# Patient Record
Sex: FEMALE | Race: WHITE | Employment: OTHER | URBAN - METROPOLITAN AREA
[De-identification: names, ages, dates, MRNs, and addresses within clinical notes are randomized per-mention and may not be internally consistent; named-entity substitution may affect disease eponyms.]

---

## 2017-01-04 ENCOUNTER — HOSPITAL ENCOUNTER (OUTPATIENT)
Dept: PHYSICAL THERAPY | Age: 72
Discharge: HOME | End: 2017-01-04
Payer: MEDICARE

## 2017-01-04 PROCEDURE — G8979 MOBILITY GOAL STATUS: HCPCS

## 2017-01-04 PROCEDURE — 97162 PT EVAL MOD COMPLEX 30 MIN: CPT

## 2017-01-04 PROCEDURE — G8978 MOBILITY CURRENT STATUS: HCPCS

## 2017-01-04 PROCEDURE — 97110 THERAPEUTIC EXERCISES: CPT

## 2017-01-04 NOTE — PROGRESS NOTES
Ambulatory/Rehab Services H2 Model Falls Risk Assessment    Risk Factor Pts. ·   Confusion/Disorientation/Impulsivity  []    4 ·   Symptomatic Depression  []   2 ·   Altered Elimination  []   1 ·   Dizziness/Vertigo  []   1 ·   Gender (Male)  []   1 ·   Any administered antiepileptics (anticonvulsants):  []   2 ·   Any administered benzodiazepines:  []   1 ·   Visual Impairment (specify):  []   1 ·   Portable Oxygen Use  []   1 ·   Orthostatic ? BP  []   1 ·   History of Recent Falls (within 3 mos.)  []   5     Ability to Rise from Chair (choose one) Pts. ·   Ability to rise in a single movement  []   0 ·   Pushes up, successful in one attempt  [x]   1 ·   Multiple attempts, but successful  []   3 ·   Unable to rise without assistance  []   4   Total: (5 or greater = High Risk) 1     Falls Prevention Plan:   []                Physical Limitations to Exercise (specify):   []                Mobility Assistance Device (type):   []                Exercise/Equipment Adaptation (specify):    ©2010 [facility] [address] Patent #[id number].  Federal Law prohibits the replication, distribution or use without written permission from [facility] [employer]

## 2017-01-04 NOTE — PROGRESS NOTES
Leonela Thompson  : 1945 [facility] [place] [address], [address] [place] [address]  [phone]   [phone]             OUTPATIENT PHYSICAL THERAPY:Initial Assessment 2017    ICD-10: Treatment Diagnosis: pain in left knee M25.562, Pain in right knee M25.561, Abnormality of Gait R26.89  Precautions/Allergies:   Review of patient's allergies indicates no known allergies. Fall Risk Score: 1 (? 5 = High Risk)  MD Orders: Eval and treat MEDICAL/REFERRING DIAGNOSIS:  Unilateral primary osteoarthritis, right knee [M17.11]  Unilateral primary osteoarthritis, left knee [M17.12]   DATE OF ONSET: chronic  REFERRING PHYSICIAN: José Zuleta., *  RETURN PHYSICIAN APPOINTMENT: TBD     INITIAL ASSESSMENT:  Ms. Sean Brewer presents with severe gait deviations, poor tolerance for gait, decreased glute and quad strength, impaired TKE and end-range extension ROM, decreased tibiofemoral and PF joint mobility and impaired balance and proprioception that limits ability to prolonged ambulate and perform functional activities. Pt would benefit from skilled therapy to address these deficits for return to previous level of function. PROBLEM LIST (Impacting functional limitations):  1. Decreased Strength  2. Decreased ADL/Functional Activities  3. Decreased Transfer Abilities  4. Decreased Ambulation Ability/Technique  5. Decreased Balance  6. Increased Pain  7. Decreased Activity Tolerance  8. Decreased Flexibility/Joint Mobility  9. Decreased Knowledge of Precautions INTERVENTIONS PLANNED:  1. Cold  2. Gait Training  3. Home Exercise Program (HEP)  4. Manual Therapy  5. Range of Motion (ROM)  6. Therapeutic Exercise/Strengthening   TREATMENT PLAN:  Effective Dates: 17 TO 17  Frequency/Duration: 2 times a week for 12 weeks  GOALS: (Goals have been discussed and agreed upon with patient.)  Short-Term Functional Goals: Time Frame: 4 weeks  1.  Pt will be I with HEP to allow for continued progress with symptom management. 2. Pt will ambulate with moderate gait deviations to improve tolerance for gait. Discharge Goals: Time Frame: 8 weeks  1. Pt will improve LEFS score to >47 to reflect an improvement in function. 2. Pt will improve c/o pain to 5/10 to allow for improved tolerance for ambulation. 3. Pt will improve quad and glute strength to 4+/5 for improved strength for functional activities. 4. Pt will demonstrate trace gait deviations with use of single point cane for all distance for improved tolerance for ambulation. Rehabilitation Potential For Stated Goals: Good  Regarding [facility]'s therapy, I certify that the treatment plan above will be carried out by a therapist or under their direction. Thank you for this referral,  Delmy Nicholson, PT     Referring Physician Signature: Noelle Reyez., *              Date                    HISTORY:   History of Present Injury/Illness (Reason for Referral):  Pt is a 73 y.o. Female presenting to PT with bilateral knee pain with TKA scheduled in the next 6 months. Has had chemotherapy last year and feels that her knees worsened during this time period. States that her left knee is worse than her right. Reports that she has trouble with any walking and can only do stairs with a hand rail non-reciprocally. Limited to walking for about 30 minutes before having to take a break. Has a  so she is not having to do heavy house cleaning and states that her knees do not limit her from doing the light work that she does around the house. Difficulty rising from a chair using bilateral UE support. 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 Myron Lyman  has a past medical history of Anxiety; Arthritis; Breast cancer ([facility] [address] (1/18/2016); Breast cancer ([facility] [address] Cancer ([facility] [address] Former smoker; GERD (gastroesophageal reflux disease); Hard of hearing;  Headache; Hypertension; and Insomnia. She also has no past medical history of Adverse effect of anesthesia; Aneurysm ([facility] [address] Arrhythmia; Asthma; Autoimmune disease ([facility] [address] CAD (coronary artery disease); Chronic kidney disease; Chronic obstructive pulmonary disease ([facility] [address] Chronic pain; Coagulation disorder ([facility] [address] Diabetes ([facility] [address] Difficult intubation; Heart failure ([facility] [address] Ill-defined condition; Liver disease; Malignant hyperthermia due to anesthesia; Morbid obesity ([facility] [address] Pseudocholinesterase deficiency; Psychiatric disorder; PUD (peptic ulcer disease); Seizures ([facility] [address] Sleep apnea; Stroke [facility]); Thromboembolus ([facility] [address] Thyroid disease; or Unspecified adverse effect of anesthesia. Ms. Sherif Álvarez  has a past surgical history that includes colonoscopy; wisdom teeth extraction; tonsillectomy (1957); vascular access (Right); breast surgery procedure unlisted (Left, 7/2015); breast biopsy (Left, 1/18/2016); mastectomy, partial (1/18/2016); and breast lumpectomy (Left, 01/2016). Social History/Living Environment:     lives alone  Prior Level of Function/Work/Activity:  Restricted by multi-joint arthritis and effects of chemotherapy and radiation  Previous Treatment Approaches:          History of BRCA  Current Medications:    Current Outpatient Prescriptions:     TURMERIC, BULK,, by Does Not Apply route., Disp: , Rfl:     hydrochlorothiazide (HYDRODIURIL) 25 mg tablet, Take 1 Tab by mouth daily. Indications: HYPERTENSION, Disp: 30 Tab, Rfl: 5    levothyroxine (SYNTHROID) 25 mcg tablet, Take 1 Tab by mouth Daily (before breakfast). , Disp: 30 Tab, Rfl: 5    temazepam (RESTORIL) 15 mg capsule, Take 1 Cap by mouth nightly. Max Daily Amount: 15 mg., Disp: 30 Cap, Rfl: 5    calcium carbonate (TITRALAC) 168 mg (420 mg) chew, Take 1 Tab by mouth., Disp: , Rfl:     multivitamin (ONE A DAY) tablet, Take 1 Tab by mouth daily. , Disp: , Rfl:     esomeprazole (NEXIUM) 20 mg capsule, Take 20 mg by mouth every morning.  Take / use AM day of surgery  per anesthesia protocols. Indications: GASTROESOPHAGEAL REFLUX, Disp: , Rfl:      Date Last Reviewed:  1/4/2017     Number of Personal Factors/Comorbidities that affect the Plan of Care: 1-2: MODERATE COMPLEXITY   EXAMINATION:   Observation/Orthostatic Postural Assessment:     Joint effusion L>R  Knee posture-  Genu valgus L>R  Palpation:          Prominent bony build up in lateral joint line L>R  ROM:    LEFT        Knee flexion- 124 degrees  Knee extension-  4 degrees    RIGHT  Knee flexion- 130 degrees  Knee extension-  5 degrees    Strength:    LEFT  Knee flexion- 4-/5   Knee extension-  4-/5   Hip Ext- 4-/5   Hip Flexion-  4-/5   Hip ER- 4-/5   Hip IR- 4-/5   Hip ABD- 4-/5     RIGHT  Knee flexion- 4/5   Knee extension-  4/5   Hip Ext- 4/5   Hip Flexion-  4/5   Hip ER- 4/5   Hip IR- 4/5   Hip ABD- 4/5       Functional Mobility:         Gait/Ambulation: Stiff knee with hip hike bilaterally, L trendelenberg, slow and antalgic        Transfers:  Use of B UE for sit-to-stand        Bed Mobility:  WFL        Stairs:  Non-reciprocally relying on B UE for support with hand-rail  Balance:          SLS:  L- 1 sec, R 3 sec  Skin Integrity:          normal   Body Structures Involved:  1. Bones  2. Joints  3. Muscles  4. Ligaments Body Functions Affected:  1. Sensory/Pain  2. Neuromusculoskeletal  3. Movement Related Activities and Participation Affected:  1. Learning and Applying Knowledge  2. General Tasks and Demands  3. Mobility  4. Self Care   Number of elements that affect the Plan of Care: 3: MODERATE COMPLEXITY   CLINICAL PRESENTATION:   Presentation: Evolving clinical presentation with changing clinical characteristics: MODERATE COMPLEXITY   CLINICAL DECISION MAKING:   Outcome Measure:    Tool Used: Lower Extremity Functional Scale (LEFS)  Score:  Initial: 35/80 Most Recent: X/80 (Date: -- )   Interpretation of Score: 20 questions each scored on a 5 point scale with 0 representing \"extreme difficulty or unable to perform\" and 4 representing \"no difficulty\". The lower the score, the greater the functional disability. 80/80 represents no disability. Minimal detectable change is 9 points. Score 80 79-63 62-48 47-32 31-16 15-1 0   Modifier CH CI CJ CK CL CM CN     ? Mobility - Walking and Moving Around:     - CURRENT STATUS: CK - 40%-59% impaired, limited or restricted    - GOAL STATUS: CJ - 20%-39% impaired, limited or restricted    - D/C STATUS:  ---------------To be determined---------------    Medical Necessity:   · Patient is expected to demonstrate progress in strength, range of motion and balance to improve ambulation. Reason for Services/Other Comments:  · Patient continues to require present interventions due to patient's inability to prolonged ambulate. Use of outcome tool(s) and clinical judgement create a POC that gives a: Questionable prediction of patient's progress: MODERATE COMPLEXITY   TREATMENT:   (In addition to Assessment/Re-Assessment sessions the following treatments were rendered)  THERAPEUTIC EXERCISE: (14 minutes):  Exercises per grid below to improve mobility, strength and balance. Required moderate visual, verbal and manual cues to promote proper body alignment, promote proper body posture and promote proper body mechanics. Progressed resistance, range, repetitions and complexity of movement as indicated. Date:  01-04-17 Date:   Date:     Activity/Exercise Parameters Parameters Parameters   SLR 10 reps     Standing hip ABD 20 reps     SLS 3 min     March with cane 5 min                         MANUAL THERAPY: (15 minutes): Joint mobilization and Soft tissue mobilization was utilized and necessary because of the patient's restricted joint motion, painful spasm, loss of articular motion and restricted motion of soft tissue.         Treatment/Session Assessment:  Pt would benefit from skilled therapy to address the aforementioned deficits that limit functional ability to community ambulate. · Pre-treatment Symptoms:  Bilateral knee pain and difficulty walking  · Pain: Initial:  Pain Intensity 1:  (4-9/10)  Post Session:  4/10 ·   Compliance with Program/Exercises: Will assess as treatment progresses. · Recommendations/Intent for next treatment session: \"Next visit will focus on advancements to more challenging activities\".   Total Treatment Duration:  PT Patient Time In/Time Out  Time In: 0959  Time Out: [address] [facility]

## 2017-01-09 ENCOUNTER — HOSPITAL ENCOUNTER (OUTPATIENT)
Dept: PHYSICAL THERAPY | Age: 72
Discharge: HOME | End: 2017-01-09
Payer: MEDICARE

## 2017-01-11 ENCOUNTER — HOSPITAL ENCOUNTER (OUTPATIENT)
Dept: PHYSICAL THERAPY | Age: 72
Discharge: HOME | End: 2017-01-11
Payer: MEDICARE

## 2017-01-11 PROCEDURE — 97140 MANUAL THERAPY 1/> REGIONS: CPT

## 2017-01-11 PROCEDURE — 97110 THERAPEUTIC EXERCISES: CPT

## 2017-01-11 NOTE — PROGRESS NOTES
eDz Swanson  : 1945 [facility] [address] [place] [address], [place] [address].  [phone]   [phone]             OUTPATIENT PHYSICAL THERAPY:Daily Note 2017    ICD-10: Treatment Diagnosis: pain in left knee M25.562, Pain in right knee M25.561, Abnormality of Gait R26.89  Precautions/Allergies:   Review of patient's allergies indicates no known allergies. Fall Risk Score: 1 (? 5 = High Risk)  MD Orders: Eval and treat MEDICAL/REFERRING DIAGNOSIS:  Unilateral primary osteoarthritis, right knee [M17.11]  Unilateral primary osteoarthritis, left knee [M17.12]   DATE OF ONSET: chronic  REFERRING PHYSICIAN: Thang Pradhan, *  RETURN PHYSICIAN APPOINTMENT: TBD     INITIAL ASSESSMENT:  Ms. Sherif Álvarez presents with severe gait deviations, poor tolerance for gait, decreased glute and quad strength, impaired TKE and end-range extension ROM, decreased tibiofemoral and PF joint mobility and impaired balance and proprioception that limits ability to prolonged ambulate and perform functional activities. Pt would benefit from skilled therapy to address these deficits for return to previous level of function. PROBLEM LIST (Impacting functional limitations):  1. Decreased Strength  2. Decreased ADL/Functional Activities  3. Decreased Transfer Abilities  4. Decreased Ambulation Ability/Technique  5. Decreased Balance  6. Increased Pain  7. Decreased Activity Tolerance  8. Decreased Flexibility/Joint Mobility  9. Decreased Knowledge of Precautions INTERVENTIONS PLANNED:  1. Cold  2. Gait Training  3. Home Exercise Program (HEP)  4. Manual Therapy  5. Range of Motion (ROM)  6. Therapeutic Exercise/Strengthening   TREATMENT PLAN:  Effective Dates: 17 TO 17  Frequency/Duration: 2 times a week for 12 weeks  GOALS: (Goals have been discussed and agreed upon with patient.)  Short-Term Functional Goals: Time Frame: 4 weeks  1.  Pt will be I with HEP to allow for continued progress with symptom management. 2. Pt will ambulate with moderate gait deviations to improve tolerance for gait. Discharge Goals: Time Frame: 8 weeks  1. Pt will improve LEFS score to >47 to reflect an improvement in function. 2. Pt will improve c/o pain to 5/10 to allow for improved tolerance for ambulation. 3. Pt will improve quad and glute strength to 4+/5 for improved strength for functional activities. 4. Pt will demonstrate trace gait deviations with use of single point cane for all distance for improved tolerance for ambulation. Rehabilitation Potential For Stated Goals: Good  Regarding [facility]'s therapy, I certify that the treatment plan above will be carried out by a therapist or under their direction. Thank you for this referral,  Berhane Ortiz, PT     Referring Physician Signature: Tonnie Nageotte., *              Date                    HISTORY:   1/11/2017: Pt rates her current knee pain 5/10 L>R. History of Present Injury/Illness (Reason for Referral):  Pt is a 73 y.o. Female presenting to PT with bilateral knee pain with TKA scheduled in the next 6 months. Has had chemotherapy last year and feels that her knees worsened during this time period. States that her left knee is worse than her right. Reports that she has trouble with any walking and can only do stairs with a hand rail non-reciprocally. Limited to walking for about 30 minutes before having to take a break. Has a  so she is not having to do heavy house cleaning and states that her knees do not limit her from doing the light work that she does around the house. Difficulty rising from a chair using bilateral UE support. 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 Petr Chatman  has a past medical history of Anxiety; Arthritis; Breast cancer ([facility] [address] (1/18/2016); Breast cancer ([facility] [address] Cancer ([facility] [address]  Former smoker; GERD (gastroesophageal reflux disease); Hard of hearing; Headache; Hypertension; and Insomnia. She also has no past medical history of Adverse effect of anesthesia; Aneurysm ([facility] [address] Arrhythmia; Asthma; Autoimmune disease ([facility] [address] CAD (coronary artery disease); Chronic kidney disease; Chronic obstructive pulmonary disease ([facility] [address] Chronic pain; Coagulation disorder ([facility] [address] Diabetes ([facility] [address] Difficult intubation; Heart failure ([facility] [address] Ill-defined condition; Liver disease; Malignant hyperthermia due to anesthesia; Morbid obesity ([facility] [address] Pseudocholinesterase deficiency; Psychiatric disorder; PUD (peptic ulcer disease); Seizures ([facility] [address] Sleep apnea; Stroke [facility]); Thromboembolus ([facility] [address] Thyroid disease; or Unspecified adverse effect of anesthesia. Ms. Stefan Arevalo  has a past surgical history that includes colonoscopy; wisdom teeth extraction; tonsillectomy (1957); vascular access (Right); breast surgery procedure unlisted (Left, 7/2015); breast biopsy (Left, 1/18/2016); mastectomy, partial (1/18/2016); and breast lumpectomy (Left, 01/2016). Social History/Living Environment:     lives alone  Prior Level of Function/Work/Activity:  Restricted by multi-joint arthritis and effects of chemotherapy and radiation  Previous Treatment Approaches:          History of BRCA  Current Medications:    Current Outpatient Prescriptions:     celecoxib (CELEBREX) 200 mg capsule, TAKE ONE CAPSULE BY MOUTH ONE TIME DAILY, Disp: , Rfl: 3    TURMERIC, BULK,, by Does Not Apply route., Disp: , Rfl:     hydrochlorothiazide (HYDRODIURIL) 25 mg tablet, Take 1 Tab by mouth daily. Indications: HYPERTENSION, Disp: 30 Tab, Rfl: 5    levothyroxine (SYNTHROID) 25 mcg tablet, Take 1 Tab by mouth Daily (before breakfast). , Disp: 30 Tab, Rfl: 5    temazepam (RESTORIL) 15 mg capsule, Take 1 Cap by mouth nightly.  Max Daily Amount: 15 mg., Disp: 30 Cap, Rfl: 5    calcium carbonate (TITRALAC) 168 mg (420 mg) chew, Take 1 Tab by mouth., Disp: , Rfl:     multivitamin (ONE A DAY) tablet, Take 1 Tab by mouth daily. , Disp: , Rfl:     esomeprazole (NEXIUM) 20 mg capsule, Take 20 mg by mouth every morning. Take / use AM day of surgery  per anesthesia protocols. Indications: GASTROESOPHAGEAL REFLUX, Disp: , Rfl:      Date Last Reviewed:  1/11/2017     Number of Personal Factors/Comorbidities that affect the Plan of Care: 1-2: MODERATE COMPLEXITY   EXAMINATION:   Observation/Orthostatic Postural Assessment:     Joint effusion L>R  Knee posture-  Genu valgus L>R  Palpation:          Prominent bony build up in lateral joint line L>R  ROM:    LEFT        Knee flexion- 124 degrees  Knee extension-  4 degrees    RIGHT  Knee flexion- 130 degrees  Knee extension-  5 degrees    Strength:    LEFT  Knee flexion- 4-/5   Knee extension-  4-/5   Hip Ext- 4-/5   Hip Flexion-  4-/5   Hip ER- 4-/5   Hip IR- 4-/5   Hip ABD- 4-/5     RIGHT  Knee flexion- 4/5   Knee extension-  4/5   Hip Ext- 4/5   Hip Flexion-  4/5   Hip ER- 4/5   Hip IR- 4/5   Hip ABD- 4/5       Functional Mobility:         Gait/Ambulation: Stiff knee with hip hike bilaterally, L trendelenberg, slow and antalgic        Transfers:  Use of B UE for sit-to-stand        Bed Mobility:  WFL        Stairs:  Non-reciprocally relying on B UE for support with hand-rail  Balance:          SLS:  L- 1 sec, R 3 sec  Skin Integrity:          normal   Body Structures Involved:  1. Bones  2. Joints  3. Muscles  4. Ligaments Body Functions Affected:  1. Sensory/Pain  2. Neuromusculoskeletal  3. Movement Related Activities and Participation Affected:  1. Learning and Applying Knowledge  2. General Tasks and Demands  3. Mobility  4. Self Care   Number of elements that affect the Plan of Care: 3: MODERATE COMPLEXITY   CLINICAL PRESENTATION:   Presentation: Evolving clinical presentation with changing clinical characteristics: MODERATE COMPLEXITY   CLINICAL DECISION MAKING:   Outcome Measure:    Tool Used: Lower Extremity Functional Scale (LEFS)  Score:  Initial: 35/80 Most Recent: X/80 (Date: -- )   Interpretation of Score: 20 questions each scored on a 5 point scale with 0 representing \"extreme difficulty or unable to perform\" and 4 representing \"no difficulty\". The lower the score, the greater the functional disability. 80/80 represents no disability. Minimal detectable change is 9 points. Score 80 79-63 62-48 47-32 31-16 15-1 0   Modifier CH CI CJ CK CL CM CN     ? Mobility - Walking and Moving Around:     - CURRENT STATUS: CK - 40%-59% impaired, limited or restricted    - GOAL STATUS: CJ - 20%-39% impaired, limited or restricted    - D/C STATUS:  ---------------To be determined---------------    Medical Necessity:   · Patient is expected to demonstrate progress in strength, range of motion and balance to improve ambulation. Reason for Services/Other Comments:  · Patient continues to require present interventions due to patient's inability to prolonged ambulate. Use of outcome tool(s) and clinical judgement create a POC that gives a: Questionable prediction of patient's progress: MODERATE COMPLEXITY   TREATMENT:   (In addition to Assessment/Re-Assessment sessions the following treatments were rendered)  THERAPEUTIC EXERCISE: (35 minutes):  Exercises per grid below to improve mobility, strength and balance. Required moderate visual, verbal and manual cues to promote proper body alignment, promote proper body posture and promote proper body mechanics. Progressed resistance, range, repetitions and complexity of movement as indicated.    Date:  01-04-17 Date:  01-11-17 Date:     Activity/Exercise Parameters Parameters Parameters   SLR 10 reps 15 reps  B LE's    Standing hip ABD 20 reps     SLS 3 min     March with cane 5 min     NuStep  Level 3  8 minutes    Standing Heel/Toe Raises  20 reps each    Step-Ups  6 inch step  10 reps each LE    Gastroc Slantboard  3 reps  20 sec holds    LAQ's  15 reps  B LE's    SAQ's  15 reps  B LE's    Gait Training with Cane  5 minutes MANUAL THERAPY: (10 minutes): Joint mobilization and Soft tissue mobilization was utilized and necessary because of the patient's restricted joint motion, painful spasm, loss of articular motion and restricted motion of soft tissue. L knee       Treatment/Session Assessment:  Pt denied pain at end of session. · Pre-treatment Symptoms:  Bilateral knee pain and difficulty walking  · Pain: Initial:   5/10  Post Session:  0/10 ·   Compliance with Program/Exercises: Will assess as treatment progresses. · Recommendations/Intent for next treatment session: \"Next visit will focus on advancements to more challenging activities\".   Total Treatment Duration:  PT Patient Time In/Time Out  Time In: 0940  Time Out: [address], PT

## 2017-01-13 ENCOUNTER — HOSPITAL ENCOUNTER (OUTPATIENT)
Dept: PHYSICAL THERAPY | Age: 72
Discharge: HOME | End: 2017-01-13
Payer: MEDICARE

## 2017-01-16 ENCOUNTER — HOSPITAL ENCOUNTER (OUTPATIENT)
Dept: PHYSICAL THERAPY | Age: 72
Discharge: HOME | End: 2017-01-16
Payer: MEDICARE

## 2017-01-16 PROCEDURE — 97110 THERAPEUTIC EXERCISES: CPT

## 2017-01-16 PROCEDURE — 97140 MANUAL THERAPY 1/> REGIONS: CPT

## 2017-01-16 NOTE — PROGRESS NOTES
Cresencio Pereira  : 1945 [facility] [place] [address],[address] [facility], [facility].  [phone]   [phone]             OUTPATIENT PHYSICAL THERAPY:Daily Note 2017    ICD-10: Treatment Diagnosis: pain in left knee M25.562, Pain in right knee M25.561, Abnormality of Gait R26.89  Precautions/Allergies:   Review of patient's allergies indicates no known allergies. Fall Risk Score: 1 (? 5 = High Risk)  MD Orders: Eval and treat MEDICAL/REFERRING DIAGNOSIS:  Unilateral primary osteoarthritis, right knee [M17.11]  Unilateral primary osteoarthritis, left knee [M17.12]   DATE OF ONSET: chronic  REFERRING PHYSICIAN: Dorene Gutierrez., *  RETURN PHYSICIAN APPOINTMENT: TBD     INITIAL ASSESSMENT:  Ms. Protia Bean presents with severe gait deviations, poor tolerance for gait, decreased glute and quad strength, impaired TKE and end-range extension ROM, decreased tibiofemoral and PF joint mobility and impaired balance and proprioception that limits ability to prolonged ambulate and perform functional activities. Pt would benefit from skilled therapy to address these deficits for return to previous level of function. PROBLEM LIST (Impacting functional limitations):  1. Decreased Strength  2. Decreased ADL/Functional Activities  3. Decreased Transfer Abilities  4. Decreased Ambulation Ability/Technique  5. Decreased Balance  6. Increased Pain  7. Decreased Activity Tolerance  8. Decreased Flexibility/Joint Mobility  9. Decreased Knowledge of Precautions INTERVENTIONS PLANNED:  1. Cold  2. Gait Training  3. Home Exercise Program (HEP)  4. Manual Therapy  5. Range of Motion (ROM)  6. Therapeutic Exercise/Strengthening   TREATMENT PLAN:  Effective Dates: 17 TO 17  Frequency/Duration: 2 times a week for 12 weeks  GOALS: (Goals have been discussed and agreed upon with patient.)  Short-Term Functional Goals: Time Frame: 4 weeks  1.  Pt will be I with HEP to allow for continued progress with symptom management. 2. Pt will ambulate with moderate gait deviations to improve tolerance for gait. Discharge Goals: Time Frame: 8 weeks  1. Pt will improve LEFS score to >47 to reflect an improvement in function. 2. Pt will improve c/o pain to 5/10 to allow for improved tolerance for ambulation. 3. Pt will improve quad and glute strength to 4+/5 for improved strength for functional activities. 4. Pt will demonstrate trace gait deviations with use of single point cane for all distance for improved tolerance for ambulation. Rehabilitation Potential For Stated Goals: Good  Regarding Norberto Rust's therapy, I certify that the treatment plan above will be carried out by a therapist or under their direction. Thank you for this referral,  Genesis Nixon, PT     Referring Physician Signature: Hermilo Montes De Oca., *              Date                    HISTORY:   1/16/2017: Pt states she walked around [place] about 3 miles on Saturday and rates her current knee pain 9/10. She states she is also having pain in her low back today. History of Present Injury/Illness (Reason for Referral):  Pt is a 73 y.o. Female presenting to PT with bilateral knee pain with TKA scheduled in the next 6 months. Has had chemotherapy last year and feels that her knees worsened during this time period. States that her left knee is worse than her right. Reports that she has trouble with any walking and can only do stairs with a hand rail non-reciprocally. Limited to walking for about 30 minutes before having to take a break. Has a  so she is not having to do heavy house cleaning and states that her knees do not limit her from doing the light work that she does around the house. Difficulty rising from a chair using bilateral UE support. Enjoys doing yoga. Retired hairdresser.   Past Medical History/Comorbidities:   Ms. Juani Henriquez  has a past medical history of Anxiety; Arthritis; Breast cancer ([facility] [address] (1/18/2016); Breast cancer ([facility] [address] Cancer ([facility] [address] Former smoker; GERD (gastroesophageal reflux disease); Hard of hearing; Headache; Hypertension; and Insomnia. She also has no past medical history of Adverse effect of anesthesia; Aneurysm ([facility] [address] [facility] Arrhythmia; Asthma; Autoimmune disease ([facility] [address] [facility] CAD (coronary artery disease); Chronic kidney disease; Chronic obstructive pulmonary disease ([facility] [address] [facility] Chronic pain; Coagulation disorder ([facility] [address] [facility] Diabetes ([facility] [address] [facility] Difficult intubation; Heart failure ([facility] [address] [facility] Ill-defined condition; Liver disease; Malignant hyperthermia due to anesthesia; Morbid obesity ([facility] [facility] Pseudocholinesterase deficiency; Psychiatric disorder; PUD (peptic ulcer disease); Seizures ([facility] [address] [facility] Sleep apnea; Stroke [facility]); Thromboembolus ([facility] [address] [facility] Thyroid disease; or Unspecified adverse effect of anesthesia. Ms. Luis Ballard  has a past surgical history that includes colonoscopy; wisdom teeth extraction; tonsillectomy (1957); vascular access (Right); breast surgery procedure unlisted (Left, 7/2015); breast biopsy (Left, 1/18/2016); mastectomy, partial (1/18/2016); and breast lumpectomy (Left, 01/2016). Social History/Living Environment:     lives alone  Prior Level of Function/Work/Activity:  Restricted by multi-joint arthritis and effects of chemotherapy and radiation  Previous Treatment Approaches:          History of BRCA  Current Medications:    Current Outpatient Prescriptions:     celecoxib (CELEBREX) 200 mg capsule, TAKE ONE CAPSULE BY MOUTH ONE TIME DAILY, Disp: , Rfl: 3    TURMERIC, BULK,, by Does Not Apply route., Disp: , Rfl:     hydrochlorothiazide (HYDRODIURIL) 25 mg tablet, Take 1 Tab by mouth daily. Indications: HYPERTENSION, Disp: 30 Tab, Rfl: 5    levothyroxine (SYNTHROID) 25 mcg tablet, Take 1 Tab by mouth Daily (before breakfast). , Disp: 30 Tab, Rfl: 5    temazepam (RESTORIL) 15 mg capsule, Take 1 Cap by mouth nightly.  Max Daily Amount: 15 mg., Disp: 30 Cap, Rfl: 5    calcium carbonate (TITRALAC) 168 mg (420 mg) chew, Take 1 Tab by mouth., Disp: , Rfl:     multivitamin (ONE A DAY) tablet, Take 1 Tab by mouth daily. , Disp: , Rfl:     esomeprazole (NEXIUM) 20 mg capsule, Take 20 mg by mouth every morning. Take / use AM day of surgery  per anesthesia protocols. Indications: GASTROESOPHAGEAL REFLUX, Disp: , Rfl:      Date Last Reviewed:  1/16/2017     Number of Personal Factors/Comorbidities that affect the Plan of Care: 1-2: MODERATE COMPLEXITY   EXAMINATION:   Observation/Orthostatic Postural Assessment:     Joint effusion L>R  Knee posture-  Genu valgus L>R  Palpation:          Prominent bony build up in lateral joint line L>R  ROM:    LEFT        Knee flexion- 124 degrees  Knee extension-  4 degrees    RIGHT  Knee flexion- 130 degrees  Knee extension-  5 degrees    Strength:    LEFT  Knee flexion- 4-/5   Knee extension-  4-/5   Hip Ext- 4-/5   Hip Flexion-  4-/5   Hip ER- 4-/5   Hip IR- 4-/5   Hip ABD- 4-/5     RIGHT  Knee flexion- 4/5   Knee extension-  4/5   Hip Ext- 4/5   Hip Flexion-  4/5   Hip ER- 4/5   Hip IR- 4/5   Hip ABD- 4/5       Functional Mobility:         Gait/Ambulation: Stiff knee with hip hike bilaterally, L trendelenberg, slow and antalgic        Transfers:  Use of B UE for sit-to-stand        Bed Mobility:  WFL        Stairs:  Non-reciprocally relying on B UE for support with hand-rail  Balance:          SLS:  L- 1 sec, R 3 sec  Skin Integrity:          normal   Body Structures Involved:  1. Bones  2. Joints  3. Muscles  4. Ligaments Body Functions Affected:  1. Sensory/Pain  2. Neuromusculoskeletal  3. Movement Related Activities and Participation Affected:  1. Learning and Applying Knowledge  2. General Tasks and Demands  3. Mobility  4.  Self Care   Number of elements that affect the Plan of Care: 3: MODERATE COMPLEXITY   CLINICAL PRESENTATION:   Presentation: Evolving clinical presentation with changing clinical characteristics: MODERATE COMPLEXITY   CLINICAL DECISION MAKING:   Outcome Measure: Tool Used: Lower Extremity Functional Scale (LEFS)  Score:  Initial: 35/80 Most Recent: X/80 (Date: -- )   Interpretation of Score: 20 questions each scored on a 5 point scale with 0 representing \"extreme difficulty or unable to perform\" and 4 representing \"no difficulty\". The lower the score, the greater the functional disability. 80/80 represents no disability. Minimal detectable change is 9 points. Score 80 79-63 62-48 47-32 31-16 15-1 0   Modifier CH CI CJ CK CL CM CN     ? Mobility - Walking and Moving Around:     - CURRENT STATUS: CK - 40%-59% impaired, limited or restricted    - GOAL STATUS: CJ - 20%-39% impaired, limited or restricted    - D/C STATUS:  ---------------To be determined---------------    Medical Necessity:   · Patient is expected to demonstrate progress in strength, range of motion and balance to improve ambulation. Reason for Services/Other Comments:  · Patient continues to require present interventions due to patient's inability to prolonged ambulate. Use of outcome tool(s) and clinical judgement create a POC that gives a: Questionable prediction of patient's progress: MODERATE COMPLEXITY   TREATMENT:   (In addition to Assessment/Re-Assessment sessions the following treatments were rendered)  THERAPEUTIC EXERCISE: (30 minutes):  Exercises per grid below to improve mobility, strength and balance. Required moderate visual, verbal and manual cues to promote proper body alignment, promote proper body posture and promote proper body mechanics. Progressed resistance, range, repetitions and complexity of movement as indicated.    Date:  01-04-17 Date:  01-11-17 Date:  01-16-17   Activity/Exercise Parameters Parameters Parameters   SLR 10 reps 15 reps  B LE's 20 reps  B LE's   Standing hip ABD 20 reps     SLS 3 min     March with cane 5 min     NuStep  Level 3  8 minutes Level 3  8 minutes   Standing Heel/Toe Raises  20 reps each 20 reps each   Step-Ups  6 inch step  10 reps each LE 6 inch step  15 reps each LE   Gastroc Slantboard  3 reps  20 sec holds 3 reps  20 sec holds   LAQ's  15 reps  B LE's 20 reps  B LE's   SAQ's  15 reps  B LE's 20 reps  B LE's   Gait Training with Cane  5 minutes 5 minutes     MANUAL THERAPY: (10 minutes): Joint mobilization and Soft tissue mobilization was utilized and necessary because of the patient's restricted joint motion, painful spasm, loss of articular motion and restricted motion of soft tissue. L knee     Modalities: Cold pack to bilateral knees x10 minutes to decrease pain. Skin clear afterwards. Treatment/Session Assessment:  Pt tolerated treatments fairly today. Did not progress exercises due to increased pain from excessive walking this weekend. She stated her knees felt much better after session today. · Pre-treatment Symptoms:  Bilateral knee pain and difficulty walking  · Pain: Initial:   9/10  Post Session:  2/10 ·   Compliance with Program/Exercises: Will assess as treatment progresses. · Recommendations/Intent for next treatment session: \"Next visit will focus on advancements to more challenging activities\".   Total Treatment Duration:  50 minutes  PT Patient Time In/Time Out  Time In: 1030  Time Out: Miguel [address] Rvias, PT

## 2017-01-18 ENCOUNTER — HOSPITAL ENCOUNTER (OUTPATIENT)
Dept: PHYSICAL THERAPY | Age: 72
Discharge: HOME | End: 2017-01-18
Payer: MEDICARE

## 2017-01-18 PROCEDURE — 97110 THERAPEUTIC EXERCISES: CPT

## 2017-01-18 PROCEDURE — 97140 MANUAL THERAPY 1/> REGIONS: CPT

## 2017-01-18 NOTE — PROGRESS NOTES
Cielo Ng  : 1945 [facility] [address] [place] [address],[address], [facility] [address]  [phone]   [phone]             OUTPATIENT PHYSICAL THERAPY:Daily Note 2017    ICD-10: Treatment Diagnosis: pain in left knee M25.562, Pain in right knee M25.561, Abnormality of Gait R26.89  Precautions/Allergies:   Review of patient's allergies indicates no known allergies. Fall Risk Score: 1 (? 5 = High Risk)  MD Orders: Eval and treat MEDICAL/REFERRING DIAGNOSIS:  Unilateral primary osteoarthritis, right knee [M17.11]  Unilateral primary osteoarthritis, left knee [M17.12]   DATE OF ONSET: chronic  REFERRING PHYSICIAN: Bob Winters., *  RETURN PHYSICIAN APPOINTMENT: TBD     INITIAL ASSESSMENT:  Ms. Angel King presents with severe gait deviations, poor tolerance for gait, decreased glute and quad strength, impaired TKE and end-range extension ROM, decreased tibiofemoral and PF joint mobility and impaired balance and proprioception that limits ability to prolonged ambulate and perform functional activities. Pt would benefit from skilled therapy to address these deficits for return to previous level of function. PROBLEM LIST (Impacting functional limitations):  1. Decreased Strength  2. Decreased ADL/Functional Activities  3. Decreased Transfer Abilities  4. Decreased Ambulation Ability/Technique  5. Decreased Balance  6. Increased Pain  7. Decreased Activity Tolerance  8. Decreased Flexibility/Joint Mobility  9. Decreased Knowledge of Precautions INTERVENTIONS PLANNED:  1. Cold  2. Gait Training  3. Home Exercise Program (HEP)  4. Manual Therapy  5. Range of Motion (ROM)  6. Therapeutic Exercise/Strengthening   TREATMENT PLAN:  Effective Dates: 17 TO 17  Frequency/Duration: 2 times a week for 12 weeks  GOALS: (Goals have been discussed and agreed upon with patient.)  Short-Term Functional Goals: Time Frame: 4 weeks  1.  Pt will be I with HEP to allow for continued progress with symptom management. 2. Pt will ambulate with moderate gait deviations to improve tolerance for gait. Discharge Goals: Time Frame: 8 weeks  1. Pt will improve LEFS score to >47 to reflect an improvement in function. 2. Pt will improve c/o pain to 5/10 to allow for improved tolerance for ambulation. 3. Pt will improve quad and glute strength to 4+/5 for improved strength for functional activities. 4. Pt will demonstrate trace gait deviations with use of single point cane for all distance for improved tolerance for ambulation. Rehabilitation Potential For Stated Goals: Good  Regarding [facility]'s therapy, I certify that the treatment plan above will be carried out by a therapist or under their direction. Thank you for this referral,  Delmy Nicholson, PT     Referring Physician Signature: Noelle Reyez., *              Date                    HISTORY:   1/18/2017: Pt reports that her knees are much better today because she has not walked as much and because it is not raining. History of Present Injury/Illness (Reason for Referral):  Pt is a 73 y.o. Female presenting to PT with bilateral knee pain with TKA scheduled in the next 6 months. Has had chemotherapy last year and feels that her knees worsened during this time period. States that her left knee is worse than her right. Reports that she has trouble with any walking and can only do stairs with a hand rail non-reciprocally. Limited to walking for about 30 minutes before having to take a break. Has a  so she is not having to do heavy house cleaning and states that her knees do not limit her from doing the light work that she does around the house. Difficulty rising from a chair using bilateral UE support. 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 Myron Lyman  has a past medical history of Anxiety;  Arthritis; Breast cancer ([facility] [address] (1/18/2016); Breast cancer ([facility] [address] Cancer ([facility] [address] Former smoker; GERD (gastroesophageal reflux disease); Hard of hearing; Headache; Hypertension; and Insomnia. She also has no past medical history of Adverse effect of anesthesia; Aneurysm ([facility] [address] Arrhythmia; Asthma; Autoimmune disease ([facility] [address] CAD (coronary artery disease); Chronic kidney disease; Chronic obstructive pulmonary disease ([facility] [address] Chronic pain; Coagulation disorder ([facility] [address] Diabetes ([facility] [address] Difficult intubation; Heart failure ([facility] [address] Ill-defined condition; Liver disease; Malignant hyperthermia due to anesthesia; Morbid obesity ([facility] [address] Pseudocholinesterase deficiency; Psychiatric disorder; PUD (peptic ulcer disease); Seizures ([facility] [address] Sleep apnea; Stroke [facility]); Thromboembolus ([facility] [address] Thyroid disease; or Unspecified adverse effect of anesthesia. Ms. Siomara Cabrera  has a past surgical history that includes colonoscopy; wisdom teeth extraction; tonsillectomy (1957); vascular access (Right); breast surgery procedure unlisted (Left, 7/2015); breast biopsy (Left, 1/18/2016); mastectomy, partial (1/18/2016); and breast lumpectomy (Left, 01/2016). Social History/Living Environment:     lives alone  Prior Level of Function/Work/Activity:  Restricted by multi-joint arthritis and effects of chemotherapy and radiation  Previous Treatment Approaches:          History of BRCA  Current Medications:    Current Outpatient Prescriptions:     temazepam (RESTORIL) 15 mg capsule, Take 1 Cap by mouth nightly. Max Daily Amount: 15 mg., Disp: 30 Cap, Rfl: 5    celecoxib (CELEBREX) 200 mg capsule, TAKE ONE CAPSULE BY MOUTH ONE TIME DAILY, Disp: , Rfl: 3    TURMERIC, BULK,, by Does Not Apply route., Disp: , Rfl:     hydrochlorothiazide (HYDRODIURIL) 25 mg tablet, Take 1 Tab by mouth daily. Indications: HYPERTENSION, Disp: 30 Tab, Rfl: 5    levothyroxine (SYNTHROID) 25 mcg tablet, Take 1 Tab by mouth Daily (before breakfast). , Disp: 30 Tab, Rfl: 5    calcium carbonate (TITRALAC) 168 mg (420 mg) chew, Take 1 Tab by mouth., Disp: , Rfl:     multivitamin (ONE A DAY) tablet, Take 1 Tab by mouth daily. , Disp: , Rfl:     esomeprazole (NEXIUM) 20 mg capsule, Take 20 mg by mouth every morning. Take / use AM day of surgery  per anesthesia protocols. Indications: GASTROESOPHAGEAL REFLUX, Disp: , Rfl:      Date Last Reviewed:  1/18/2017     Number of Personal Factors/Comorbidities that affect the Plan of Care: 1-2: MODERATE COMPLEXITY   EXAMINATION:   Observation/Orthostatic Postural Assessment:     Joint effusion L>R  Knee posture-  Genu valgus L>R  Palpation:          Prominent bony build up in lateral joint line L>R  ROM:    LEFT        Knee flexion- 124 degrees  Knee extension-  4 degrees    RIGHT  Knee flexion- 130 degrees  Knee extension-  5 degrees    Strength:    LEFT  Knee flexion- 4-/5   Knee extension-  4-/5   Hip Ext- 4-/5   Hip Flexion-  4-/5   Hip ER- 4-/5   Hip IR- 4-/5   Hip ABD- 4-/5     RIGHT  Knee flexion- 4/5   Knee extension-  4/5   Hip Ext- 4/5   Hip Flexion-  4/5   Hip ER- 4/5   Hip IR- 4/5   Hip ABD- 4/5       Functional Mobility:         Gait/Ambulation: Stiff knee with hip hike bilaterally, L trendelenberg, slow and antalgic        Transfers:  Use of B UE for sit-to-stand        Bed Mobility:  WFL        Stairs:  Non-reciprocally relying on B UE for support with hand-rail  Balance:          SLS:  L- 1 sec, R 3 sec  Skin Integrity:          normal   Body Structures Involved:  1. Bones  2. Joints  3. Muscles  4. Ligaments Body Functions Affected:  1. Sensory/Pain  2. Neuromusculoskeletal  3. Movement Related Activities and Participation Affected:  1. Learning and Applying Knowledge  2. General Tasks and Demands  3. Mobility  4. Self Care   Number of elements that affect the Plan of Care: 3: MODERATE COMPLEXITY   CLINICAL PRESENTATION:   Presentation: Evolving clinical presentation with changing clinical characteristics: MODERATE COMPLEXITY   CLINICAL DECISION MAKING:   Outcome Measure:    Tool Used: Lower Extremity Functional Scale (LEFS)  Score:  Initial: 35/80 Most Recent: X/80 (Date: -- )   Interpretation of Score: 20 questions each scored on a 5 point scale with 0 representing \"extreme difficulty or unable to perform\" and 4 representing \"no difficulty\". The lower the score, the greater the functional disability. 80/80 represents no disability. Minimal detectable change is 9 points. Score 80 79-63 62-48 47-32 31-16 15-1 0   Modifier CH CI CJ CK CL CM CN     ? Mobility - Walking and Moving Around:     - CURRENT STATUS: CK - 40%-59% impaired, limited or restricted    - GOAL STATUS: CJ - 20%-39% impaired, limited or restricted    - D/C STATUS:  ---------------To be determined---------------    Medical Necessity:   · Patient is expected to demonstrate progress in strength, range of motion and balance to improve ambulation. Reason for Services/Other Comments:  · Patient continues to require present interventions due to patient's inability to prolonged ambulate. Use of outcome tool(s) and clinical judgement create a POC that gives a: Questionable prediction of patient's progress: MODERATE COMPLEXITY   TREATMENT:   (In addition to Assessment/Re-Assessment sessions the following treatments were rendered)  THERAPEUTIC EXERCISE: (25 minutes):  Exercises per grid below to improve mobility, strength and balance. Required moderate visual, verbal and manual cues to promote proper body alignment, promote proper body posture and promote proper body mechanics. Progressed resistance, range, repetitions and complexity of movement as indicated.    Date:  01-04-17 Date:  01-11-17 Date:  01-16-17 Date:  01-18-17   Activity/Exercise Parameters Parameters Parameters    SLR 10 reps 15 reps  B LE's 20 reps  B LE's 20 reps B LE's   Standing hip ABD 20 reps   20 reps   SLS 3 min      March with cane 5 min      NuStep  Level 3  8 minutes Level 3  8 minutes 8 min level 3.0   Standing Heel/Toe Raises  20 reps each 20 reps each 20 reps   Step-Ups  6 inch step  10 reps each LE 6 inch step  15 reps each LE 6 inch step x 15 reps   Gastroc Slantboard  3 reps  20 sec holds 3 reps  20 sec holds 3x20 sec hold   LAQ's  15 reps  B LE's 20 reps  B LE's 20 reps B LE   SAQ's  15 reps  B LE's 20 reps  B LE's    Gait Training with Cane  5 minutes 5 minutes 5 min   Heel raises    3x20 reps                                 MANUAL THERAPY: (13 minutes): Joint mobilization and Soft tissue mobilization was utilized and necessary because of the patient's restricted joint motion, painful spasm, loss of articular motion and restricted motion of soft tissue. L knee     Modalities: Cold pack to bilateral knees x10 minutes to decrease pain. Skin clear afterwards. Treatment/Session Assessment:  Pt unable to ambulate for prolonged distance and resorted back to stiff knee gait by end of session. · Pre-treatment Symptoms:  Bilateral knee pain and difficulty walking  · Pain: Initial:  Pain Intensity 1: 5 Post Session:  2/10 ·   Compliance with Program/Exercises: Will assess as treatment progresses. · Recommendations/Intent for next treatment session: \"Next visit will focus on advancements to more challenging activities\".   Total Treatment Duration:  50 minutes  PT Patient Time In/Time Out  Time In: Kathya  Time Out: Adrián

## 2017-01-20 ENCOUNTER — HOSPITAL ENCOUNTER (OUTPATIENT)
Dept: PHYSICAL THERAPY | Age: 72
Discharge: HOME | End: 2017-01-20
Payer: MEDICARE

## 2017-01-20 ENCOUNTER — HOSPITAL ENCOUNTER (OUTPATIENT)
Dept: LAB | Age: 72
Discharge: HOME | End: 2017-01-20
Payer: MEDICARE

## 2017-01-20 DIAGNOSIS — C50.912 MALIGNANT NEOPLASM OF LEFT FEMALE BREAST, UNSPECIFIED SITE OF BREAST: Chronic | ICD-10-CM

## 2017-01-20 LAB
ALBUMIN SERPL BCP-MCNC: 4 G/DL (ref 3.2–4.6)
ALBUMIN/GLOB SERPL: 1.1 {RATIO} (ref 1.2–3.5)
ALP SERPL-CCNC: 95 U/L (ref 50–136)
ALT SERPL-CCNC: 23 U/L (ref 12–65)
ANION GAP BLD CALC-SCNC: 6 MMOL/L (ref 7–16)
AST SERPL W P-5'-P-CCNC: 18 U/L (ref 15–37)
BASOPHILS # BLD AUTO: 0 K/UL (ref 0–0.2)
BASOPHILS # BLD: 1 % (ref 0–2)
BILIRUB SERPL-MCNC: 0.6 MG/DL (ref 0.2–1.1)
BUN SERPL-MCNC: 23 MG/DL (ref 8–23)
CALCIUM SERPL-MCNC: 9.8 MG/DL (ref 8.3–10.4)
CANCER AG15-3 SERPL-ACNC: 15 U/ML (ref 1–35)
CHLORIDE SERPL-SCNC: 103 MMOL/L (ref 98–107)
CO2 SERPL-SCNC: 29 MMOL/L (ref 23–32)
CREAT SERPL-MCNC: 0.9 MG/DL (ref 0.6–1)
DIFFERENTIAL METHOD BLD: NORMAL
EOSINOPHIL # BLD: 0.1 K/UL (ref 0–0.8)
EOSINOPHIL NFR BLD: 2 % (ref 0.5–7.8)
ERYTHROCYTE [DISTWIDTH] IN BLOOD BY AUTOMATED COUNT: 13.8 % (ref 11.9–14.6)
GLOBULIN SER CALC-MCNC: 3.7 G/DL (ref 2.3–3.5)
GLUCOSE SERPL-MCNC: 92 MG/DL (ref 65–100)
HCT VFR BLD AUTO: 41.3 % (ref 35.8–46.3)
HGB BLD-MCNC: 13.4 G/DL (ref 11.7–15.4)
LYMPHOCYTES # BLD AUTO: 17 % (ref 13–44)
LYMPHOCYTES # BLD: 1.1 K/UL (ref 0.5–4.6)
MCH RBC QN AUTO: 27.9 PG (ref 26.1–32.9)
MCHC RBC AUTO-ENTMCNC: 32.4 G/DL (ref 31.4–35)
MCV RBC AUTO: 86 FL (ref 79.6–97.8)
MONOCYTES # BLD: 0.6 K/UL (ref 0.1–1.3)
MONOCYTES NFR BLD AUTO: 10 % (ref 4–12)
NEUTS SEG # BLD: 4.5 K/UL (ref 1.7–8.2)
NEUTS SEG NFR BLD AUTO: 71 % (ref 43–78)
NRBC # BLD: 0 K/UL (ref 0–0.2)
PLATELET # BLD AUTO: 192 K/UL (ref 150–450)
PMV BLD AUTO: 11.5 FL (ref 10.8–14.1)
POTASSIUM SERPL-SCNC: 3.8 MMOL/L (ref 3.5–5.1)
PROT SERPL-MCNC: 7.7 G/DL (ref 6.3–8.2)
RBC # BLD AUTO: 4.8 M/UL (ref 4.05–5.25)
SODIUM SERPL-SCNC: 138 MMOL/L (ref 136–145)
WBC # BLD AUTO: 6.4 K/UL (ref 4.3–11.1)

## 2017-01-20 PROCEDURE — 85025 COMPLETE CBC W/AUTO DIFF WBC: CPT | Performed by: INTERNAL MEDICINE

## 2017-01-20 PROCEDURE — 86300 IMMUNOASSAY TUMOR CA 15-3: CPT | Performed by: INTERNAL MEDICINE

## 2017-01-20 PROCEDURE — 80053 COMPREHEN METABOLIC PANEL: CPT | Performed by: INTERNAL MEDICINE

## 2017-01-20 PROCEDURE — 36415 COLL VENOUS BLD VENIPUNCTURE: CPT | Performed by: INTERNAL MEDICINE

## 2017-01-23 ENCOUNTER — HOSPITAL ENCOUNTER (OUTPATIENT)
Dept: PHYSICAL THERAPY | Age: 72
Discharge: HOME | End: 2017-01-23
Payer: MEDICARE

## 2017-01-23 PROCEDURE — 97110 THERAPEUTIC EXERCISES: CPT

## 2017-01-23 NOTE — PROGRESS NOTES
Roger Rivas  : 1945 [facility]  [facility] [address] [address], [place] [address].  [phone]   [phone]             OUTPATIENT PHYSICAL THERAPY:Daily Note 2017    ICD-10: Treatment Diagnosis: pain in left knee M25.562, Pain in right knee M25.561, Abnormality of Gait R26.89  Precautions/Allergies:   Review of patient's allergies indicates no known allergies. Fall Risk Score: 1 (? 5 = High Risk)  MD Orders: Eval and treat MEDICAL/REFERRING DIAGNOSIS:  Unilateral primary osteoarthritis, right knee [M17.11]  Unilateral primary osteoarthritis, left knee [M17.12]   DATE OF ONSET: chronic  REFERRING PHYSICIAN: Jeremías Hamilton., *  RETURN PHYSICIAN APPOINTMENT: TBD     INITIAL ASSESSMENT:  Ms. Elizabeth Clancy presents with severe gait deviations, poor tolerance for gait, decreased glute and quad strength, impaired TKE and end-range extension ROM, decreased tibiofemoral and PF joint mobility and impaired balance and proprioception that limits ability to prolonged ambulate and perform functional activities. Pt would benefit from skilled therapy to address these deficits for return to previous level of function. PROBLEM LIST (Impacting functional limitations):  1. Decreased Strength  2. Decreased ADL/Functional Activities  3. Decreased Transfer Abilities  4. Decreased Ambulation Ability/Technique  5. Decreased Balance  6. Increased Pain  7. Decreased Activity Tolerance  8. Decreased Flexibility/Joint Mobility  9. Decreased Knowledge of Precautions INTERVENTIONS PLANNED:  1. Cold  2. Gait Training  3. Home Exercise Program (HEP)  4. Manual Therapy  5. Range of Motion (ROM)  6. Therapeutic Exercise/Strengthening   TREATMENT PLAN:  Effective Dates: 17 TO 17  Frequency/Duration: 2 times a week for 12 weeks  GOALS: (Goals have been discussed and agreed upon with patient.)  Short-Term Functional Goals: Time Frame: 4 weeks  1.  Pt will be I with HEP to allow for continued progress with symptom management. 2. Pt will ambulate with moderate gait deviations to improve tolerance for gait. Discharge Goals: Time Frame: 8 weeks  1. Pt will improve LEFS score to >47 to reflect an improvement in function. 2. Pt will improve c/o pain to 5/10 to allow for improved tolerance for ambulation. 3. Pt will improve quad and glute strength to 4+/5 for improved strength for functional activities. 4. Pt will demonstrate trace gait deviations with use of single point cane for all distance for improved tolerance for ambulation. Rehabilitation Potential For Stated Goals: Good  Regarding Siva Rust's therapy, I certify that the treatment plan above will be carried out by a therapist or under their direction. Thank you for this referral,  Janet Zavala, PT     Referring Physician Signature: Erick Gavin., *              Date                    HISTORY:   1/23/2017: Pt states her knees are feeling \"ok\" this afternoon. History of Present Injury/Illness (Reason for Referral):  Pt is a 73 y.o. Female presenting to PT with bilateral knee pain with TKA scheduled in the next 6 months. Has had chemotherapy last year and feels that her knees worsened during this time period. States that her left knee is worse than her right. Reports that she has trouble with any walking and can only do stairs with a hand rail non-reciprocally. Limited to walking for about 30 minutes before having to take a break. Has a  so she is not having to do heavy house cleaning and states that her knees do not limit her from doing the light work that she does around the house. Difficulty rising from a chair using bilateral UE support. 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 Jordan Carrington  has a past medical history of Anxiety; Arthritis; Breast cancer ([address] (1/18/2016); Breast cancer ([address] Cancer ([address]  Former smoker; GERD (gastroesophageal reflux disease); Hard of hearing; Headache; Hypertension; and Insomnia. She also has no past medical history of Adverse effect of anesthesia; Aneurysm ([facility] Arrhythmia; Asthma; Autoimmune disease ([facility] CAD (coronary artery disease); Chronic kidney disease; Chronic obstructive pulmonary disease ([address] Chronic pain; Coagulation disorder ([facility] Diabetes ([facility] Difficult intubation; Heart failure ([facility] Ill-defined condition; Liver disease; Malignant hyperthermia due to anesthesia; Morbid obesity ([facility] Pseudocholinesterase deficiency; Psychiatric disorder; PUD (peptic ulcer disease); Seizures ([facility] Sleep apnea; Stroke [facility]); Thromboembolus ([facility] Thyroid disease; or Unspecified adverse effect of anesthesia. Ms. Laine Augustine  has a past surgical history that includes colonoscopy; wisdom teeth extraction; tonsillectomy (1957); vascular access (Right); breast surgery procedure unlisted (Left, 7/2015); breast biopsy (Left, 1/18/2016); mastectomy, partial (1/18/2016); and breast lumpectomy (Left, 01/2016). Social History/Living Environment:     lives alone  Prior Level of Function/Work/Activity:  Restricted by multi-joint arthritis and effects of chemotherapy and radiation  Previous Treatment Approaches:          History of BRCA  Current Medications:    Current Outpatient Prescriptions:     temazepam (RESTORIL) 15 mg capsule, Take 1 Cap by mouth nightly. Max Daily Amount: 15 mg., Disp: 30 Cap, Rfl: 5    celecoxib (CELEBREX) 200 mg capsule, TAKE ONE CAPSULE BY MOUTH ONE TIME DAILY, Disp: , Rfl: 3    TURMERIC, BULK,, by Does Not Apply route., Disp: , Rfl:     hydrochlorothiazide (HYDRODIURIL) 25 mg tablet, Take 1 Tab by mouth daily. Indications: HYPERTENSION, Disp: 30 Tab, Rfl: 5    levothyroxine (SYNTHROID) 25 mcg tablet, Take 1 Tab by mouth Daily (before breakfast). , Disp: 30 Tab, Rfl: 5    calcium carbonate (TITRALAC) 168 mg (420 mg) chew, Take 1 Tab by mouth., Disp: , Rfl:     multivitamin (ONE A DAY) tablet, Take 1 Tab by mouth daily. , Disp: , Rfl:     esomeprazole (NEXIUM) 20 mg capsule, Take 20 mg by mouth every morning. Take / use AM day of surgery  per anesthesia protocols. Indications: GASTROESOPHAGEAL REFLUX, Disp: , Rfl:      Date Last Reviewed:  1/23/2017     Number of Personal Factors/Comorbidities that affect the Plan of Care: 1-2: MODERATE COMPLEXITY   EXAMINATION:   Observation/Orthostatic Postural Assessment:     Joint effusion L>R  Knee posture-  Genu valgus L>R  Palpation:          Prominent bony build up in lateral joint line L>R  ROM:    LEFT        Knee flexion- 124 degrees  Knee extension-  4 degrees    RIGHT  Knee flexion- 130 degrees  Knee extension-  5 degrees    Strength:    LEFT  Knee flexion- 4-/5   Knee extension-  4-/5   Hip Ext- 4-/5   Hip Flexion-  4-/5   Hip ER- 4-/5   Hip IR- 4-/5   Hip ABD- 4-/5     RIGHT  Knee flexion- 4/5   Knee extension-  4/5   Hip Ext- 4/5   Hip Flexion-  4/5   Hip ER- 4/5   Hip IR- 4/5   Hip ABD- 4/5       Functional Mobility:         Gait/Ambulation: Stiff knee with hip hike bilaterally, L trendelenberg, slow and antalgic        Transfers:  Use of B UE for sit-to-stand        Bed Mobility:  WFL        Stairs:  Non-reciprocally relying on B UE for support with hand-rail  Balance:          SLS:  L- 1 sec, R 3 sec  Skin Integrity:          normal   Body Structures Involved:  1. Bones  2. Joints  3. Muscles  4. Ligaments Body Functions Affected:  1. Sensory/Pain  2. Neuromusculoskeletal  3. Movement Related Activities and Participation Affected:  1. Learning and Applying Knowledge  2. General Tasks and Demands  3. Mobility  4. Self Care   Number of elements that affect the Plan of Care: 3: MODERATE COMPLEXITY   CLINICAL PRESENTATION:   Presentation: Evolving clinical presentation with changing clinical characteristics: MODERATE COMPLEXITY   CLINICAL DECISION MAKING:   Outcome Measure:    Tool Used: Lower Extremity Functional Scale (LEFS)  Score: Initial: 35/80 Most Recent: X/80 (Date: -- )   Interpretation of Score: 20 questions each scored on a 5 point scale with 0 representing \"extreme difficulty or unable to perform\" and 4 representing \"no difficulty\". The lower the score, the greater the functional disability. 80/80 represents no disability. Minimal detectable change is 9 points. Score 80 79-63 62-48 47-32 31-16 15-1 0   Modifier CH CI CJ CK CL CM CN     ? Mobility - Walking and Moving Around:     - CURRENT STATUS: CK - 40%-59% impaired, limited or restricted    - GOAL STATUS: CJ - 20%-39% impaired, limited or restricted    - D/C STATUS:  ---------------To be determined---------------    Medical Necessity:   · Patient is expected to demonstrate progress in strength, range of motion and balance to improve ambulation. Reason for Services/Other Comments:  · Patient continues to require present interventions due to patient's inability to prolonged ambulate. Use of outcome tool(s) and clinical judgement create a POC that gives a: Questionable prediction of patient's progress: MODERATE COMPLEXITY   TREATMENT:   (In addition to Assessment/Re-Assessment sessions the following treatments were rendered)  THERAPEUTIC EXERCISE: (40 minutes):  Exercises per grid below to improve mobility, strength and balance. Required moderate visual, verbal and manual cues to promote proper body alignment, promote proper body posture and promote proper body mechanics. Progressed resistance, range, repetitions and complexity of movement as indicated.    Date:  01-04-17 Date:  01-11-17 Date:  01-16-17 Date:  01-18-17 Date  01-23-17   Activity/Exercise Parameters Parameters Parameters     SLR 10 reps 15 reps  B LE's 20 reps  B LE's 20 reps B LE's 20 reps  B LE's   Standing hip ABD 20 reps   20 reps 20 reps   SLS 3 min       March with cane 5 min       NuStep  Level 3  8 minutes Level 3  8 minutes 8 min level 3.0 Level 4  8 minutes   Standing Heel/Toe Raises  20 reps each 20 reps each 20 reps 20 reps   Step-Ups  6 inch step  10 reps each LE 6 inch step  15 reps each LE 6 inch step x 15 reps 6 inch step  15 reps each LE   Gastroc Slantboard  3 reps  20 sec holds 3 reps  20 sec holds 3x20 sec hold 3 reps  20 sec holds   LAQ's  15 reps  B LE's 20 reps  B LE's 20 reps B LE 20 reps  B LE's   SAQ's  15 reps  B LE's 20 reps  B LE's     Gait Training with Cane  5 minutes 5 minutes 5 min With no assistive device-working on heel strike at initial contact   Heel raises    3x20 reps    High Knee March 4 laps                             MANUAL THERAPY: (0 minutes): Joint mobilization and Soft tissue mobilization was utilized and necessary because of the patient's restricted joint motion, painful spasm, loss of articular motion and restricted motion of soft tissue. L knee     Modalities: Cold pack to bilateral knees x10 minutes to decrease pain. Skin clear afterwards. Treatment/Session Assessment:  Gait improves with treatments, but pt noted to resort back to stiff knee gait when leaving clinic. · Pre-treatment Symptoms:  Bilateral knee pain and difficulty walking  · Pain: Initial:   4/10 Post Session:  2/10 ·   Compliance with Program/Exercises: Will assess as treatment progresses. · Recommendations/Intent for next treatment session: \"Next visit will focus on advancements to more challenging activities\".   Total Treatment Duration:  50 minutes  PT Patient Time In/Time Out  Time In: 3408  Time Out: [address] [facility], PT

## 2017-01-25 ENCOUNTER — HOSPITAL ENCOUNTER (OUTPATIENT)
Dept: PHYSICAL THERAPY | Age: 72
Discharge: HOME | End: 2017-01-25
Payer: MEDICARE

## 2017-01-25 PROCEDURE — 97110 THERAPEUTIC EXERCISES: CPT

## 2017-01-25 PROCEDURE — G8978 MOBILITY CURRENT STATUS: HCPCS

## 2017-01-25 PROCEDURE — G8979 MOBILITY GOAL STATUS: HCPCS

## 2017-01-25 NOTE — PROGRESS NOTES
Kayden Gutierrez  : 1945 Therapy Center at [address], [address] [place] [address],[address], [place] [address].  [phone]   [phone]             OUTPATIENT PHYSICAL THERAPY:Daily Note and Re-evaluation 2017    ICD-10: Treatment Diagnosis: pain in left knee M25.562, Pain in right knee M25.561, Abnormality of Gait R26.89  Precautions/Allergies:   Review of patient's allergies indicates no known allergies. Fall Risk Score: 1 (? 5 = High Risk)  MD Orders: Eval and treat MEDICAL/REFERRING DIAGNOSIS:  Unilateral primary osteoarthritis, right knee [M17.11]  Unilateral primary osteoarthritis, left knee [M17.12]   DATE OF ONSET: chronic  REFERRING PHYSICIAN: Terrance Shaw., *  RETURN PHYSICIAN APPOINTMENT: TBD     INITIAL ASSESSMENT:  Ms. Chioma Figueroa has attended 6 visits over the past 3 weeks with good improvement in gait and progress with strength, ROM and balance. Continues to present with moderate gait deviations, poor tolerance for gait, decreased glute and quad strength, impaired TKE and end-range extension ROM, decreased tibiofemoral and PF joint mobility and impaired balance and proprioception that limits ability to prolonged ambulate and perform functional activities. Pt would benefit from skilled therapy to address these deficits for return to previous level of function. PROBLEM LIST (Impacting functional limitations):  1. Decreased Strength  2. Decreased ADL/Functional Activities  3. Decreased Transfer Abilities  4. Decreased Ambulation Ability/Technique  5. Decreased Balance  6. Increased Pain  7. Decreased Activity Tolerance  8. Decreased Flexibility/Joint Mobility  9. Decreased Knowledge of Precautions INTERVENTIONS PLANNED:  1. Cold  2. Gait Training  3. Home Exercise Program (HEP)  4. Manual Therapy  5. Range of Motion (ROM)  6.  Therapeutic Exercise/Strengthening   TREATMENT PLAN:  Effective Dates: 17 TO 17  Frequency/Duration: 2 times a week for 12 weeks  GOALS: (Goals have been discussed and agreed upon with patient.)  Short-Term Functional Goals: Time Frame: 4 weeks  1. Pt will be I with HEP to allow for continued progress with symptom management. MET 01-25-17  2. Pt will ambulate with moderate gait deviations to improve tolerance for gait. MET 01-25-17  Discharge Goals: Time Frame: 8 weeks  1. Pt will improve LEFS score to >47 to reflect an improvement in function. Ongoing  2. Pt will improve c/o pain to 5/10 to allow for improved tolerance for ambulation. Ongoing  3. Pt will improve quad and glute strength to 4+/5 for improved strength for functional activities. Ongoing  4. Pt will demonstrate trace gait deviations with use of single point cane for all distance for improved tolerance for ambulation. Ongoing  Rehabilitation Potential For Stated Goals: Good  Regarding Carline Rust's therapy, I certify that the treatment plan above will be carried out by a therapist or under their direction. Thank you for this referral,  Lydia Goyal, PT     Referring Physician Signature: Rizwana Martin., *              Date                    HISTORY:   1/25/2017: Pt states that she had injections and her knees are sore. History of Present Injury/Illness (Reason for Referral):  Pt is a 73 y.o. Female presenting to PT with bilateral knee pain with TKA scheduled in the next 6 months. Has had chemotherapy last year and feels that her knees worsened during this time period. States that her left knee is worse than her right. Reports that she has trouble with any walking and can only do stairs with a hand rail non-reciprocally. Limited to walking for about 30 minutes before having to take a break. Has a  so she is not having to do heavy house cleaning and states that her knees do not limit her from doing the light work that she does around the house. Difficulty rising from a chair using bilateral UE support. 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 Chris Alarcon  has a past medical history of Anxiety; Arthritis; Breast cancer ([address] (1/18/2016); Breast cancer ([address] Cancer ([address] Former smoker; GERD (gastroesophageal reflux disease); Hard of hearing; Headache; Hypertension; and Insomnia. She also has no past medical history of Adverse effect of anesthesia; Aneurysm ([address] Arrhythmia; Asthma; Autoimmune disease ([address] CAD (coronary artery disease); Chronic kidney disease; Chronic obstructive pulmonary disease ([address] Chronic pain; Coagulation disorder ([address] Diabetes ([address] Difficult intubation; Heart failure ([address] Ill-defined condition; Liver disease; Malignant hyperthermia due to anesthesia; Morbid obesity ([address] Pseudocholinesterase deficiency; Psychiatric disorder; PUD (peptic ulcer disease); Seizures ([address] Sleep apnea; Stroke [facility]); Thromboembolus ([address] Thyroid disease; or Unspecified adverse effect of anesthesia. Ms. Chris Alarcon  has a past surgical history that includes colonoscopy; wisdom teeth extraction; tonsillectomy (1957); vascular access (Right); breast surgery procedure unlisted (Left, 7/2015); breast biopsy (Left, 1/18/2016); mastectomy, partial (1/18/2016); and breast lumpectomy (Left, 01/2016). Social History/Living Environment:     lives alone  Prior Level of Function/Work/Activity:  Restricted by multi-joint arthritis and effects of chemotherapy and radiation  Previous Treatment Approaches:          History of BRCA  Current Medications:    Current Outpatient Prescriptions:     temazepam (RESTORIL) 15 mg capsule, Take 1 Cap by mouth nightly. Max Daily Amount: 15 mg., Disp: 30 Cap, Rfl: 5    celecoxib (CELEBREX) 200 mg capsule, TAKE ONE CAPSULE BY MOUTH ONE TIME DAILY, Disp: , Rfl: 3    TURMERIC, BULK,, by Does Not Apply route., Disp: , Rfl:     hydrochlorothiazide (HYDRODIURIL) 25 mg tablet, Take 1 Tab by mouth daily.  Indications: HYPERTENSION, Disp: 30 Tab, Rfl: 5   levothyroxine (SYNTHROID) 25 mcg tablet, Take 1 Tab by mouth Daily (before breakfast). , Disp: 30 Tab, Rfl: 5    calcium carbonate (TITRALAC) 168 mg (420 mg) chew, Take 1 Tab by mouth., Disp: , Rfl:     multivitamin (ONE A DAY) tablet, Take 1 Tab by mouth daily. , Disp: , Rfl:     esomeprazole (NEXIUM) 20 mg capsule, Take 20 mg by mouth every morning. Take / use AM day of surgery  per anesthesia protocols. Indications: GASTROESOPHAGEAL REFLUX, Disp: , Rfl:      Date Last Reviewed:  1/25/2017     Number of Personal Factors/Comorbidities that affect the Plan of Care: 1-2: MODERATE COMPLEXITY   EXAMINATION:   Observation/Orthostatic Postural Assessment:     Joint effusion L>R  Knee posture-  Genu valgus L>R  Palpation:          Prominent bony build up in lateral joint line L>R  ROM:    LEFT        Knee flexion- 124 degrees  Knee extension-  4 degrees    RIGHT  Knee flexion- 130 degrees  Knee extension-  5 degrees    Strength:    LEFT  Knee flexion- 4/5   Knee extension-  4/5   Hip Ext- 4-/5   Hip Flexion-  4/5   Hip ER- 4-/5   Hip IR- 4/5   Hip ABD- 4-/5     RIGHT  Knee flexion- 4+/5   Knee extension-  4/5   Hip Ext- 4/5   Hip Flexion-  4/5   Hip ER- 4/5   Hip IR- 4/5   Hip ABD- 4/5       Functional Mobility:         Gait/Ambulation: Moderately Stiff knee with hip hike bilaterally, mild L trendelenberg, slow and antalgic        Transfers: Moderate use of B UE for sit-to-stand        Bed Mobility:  WFL        Stairs:  Non-reciprocally relying on B UE for support with hand-rail  Balance:          SLS:  L- 3 sec, R 3 sec  Skin Integrity:          normal   Body Structures Involved:  1. Bones  2. Joints  3. Muscles  4. Ligaments Body Functions Affected:  1. Sensory/Pain  2. Neuromusculoskeletal  3. Movement Related Activities and Participation Affected:  1. Learning and Applying Knowledge  2. General Tasks and Demands  3. Mobility  4.  Self Care   Number of elements that affect the Plan of Care: 3: MODERATE COMPLEXITY CLINICAL PRESENTATION:   Presentation: Evolving clinical presentation with changing clinical characteristics: MODERATE COMPLEXITY   CLINICAL DECISION MAKING:   Outcome Measure: Tool Used: Lower Extremity Functional Scale (LEFS)  Score:  Initial: 35/80 Most Recent: 33/80 (Date: 01-25-17 )   Interpretation of Score: 20 questions each scored on a 5 point scale with 0 representing \"extreme difficulty or unable to perform\" and 4 representing \"no difficulty\". The lower the score, the greater the functional disability. 80/80 represents no disability. Minimal detectable change is 9 points. Score 80 79-63 62-48 47-32 31-16 15-1 0   Modifier CH CI CJ CK CL CM CN     ? Mobility - Walking and Moving Around:     - CURRENT STATUS: CK - 40%-59% impaired, limited or restricted    - GOAL STATUS: CJ - 20%-39% impaired, limited or restricted    - D/C STATUS:  ---------------To be determined---------------    Medical Necessity:   · Patient is expected to demonstrate progress in strength, range of motion and balance to improve ambulation. Reason for Services/Other Comments:  · Patient continues to require present interventions due to patient's inability to prolonged ambulate. Use of outcome tool(s) and clinical judgement create a POC that gives a: Questionable prediction of patient's progress: MODERATE COMPLEXITY   TREATMENT:   (In addition to Assessment/Re-Assessment sessions the following treatments were rendered)  THERAPEUTIC EXERCISE: (40 minutes):  Exercises per grid below to improve mobility, strength and balance. Required moderate visual, verbal and manual cues to promote proper body alignment, promote proper body posture and promote proper body mechanics. Progressed resistance, range, repetitions and complexity of movement as indicated.    Date:  01-16-17 Date:  01-18-17 Date  01-23-17 Date:  01-25-17   Activity/Exercise Parameters      SLR 20 reps  B LE's 20 reps B LE's 20 reps  B LE's 2x15 reps B LE Standing hip ABD  20 reps 20 reps 2x15 reps each   SLS    3 min          NuStep Level 3  8 minutes 8 min level 3.0 Level 4  8 minutes Level 4.0  8 min   Standing Heel/Toe Raises 20 reps each 20 reps 20 reps 20 reps   Step-Ups 6 inch step  15 reps each LE 6 inch step x 15 reps 6 inch step  15 reps each LE    Gastroc Slantboard 3 reps  20 sec holds 3x20 sec hold 3 reps  20 sec holds 3x20 sec hold   LAQ's 20 reps  B LE's 20 reps B LE 20 reps  B LE's    SAQ's 20 reps  B LE's      Gait Training with Cane 5 minutes 5 min With no assistive device-working on heel strike at initial contact    Heel raises  3x20 reps     High Knee March   4 laps 4x30 ft   Tandem gait    4x30 ft   Side Stepping    2x30 ft   tiltboard    Fwd/bwd 2 min     MANUAL THERAPY: (0 minutes): Joint mobilization and Soft tissue mobilization was utilized and necessary because of the patient's restricted joint motion, painful spasm, loss of articular motion and restricted motion of soft tissue. L knee     Modalities: Cold pack to bilateral knees x10 minutes to decrease pain. Skin clear afterwards. Treatment/Session Assessment:  Pt tolerated treatment with no increased c/o pain but required several rest breaks. · Pre-treatment Symptoms:  Bilateral knee pain and difficulty walking  · Pain: Initial:  Pain Intensity 1: 5 Post Session:  2/10 ·   Compliance with Program/Exercises: Will assess as treatment progresses. · Recommendations/Intent for next treatment session: \"Next visit will focus on advancements to more challenging activities\".   Total Treatment Duration:  50 minutes  PT Patient Time In/Time Out  Time In: Kathya  Time Out: Adrián

## 2017-01-30 ENCOUNTER — APPOINTMENT (OUTPATIENT)
Dept: PHYSICAL THERAPY | Age: 72
End: 2017-01-30
Payer: MEDICARE

## 2017-02-21 ENCOUNTER — HOSPITAL ENCOUNTER (OUTPATIENT)
Dept: PHYSICAL THERAPY | Age: 72
Discharge: HOME | End: 2017-02-21
Payer: MEDICARE

## 2017-02-21 PROCEDURE — 97110 THERAPEUTIC EXERCISES: CPT

## 2017-02-21 NOTE — PROGRESS NOTES
Roberta Morales  : 1945 [facility] [place] [address], [place] [address].  [phone]   [phone]             OUTPATIENT PHYSICAL THERAPY:Daily Note 2017    ICD-10: Treatment Diagnosis: pain in left knee M25.562, Pain in right knee M25.561, Abnormality of Gait R26.89  Precautions/Allergies:   Review of patient's allergies indicates no known allergies. Fall Risk Score: 1 (? 5 = High Risk)  MD Orders: Eval and treat MEDICAL/REFERRING DIAGNOSIS:  Unilateral primary osteoarthritis, right knee [M17.11]  Unilateral primary osteoarthritis, left knee [M17.12]   DATE OF ONSET: chronic  REFERRING PHYSICIAN: Zhen Cross, *  RETURN PHYSICIAN APPOINTMENT: TBD     INITIAL ASSESSMENT:  Ms. Latonya Mccall has attended 6 visits over the past 3 weeks with good improvement in gait and progress with strength, ROM and balance. Continues to present with moderate gait deviations, poor tolerance for gait, decreased glute and quad strength, impaired TKE and end-range extension ROM, decreased tibiofemoral and PF joint mobility and impaired balance and proprioception that limits ability to prolonged ambulate and perform functional activities. Pt would benefit from skilled therapy to address these deficits for return to previous level of function. PROBLEM LIST (Impacting functional limitations):  1. Decreased Strength  2. Decreased ADL/Functional Activities  3. Decreased Transfer Abilities  4. Decreased Ambulation Ability/Technique  5. Decreased Balance  6. Increased Pain  7. Decreased Activity Tolerance  8. Decreased Flexibility/Joint Mobility  9. Decreased Knowledge of Precautions INTERVENTIONS PLANNED:  1. Cold  2. Gait Training  3. Home Exercise Program (HEP)  4. Manual Therapy  5. Range of Motion (ROM)  6.  Therapeutic Exercise/Strengthening   TREATMENT PLAN:  Effective Dates: 17 TO 17  Frequency/Duration: 2 times a week for 12 weeks  GOALS: (Goals have been discussed and agreed upon with patient.)  Short-Term Functional Goals: Time Frame: 4 weeks  1. Pt will be I with HEP to allow for continued progress with symptom management. MET 01-25-17  2. Pt will ambulate with moderate gait deviations to improve tolerance for gait. MET 01-25-17  Discharge Goals: Time Frame: 8 weeks  1. Pt will improve LEFS score to >47 to reflect an improvement in function. Ongoing  2. Pt will improve c/o pain to 5/10 to allow for improved tolerance for ambulation. Ongoing  3. Pt will improve quad and glute strength to 4+/5 for improved strength for functional activities. Ongoing  4. Pt will demonstrate trace gait deviations with use of single point cane for all distance for improved tolerance for ambulation. Ongoing  Rehabilitation Potential For Stated Goals: Good  Regarding Jenniffer Rust's therapy, I certify that the treatment plan above will be carried out by a therapist or under their direction. Thank you for this referral,  Kezia Henderson, PT     Referring Physician Signature: Bob Winters., *              Date                    HISTORY:   2/21/2017: Pt states that her knees are feeling pretty good today. History of Present Injury/Illness (Reason for Referral):  Pt is a 73 y.o. Female presenting to PT with bilateral knee pain with TKA scheduled in the next 6 months. Has had chemotherapy last year and feels that her knees worsened during this time period. States that her left knee is worse than her right. Reports that she has trouble with any walking and can only do stairs with a hand rail non-reciprocally. Limited to walking for about 30 minutes before having to take a break. Has a  so she is not having to do heavy house cleaning and states that her knees do not limit her from doing the light work that she does around the house. Difficulty rising from a chair using bilateral UE support. Enjoys doing yoga.   Retired natasha. Past Medical History/Comorbidities:   Ms. Joe Tony  has a past medical history of Anxiety; Arthritis; Breast cancer ([facility] [address] (1/18/2016); Breast cancer ([address] Cancer ([address] Former smoker; GERD (gastroesophageal reflux disease); Hard of hearing; Headache; Hypertension; and Insomnia. She also has no past medical history of Adverse effect of anesthesia; Aneurysm ([address] Arrhythmia; Asthma; Autoimmune disease ([address] CAD (coronary artery disease); Chronic kidney disease; Chronic obstructive pulmonary disease ([address] Chronic pain; Coagulation disorder ([address] Diabetes ([address] Difficult intubation; Heart failure ([address] Ill-defined condition; Liver disease; Malignant hyperthermia due to anesthesia; Morbid obesity ([address] Pseudocholinesterase deficiency; Psychiatric disorder; PUD (peptic ulcer disease); Seizures ([address] Sleep apnea; Stroke [facility]); Thromboembolus ([address] Thyroid disease; or Unspecified adverse effect of anesthesia. Ms. Joe Tony  has a past surgical history that includes colonoscopy; wisdom teeth extraction; tonsillectomy (1957); vascular access (Right); breast surgery procedure unlisted (Left, 7/2015); breast biopsy (Left, 1/18/2016); mastectomy, partial (1/18/2016); and breast lumpectomy (Left, 01/2016). Social History/Living Environment:     lives alone  Prior Level of Function/Work/Activity:  Restricted by multi-joint arthritis and effects of chemotherapy and radiation  Previous Treatment Approaches:          History of BRCA  Current Medications:    Current Outpatient Prescriptions:     temazepam (RESTORIL) 15 mg capsule, Take 1 Cap by mouth nightly. Max Daily Amount: 15 mg., Disp: 30 Cap, Rfl: 5    celecoxib (CELEBREX) 200 mg capsule, TAKE ONE CAPSULE BY MOUTH ONE TIME DAILY, Disp: , Rfl: 3    TURMERIC, BULK,, by Does Not Apply route., Disp: , Rfl:     hydrochlorothiazide (HYDRODIURIL) 25 mg tablet, Take 1 Tab by mouth daily.  Indications: HYPERTENSION, Disp: 30 Tab, Rfl: 5    levothyroxine (SYNTHROID) 25 mcg tablet, Take 1 Tab by mouth Daily (before breakfast). , Disp: 30 Tab, Rfl: 5    calcium carbonate (TITRALAC) 168 mg (420 mg) chew, Take 1 Tab by mouth., Disp: , Rfl:     multivitamin (ONE A DAY) tablet, Take 1 Tab by mouth daily. , Disp: , Rfl:     esomeprazole (NEXIUM) 20 mg capsule, Take 20 mg by mouth every morning. Take / use AM day of surgery  per anesthesia protocols. Indications: GASTROESOPHAGEAL REFLUX, Disp: , Rfl:      Date Last Reviewed:  2/21/2017     Number of Personal Factors/Comorbidities that affect the Plan of Care: 1-2: MODERATE COMPLEXITY   EXAMINATION:   Observation/Orthostatic Postural Assessment:     Joint effusion L>R  Knee posture-  Genu valgus L>R  Palpation:          Prominent bony build up in lateral joint line L>R  ROM:    LEFT        Knee flexion- 124 degrees  Knee extension-  4 degrees    RIGHT  Knee flexion- 130 degrees  Knee extension-  5 degrees    Strength:    LEFT  Knee flexion- 4/5   Knee extension-  4/5   Hip Ext- 4-/5   Hip Flexion-  4/5   Hip ER- 4-/5   Hip IR- 4/5   Hip ABD- 4-/5     RIGHT  Knee flexion- 4+/5   Knee extension-  4/5   Hip Ext- 4/5   Hip Flexion-  4/5   Hip ER- 4/5   Hip IR- 4/5   Hip ABD- 4/5       Functional Mobility:         Gait/Ambulation: Moderately Stiff knee with hip hike bilaterally, mild L trendelenberg, slow and antalgic        Transfers: Moderate use of B UE for sit-to-stand        Bed Mobility:  WFL        Stairs:  Non-reciprocally relying on B UE for support with hand-rail  Balance:          SLS:  L- 3 sec, R 3 sec  Skin Integrity:          normal   Body Structures Involved:  1. Bones  2. Joints  3. Muscles  4. Ligaments Body Functions Affected:  1. Sensory/Pain  2. Neuromusculoskeletal  3. Movement Related Activities and Participation Affected:  1. Learning and Applying Knowledge  2. General Tasks and Demands  3. Mobility  4.  Self Care   Number of elements that affect the Plan of Care: 3: MODERATE COMPLEXITY   CLINICAL PRESENTATION:   Presentation: Evolving clinical presentation with changing clinical characteristics: MODERATE COMPLEXITY   CLINICAL DECISION MAKING:   Outcome Measure: Tool Used: Lower Extremity Functional Scale (LEFS)  Score:  Initial: 35/80 Most Recent: 33/80 (Date: 01-25-17 )   Interpretation of Score: 20 questions each scored on a 5 point scale with 0 representing \"extreme difficulty or unable to perform\" and 4 representing \"no difficulty\". The lower the score, the greater the functional disability. 80/80 represents no disability. Minimal detectable change is 9 points. Score 80 79-63 62-48 47-32 31-16 15-1 0   Modifier CH CI CJ CK CL CM CN     ? Mobility - Walking and Moving Around:     - CURRENT STATUS: CK - 40%-59% impaired, limited or restricted    - GOAL STATUS: CJ - 20%-39% impaired, limited or restricted    - D/C STATUS:  ---------------To be determined---------------    Medical Necessity:   · Patient is expected to demonstrate progress in strength, range of motion and balance to improve ambulation. Reason for Services/Other Comments:  · Patient continues to require present interventions due to patient's inability to prolonged ambulate. Use of outcome tool(s) and clinical judgement create a POC that gives a: Questionable prediction of patient's progress: MODERATE COMPLEXITY   TREATMENT:   (In addition to Assessment/Re-Assessment sessions the following treatments were rendered)  THERAPEUTIC EXERCISE: (40 minutes):  Exercises per grid below to improve mobility, strength and balance. Required moderate visual, verbal and manual cues to promote proper body alignment, promote proper body posture and promote proper body mechanics. Progressed resistance, range, repetitions and complexity of movement as indicated.    Date:  01-16-17 Date:  01-18-17 Date  01-23-17 Date:  01-25-17 Date  02-21-17   Activity/Exercise Parameters       SLR 20 reps  B LE's 20 reps B LE's 20 reps  B LE's 2x15 reps B LE 2 sets  10 reps  B LE's   Standing hip ABD  20 reps 20 reps 2x15 reps each 2 sets  15 reps each   SLS    3 min 5 reps to Fatigue each LE           NuStep Level 3  8 minutes 8 min level 3.0 Level 4  8 minutes Level 4.0  8 min Level 4  8 minutes   Standing Heel/Toe Raises 20 reps each 20 reps 20 reps 20 reps 20 reps   Step-Ups 6 inch step  15 reps each LE 6 inch step x 15 reps 6 inch step  15 reps each LE     Gastroc Slantboard 3 reps  20 sec holds 3x20 sec hold 3 reps  20 sec holds 3x20 sec hold 3 reps  20 sec holds   LAQ's 20 reps  B LE's 20 reps B LE 20 reps  B LE's     SAQ's 20 reps  B LE's       Gait Training with Cane 5 minutes 5 min With no assistive device-working on heel strike at initial contact     Heel raises  3x20 reps      High Knee March   4 laps 4x30 ft 4x30 ft   Tandem gait    4x30 ft 4x30 ft   Side Stepping    2x30 ft 4x30 ft   tiltboard    Fwd/bwd 2 min FW/BW and Sideways  20 reps each     MANUAL THERAPY: (0 minutes): Joint mobilization and Soft tissue mobilization was utilized and necessary because of the patient's restricted joint motion, painful spasm, loss of articular motion and restricted motion of soft tissue. L knee     Modalities: Cold pack to bilateral knees x10 minutes to decrease pain. Skin clear afterwards. Treatment/Session Assessment:  Pt tolerated treatment with no increased c/o pain. She did require a few rest breaks due to fatigue. She reported feeling good at end of session today. · Pre-treatment Symptoms:  Bilateral knee pain and difficulty walking  · Pain: Initial:    Post Session:  2/10 ·   Compliance with Program/Exercises: Will assess as treatment progresses. · Recommendations/Intent for next treatment session: \"Next visit will focus on advancements to more challenging activities\".   Total Treatment Duration:  50 minutes  PT Patient Time In/Time Out  Time In: 1115  Time Out: [address], PT

## 2017-02-28 ENCOUNTER — HOSPITAL ENCOUNTER (OUTPATIENT)
Dept: PHYSICAL THERAPY | Age: 72
Discharge: HOME | End: 2017-02-28
Payer: MEDICARE

## 2017-02-28 PROCEDURE — 97110 THERAPEUTIC EXERCISES: CPT

## 2017-02-28 NOTE — PROGRESS NOTES
Ashish Mcneal  : 1945 [facility] [place] [address] [place] [address], [place] [address].  [phone]   [phone]             OUTPATIENT PHYSICAL THERAPY:Daily Note and Progress Report 2017    ICD-10: Treatment Diagnosis: pain in left knee M25.562, Pain in right knee M25.561, Abnormality of Gait R26.89  Precautions/Allergies:   Review of patient's allergies indicates no known allergies. Fall Risk Score: 1 (? 5 = High Risk)  MD Orders: Eval and treat MEDICAL/REFERRING DIAGNOSIS:  Unilateral primary osteoarthritis, right knee [M17.11]  Unilateral primary osteoarthritis, left knee [M17.12]   DATE OF ONSET: chronic  REFERRING PHYSICIAN: Ray Barnett., *  RETURN PHYSICIAN APPOINTMENT: TBD     INITIAL ASSESSMENT:  Ms. Silvia Rai has attended 6 visits over the past 3 weeks with good improvement in gait and progress with strength, ROM and balance. Continues to present with moderate gait deviations, poor tolerance for gait, decreased glute and quad strength, impaired TKE and end-range extension ROM, decreased tibiofemoral and PF joint mobility and impaired balance and proprioception that limits ability to prolonged ambulate and perform functional activities. Pt would benefit from skilled therapy to address these deficits for return to previous level of function. PROBLEM LIST (Impacting functional limitations):  1. Decreased Strength  2. Decreased ADL/Functional Activities  3. Decreased Transfer Abilities  4. Decreased Ambulation Ability/Technique  5. Decreased Balance  6. Increased Pain  7. Decreased Activity Tolerance  8. Decreased Flexibility/Joint Mobility  9. Decreased Knowledge of Precautions INTERVENTIONS PLANNED:  1. Cold  2. Gait Training  3. Home Exercise Program (HEP)  4. Manual Therapy  5. Range of Motion (ROM)  6.  Therapeutic Exercise/Strengthening   TREATMENT PLAN:  Effective Dates: 17 TO 17  Frequency/Duration: 2 times a week for 12 weeks  GOALS: (Goals have been discussed and agreed upon with patient.)  Short-Term Functional Goals: Time Frame: 4 weeks  1. Pt will be I with HEP to allow for continued progress with symptom management. MET 01-25-17  2. Pt will ambulate with moderate gait deviations to improve tolerance for gait. MET 01-25-17  Discharge Goals: Time Frame: 8 weeks  1. Pt will improve LEFS score to >47 to reflect an improvement in function. Ongoing  2. Pt will improve c/o pain to 5/10 to allow for improved tolerance for ambulation. Ongoing  3. Pt will improve quad and glute strength to 4+/5 for improved strength for functional activities. Ongoing  4. Pt will demonstrate trace gait deviations with use of single point cane for all distance for improved tolerance for ambulation. Ongoing  Rehabilitation Potential For Stated Goals: Good  Regarding Yady Rust's therapy, I certify that the treatment plan above will be carried out by a therapist or under their direction. Thank you for this referral,  Veronique Alejandre, PT     Referring Physician Signature: Jodi Nix, *              Date                    HISTORY:   2/28/2017: Pt states she is having a bad day today due to the rainy weather coming in. She rates her current pain 9/10. History of Present Injury/Illness (Reason for Referral):  Pt is a 73 y.o. Female presenting to PT with bilateral knee pain with TKA scheduled in the next 6 months. Has had chemotherapy last year and feels that her knees worsened during this time period. States that her left knee is worse than her right. Reports that she has trouble with any walking and can only do stairs with a hand rail non-reciprocally. Limited to walking for about 30 minutes before having to take a break. Has a  so she is not having to do heavy house cleaning and states that her knees do not limit her from doing the light work that she does around the house.   Difficulty rising from a chair using bilateral UE support. 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 Chris Alarcon  has a past medical history of Anxiety; Arthritis; Breast cancer ([facility] [address] (1/18/2016); Breast cancer ([address] Cancer ([address] Former smoker; GERD (gastroesophageal reflux disease); Hard of hearing; Headache; Hypertension; and Insomnia. She also has no past medical history of Adverse effect of anesthesia; Aneurysm ([address] Arrhythmia; Asthma; Autoimmune disease ([address] CAD (coronary artery disease); Chronic kidney disease; Chronic obstructive pulmonary disease ([address] Chronic pain; Coagulation disorder ([address] Diabetes ([address] Difficult intubation; Heart failure ([address] Ill-defined condition; Liver disease; Malignant hyperthermia due to anesthesia; Morbid obesity ([address] Pseudocholinesterase deficiency; Psychiatric disorder; PUD (peptic ulcer disease); Seizures ([address] Sleep apnea; Stroke [facility]); Thromboembolus ([address] Thyroid disease; or Unspecified adverse effect of anesthesia. Ms. Chris Alarcon  has a past surgical history that includes colonoscopy; wisdom teeth extraction; tonsillectomy (1957); vascular access (Right); breast surgery procedure unlisted (Left, 7/2015); breast biopsy (Left, 1/18/2016); mastectomy, partial (1/18/2016); and breast lumpectomy (Left, 01/2016). Social History/Living Environment:     lives alone  Prior Level of Function/Work/Activity:  Restricted by multi-joint arthritis and effects of chemotherapy and radiation  Previous Treatment Approaches:          History of BRCA  Current Medications:    Current Outpatient Prescriptions:     temazepam (RESTORIL) 15 mg capsule, Take 1 Cap by mouth nightly. Max Daily Amount: 15 mg., Disp: 30 Cap, Rfl: 5    celecoxib (CELEBREX) 200 mg capsule, TAKE ONE CAPSULE BY MOUTH ONE TIME DAILY, Disp: , Rfl: 3    TURMERIC, BULK,, by Does Not Apply route., Disp: , Rfl:     hydrochlorothiazide (HYDRODIURIL) 25 mg tablet, Take 1 Tab by mouth daily.  Indications: HYPERTENSION, Disp: 30 Tab, Rfl: 5    levothyroxine (SYNTHROID) 25 mcg tablet, Take 1 Tab by mouth Daily (before breakfast). , Disp: 30 Tab, Rfl: 5    calcium carbonate (TITRALAC) 168 mg (420 mg) chew, Take 1 Tab by mouth., Disp: , Rfl:     multivitamin (ONE A DAY) tablet, Take 1 Tab by mouth daily. , Disp: , Rfl:     esomeprazole (NEXIUM) 20 mg capsule, Take 20 mg by mouth every morning. Take / use AM day of surgery  per anesthesia protocols. Indications: GASTROESOPHAGEAL REFLUX, Disp: , Rfl:      Date Last Reviewed:  2/28/2017     Number of Personal Factors/Comorbidities that affect the Plan of Care: 1-2: MODERATE COMPLEXITY   EXAMINATION:   Observation/Orthostatic Postural Assessment:     Joint effusion L>R  Knee posture-  Genu valgus L>R  Palpation:          Prominent bony build up in lateral joint line L>R  ROM:    LEFT        Knee flexion- 124 degrees  Knee extension-  4 degrees    RIGHT  Knee flexion- 130 degrees  Knee extension-  5 degrees    Strength:    LEFT  Knee flexion- 4/5   Knee extension-  4/5   Hip Ext- 4-/5   Hip Flexion-  4/5   Hip ER- 4-/5   Hip IR- 4/5   Hip ABD- 4-/5     RIGHT  Knee flexion- 4+/5   Knee extension-  4/5   Hip Ext- 4/5   Hip Flexion-  4/5   Hip ER- 4/5   Hip IR- 4/5   Hip ABD- 4/5       Functional Mobility:         Gait/Ambulation: Moderately Stiff knee with hip hike bilaterally, mild L trendelenberg, slow and antalgic        Transfers: Moderate use of B UE for sit-to-stand        Bed Mobility:  WFL        Stairs:  Non-reciprocally relying on B UE for support with hand-rail  Balance:          SLS:  L- 3 sec, R 3 sec  Skin Integrity:          normal   Body Structures Involved:  1. Bones  2. Joints  3. Muscles  4. Ligaments Body Functions Affected:  1. Sensory/Pain  2. Neuromusculoskeletal  3. Movement Related Activities and Participation Affected:  1. Learning and Applying Knowledge  2. General Tasks and Demands  3. Mobility  4.  Self Care   Number of elements that affect the Plan of Care: 3: MODERATE COMPLEXITY   CLINICAL PRESENTATION:   Presentation: Evolving clinical presentation with changing clinical characteristics: MODERATE COMPLEXITY   CLINICAL DECISION MAKING:   Outcome Measure: Tool Used: Lower Extremity Functional Scale (LEFS)  Score:  Initial: 35/80 Most Recent: 33/80 (Date: 01-25-17 )   Interpretation of Score: 20 questions each scored on a 5 point scale with 0 representing \"extreme difficulty or unable to perform\" and 4 representing \"no difficulty\". The lower the score, the greater the functional disability. 80/80 represents no disability. Minimal detectable change is 9 points. Score 80 79-63 62-48 47-32 31-16 15-1 0   Modifier CH CI CJ CK CL CM CN     ? Mobility - Walking and Moving Around:     - CURRENT STATUS: CK - 40%-59% impaired, limited or restricted    - GOAL STATUS: CJ - 20%-39% impaired, limited or restricted    - D/C STATUS:  ---------------To be determined---------------    Medical Necessity:   · Patient is expected to demonstrate progress in strength, range of motion and balance to improve ambulation. Reason for Services/Other Comments:  · Patient continues to require present interventions due to patient's inability to prolonged ambulate. Use of outcome tool(s) and clinical judgement create a POC that gives a: Questionable prediction of patient's progress: MODERATE COMPLEXITY   TREATMENT:   (In addition to Assessment/Re-Assessment sessions the following treatments were rendered)  THERAPEUTIC EXERCISE: (40 minutes):  Exercises per grid below to improve mobility, strength and balance. Required moderate visual, verbal and manual cues to promote proper body alignment, promote proper body posture and promote proper body mechanics. Progressed resistance, range, repetitions and complexity of movement as indicated.    Date  02-28-17   Activity/Exercise    SLR 2 sets  10 reps  B LE's   Standing hip ABD 2 sets  15 reps each   SLS    NuStep Level 4  8 minutes   Standing Heel/Toe Raises 20 reps   Step-Ups    Gastroc Slantboard 3 reps  20 reps each   LAQ's    SAQ's 20 reps  B LE's   Gait Training with Cane    Heel raises    High Knee March 4x30 ft   Tandem gait 4x30 ft   Side Stepping 4x30 ft   tiltboard FW/BW and Sideways  20 reps each     MANUAL THERAPY: (0 minutes): Joint mobilization and Soft tissue mobilization was utilized and necessary because of the patient's restricted joint motion, painful spasm, loss of articular motion and restricted motion of soft tissue. L knee     Modalities: Cold pack to bilateral knees x10 minutes to decrease pain. Skin clear afterwards. Treatment/Session Assessment:  Pt tolerated treatments fairly today. She had increased pain with exercise today-she feels due to her inflamed arthritis from the weather. · Pre-treatment Symptoms:  Bilateral knee pain and difficulty walking  · Pain: Initial:    Post Session:  2/10 ·   Compliance with Program/Exercises: Will assess as treatment progresses. · Recommendations/Intent for next treatment session: \"Next visit will focus on advancements to more challenging activities\".   Total Treatment Duration:  50 minutes  PT Patient Time In/Time Out  Time In: 1110  Time Out: [address] [place] Persons

## 2017-03-07 ENCOUNTER — APPOINTMENT (OUTPATIENT)
Dept: PHYSICAL THERAPY | Age: 72
End: 2017-03-07

## 2017-03-13 ENCOUNTER — HOSPITAL ENCOUNTER (OUTPATIENT)
Dept: PHYSICAL THERAPY | Age: 72
Discharge: HOME | End: 2017-03-13

## 2017-04-18 ENCOUNTER — HOSPITAL ENCOUNTER (OUTPATIENT)
Dept: PHYSICAL THERAPY | Age: 72
Discharge: HOME | End: 2017-04-18

## 2017-04-18 ENCOUNTER — ANESTHESIA EVENT (OUTPATIENT)
Dept: SURGERY | Age: 72
DRG: 470 | End: 2017-04-18
Payer: MEDICARE

## 2017-04-18 ENCOUNTER — HOSPITAL ENCOUNTER (OUTPATIENT)
Dept: SURGERY | Age: 72
Discharge: HOME | End: 2017-04-18
Payer: MEDICARE

## 2017-04-18 VITALS
SYSTOLIC BLOOD PRESSURE: 140 MMHG | WEIGHT: 172.2 LBS | BODY MASS INDEX: 29.4 KG/M2 | HEIGHT: 64 IN | DIASTOLIC BLOOD PRESSURE: 70 MMHG | OXYGEN SATURATION: 97 % | HEART RATE: 74 BPM | TEMPERATURE: 96.2 F

## 2017-04-18 LAB
ANION GAP BLD CALC-SCNC: 10 MMOL/L (ref 7–16)
APPEARANCE UR: ABNORMAL
APTT PPP: 23.9 SEC (ref 23.5–31.7)
ATRIAL RATE: 68 BPM
BACTERIA SPEC CULT: ABNORMAL
BACTERIA URNS QL MICRO: ABNORMAL /HPF
BASOPHILS # BLD AUTO: 0 K/UL (ref 0–0.2)
BASOPHILS # BLD: 0 % (ref 0–2)
BILIRUB UR QL: NEGATIVE
BUN SERPL-MCNC: 27 MG/DL (ref 8–23)
CALCIUM SERPL-MCNC: 9.1 MG/DL (ref 8.3–10.4)
CALCULATED P AXIS, ECG09: 54 DEGREES
CALCULATED R AXIS, ECG10: 44 DEGREES
CALCULATED T AXIS, ECG11: 29 DEGREES
CASTS URNS QL MICRO: ABNORMAL /LPF
CHLORIDE SERPL-SCNC: 103 MMOL/L (ref 98–107)
CO2 SERPL-SCNC: 27 MMOL/L (ref 21–32)
COLOR UR: YELLOW
CREAT SERPL-MCNC: 0.92 MG/DL (ref 0.6–1)
DIAGNOSIS, 93000: NORMAL
DIFFERENTIAL METHOD BLD: ABNORMAL
EOSINOPHIL # BLD: 0.3 K/UL (ref 0–0.8)
EOSINOPHIL NFR BLD: 5 % (ref 0.5–7.8)
EPI CELLS #/AREA URNS HPF: ABNORMAL /HPF
ERYTHROCYTE [DISTWIDTH] IN BLOOD BY AUTOMATED COUNT: 14.3 % (ref 11.9–14.6)
GLUCOSE SERPL-MCNC: 84 MG/DL (ref 65–100)
GLUCOSE UR STRIP.AUTO-MCNC: NEGATIVE MG/DL
HCT VFR BLD AUTO: 42.2 % (ref 35.8–46.3)
HGB BLD-MCNC: 13.8 G/DL (ref 11.7–15.4)
HGB UR QL STRIP: ABNORMAL
IMM GRANULOCYTES # BLD: 0 K/UL (ref 0–0.5)
IMM GRANULOCYTES NFR BLD AUTO: 0.2 % (ref 0–5)
INR PPP: 1 (ref 0.9–1.2)
KETONES UR QL STRIP.AUTO: NEGATIVE MG/DL
LEUKOCYTE ESTERASE UR QL STRIP.AUTO: ABNORMAL
LYMPHOCYTES # BLD AUTO: 22 % (ref 13–44)
LYMPHOCYTES # BLD: 1.3 K/UL (ref 0.5–4.6)
MCH RBC QN AUTO: 28.9 PG (ref 26.1–32.9)
MCHC RBC AUTO-ENTMCNC: 32.7 G/DL (ref 31.4–35)
MCV RBC AUTO: 88.5 FL (ref 79.6–97.8)
MONOCYTES # BLD: 0.8 K/UL (ref 0.1–1.3)
MONOCYTES NFR BLD AUTO: 13 % (ref 4–12)
NEUTS SEG # BLD: 3.5 K/UL (ref 1.7–8.2)
NEUTS SEG NFR BLD AUTO: 60 % (ref 43–78)
NITRITE UR QL STRIP.AUTO: NEGATIVE
P-R INTERVAL, ECG05: 128 MS
PH UR STRIP: 5.5 [PH] (ref 5–9)
PLATELET # BLD AUTO: 214 K/UL (ref 150–450)
PMV BLD AUTO: 12.2 FL (ref 10.8–14.1)
POTASSIUM SERPL-SCNC: 3.6 MMOL/L (ref 3.5–5.1)
PROT UR STRIP-MCNC: NEGATIVE MG/DL
PROTHROMBIN TIME: 10.3 SEC (ref 9.6–12)
Q-T INTERVAL, ECG07: 418 MS
QRS DURATION, ECG06: 84 MS
QTC CALCULATION (BEZET), ECG08: 444 MS
RBC # BLD AUTO: 4.77 M/UL (ref 4.05–5.25)
RBC #/AREA URNS HPF: ABNORMAL /HPF
SERVICE CMNT-IMP: ABNORMAL
SODIUM SERPL-SCNC: 140 MMOL/L (ref 136–145)
SP GR UR REFRACTOMETRY: 1.02 (ref 1–1.02)
UROBILINOGEN UR QL STRIP.AUTO: 0.2 EU/DL (ref 0.2–1)
VENTRICULAR RATE, ECG03: 68 BPM
WBC # BLD AUTO: 5.9 K/UL (ref 4.3–11.1)
WBC URNS QL MICRO: ABNORMAL /HPF

## 2017-04-18 PROCEDURE — 80048 BASIC METABOLIC PNL TOTAL CA: CPT | Performed by: PHYSICIAN ASSISTANT

## 2017-04-18 PROCEDURE — 87641 MR-STAPH DNA AMP PROBE: CPT | Performed by: PHYSICIAN ASSISTANT

## 2017-04-18 PROCEDURE — 93005 ELECTROCARDIOGRAM TRACING: CPT | Performed by: ANESTHESIOLOGY

## 2017-04-18 PROCEDURE — 85025 COMPLETE CBC W/AUTO DIFF WBC: CPT | Performed by: PHYSICIAN ASSISTANT

## 2017-04-18 PROCEDURE — 81001 URINALYSIS AUTO W/SCOPE: CPT | Performed by: PHYSICIAN ASSISTANT

## 2017-04-18 PROCEDURE — 36415 COLL VENOUS BLD VENIPUNCTURE: CPT | Performed by: PHYSICIAN ASSISTANT

## 2017-04-18 PROCEDURE — 77030027138 HC INCENT SPIROMETER -A

## 2017-04-18 PROCEDURE — 85610 PROTHROMBIN TIME: CPT | Performed by: PHYSICIAN ASSISTANT

## 2017-04-18 PROCEDURE — 85730 THROMBOPLASTIN TIME PARTIAL: CPT | Performed by: PHYSICIAN ASSISTANT

## 2017-04-18 RX ORDER — GLUCOSAMINE/CHONDR SU A SOD 750-600 MG
2 TABLET ORAL DAILY
COMMUNITY
End: 2018-08-24

## 2017-04-18 NOTE — PROGRESS NOTES
17 1030   Oxygen Therapy   O2 Sat (%) 98 %   Pulse via Oximetry 89 beats per minute   O2 Device Room air   Pre-Treatment   Breath Sounds Bilateral Clear   Pre FEV1 (liters) 1.7 liters   % Predicted 78   Incentive Spirometry Treatment   Actual Volume (ml) 1500 ml     Initial respiratory Assessment completed with pt. Pt was interviewed and evaluated in [facility] prior to surgery. Patient ID:  Fe Weaver  [id number]  80 y.o.  1945  Surgeon: Dr. Ivan Nichole  Date of Surgery: The linked surgery was not found. Please check manually. Procedure: Total Left Knee Arthroplasty  Primary Care Physician: Froylan Meyer [phone]  Specialists:                                  Pt instructed in the use of Incentive Spirometry. Pt instructed to bring Incentive Spirometer back on date of surgery & to start using Is upon return to pt room.     Pt taught proper cough technique      History of smokin CIG /WEEK FOR MANY YEARS     Quit date:10 YEARS AGO    Secondhand smoke:MOTHER      Past procedures with Oxygen desaturation:NONE    Past Medical History:   Diagnosis Date    Anxiety     Arthritis     back and knee's     Breast cancer ([facility] 2016    Left Lumpectomy    Breast cancer ([facility]     Cancer ([facility]     melanoma left face,  left breast ca     Chronic pain     back    Former smoker     was a passive smoker for 40 yrs; stopped in     GERD (gastroesophageal reflux disease)     Hard of hearing     bilateral hearing aids    Headache     hx monthly migraines; rare now    Hypertension     controlled with med    Hypothyroidism     Insomnia                                     ENT:SINUS                                                                                                                      Respiratory history:HX OF PNA- HOSPITALIZED IN , HX OF DYSPNEAHX ,GROUND GLASS APPEARANCE ON X-RAY                                 SOB  ON EXERTION Respiratory meds:                                                         FAMILY PRESENT:             FRIEND                                        PAST SLEEP STUDY:           NO  HX OF MAYA:                           NO                                     MAYA assessment:                                               SLEEPS ON SIDE                                                    PHYSICAL EXAM   Body mass index is 29.56 kg/(m^2).    Visit Vitals    /70 (BP 1 Location: Right arm, BP Patient Position: At rest;Sitting)    Pulse 74    Temp 96.2 °F (35.7 °C)    Ht 5' 4\" (1.626 m)    Wt 78.1 kg (172 lb 3.2 oz)    SpO2 97%    BMI 29.56 kg/m2     Neck circumference:  35.5    cm    Loud snoring:MINIMAL    Witnessed apnea or wakening gasping or choking:,DENIES,    Awakens with headaches:DENIES    Morning or daytime tiredness/ sleepiness: DENIES      Dry mouth or sore throat in morning:SOMETIMES    Freidman stage:3    SACS score:6    STOP/BANG:3                              CPAP:NA           AGGRESSIVE IS  Referrals:    Pt. Phone Number:

## 2017-04-18 NOTE — PERIOP NOTES
Patient verified name, , and surgery as listed in [facility]. Type 3 surgery, walk in assessment complete. Labs per surgeon: CBC, BMP, U/A, PT/PTT; results within [facility] protocols. MRSA nasal swab pending. Labs per anesthesia protocol: included in surgeons orders. EKG: done today; abnormal; MDA to review. EKG dated 14 printed from EMR for comparison. Hibiclens and instructions given per hospital policy. Nasal Swab collected per MD order and instructions for Mupirocin nasal ointment if required. Patient provided with handouts including Guide to Surgery, Pain Management, Hand Hygiene, Blood Transfusion Education, and [facility] Brochure. Patient answered medical/surgical history questions at their best of ability. All prior to admission medications documented in [facility]. Original medication prescription bottles not visualized during patient appointment. Patient instructed to hold all vitamins 7 days prior to surgery and NSAIDS 5 days prior to surgery. Medications to be held prior to surgery: vitamins. Patient instructed to continue previous medications as prescribed prior to surgery and to take the following medications the day of surgery according to anesthesia guidelines with a small sip of water: nexium, levothyroxine. Patient taught back and verbalized understanding.

## 2017-04-18 NOTE — PROGRESS NOTES
Physical Therapy at [address] [place] [address], [place], [address]  [phone]  [facility] Prehab Interview       ICD-10: Treatment Diagnosis:   · Pain in Left Knee (M25.562)  · Stiffness of Left Knee, Not elsewhere classified (M25.662)    SUBJECTIVE:  Subjective: \"I want to walk a 3 mile in 3 months\"      OBJECTIVE:  Patient attends [facility]. Patient has attended a conservative trial of Physical Therapy at [facility] clinic with Netta Ferrer and Marylin Park. Patient has a PT HEP that they are currently performing. We reviewed the Prehab Questionnaire:  Home Situation:    · Home Environment: Private residence  · # Steps to Enter: 0  · One/Two Story Residence: One story  · Living Alone: No  · Support Systems: Spouse/Significant Other/Partner  · Patient Expects to be Discharged to[de-identified] Private residence  · Current DME Used/Available at Home: Cane, straight  · Tub or Shower Type: Shower     Patient self reported Lower Extremity Functional Scale (LEFS) score for today as 42/80. Patient attends the Prehab Education class and all questions are answered. ASSESSMENT:  COMMENTS: She is here with a friend. She plans on going home at [place] with her spouse and a sitter's help    PLAN OF CARE:  left TKA is scheduled with Dr. Jason Pastor on 05/12/17.     Thank you for this referral,  Rose Rashid, PT

## 2017-04-18 NOTE — PERIOP NOTES
Labs dated 4/18/17 routed via [address] [facility] to patients PCP, Neha Connors MD, via fax at [phone] per Dr. Leonel Quigley request. Please contact surgeon with any questions or concerns ([phone]). Recent Results (from the past 12 hour(s))   CBC WITH AUTOMATED DIFF    Collection Time: 04/18/17 10:58 AM   Result Value Ref Range    WBC 5.9 4.3 - 11.1 K/uL    RBC 4.77 4.05 - 5.25 M/uL    HGB 13.8 11.7 - 15.4 g/dL    HCT 42.2 35.8 - 46.3 %    MCV 88.5 79.6 - 97.8 FL    MCH 28.9 26.1 - 32.9 PG    MCHC 32.7 31.4 - 35.0 g/dL    RDW 14.3 11.9 - 14.6 %    PLATELET 957 906 - 125 K/uL    MPV 12.2 10.8 - 14.1 FL    DF AUTOMATED      NEUTROPHILS 60 43 - 78 %    LYMPHOCYTES 22 13 - 44 %    MONOCYTES 13 (H) 4.0 - 12.0 %    EOSINOPHILS 5 0.5 - 7.8 %    BASOPHILS 0 0.0 - 2.0 %    IMMATURE GRANULOCYTES 0.2 0.0 - 5.0 %    ABS. NEUTROPHILS 3.5 1.7 - 8.2 K/UL    ABS. LYMPHOCYTES 1.3 0.5 - 4.6 K/UL    ABS. MONOCYTES 0.8 0.1 - 1.3 K/UL    ABS. EOSINOPHILS 0.3 0.0 - 0.8 K/UL    ABS. BASOPHILS 0.0 0.0 - 0.2 K/UL    ABS. IMM.  GRANS. 0.0 0.0 - 0.5 K/UL   PROTHROMBIN TIME + INR    Collection Time: 04/18/17 10:58 AM   Result Value Ref Range    Prothrombin time 10.3 9.6 - 12.0 sec    INR 1.0 0.9 - 1.2     PTT    Collection Time: 04/18/17 10:58 AM   Result Value Ref Range    aPTT 23.9 23.5 - 30.8 SEC   METABOLIC PANEL, BASIC    Collection Time: 04/18/17 10:58 AM   Result Value Ref Range    Sodium 140 136 - 145 mmol/L    Potassium 3.6 3.5 - 5.1 mmol/L    Chloride 103 98 - 107 mmol/L    CO2 27 21 - 32 mmol/L    Anion gap 10 7 - 16 mmol/L    Glucose 84 65 - 100 mg/dL    BUN 27 (H) 8 - 23 MG/DL    Creatinine 0.92 0.6 - 1.0 MG/DL    GFR est AA >60 >60 ml/min/1.73m2    GFR est non-AA >60 >60 ml/min/1.73m2    Calcium 9.1 8.3 - 10.4 MG/DL   URINALYSIS W/ RFLX MICROSCOPIC    Collection Time: 04/18/17 10:58 AM   Result Value Ref Range    Color YELLOW      Appearance CLOUDY      Specific gravity 1.024 (H) 1.001 - 1.023      pH (UA) 5.5 5.0 - 9.0 Protein NEGATIVE  NEG mg/dL    Glucose NEGATIVE  mg/dL    Ketone NEGATIVE  NEG mg/dL    Bilirubin NEGATIVE  NEG      Blood SMALL (A) NEG      Urobilinogen 0.2 0.2 - 1.0 EU/dL    Nitrites NEGATIVE  NEG      Leukocyte Esterase MODERATE (A) NEG      WBC 20-50 0 /hpf    RBC 5-10 0 /hpf    Epithelial cells 5-10 0 /hpf    Bacteria 1+ (H) 0 /hpf    Casts 0-3 0 /lpf   EKG, 12 LEAD, INITIAL    Collection Time: 04/18/17 12:52 PM   Result Value Ref Range    Ventricular Rate 68 BPM    Atrial Rate 68 BPM    P-R Interval 128 ms    QRS Duration 84 ms    Q-T Interval 418 ms    QTC Calculation (Bezet) 444 ms    Calculated P Axis 54 degrees    Calculated R Axis 44 degrees    Calculated T Axis 29 degrees    Diagnosis       Normal sinus rhythm  Cannot rule out Anterior infarct , age undetermined  Abnormal ECG  When compared with ECG of 11-MAY-2014 09:51,  No significant change was found

## 2017-04-19 NOTE — PERIOP NOTES
Nasal swab reviewed. 4/18/2017  5:33 PM - Bryan, Lab In [facility]       Component Results      Component Value Flag Ref Range Units Status     Special Requests: NO SPECIAL REQUESTS     Final     Culture result:  (A)    Final     MRSA target DNA not detected, SA target DNA detected.   A MRSA negative, SA positive test result does not preclude MRSA nasal colonization. Pt called and notified of result. Mupirocin ointment 2% 22 gram tube called into pt's pharmacy ([facility] #[id number]) and instructions left for pt to apply intra-nasally to both nostrils BID x 5 days for a total of 10 doses prior to surgery per protocol.

## 2017-04-27 NOTE — H&P
[facility]  Pre Operative History and Physical Exam    Patient ID:  Hanh Caruso  [id number]  65 y.o.  1945    Today: April 27, 2017       Assessment:   1. Arthritis of the left knee  2. UTI: will place on bactrim; recheck ua on DOS after gilliam placed    Plan:    1. Proceed with scheduled Procedure(s) (LRB):  LEFT KNEE ARTHROPLASTY TOTAL/ ROMEL (Left)            CC: Left knee pain    HPI:   The patient has end stage arthritis of the left knee. The patient was evaluated and examined during a consultation prior to this office visit. There have been no changes to the patient's orthopedic condition since the initial consultation. The patient has failed previous conservative treatment for this condition including antiinflammatories , and lifestyle modifications. The necessity for joint replacement is present.  The patient will be admitted the day of surgery for Procedure(s) (LRB):  LEFT KNEE ARTHROPLASTY TOTAL/ ROMEL (Left)      Past Medical/Surgical History:  Past Medical History:   Diagnosis Date    Anxiety     Arthritis     back and knee's     Breast cancer ([facility] 1/18/2016    Left Lumpectomy    Breast cancer ([facility]     Cancer ([facility]     melanoma left face,  left breast ca     Chronic pain     back    Former smoker     was a passive smoker for 40 yrs; stopped in 2014    GERD (gastroesophageal reflux disease)     Hard of hearing     bilateral hearing aids    Headache     hx monthly migraines; rare now    Hypertension     controlled with med    Hypothyroidism     Insomnia      Past Surgical History:   Procedure Laterality Date    BREAST SURGERY PROCEDURE UNLISTED Left 7/2015    biopsy    HX BREAST BIOPSY Left 1/18/2016    BREAST NEEDLE LOCALIZED PARTIAL MASTECTOMY/ LEFT //   performed by Concepcion Blakely MD at [address] [place] [address] HX BREAST LUMPECTOMY Left 01/2016    HX COLONOSCOPY      HX TONSILLECTOMY  1957    HX VASCULAR ACCESS Right     port right upper chest- removed 2016    HX WISDOM TEETH EXTRACTION      RI MASTECTOMY, PARTIAL Left 01/18/2016        Allergies: No Known Allergies     Objective:                    HEENT: NC/AT                   Lungs:  Unlabored respirations, clear breath sounds                    Heart:   RRR                    Abdomen: soft                   Extremities:  Pain with ROM of the left knee    Meds:   No current facility-administered medications for this encounter. Current Outpatient Prescriptions   Medication Sig    mupirocin calcium (BACTROBAN) 2 % nasal ointment by Both Nostrils route two (2) times a day.  NAPROXEN SODIUM (ALEVE PO) Take 2 Tabs by mouth daily as needed for Pain.  OTHER,NON-FORMULARY, Take 4 Tabs by mouth daily. Relief Factor Tablets    Biotin 2,500 mcg cap Take 2 Tabs by mouth daily.  OTHER,NON-FORMULARY, Take 1 Tab by mouth daily. Air Borne Chewable tab    polycarbophil calcium (EQUALACTIN) 500 mg chew Take 1,500 mg by mouth daily.  levothyroxine (SYNTHROID) 25 mcg tablet TAKE ONE TABLET BY MOUTH ONE TIME DAILY BEFORE BREAKFAST    hydroCHLOROthiazide (HYDRODIURIL) 25 mg tablet TAKE ONE TABLET BY MOUTH ONE TIME DAILY    temazepam (RESTORIL) 15 mg capsule Take 1 Cap by mouth nightly. Max Daily Amount: 15 mg.    multivitamin (ONE A DAY) tablet Take 1 Tab by mouth daily.  esomeprazole (NEXIUM) 20 mg capsule Take 20 mg by mouth every morning. Take / use AM day of surgery  per anesthesia protocols.   Indications: GASTROESOPHAGEAL REFLUX         Labs:  Hospital Outpatient Visit on 04/18/2017   Component Date Value Ref Range Status    WBC 04/18/2017 5.9  4.3 - 11.1 K/uL Final    RBC 04/18/2017 4.77  4.05 - 5.25 M/uL Final    HGB 04/18/2017 13.8  11.7 - 15.4 g/dL Final    HCT 04/18/2017 42.2  35.8 - 46.3 % Final    MCV 04/18/2017 88.5  79.6 - 97.8 FL Final    MCH 04/18/2017 28.9  26.1 - 32.9 PG Final    MCHC 04/18/2017 32.7  31.4 - 35.0 g/dL Final    RDW 04/18/2017 14.3  11.9 - 14.6 % Final    PLATELET 69/94/7721 190  150 - 450 K/uL Final    MPV 04/18/2017 12.2  10.8 - 14.1 FL Final    DF 04/18/2017 AUTOMATED    Final    NEUTROPHILS 04/18/2017 60  43 - 78 % Final    LYMPHOCYTES 04/18/2017 22  13 - 44 % Final    MONOCYTES 04/18/2017 13* 4.0 - 12.0 % Final    EOSINOPHILS 04/18/2017 5  0.5 - 7.8 % Final    BASOPHILS 04/18/2017 0  0.0 - 2.0 % Final    IMMATURE GRANULOCYTES 04/18/2017 0.2  0.0 - 5.0 % Final    ABS. NEUTROPHILS 04/18/2017 3.5  1.7 - 8.2 K/UL Final    ABS. LYMPHOCYTES 04/18/2017 1.3  0.5 - 4.6 K/UL Final    ABS. MONOCYTES 04/18/2017 0.8  0.1 - 1.3 K/UL Final    ABS. EOSINOPHILS 04/18/2017 0.3  0.0 - 0.8 K/UL Final    ABS. BASOPHILS 04/18/2017 0.0  0.0 - 0.2 K/UL Final    ABS. IMM. GRANS. 04/18/2017 0.0  0.0 - 0.5 K/UL Final    Prothrombin time 04/18/2017 10.3  9.6 - 12.0 sec Final    INR 04/18/2017 1.0  0.9 - 1.2   Final    Comment: Suggested therapeutic INR range:  Venous thrombosis and embolus  2.0-3.0  Prosthetic heart valve         2.5-3.5      aPTT 04/18/2017 23.9  23.5 - 31.7 SEC Final    Heparin Therapeutic Range = 56.6-81.7 secs    Sodium 04/18/2017 140  136 - 145 mmol/L Final    Potassium 04/18/2017 3.6  3.5 - 5.1 mmol/L Final    Chloride 04/18/2017 103  98 - 107 mmol/L Final    CO2 04/18/2017 27  21 - 32 mmol/L Final    Anion gap 04/18/2017 10  7 - 16 mmol/L Final    Glucose 04/18/2017 84  65 - 100 mg/dL Final    Comment: 47 - 60 mg/dl Consistent with, but not fully diagnostic of hypoglycemia.   101 - 125 mg/dl Impaired fasting glucose/consistent with pre-diabetes mellitus  > 126 mg/dl Fasting glucose consistent with overt diabetes mellitus      BUN 04/18/2017 27* 8 - 23 MG/DL Final    Creatinine 04/18/2017 0.92  0.6 - 1.0 MG/DL Final    GFR est AA 04/18/2017 >60  >60 ml/min/1.73m2 Final    GFR est non-AA 04/18/2017 >60  >60 ml/min/1.73m2 Final    Comment: (NOTE)  Estimated GFR is calculated using the Modification of Diet in Renal   Disease (MDRD) Study equation, reported for both  Americans   (GFRAA) and non- Americans (GFRNA), and normalized to 1.73m2   body surface area. The physician must decide which value applies to   the patient. The MDRD study equation should only be used in   individuals age 25 or older. It has not been validated for the   following: pregnant women, patients with serious comorbid conditions,   or on certain medications, or persons with extremes of body size,   muscle mass, or nutritional status.  Calcium 04/18/2017 9.1  8.3 - 10.4 MG/DL Final    Color 04/18/2017 YELLOW    Final    Appearance 04/18/2017 CLOUDY    Final    Specific gravity 04/18/2017 1.024* 1.001 - 1.023   Final    pH (UA) 04/18/2017 5.5  5.0 - 9.0   Final    Protein 04/18/2017 NEGATIVE   NEG mg/dL Final    Glucose 04/18/2017 NEGATIVE   mg/dL Final    Ketone 04/18/2017 NEGATIVE   NEG mg/dL Final    Bilirubin 04/18/2017 NEGATIVE   NEG   Final    Blood 04/18/2017 SMALL* NEG   Final    Urobilinogen 04/18/2017 0.2  0.2 - 1.0 EU/dL Final    Nitrites 04/18/2017 NEGATIVE   NEG   Final    Leukocyte Esterase 04/18/2017 MODERATE* NEG   Final    WBC 04/18/2017 20-50  0 /hpf Final    RBC 04/18/2017 5-10  0 /hpf Final    Epithelial cells 04/18/2017 5-10  0 /hpf Final    Bacteria 04/18/2017 1+* 0 /hpf Final    Casts 04/18/2017 0-3  0 /lpf Final    HYALINE    Special Requests: 04/18/2017 NO SPECIAL REQUESTS    Final    Culture result: 04/18/2017 MRSA target DNA not detected, SA target DNA detected. A MRSA negative, SA positive test result does not preclude MRSA nasal colonization. *   Final    Ventricular Rate 04/18/2017 68  BPM Final    Atrial Rate 04/18/2017 68  BPM Final    P-R Interval 04/18/2017 128  ms Final    QRS Duration 04/18/2017 84  ms Final    Q-T Interval 04/18/2017 418  ms Final    QTC Calculation (Bezet) 04/18/2017 444  ms Final    Calculated P Axis 04/18/2017 54  degrees Final    Calculated R Axis 04/18/2017 44  degrees Final  Calculated T Axis 04/18/2017 29  degrees Final    Diagnosis 04/18/2017    Final                    Value:Normal sinus rhythm  When compared with ECG of 11-MAY-2014 09:51,  No significant change was found  Confirmed by [facility]  MD ()ASHLEIGH ([id number]) on 4/18/2017 3:25:37 PM                   Patient Active Problem List   Diagnosis Code    Malignant neoplasm of left breast ([facility] C50.912    Dyspnea R06.00    Anemia D64.9    Pulmonary infiltrates R91.8    GERD (gastroesophageal reflux disease) K21.9    Hypertension I10    Hypomagnesemia E83.42         Signed By: RADHA Harp  April 27, 2017

## 2017-05-12 ENCOUNTER — HOSPITAL ENCOUNTER (INPATIENT)
Age: 72
LOS: 2 days | Discharge: HOME HEALTH | DRG: 470 | End: 2017-05-14
Attending: ORTHOPAEDIC SURGERY | Admitting: ORTHOPAEDIC SURGERY
Payer: MEDICARE

## 2017-05-12 ENCOUNTER — ANESTHESIA (OUTPATIENT)
Dept: SURGERY | Age: 72
DRG: 470 | End: 2017-05-12
Payer: MEDICARE

## 2017-05-12 DIAGNOSIS — Z96.652 STATUS POST TOTAL LEFT KNEE REPLACEMENT: Primary | ICD-10-CM

## 2017-05-12 LAB
ABO + RH BLD: NORMAL
APPEARANCE UR: CLEAR
BACTERIA URNS QL MICRO: 0 /HPF
BILIRUB UR QL: NEGATIVE
BLOOD GROUP ANTIBODIES SERPL: NORMAL
CASTS URNS QL MICRO: ABNORMAL /LPF
COLOR UR: YELLOW
EPI CELLS #/AREA URNS HPF: ABNORMAL /HPF
GLUCOSE BLD STRIP.AUTO-MCNC: 100 MG/DL (ref 65–100)
GLUCOSE UR STRIP.AUTO-MCNC: NEGATIVE MG/DL
HGB BLD-MCNC: 10.9 G/DL (ref 11.7–15.4)
HGB UR QL STRIP: ABNORMAL
KETONES UR QL STRIP.AUTO: NEGATIVE MG/DL
LEUKOCYTE ESTERASE UR QL STRIP.AUTO: NEGATIVE
NITRITE UR QL STRIP.AUTO: NEGATIVE
PH UR STRIP: 6 [PH] (ref 5–9)
PROT UR STRIP-MCNC: NEGATIVE MG/DL
RBC #/AREA URNS HPF: ABNORMAL /HPF
SP GR UR REFRACTOMETRY: 1.01 (ref 1–1.02)
SPECIMEN EXP DATE BLD: NORMAL
UROBILINOGEN UR QL STRIP.AUTO: 0.2 EU/DL (ref 0.2–1)
WBC URNS QL MICRO: 0 /HPF

## 2017-05-12 PROCEDURE — 77030003602 HC NDL NRV BLK BBMI -B: Performed by: NURSE ANESTHETIST, CERTIFIED REGISTERED

## 2017-05-12 PROCEDURE — 74011250636 HC RX REV CODE- 250/636

## 2017-05-12 PROCEDURE — 77030018836 HC SOL IRR NACL ICUM -A: Performed by: ORTHOPAEDIC SURGERY

## 2017-05-12 PROCEDURE — 87086 URINE CULTURE/COLONY COUNT: CPT | Performed by: ORTHOPAEDIC SURGERY

## 2017-05-12 PROCEDURE — 74011250636 HC RX REV CODE- 250/636: Performed by: ORTHOPAEDIC SURGERY

## 2017-05-12 PROCEDURE — 86900 BLOOD TYPING SEROLOGIC ABO: CPT | Performed by: PHYSICIAN ASSISTANT

## 2017-05-12 PROCEDURE — 82962 GLUCOSE BLOOD TEST: CPT

## 2017-05-12 PROCEDURE — 76010010054 HC POST OP PAIN BLOCK: Performed by: ORTHOPAEDIC SURGERY

## 2017-05-12 PROCEDURE — 77030034849: Performed by: ORTHOPAEDIC SURGERY

## 2017-05-12 PROCEDURE — 74011250636 HC RX REV CODE- 250/636: Performed by: ANESTHESIOLOGY

## 2017-05-12 PROCEDURE — 74011250637 HC RX REV CODE- 250/637: Performed by: PHYSICIAN ASSISTANT

## 2017-05-12 PROCEDURE — 77030011640 HC PAD GRND REM COVD -A: Performed by: ORTHOPAEDIC SURGERY

## 2017-05-12 PROCEDURE — 81001 URINALYSIS AUTO W/SCOPE: CPT | Performed by: ORTHOPAEDIC SURGERY

## 2017-05-12 PROCEDURE — 74011000302 HC RX REV CODE- 302: Performed by: ORTHOPAEDIC SURGERY

## 2017-05-12 PROCEDURE — 74011000250 HC RX REV CODE- 250: Performed by: ORTHOPAEDIC SURGERY

## 2017-05-12 PROCEDURE — 97165 OT EVAL LOW COMPLEX 30 MIN: CPT

## 2017-05-12 PROCEDURE — 77030020782 HC GWN BAIR PAWS FLX 3M -B: Performed by: NURSE ANESTHETIST, CERTIFIED REGISTERED

## 2017-05-12 PROCEDURE — 36415 COLL VENOUS BLD VENIPUNCTURE: CPT | Performed by: PHYSICIAN ASSISTANT

## 2017-05-12 PROCEDURE — 77030036688 HC BLNKT CLD THER S2SG -B

## 2017-05-12 PROCEDURE — 77030031139 HC SUT VCRL2 J&J -A: Performed by: ORTHOPAEDIC SURGERY

## 2017-05-12 PROCEDURE — 77010033678 HC OXYGEN DAILY

## 2017-05-12 PROCEDURE — 85018 HEMOGLOBIN: CPT | Performed by: PHYSICIAN ASSISTANT

## 2017-05-12 PROCEDURE — 76210000016 HC OR PH I REC 1 TO 1.5 HR: Performed by: ORTHOPAEDIC SURGERY

## 2017-05-12 PROCEDURE — 0SRD0JZ REPLACEMENT OF LEFT KNEE JOINT WITH SYNTHETIC SUBSTITUTE, OPEN APPROACH: ICD-10-PCS | Performed by: ORTHOPAEDIC SURGERY

## 2017-05-12 PROCEDURE — 74011000258 HC RX REV CODE- 258: Performed by: ORTHOPAEDIC SURGERY

## 2017-05-12 PROCEDURE — 77030007880 HC KT SPN EPDRL BBMI -B: Performed by: NURSE ANESTHETIST, CERTIFIED REGISTERED

## 2017-05-12 PROCEDURE — 77030006789 HC BLD SAW OSC STRY -C: Performed by: ORTHOPAEDIC SURGERY

## 2017-05-12 PROCEDURE — 76060000035 HC ANESTHESIA 2 TO 2.5 HR: Performed by: ORTHOPAEDIC SURGERY

## 2017-05-12 PROCEDURE — 76942 ECHO GUIDE FOR BIOPSY: CPT | Performed by: ORTHOPAEDIC SURGERY

## 2017-05-12 PROCEDURE — 65270000029 HC RM PRIVATE

## 2017-05-12 PROCEDURE — 77030008467 HC STPLR SKN COVD -B: Performed by: ORTHOPAEDIC SURGERY

## 2017-05-12 PROCEDURE — 74011250637 HC RX REV CODE- 250/637: Performed by: INTERNAL MEDICINE

## 2017-05-12 PROCEDURE — 77030011208: Performed by: ORTHOPAEDIC SURGERY

## 2017-05-12 PROCEDURE — 77030002912 HC SUT ETHBND J&J -A: Performed by: ORTHOPAEDIC SURGERY

## 2017-05-12 PROCEDURE — 77030019557 HC ELECTRD VES SEAL MEDT -F: Performed by: ORTHOPAEDIC SURGERY

## 2017-05-12 PROCEDURE — 74011000250 HC RX REV CODE- 250

## 2017-05-12 PROCEDURE — 74011250636 HC RX REV CODE- 250/636: Performed by: PHYSICIAN ASSISTANT

## 2017-05-12 PROCEDURE — 77030006720 HC BLD PAT RMR ZIMM -B: Performed by: ORTHOPAEDIC SURGERY

## 2017-05-12 PROCEDURE — 97161 PT EVAL LOW COMPLEX 20 MIN: CPT

## 2017-05-12 PROCEDURE — 77030035236 HC SUT PDS STRATFX BARB J&J -B: Performed by: ORTHOPAEDIC SURGERY

## 2017-05-12 PROCEDURE — 94760 N-INVAS EAR/PLS OXIMETRY 1: CPT

## 2017-05-12 PROCEDURE — C1776 JOINT DEVICE (IMPLANTABLE): HCPCS | Performed by: ORTHOPAEDIC SURGERY

## 2017-05-12 PROCEDURE — 77030013727 HC IRR FAN PULSVC ZIMM -B: Performed by: ORTHOPAEDIC SURGERY

## 2017-05-12 PROCEDURE — 76010000171 HC OR TIME 2 TO 2.5 HR INTENSV-TIER 1: Performed by: ORTHOPAEDIC SURGERY

## 2017-05-12 PROCEDURE — 77030002966 HC SUT PDS J&J -A: Performed by: ORTHOPAEDIC SURGERY

## 2017-05-12 PROCEDURE — 77030012935 HC DRSG AQUACEL BMS -B: Performed by: ORTHOPAEDIC SURGERY

## 2017-05-12 PROCEDURE — 77030032490 HC SLV COMPR SCD KNE COVD -B

## 2017-05-12 PROCEDURE — 77030012890

## 2017-05-12 PROCEDURE — 86580 TB INTRADERMAL TEST: CPT | Performed by: ORTHOPAEDIC SURGERY

## 2017-05-12 PROCEDURE — 77030003665 HC NDL SPN BBMI -A: Performed by: NURSE ANESTHETIST, CERTIFIED REGISTERED

## 2017-05-12 DEVICE — IMPLANTABLE DEVICE: Type: IMPLANTABLE DEVICE | Site: KNEE | Status: FUNCTIONAL

## 2017-05-12 DEVICE — BASEPLATE TIB SZ 3 AP44MM ML67MM KNEE TRITANIUM 4 CRUCFRM: Type: IMPLANTABLE DEVICE | Site: KNEE | Status: FUNCTIONAL

## 2017-05-12 DEVICE — INSERT TIB SZ 3 11MM KNEE POLY POST STBL CONVENTIONAL: Type: IMPLANTABLE DEVICE | Site: KNEE | Status: FUNCTIONAL

## 2017-05-12 DEVICE — PAT ASYM MTL-BK 9MM SZ A29 -- TRIATHLON: Type: IMPLANTABLE DEVICE | Site: KNEE | Status: FUNCTIONAL

## 2017-05-12 RX ORDER — LIDOCAINE HYDROCHLORIDE 20 MG/ML
INJECTION, SOLUTION EPIDURAL; INFILTRATION; INTRACAUDAL; PERINEURAL AS NEEDED
Status: DISCONTINUED | OUTPATIENT
Start: 2017-05-12 | End: 2017-05-12 | Stop reason: HOSPADM

## 2017-05-12 RX ORDER — ACETAMINOPHEN 10 MG/ML
1000 INJECTION, SOLUTION INTRAVENOUS ONCE
Status: COMPLETED | OUTPATIENT
Start: 2017-05-12 | End: 2017-05-12

## 2017-05-12 RX ORDER — KETOROLAC TROMETHAMINE 30 MG/ML
INJECTION, SOLUTION INTRAMUSCULAR; INTRAVENOUS AS NEEDED
Status: DISCONTINUED | OUTPATIENT
Start: 2017-05-12 | End: 2017-05-12 | Stop reason: HOSPADM

## 2017-05-12 RX ORDER — ONDANSETRON 2 MG/ML
4 INJECTION INTRAMUSCULAR; INTRAVENOUS
Status: DISCONTINUED | OUTPATIENT
Start: 2017-05-12 | End: 2017-05-14 | Stop reason: HOSPADM

## 2017-05-12 RX ORDER — CIPROFLOXACIN 500 MG/1
500 TABLET ORAL EVERY 12 HOURS
Status: COMPLETED | OUTPATIENT
Start: 2017-05-12 | End: 2017-05-13

## 2017-05-12 RX ORDER — TEMAZEPAM 15 MG/1
15 CAPSULE ORAL
Status: DISCONTINUED | OUTPATIENT
Start: 2017-05-12 | End: 2017-05-14 | Stop reason: HOSPADM

## 2017-05-12 RX ORDER — SODIUM CHLORIDE, SODIUM LACTATE, POTASSIUM CHLORIDE, CALCIUM CHLORIDE 600; 310; 30; 20 MG/100ML; MG/100ML; MG/100ML; MG/100ML
INJECTION, SOLUTION INTRAVENOUS
Status: DISCONTINUED | OUTPATIENT
Start: 2017-05-12 | End: 2017-05-12 | Stop reason: HOSPADM

## 2017-05-12 RX ORDER — SODIUM CHLORIDE, SODIUM LACTATE, POTASSIUM CHLORIDE, CALCIUM CHLORIDE 600; 310; 30; 20 MG/100ML; MG/100ML; MG/100ML; MG/100ML
75 INJECTION, SOLUTION INTRAVENOUS CONTINUOUS
Status: CANCELLED | OUTPATIENT
Start: 2017-05-12 | End: 2017-05-13

## 2017-05-12 RX ORDER — ROPIVACAINE HYDROCHLORIDE 2 MG/ML
INJECTION, SOLUTION EPIDURAL; INFILTRATION; PERINEURAL AS NEEDED
Status: DISCONTINUED | OUTPATIENT
Start: 2017-05-12 | End: 2017-05-12 | Stop reason: HOSPADM

## 2017-05-12 RX ORDER — CELECOXIB 200 MG/1
200 CAPSULE ORAL EVERY 12 HOURS
Status: DISCONTINUED | OUTPATIENT
Start: 2017-05-12 | End: 2017-05-14 | Stop reason: HOSPADM

## 2017-05-12 RX ORDER — CEFAZOLIN SODIUM IN 0.9 % NACL 2 G/50 ML
2 INTRAVENOUS SOLUTION, PIGGYBACK (ML) INTRAVENOUS EVERY 8 HOURS
Status: COMPLETED | OUTPATIENT
Start: 2017-05-12 | End: 2017-05-12

## 2017-05-12 RX ORDER — MORPHINE SULFATE 10 MG/ML
INJECTION, SOLUTION INTRAMUSCULAR; INTRAVENOUS AS NEEDED
Status: DISCONTINUED | OUTPATIENT
Start: 2017-05-12 | End: 2017-05-12 | Stop reason: HOSPADM

## 2017-05-12 RX ORDER — FENTANYL CITRATE 50 UG/ML
INJECTION, SOLUTION INTRAMUSCULAR; INTRAVENOUS AS NEEDED
Status: DISCONTINUED | OUTPATIENT
Start: 2017-05-12 | End: 2017-05-12 | Stop reason: HOSPADM

## 2017-05-12 RX ORDER — SODIUM CHLORIDE 0.9 % (FLUSH) 0.9 %
5-10 SYRINGE (ML) INJECTION AS NEEDED
Status: DISCONTINUED | OUTPATIENT
Start: 2017-05-12 | End: 2017-05-14 | Stop reason: HOSPADM

## 2017-05-12 RX ORDER — SODIUM CHLORIDE 9 MG/ML
100 INJECTION, SOLUTION INTRAVENOUS CONTINUOUS
Status: DISPENSED | OUTPATIENT
Start: 2017-05-12 | End: 2017-05-13

## 2017-05-12 RX ORDER — ONDANSETRON 2 MG/ML
INJECTION INTRAMUSCULAR; INTRAVENOUS AS NEEDED
Status: DISCONTINUED | OUTPATIENT
Start: 2017-05-12 | End: 2017-05-12 | Stop reason: HOSPADM

## 2017-05-12 RX ORDER — AMOXICILLIN 250 MG
2 CAPSULE ORAL DAILY
Status: DISCONTINUED | OUTPATIENT
Start: 2017-05-13 | End: 2017-05-14 | Stop reason: HOSPADM

## 2017-05-12 RX ORDER — ASPIRIN 325 MG
325 TABLET, DELAYED RELEASE (ENTERIC COATED) ORAL EVERY 12 HOURS
Qty: 70 TAB | Refills: 0 | Status: SHIPPED | OUTPATIENT
Start: 2017-05-12 | End: 2017-06-16

## 2017-05-12 RX ORDER — MIDAZOLAM HYDROCHLORIDE 1 MG/ML
2 INJECTION, SOLUTION INTRAMUSCULAR; INTRAVENOUS ONCE
Status: COMPLETED | OUTPATIENT
Start: 2017-05-12 | End: 2017-05-12

## 2017-05-12 RX ORDER — HYDRALAZINE HYDROCHLORIDE 20 MG/ML
INJECTION INTRAMUSCULAR; INTRAVENOUS AS NEEDED
Status: DISCONTINUED | OUTPATIENT
Start: 2017-05-12 | End: 2017-05-12 | Stop reason: HOSPADM

## 2017-05-12 RX ORDER — DEXAMETHASONE SODIUM PHOSPHATE 4 MG/ML
INJECTION, SOLUTION INTRA-ARTICULAR; INTRALESIONAL; INTRAMUSCULAR; INTRAVENOUS; SOFT TISSUE AS NEEDED
Status: DISCONTINUED | OUTPATIENT
Start: 2017-05-12 | End: 2017-05-12 | Stop reason: HOSPADM

## 2017-05-12 RX ORDER — HYDROMORPHONE HYDROCHLORIDE 2 MG/1
2 TABLET ORAL
Status: DISCONTINUED | OUTPATIENT
Start: 2017-05-12 | End: 2017-05-14 | Stop reason: HOSPADM

## 2017-05-12 RX ORDER — SODIUM CHLORIDE, SODIUM LACTATE, POTASSIUM CHLORIDE, CALCIUM CHLORIDE 600; 310; 30; 20 MG/100ML; MG/100ML; MG/100ML; MG/100ML
75 INJECTION, SOLUTION INTRAVENOUS CONTINUOUS
Status: DISCONTINUED | OUTPATIENT
Start: 2017-05-12 | End: 2017-05-14 | Stop reason: HOSPADM

## 2017-05-12 RX ORDER — DEXAMETHASONE SODIUM PHOSPHATE 100 MG/10ML
10 INJECTION INTRAMUSCULAR; INTRAVENOUS ONCE
Status: ACTIVE | OUTPATIENT
Start: 2017-05-13 | End: 2017-05-14

## 2017-05-12 RX ORDER — SODIUM CHLORIDE 0.9 % (FLUSH) 0.9 %
5-10 SYRINGE (ML) INJECTION AS NEEDED
Status: DISCONTINUED | OUTPATIENT
Start: 2017-05-12 | End: 2017-05-12 | Stop reason: HOSPADM

## 2017-05-12 RX ORDER — TRANEXAMIC ACID 100 MG/ML
INJECTION, SOLUTION INTRAVENOUS AS NEEDED
Status: DISCONTINUED | OUTPATIENT
Start: 2017-05-12 | End: 2017-05-12 | Stop reason: HOSPADM

## 2017-05-12 RX ORDER — FENTANYL CITRATE 50 UG/ML
100 INJECTION, SOLUTION INTRAMUSCULAR; INTRAVENOUS ONCE
Status: CANCELLED | OUTPATIENT
Start: 2017-05-12 | End: 2017-05-12

## 2017-05-12 RX ORDER — HYDROMORPHONE HYDROCHLORIDE 2 MG/1
2 TABLET ORAL
Qty: 40 TAB | Refills: 0 | Status: SHIPPED | OUTPATIENT
Start: 2017-05-12 | End: 2017-11-21

## 2017-05-12 RX ORDER — ACETAMINOPHEN 500 MG
1000 TABLET ORAL EVERY 6 HOURS
Status: DISCONTINUED | OUTPATIENT
Start: 2017-05-13 | End: 2017-05-14 | Stop reason: HOSPADM

## 2017-05-12 RX ORDER — ASPIRIN 325 MG
325 TABLET, DELAYED RELEASE (ENTERIC COATED) ORAL EVERY 12 HOURS
Status: DISCONTINUED | OUTPATIENT
Start: 2017-05-12 | End: 2017-05-14 | Stop reason: HOSPADM

## 2017-05-12 RX ORDER — HYDROCHLOROTHIAZIDE 25 MG/1
25 TABLET ORAL DAILY
Status: DISCONTINUED | OUTPATIENT
Start: 2017-05-13 | End: 2017-05-14 | Stop reason: HOSPADM

## 2017-05-12 RX ORDER — NALOXONE HYDROCHLORIDE 0.4 MG/ML
.2-.4 INJECTION, SOLUTION INTRAMUSCULAR; INTRAVENOUS; SUBCUTANEOUS
Status: DISCONTINUED | OUTPATIENT
Start: 2017-05-12 | End: 2017-05-14 | Stop reason: HOSPADM

## 2017-05-12 RX ORDER — HYDROMORPHONE HYDROCHLORIDE 1 MG/ML
1 INJECTION, SOLUTION INTRAMUSCULAR; INTRAVENOUS; SUBCUTANEOUS
Status: DISCONTINUED | OUTPATIENT
Start: 2017-05-12 | End: 2017-05-14 | Stop reason: HOSPADM

## 2017-05-12 RX ORDER — CEFAZOLIN SODIUM IN 0.9 % NACL 2 G/50 ML
2 INTRAVENOUS SOLUTION, PIGGYBACK (ML) INTRAVENOUS ONCE
Status: COMPLETED | OUTPATIENT
Start: 2017-05-12 | End: 2017-05-12

## 2017-05-12 RX ORDER — FENTANYL CITRATE 50 UG/ML
100 INJECTION, SOLUTION INTRAMUSCULAR; INTRAVENOUS ONCE
Status: COMPLETED | OUTPATIENT
Start: 2017-05-12 | End: 2017-05-12

## 2017-05-12 RX ORDER — OXYCODONE AND ACETAMINOPHEN 10; 325 MG/1; MG/1
1 TABLET ORAL AS NEEDED
Status: DISCONTINUED | OUTPATIENT
Start: 2017-05-12 | End: 2017-05-12 | Stop reason: HOSPADM

## 2017-05-12 RX ORDER — DIPHENHYDRAMINE HCL 25 MG
25 CAPSULE ORAL
Status: DISCONTINUED | OUTPATIENT
Start: 2017-05-12 | End: 2017-05-14 | Stop reason: HOSPADM

## 2017-05-12 RX ORDER — SODIUM CHLORIDE 0.9 % (FLUSH) 0.9 %
5-10 SYRINGE (ML) INJECTION EVERY 8 HOURS
Status: DISCONTINUED | OUTPATIENT
Start: 2017-05-12 | End: 2017-05-14 | Stop reason: HOSPADM

## 2017-05-12 RX ORDER — NEOMYCIN AND POLYMYXIN B SULFATES 40; 200000 MG/ML; [USP'U]/ML
SOLUTION IRRIGATION AS NEEDED
Status: DISCONTINUED | OUTPATIENT
Start: 2017-05-12 | End: 2017-05-12 | Stop reason: HOSPADM

## 2017-05-12 RX ORDER — PROPOFOL 10 MG/ML
INJECTION, EMULSION INTRAVENOUS AS NEEDED
Status: DISCONTINUED | OUTPATIENT
Start: 2017-05-12 | End: 2017-05-12 | Stop reason: HOSPADM

## 2017-05-12 RX ORDER — LEVOTHYROXINE SODIUM 50 UG/1
25 TABLET ORAL
Status: DISCONTINUED | OUTPATIENT
Start: 2017-05-13 | End: 2017-05-14 | Stop reason: HOSPADM

## 2017-05-12 RX ORDER — HYDROMORPHONE HYDROCHLORIDE 2 MG/ML
0.5 INJECTION, SOLUTION INTRAMUSCULAR; INTRAVENOUS; SUBCUTANEOUS
Status: DISCONTINUED | OUTPATIENT
Start: 2017-05-12 | End: 2017-05-12 | Stop reason: HOSPADM

## 2017-05-12 RX ORDER — MIDAZOLAM HYDROCHLORIDE 1 MG/ML
2 INJECTION, SOLUTION INTRAMUSCULAR; INTRAVENOUS
Status: CANCELLED | OUTPATIENT
Start: 2017-05-12

## 2017-05-12 RX ORDER — PANTOPRAZOLE SODIUM 40 MG/1
40 TABLET, DELAYED RELEASE ORAL
Status: DISCONTINUED | OUTPATIENT
Start: 2017-05-13 | End: 2017-05-14 | Stop reason: HOSPADM

## 2017-05-12 RX ORDER — OXYCODONE HYDROCHLORIDE 5 MG/1
5 TABLET ORAL
Status: DISCONTINUED | OUTPATIENT
Start: 2017-05-12 | End: 2017-05-12 | Stop reason: HOSPADM

## 2017-05-12 RX ADMIN — CEFAZOLIN 2 G: 1 INJECTION, POWDER, FOR SOLUTION INTRAMUSCULAR; INTRAVENOUS; PARENTERAL at 14:19

## 2017-05-12 RX ADMIN — FENTANYL CITRATE 50 MCG: 50 INJECTION, SOLUTION INTRAMUSCULAR; INTRAVENOUS at 09:13

## 2017-05-12 RX ADMIN — TUBERCULIN PURIFIED PROTEIN DERIVATIVE 5 UNITS: 5 INJECTION, SOLUTION INTRADERMAL at 06:10

## 2017-05-12 RX ADMIN — MORPHINE SULFATE 2 MG: 10 INJECTION, SOLUTION INTRAMUSCULAR; INTRAVENOUS at 09:00

## 2017-05-12 RX ADMIN — SODIUM CHLORIDE 100 ML/HR: 900 INJECTION, SOLUTION INTRAVENOUS at 14:26

## 2017-05-12 RX ADMIN — MORPHINE SULFATE 2 MG: 10 INJECTION, SOLUTION INTRAMUSCULAR; INTRAVENOUS at 09:10

## 2017-05-12 RX ADMIN — CIPROFLOXACIN HYDROCHLORIDE 500 MG: 500 TABLET, FILM COATED ORAL at 23:09

## 2017-05-12 RX ADMIN — TRANEXAMIC ACID 1000 MG: 100 INJECTION, SOLUTION INTRAVENOUS at 08:11

## 2017-05-12 RX ADMIN — HYDROMORPHONE HYDROCHLORIDE 0.5 MG: 2 INJECTION, SOLUTION INTRAMUSCULAR; INTRAVENOUS; SUBCUTANEOUS at 10:24

## 2017-05-12 RX ADMIN — SODIUM CHLORIDE, SODIUM LACTATE, POTASSIUM CHLORIDE, AND CALCIUM CHLORIDE 75 ML/HR: 600; 310; 30; 20 INJECTION, SOLUTION INTRAVENOUS at 06:11

## 2017-05-12 RX ADMIN — PROPOFOL 130 MG: 10 INJECTION, EMULSION INTRAVENOUS at 08:22

## 2017-05-12 RX ADMIN — TEMAZEPAM 15 MG: 15 CAPSULE ORAL at 23:04

## 2017-05-12 RX ADMIN — MORPHINE SULFATE 2 MG: 10 INJECTION, SOLUTION INTRAMUSCULAR; INTRAVENOUS at 09:35

## 2017-05-12 RX ADMIN — MORPHINE SULFATE 2 MG: 10 INJECTION, SOLUTION INTRAMUSCULAR; INTRAVENOUS at 09:15

## 2017-05-12 RX ADMIN — SODIUM CHLORIDE 100 ML/HR: 900 INJECTION, SOLUTION INTRAVENOUS at 21:53

## 2017-05-12 RX ADMIN — CEFAZOLIN 2 G: 1 INJECTION, POWDER, FOR SOLUTION INTRAMUSCULAR; INTRAVENOUS; PARENTERAL at 21:49

## 2017-05-12 RX ADMIN — ONDANSETRON 4 MG: 2 INJECTION INTRAMUSCULAR; INTRAVENOUS at 13:20

## 2017-05-12 RX ADMIN — FENTANYL CITRATE 100 MCG: 50 INJECTION, SOLUTION INTRAMUSCULAR; INTRAVENOUS at 07:04

## 2017-05-12 RX ADMIN — MIDAZOLAM HYDROCHLORIDE 1 MG: 1 INJECTION, SOLUTION INTRAMUSCULAR; INTRAVENOUS at 07:04

## 2017-05-12 RX ADMIN — CELECOXIB 200 MG: 200 CAPSULE ORAL at 21:48

## 2017-05-12 RX ADMIN — HYDRALAZINE HYDROCHLORIDE 5 MG: 20 INJECTION INTRAMUSCULAR; INTRAVENOUS at 09:15

## 2017-05-12 RX ADMIN — HYDROMORPHONE HYDROCHLORIDE 1 MG: 1 INJECTION, SOLUTION INTRAMUSCULAR; INTRAVENOUS; SUBCUTANEOUS at 15:05

## 2017-05-12 RX ADMIN — HYDROMORPHONE HYDROCHLORIDE 2 MG: 2 TABLET ORAL at 23:04

## 2017-05-12 RX ADMIN — FENTANYL CITRATE 50 MCG: 50 INJECTION, SOLUTION INTRAMUSCULAR; INTRAVENOUS at 09:08

## 2017-05-12 RX ADMIN — FENTANYL CITRATE 25 MCG: 50 INJECTION, SOLUTION INTRAMUSCULAR; INTRAVENOUS at 08:25

## 2017-05-12 RX ADMIN — LIDOCAINE HYDROCHLORIDE 100 MG: 20 INJECTION, SOLUTION EPIDURAL; INFILTRATION; INTRACAUDAL; PERINEURAL at 08:22

## 2017-05-12 RX ADMIN — HYDROMORPHONE HYDROCHLORIDE 0.5 MG: 2 INJECTION, SOLUTION INTRAMUSCULAR; INTRAVENOUS; SUBCUTANEOUS at 10:40

## 2017-05-12 RX ADMIN — FENTANYL CITRATE 25 MCG: 50 INJECTION, SOLUTION INTRAMUSCULAR; INTRAVENOUS at 08:31

## 2017-05-12 RX ADMIN — MORPHINE SULFATE 2 MG: 10 INJECTION, SOLUTION INTRAMUSCULAR; INTRAVENOUS at 09:05

## 2017-05-12 RX ADMIN — CEFAZOLIN 2 G: 1 INJECTION, POWDER, FOR SOLUTION INTRAMUSCULAR; INTRAVENOUS; PARENTERAL at 08:04

## 2017-05-12 RX ADMIN — CIPROFLOXACIN HYDROCHLORIDE 500 MG: 500 TABLET, FILM COATED ORAL at 14:19

## 2017-05-12 RX ADMIN — DEXAMETHASONE SODIUM PHOSPHATE 10 MG: 4 INJECTION, SOLUTION INTRA-ARTICULAR; INTRALESIONAL; INTRAMUSCULAR; INTRAVENOUS; SOFT TISSUE at 08:08

## 2017-05-12 RX ADMIN — HYDROMORPHONE HYDROCHLORIDE 2 MG: 2 TABLET ORAL at 18:58

## 2017-05-12 RX ADMIN — ACETAMINOPHEN 1000 MG: 500 TABLET, FILM COATED ORAL at 23:09

## 2017-05-12 RX ADMIN — ACETAMINOPHEN 1000 MG: 10 INJECTION, SOLUTION INTRAVENOUS at 17:22

## 2017-05-12 RX ADMIN — HYDRALAZINE HYDROCHLORIDE 5 MG: 20 INJECTION INTRAMUSCULAR; INTRAVENOUS at 09:23

## 2017-05-12 RX ADMIN — ASPIRIN 325 MG: 325 TABLET, DELAYED RELEASE ORAL at 21:48

## 2017-05-12 RX ADMIN — SODIUM CHLORIDE, SODIUM LACTATE, POTASSIUM CHLORIDE, CALCIUM CHLORIDE: 600; 310; 30; 20 INJECTION, SOLUTION INTRAVENOUS at 08:04

## 2017-05-12 RX ADMIN — FENTANYL CITRATE 25 MCG: 50 INJECTION, SOLUTION INTRAMUSCULAR; INTRAVENOUS at 08:56

## 2017-05-12 RX ADMIN — ONDANSETRON 4 MG: 2 INJECTION INTRAMUSCULAR; INTRAVENOUS at 08:08

## 2017-05-12 RX ADMIN — HYDROMORPHONE HYDROCHLORIDE 0.5 MG: 2 INJECTION, SOLUTION INTRAMUSCULAR; INTRAVENOUS; SUBCUTANEOUS at 11:06

## 2017-05-12 RX ADMIN — FENTANYL CITRATE 25 MCG: 50 INJECTION, SOLUTION INTRAMUSCULAR; INTRAVENOUS at 08:55

## 2017-05-12 NOTE — H&P
The patient has end stage arthritis of the left knee. The patient was see and examined and there are no changes to the patient's orthopedic condition. They have tried conservative treatment for this condition; including antiinflammatories and lifestyle modifications and have failed. The necessity for the joint replacement is still present, and the H&P from the office is still current.  The patient will be admitted today for Procedure(s) (LRB):  LEFT KNEE ARTHROPLASTY TOTAL/ ROMEL/FNB (Left)

## 2017-05-12 NOTE — PROGRESS NOTES
Problem: Mobility Impaired (Adult and Pediatric)  Goal: *Acute Goals and Plan of Care (Insert Text)  GOALS (1-4 days):  (1.)Ms. GRISEL MCDUFFIE will move from supine to sit and sit to supine in bed with SUPERVISION. (2.)Ms. GRISEL MCDUFFIE will transfer from bed to chair and chair to bed with STAND BY ASSIST using the least restrictive device. (3.)Ms. GRISEL MCDUFFIE will ambulate with STAND BY ASSIST for 200 feet with the least restrictive device. (4.)Ms. GRISEL MCDUFFIE will ambulate up/down 3 steps with bilateral railing with CONTACT GUARD ASSIST with no device. (5.)Ms. GRISEL MCDUFFIE will increase left knee ROM to 5°-80°.   ________________________________________________________________________________________________      PHYSICAL THERAPY JOINT CAMP TKA: INITIAL ASSESSMENT, TREATMENT DAY: DAY OF ASSESSMENT, PM 5/12/2017  INPATIENT: Hospital Day: 1  Payor: Jia Oliver / Plan: [facility] MEDICARE CHOICE PPO/PFFS / Product Type: [employer] /      NAME/AGE/GENDER: Elena Sanchez is a 81 y.o. female            PRIMARY DIAGNOSIS:  Primary osteoarthritis of left knee [M17.12]              Procedure(s) and Anesthesia Type:     * LEFT KNEE ARTHROPLASTY TOTAL/ ROMEL/FNB - Spinal (Left)  ICD-10: Treatment Diagnosis:        · Pain in Left Knee (M25.562)  · Stiffness of Left Knee, Not elsewhere classified (P74.160)       ASSESSMENT:      Ms. GRISEL MCDUFFIE presents with impaired strength & mobility s/p left TKA. Pt also had decreased stability during out of bed activity. Pt will benefit from follow up therapy to help restore safe function prior to returning home with caregiver. This section established at most recent assessment   PROBLEM LIST (Impairments causing functional limitations):  1. Decreased Strength  2. Decreased ADL/Functional Activities  3. Decreased Transfer Abilities  4. Decreased Ambulation Ability/Technique  5. Decreased Balance  6. Increased Pain  7. Decreased Activity Tolerance  8.  Decreased Flexibility/Joint Mobility  9. Decreased Starr with Home Exercise Program    INTERVENTIONS PLANNED: (Benefits and precautions of physical therapy have been discussed with the patient.)  1. Bed Mobility  2. Gait Training  3. Home Exercise Program (HEP)  4. Therapeutic Exercise/Strengthening  5. Transfer Training  6. Range of Motion: active/assisted/passive  7. Therapeutic Activities  8. Group Therapy      TREATMENT PLAN: Frequency/Duration: Follow patient BID   to address above goals. Rehabilitation Potential For Stated Goals: GOOD      RECOMMENDED REHABILITATION/EQUIPMENT: (at time of discharge pending progress): Continue Skilled Therapy and Home Health: Physical Therapy. HISTORY:   History of Present Injury/Illness (Reason for Referral): The patient has end stage arthritis of the left knee. The patient was evaluated and examined during a consultation prior to this office visit. There have been no changes to the patient's orthopedic condition since the initial consultation. The patient has failed previous conservative treatment for this condition including antiinflammatories , and lifestyle modifications. The necessity for joint replacement is present. The patient will be admitted the day of surgery for Procedure(s) (LRB):  LEFT KNEE ARTHROPLASTY TOTAL/ ROMEL (Left)      Past Medical History/Comorbidities:   Ms. Rebecca Kaur  has a past medical history of Anxiety; Arthritis; Breast cancer ([address] (1/18/2016); Breast cancer ([address] Cancer ([address] Chronic pain; Former smoker; GERD (gastroesophageal reflux disease); Hard of hearing; Headache; Hypertension; Hypothyroidism; Insomnia; and Status post total left knee replacement (5/12/2017). She also has no past medical history of Adverse effect of anesthesia; Aneurysm ([address] Arrhythmia; Asthma; Autoimmune disease ([address] CAD (coronary artery disease); Chronic kidney disease; Chronic obstructive pulmonary disease ([address]  Coagulation disorder ([address] Diabetes ([facility] Difficult intubation; Endocarditis; Heart failure ([facility] Ill-defined condition; Liver disease; Malignant hyperthermia due to anesthesia; Morbid obesity ([facility] Pseudocholinesterase deficiency; Psychiatric disorder; PUD (peptic ulcer disease); Rheumatic fever; Seizures ([facility] Sleep apnea; Stroke [facility]); Thromboembolus ([facility] or Unspecified adverse effect of anesthesia. Ms. Nadiya Beavers  has a past surgical history that includes colonoscopy; wisdom teeth extraction; tonsillectomy (1957); vascular access (Right); breast surgery procedure unlisted (Left, 7/2015); breast biopsy (Left, 1/18/2016); mastectomy, partial (Left, 01/18/2016); and breast lumpectomy (Left, 01/2016). Social History/Living Environment:   Home Environment: Private residence  # Steps to Enter: 0  One/Two Story Residence: One story  Living Alone: No  Support Systems: Spouse/Significant Other/Partner  Patient Expects to be Discharged to[de-identified] Private residence  Current DME Used/Available at Home: None  Tub or Shower Type: Shower  Prior Level of Function/Work/Activity:  Pt was independent without an assistive device prior to this admission   Number of Personal Factors/Comorbidities that affect the Plan of Care: 1-2: MODERATE COMPLEXITY   EXAMINATION:   Most Recent Physical Functioning:   Gross Assessment: Yes  Gross Assessment  AROM: Within functional limits (right LE)  Strength: Within functional limits (right LE)  Coordination: Within functional limits (right LE)            LLE PROM  L Knee Flexion: 50  L Knee Extension: -15           Bed Mobility  Supine to Sit: Contact guard assistance  Sit to Supine: Contact guard assistance  Scooting: Contact guard assistance     Transfers  Sit to Stand: Minimum assistance;Assist x2  Stand to Sit: Minimum assistance;Assist x2  Bed to Chair:  (NT)     Balance  Sitting: Intact; Without support  Standing: Impaired; With support (walker)                Weight Bearing Status  Left Side Weight Bearing: As tolerated  Distance (ft): 4 Feet (ft)  Ambulation - Level of Assistance: Minimal assistance;Assist x2  Assistive Device: Walker, rolling  Speed/Jennifer: Shuffled; Slow  Step Length: Left shortened;Right shortened  Stance: Left decreased  Gait Abnormalities: Antalgic;Decreased step clearance         Braces/Orthotics: none     Left Knee Cold  Type: Cryocuff       Body Structures Involved:  1. Joints  2. Muscles Body Functions Affected:  1. Movement Related Activities and Participation Affected:  1. Mobility   Number of elements that affect the Plan of Care: 4+: HIGH COMPLEXITY   CLINICAL PRESENTATION:   Presentation: Stable and uncomplicated: LOW COMPLEXITY   CLINICAL DECISION MAKIN[address] [place] [address] AM-PAC 6 Clicks   Basic Mobility Inpatient Short Form  How much difficulty does the patient currently have. .. Unable A Lot A Little None   1. Turning over in bed (including adjusting bedclothes, sheets and blankets)? [ ] 1   [ ] 2   [X] 3   [ ] 4   2. Sitting down on and standing up from a chair with arms ( e.g., wheelchair, bedside commode, etc.)   [ ] 1   [ ] 2   [X] 3   [ ] 4   3. Moving from lying on back to sitting on the side of the bed? [ ] 1   [ ] 2   [X] 3   [ ] 4   How much help from another person does the patient currently need. .. Total A Lot A Little None   4. Moving to and from a bed to a chair (including a wheelchair)? [ ] 1   [ ] 2   [X] 3   [ ] 4   5. Need to walk in hospital room? [ ] 1   [ ] 2   [X] 3   [ ] 4   6. Climbing 3-5 steps with a railing? [X] 1   [ ] 2   [ ] 3   [ ] 4   © 2007, Trustees of [address] [place] [address], under license to [employer]. All rights reserved       Score:  Initial: 16 Most Recent: X (Date: -- )     Interpretation of Tool:  Represents activities that are increasingly more difficult (i.e. Bed mobility, Transfers, Gait).        Score 24 23 22-20 19-15 14-10 9-7 6       Modifier CH CI CJ CK CL CM CN         · Mobility - Walking and Moving Around:  - CURRENT STATUS:    CK - 40%-59% impaired, limited or restricted               - GOAL STATUS:           CJ - 20%-39% impaired, limited or restricted               - D/C STATUS:                       ---------------To be determined---------------  Payor: [employer] MEDICARE / Plan: [facility] [employer] MEDICARE CHOICE PPO/PFFS / Product Type: Managed Care Medicare /       Medical Necessity:     · Patient is expected to demonstrate progress in strength, range of motion, balance, coordination and functional technique to decrease assistance required with bed mobility, transfers & gait. Reason for Services/Other Comments:  · Patient continues to require skilled intervention due to pt not independent with functional mobility. Use of outcome tool(s) and clinical judgement create a POC that gives a: Clear prediction of patient's progress: LOW COMPLEXITY                 TREATMENT:   (In addition to Assessment/Re-Assessment sessions the following treatments were rendered)      Pre-treatment Symptoms/Complaints:  none  Pain: Initial: visual scale  Pain Intensity 1: 0  Post Session:  0/10      Assessment/Reassessment only, no treatment provided today        Date:    Date:    Date:      ACTIVITY/EXERCISE AM PM AM PM AM PM   GROUP THERAPY  [ ]  [ ]  [ ]  [ ]  [ ]  [ ]   Ankle Pumps               Quad Sets               Gluteal Sets               Hip ABd/ADduction               Straight Leg Raises               Knee Slides               Short Arc Quads               Long Arc Quads               Chair Slides                               B = bilateral; AA = active assistive; A = active; P = passive       Treatment/Session Assessment:         Response to Treatment:  Tolerated well.      Education:  [ ] Home Exercises  [X] Fall Precautions  [ ] Hip Precautions [ ] Going Home Video  [ ] Knee/Hip Prosthesis Review  [X] Walker Management/Safety [ ] Adaptive Equipment as Needed         Interdisciplinary Collaboration: · Occupational Therapist  · Registered Nurse     After treatment position/precautions:   · Supine in bed  · Bed/Chair-wheels locked  · Bed in low position  · Caregiver at bedside  · Call light within reach  · RN notified  · Family at bedside     Compliance with Program/Exercises: Will assess as treatment progresses. Recommendations/Intent for next treatment session:  Treatment next visit will focus on increasing Ms. Rust's independence with bed mobility, transfers, gait training, strength/ROM exercises, modalities for pain, and patient education.        Total Treatment Duration:  PT Patient Time In/Time Out  Time In: 1255  Time Out: [address], PT

## 2017-05-12 NOTE — ADDENDUM NOTE
Addendum  created 05/12/17 1325 by Liban Boyle CRNA    Anesthesia Intra Meds edited, Orders acknowledged in Narrator

## 2017-05-12 NOTE — ANESTHESIA PREPROCEDURE EVALUATION
Anesthetic History   No history of anesthetic complications            Review of Systems / Medical History  Patient summary reviewed and pertinent labs reviewed    Pulmonary  Within defined limits                 Neuro/Psych   Within defined limits           Cardiovascular    Hypertension: well controlled              Exercise tolerance: >4 METS     GI/Hepatic/Renal     GERD: well controlled           Endo/Other      Hypothyroidism: well controlled  Arthritis     Other Findings              Physical Exam    Airway  Mallampati: II  TM Distance: > 6 cm  Neck ROM: normal range of motion   Mouth opening: Normal     Cardiovascular    Rhythm: regular           Dental  No notable dental hx       Pulmonary                 Abdominal  GI exam deferred       Other Findings            Anesthetic Plan    ASA: 2  Anesthesia type: spinal - femoral single shot      Post-op pain plan if not by surgeon: peripheral nerve block single    Induction: Intravenous  Anesthetic plan and risks discussed with: Patient

## 2017-05-12 NOTE — ANESTHESIA PROCEDURE NOTES
Peripheral Block    Start time: 5/12/2017 7:04 AM  End time: 5/12/2017 7:08 AM  Performed by: Rajeev Peace by: Nitesh Perez: Indications: at surgeon's request and post-op pain management    Preanesthetic Checklist: patient identified, risks and benefits discussed, site marked, timeout performed, anesthesia consent given and patient being monitored    Timeout Time: 07:04          Block Type:   Block Type:   Adductor canal  Laterality:  Left  Monitoring:  Standard ASA monitoring, continuous pulse ox, frequent vital sign checks, heart rate, oxygen and responsive to questions  Injection Technique:  Single shot  Procedures: ultrasound guided    Patient Position: supine  Prep: chlorhexidine    Location:  Mid thigh  Needle Localization:  Anatomical landmarks and ultrasound guidance  Medication Injected:  0.2%  ropivacaine  Volume (mL):  20    Assessment:  Number of attempts:  1  Injection Assessment:  Incremental injection every 5 mL, local visualized surrounding nerve on ultrasound, negative aspiration for blood, no intravascular symptoms, no paresthesia and ultrasound image on chart

## 2017-05-12 NOTE — CONSULTS
HOSPITALIST H&P/CONSULT  NAME:  Kieran Mcguire   Age:  81 y.o.  :   1945   MRN:   [id number]  PCP: Dylan Fernando MD  Consulting MD:  Treatment Team: Attending Provider: Inge Reyes MD; Consulting Provider: Lilliam Mirza MD; Consulting Provider: Shanae Obrien DO; Consulting Provider: Dmitri Bejarano MD  HPI:     Pt is a 80 yo female s/p left TKA per Blaze Johnson. PMH includes HTN, breast cancer, anxiety. Hospitalist have been consulted for medical management. Noted that pt was diagnosed with UTI at prehab and given Bactrim. She completed this and continued to c/o some dysuria and was started on Cipro per PCP .   urine culture without growth. Repeat UA today without any signs of infection. Pt given gilliam for procedure. She denies frequent UTIs. This afternoon she is resting in bed. States compliance with Cipro. Denies urinary symptoms at this time.       Complete ROS done and is as stated in HPI or otherwise negative  Past Medical History:   Diagnosis Date    Anxiety     Arthritis     back and knee's     Breast cancer ([facility] 2016    Left Lumpectomy    Breast cancer ([facility]     Cancer ([facility]     melanoma left face,  left breast ca     Chronic pain     back    Former smoker     was a passive smoker for 40 yrs; stopped in     GERD (gastroesophageal reflux disease)     Hard of hearing     bilateral hearing aids    Headache     hx monthly migraines; rare now    Hypertension     controlled with med    Hypothyroidism     Insomnia     Status post total left knee replacement 2017      Past Surgical History:   Procedure Laterality Date    BREAST SURGERY PROCEDURE UNLISTED Left 2015    biopsy    HX BREAST BIOPSY Left 2016    BREAST NEEDLE LOCALIZED PARTIAL MASTECTOMY/ LEFT //   [facility] [address] [facility] BREAST LUMPECTOMY Left 2016    HX COLONOSCOPY      HX TONSILLECTOMY      HX VASCULAR ACCESS Right port right upper chest- removed 2016    HX WISDOM TEETH EXTRACTION      NV MASTECTOMY, PARTIAL Left 01/18/2016      Prior to Admission Medications   Prescriptions Last Dose Informant Patient Reported? Taking? Biotin 2,500 mcg cap   Yes No   Sig: Take 2 Tabs by mouth daily. NAPROXEN SODIUM (ALEVE PO)   Yes No   Sig: Take 2 Tabs by mouth daily as needed for Pain. OTHER,NON-FORMULARY,   Yes No   Sig: Take 4 Tabs by mouth daily. Relief Factor Tablets   OTHER,NON-FORMULARY,   Yes No   Sig: Take 1 Tab by mouth daily. Air Borne Chewable tab   esomeprazole (NEXIUM) 20 mg capsule 5/12/2017 at Unknown time  Yes Yes   Sig: Take 20 mg by mouth every morning. Take / use AM day of surgery  per anesthesia protocols. Indications: GASTROESOPHAGEAL REFLUX   hydroCHLOROthiazide (HYDRODIURIL) 25 mg tablet   No No   Sig: TAKE ONE TABLET BY MOUTH ONE TIME DAILY   levothyroxine (SYNTHROID) 25 mcg tablet 5/12/2017 at Unknown time  No Yes   Sig: TAKE ONE TABLET BY MOUTH ONE TIME DAILY BEFORE BREAKFAST   multivitamin (ONE A DAY) tablet   Yes No   Sig: Take 1 Tab by mouth daily. mupirocin calcium (BACTROBAN) 2 % nasal ointment   Yes No   Sig: by Both Nostrils route two (2) times a day. Pt completed 5 doses of bactroban prior to arrival for surgery   polycarbophil calcium (EQUALACTIN) 500 mg chew   Yes No   Sig: Take 1,500 mg by mouth daily. temazepam (RESTORIL) 15 mg capsule   No No   Sig: Take 1 Cap by mouth nightly. Max Daily Amount: 15 mg.       Facility-Administered Medications: None     No Known Allergies   Social History   Substance Use Topics    Smoking status: Former Smoker     Years: 40.00     Types: Cigarettes     Quit date: 1/13/2014    Smokeless tobacco: Never Used      Comment: was a passive smoker for 40 yrs    Alcohol use 0.0 oz/week     0 Standard drinks or equivalent per week      Comment: occasionally      Family History   Problem Relation Age of Onset    Cancer Mother      leukemia    Hypertension Mother  Stroke Mother     Heart Disease Father     Hypertension Father     Stroke Sister     Hypertension Sister     Asthma Paternal Grandfather     Breast Cancer Neg Hx       Objective:     Visit Vitals    /48    Pulse 100    Temp 96.7 °F (35.9 °C)    Resp 16    Ht 5' 4\" (1.626 m)    Wt 78.1 kg (172 lb 3.2 oz)    SpO2 96%    BMI 29.56 kg/m2      Temp (24hrs), Av.8 °F (36 °C), Min:96.3 °F (35.7 °C), Max:97.5 °F (36.4 °C)    Oxygen Therapy  O2 Sat (%): 96 % (17 1149)  O2 Device: Nasal cannula (17 1010)  O2 Flow Rate (L/min): 3 l/min (17 1122)  Physical Exam:  General:    Alert, cooperative, no distress, appears stated age. Head:   Normocephalic, without obvious abnormality, atraumatic. Nose:  Nares normal. No drainage or sinus tenderness. Lungs:   Clear to auscultation bilaterally. No Wheezing or Rhonchi. No rales. Heart:   Regular rate and rhythm,  no murmur, rub or gallop. Abdomen:   Soft, non-tender. Not distended. Bowel sounds normal.   Extremities: No cyanosis. No edema. No clubbing  Skin:     Texture, turgor normal. No rashes or lesions.   Not Jaundiced  Neurologic: Alert and oriented x 3, no focal deficits   Data Review:   Recent Results (from the past 24 hour(s))   TYPE & SCREEN    Collection Time: 17  5:45 AM   Result Value Ref Range    Crossmatch Expiration 05/15/2017     ABO/Rh(D) B NEGATIVE     Antibody screen NEG    GLUCOSE, POC    Collection Time: 17  6:21 AM   Result Value Ref Range    Glucose (POC) 100 65 - 100 mg/dL   URINALYSIS W/ RFLX MICROSCOPIC    Collection Time: 17  8:30 AM   Result Value Ref Range    Color YELLOW      Appearance CLEAR      Specific gravity 1.011 1.001 - 1.023      pH (UA) 6.0 5.0 - 9.0      Protein NEGATIVE  NEG mg/dL    Glucose NEGATIVE  mg/dL    Ketone NEGATIVE  NEG mg/dL    Bilirubin NEGATIVE  NEG      Blood MODERATE (A) NEG      Urobilinogen 0.2 0.2 - 1.0 EU/dL    Nitrites NEGATIVE  NEG      Leukocyte Esterase NEGATIVE  NEG      WBC 0 0 /hpf    RBC 5-10 0 /hpf    Epithelial cells 0-3 0 /hpf    Bacteria 0 0 /hpf    Casts 0-3 0 /lpf     Imaging /Procedures /Studies     Assessment and Plan: Active Hospital Problems    Diagnosis Date Noted    Status post total left knee replacement 05/12/2017       A/P:    Osteoarthritis- s/p left TKA. Cont PT/OT, pain management, DVT prophylaxis per ortho    UTI- Started on Cipro 05/09, eot 05/13. Culture without growth but will finish abx course. HTN- Chronic, cont home regimen HCTZ    Hospitalist will sign off. Please re consult if needed, thank you!      Signed By: João Mckeon NP     May 12, 2017

## 2017-05-12 NOTE — PERIOP NOTES
TRANSFER - OUT REPORT:    Verbal report given to Jarod Bell  being transferred to room 315for routine progression of care       Report consisted of patients Situation, Background, Assessment and   Recommendations(SBAR). Information from the following report(s) SBAR, OR Summary, Procedure Summary, Intake/Output and MAR was reviewed with the receiving nurse. Opportunity for questions and clarification was provided.       Patient transported with:   O2 @ 2 liters

## 2017-05-12 NOTE — PROGRESS NOTES
Problem: Self Care Deficits Care Plan (Adult)  Goal: *Acute Goals and Plan of Care (Insert Text)  GOALS:   DISCHARGE GOALS (in preparation for going home/rehab): 3 days  1. Ms. Sinai Ahumada will perform one lower body dressing activity with minimal assistance required to demonstrate improved functional mobility and safety. 2. Ms. Sinai Ahumada will perform one lower body bathing activity with minimal assistance required to demonstrate improved functional mobility and safety. 3. Ms. Sinai Ahumada will perform toileting/toilet transfer with contact guard assistance to demonstrate improved functional mobility and safety. 4. Ms. Sinai Ahumada will perform shower transfer with contact guard assistance to demonstrate improved functional mobility and safety. JOINT CAMP OCCUPATIONAL THERAPY TKA: Initial Assessment 5/12/2017  INPATIENT: Hospital Day: 1  Payor: Chelsea Latif / Plan: [facility] [employer] MEDICARE CHOICE PPO/PFFS / Product Type: [employer] Medicare /      NAME/AGE/GENDER: Izzy Clarke is a 81 y.o. female           PRIMARY DIAGNOSIS:  Primary osteoarthritis of left knee [M17.12]              Procedure(s) and Anesthesia Type:     * LEFT KNEE ARTHROPLASTY TOTAL/ ROMEL/FNB - Spinal (Left)  ICD-10: Treatment Diagnosis:        · Pain in Left Knee (M25.562)  · Stiffness of Left Knee, Not elsewhere classified (T99.715)       ASSESSMENT:      Ms. Sinai Ahumada is s/p left TKA and presents with decreased weight bearing on left LE and decreased independence with functional mobility and activities of daily living. Patient would benefit from skilled Occupational Therapy to maximize independence and safety with self-care task and functional mobility. Pt would also benefit from education on adaptive equipment and safety precautions in preparation for going home or for recommendations for post-hospital rehab program.  Patient plans for further rehab at home with home health services and good family support.   OT reviewed therapy schedule and plan of care with patient. Patient was able to transfer and preform self care skills as charted below. Patient instructed to call for assistance when needing to get up from the bed and all needs in reach. Patient verbalized understanding of call light. This section established at most recent assessment   PROBLEM LIST (Impairments causing functional limitations):  1. Decreased Strength  2. Decreased ADL/Functional Activities  3. Decreased Transfer Abilities  4. Increased Pain  5. Increased Fatigue  6. Decreased Flexibility/Joint Mobility  7. Decreased Knowledge of Precautions    INTERVENTIONS PLANNED: (Benefits and precautions of occupational therapy have been discussed with the patient.)  1. Activities of daily living training  2. Adaptive equipment training  3. Balance training  4. Clothing management  5. Donning&doffing training  6. Theraputic activity      TREATMENT PLAN: Frequency/Duration: Follow patient 1-2 times to address above goals. Rehabilitation Potential For Stated Goals: GOOD      RECOMMENDED REHABILITATION/EQUIPMENT: (at time of discharge pending progress): Continue Skilled Therapy and Home Health: Physical Therapy. OCCUPATIONAL PROFILE AND HISTORY:   History of Present Injury/Illness (Reason for Referral): Pt presents this date s/p (left) TKA. Past Medical History/Comorbidities:   Ms. Carlos Cabrera  has a past medical history of Anxiety; Arthritis; Breast cancer ([address] (1/18/2016); Breast cancer ([address] Cancer ([address] Chronic pain; Former smoker; GERD (gastroesophageal reflux disease); Hard of hearing; Headache; Hypertension; Hypothyroidism; Insomnia; and Status post total left knee replacement (5/12/2017). She also has no past medical history of Adverse effect of anesthesia; Aneurysm ([address] Arrhythmia; Asthma; Autoimmune disease ([address] CAD (coronary artery disease); Chronic kidney disease; Chronic obstructive pulmonary disease ([address]  Coagulation disorder ([address] Diabetes [address] Difficult intubation; Endocarditis; Heart failure ([facility] Ill-defined condition; Liver disease; Malignant hyperthermia due to anesthesia; Morbid obesity ([facility] Pseudocholinesterase deficiency; Psychiatric disorder; PUD (peptic ulcer disease); Rheumatic fever; Seizures ([facility] Sleep apnea; Stroke [facility]); Thromboembolus ([facility] [facility] or Unspecified adverse effect of anesthesia. Ms. Annalee Sheffield  has a past surgical history that includes colonoscopy; wisdom teeth extraction; tonsillectomy (1957); vascular access (Right); breast surgery procedure unlisted (Left, 7/2015); breast biopsy (Left, 1/18/2016); mastectomy, partial (Left, 01/18/2016); and breast lumpectomy (Left, 01/2016). Social History/Living Environment:   Home Environment: Private residence  # Steps to Enter: 0  One/Two Story Residence: One story  Living Alone: No  Support Systems: Spouse/Significant Other/Partner  Patient Expects to be Discharged to[de-identified] Private residence  Current DME Used/Available at Home: None  Tub or Shower Type: Shower  Prior Level of Function/Work/Activity:  Independent       Number of Personal Factors/Comorbidities that affect the Plan of Care: Brief history (0):  LOW COMPLEXITY   ASSESSMENT OF OCCUPATIONAL PERFORMANCE[de-identified]   Most Recent Physical Functioning:   Balance  Sitting: Intact; Without support  Standing: Impaired; With support (walker)        Patient Vitals for the past 6 hrs:       BP BP Patient Position SpO2 O2 Flow Rate (L/min) Pulse   05/12/17 1010 131/58 At rest 96 % 3 l/min (!) 111   05/12/17 1015 120/58 - 98 % 3 l/min (!) 118   05/12/17 1020 118/56 - 99 % 3 l/min (!) 112   05/12/17 1025 114/54 - 99 % 3 l/min (!) 114   05/12/17 1040 112/56 - 99 % 3 l/min (!) 104   05/12/17 1044 136/62 - 99 % 3 l/min (!) 103   05/12/17 1107 136/65 - 98 % 3 l/min (!) 116   05/12/17 1113 139/65 - 99 % 3 l/min (!) 101   05/12/17 1122 134/60 - 99 % 3 l/min (!) 101   05/12/17 1149 126/48 - 96 % - 100   05/12/17 1435 - - 99 % 2 l/min - Gross Assessment: Yes  Gross Assessment  AROM: Within functional limits (right LE)  Strength: Within functional limits (right LE)  Coordination: Within functional limits (right LE)            LLE Assessment  LLE Assessment (WDL): Exception to WDL  LLE PROM  L Knee Flexion: 50  L Knee Extension: -15 Coordination  Fine Motor Skills-Upper: Left Intact; Right Intact  Gross Motor Skills-Upper: Left Intact; Right Intact           Mental Status  Neurologic State: Alert  Orientation Level: Oriented X4  Cognition: Appropriate decision making  Perception: Appears intact  Perseveration: No perseveration noted  Safety/Judgement: Awareness of environment            RLE Assessment  RLE Assessment (WDL): Within defined limits       Basic ADLs (From Assessment) Complex ADLs (From Assessment)   Basic ADL  Feeding: Independent  Oral Facial Hygiene/Grooming: Supervision  Bathing: Moderate assistance  Upper Body Dressing: Supervision  Lower Body Dressing: Moderate assistance  Toileting: Moderate assistance     Grooming/Bathing/Dressing Activities of Daily Living     Cognitive Retraining  Safety/Judgement: Awareness of environment                 Functional Transfers  Toilet Transfer : Moderate assistance  Shower Transfer: Moderate assistance     Bed/Mat Mobility  Supine to Sit: Contact guard assistance  Sit to Supine: Contact guard assistance  Sit to Stand: Minimum assistance;Assist x2  Bed to Chair:  (NT)  Scooting: Contact guard assistance           Physical Skills Involved:  1. Range of Motion  2. Balance  3. Mobility Cognitive Skills Affected (resulting in the inability to perform in a timely and safe manner): 1. none Psychosocial Skills Affected:  1. Environmental Adaptations   Number of elements that affect the Plan of Care: 3-5:  MODERATE COMPLEXITY   CLINICAL DECISION MAKIN [facility] [address] [id number] AM-PAC 6 Clicks   Basic Mobility Inpatient Short Form  How much help from another person does the patient currently need. ..  Total A Lot A Little None   1. Putting on and taking off regular lower body clothing?   [ ] 1   [X] 2   [ ] 3   [ ] 4   2. Bathing (including washing, rinsing, drying)? [ ] 1   [X] 2   [ ] 3   [ ] 4   3. Toileting, which includes using toilet, bedpan or urinal?   [ ] 1   [ ] 2   [X] 3   [ ] 4   4. Putting on and taking off regular upper body clothing?   [ ] 1   [ ] 2   [ ] 3   [X] 4   5. Taking care of personal grooming such as brushing teeth? [ ] 1   [ ] 2   [ ] 3   [X] 4   6. Eating meals? [ ] 1   [ ] 2   [ ] 3   [X] 4   © 2007, Trustees of [address] [place] [address] [address] [id number], under license to [employer]. All rights reserved   Score:  Initial: 19 Most Recent: X (Date: -- )     Interpretation of Tool:  Represents activities that are increasingly more difficult (i.e. Bed mobility, Transfers, Gait). Score 24 23 22-20 19-15 14-10 9-7 6       Modifier CH CI CJ CK CL CM CN         · Self Care:               - CURRENT STATUS:    CK - 40%-59% impaired, limited or restricted               - GOAL STATUS:           CJ - 20%-39% impaired, limited or restricted               - D/C STATUS:                       ---------------To be determined---------------  Payor: [employer] MEDICARE / Plan: [facility] [employer] MEDICARE CHOICE PPO/PFFS / Product Type: Managed Care Medicare /       Medical Necessity:     · Patient is expected to demonstrate progress in range of motion, balance and functional technique to increase independence with self care. Reason for Services/Other Comments:  · Patient would benefit from skilled Occupational Therapy to maximize independence and safety with self-care task and functional mobility. .   Use of outcome tool(s) and clinical judgement create a POC that gives a: LOW COMPLEXITY                 TREATMENT:   (In addition to Assessment/Re-Assessment sessions the following treatments were rendered)      Pre-treatment Symptoms/Complaints:  0  Pain: Initial:   Pain Intensity 1: 0 0 Post Session:  0 Assessment/Reassessment only, no treatment provided today     Treatment/Session Assessment:         Response to Treatment:  Pt moves well and should do fine. Education:  [ ] Home Exercises  [X] Fall Precautions  [ ] Hip Precautions [ ] Going Home Video  [X] Knee/Hip Prosthesis Review  [X] Walker Management/Safety [X] Adaptive Equipment as Needed         Interdisciplinary Collaboration:   · Physical Therapist  · Occupational Therapist  · Registered Nurse     After treatment position/precautions:   · Up in chair  · Bed/Chair-wheels locked  · Caregiver at bedside  · Call light within reach  · RN notified     Compliance with Program/Exercises: compliant all of the time. Recommendations/Intent for next treatment session:  Treatment next visit will focus on increasing Ms. Rust's independence with bed mobility, transfers, self care training and patient education.        Total Treatment Duration:  OT Patient Time In/Time Out  Time In: 1305  Time Out: 430 Andreas Tip Belle OT

## 2017-05-12 NOTE — CONSULTS
MD Ismael   Medical Director  [address] [place] [address], [address] [facility]  Tel: [phone]     Physical Medicine & Rehabilitation Note-consult    Patient: Alpa Hernandez MRN: [id number]  SSN: [id number]    YOB: 1945  Age: 81 y.o. Sex: female      Admit Date: 5/12/2017  Admitting Physician: Shelly De La O MD    Medical Decision Making/Plan/Recommend:  Gait impairment and functional deficits. Therapy tolerated well, without unexpected barriers to progress. She is at minimum assist level with transfers, and ambulation using a RW. Continued rehab at home via [facility] would be reasonable and necessary. Continue PT, OT for active/assisted/passive left TKA ROM, strengthening, mobility, transfers, gait training. Will follow progress. Chief Complaint : Gait dysfunction secondary to below.   Admit Diagnosis: Primary osteoarthritis of left knee [M17.12]  left total knee arthroplasty  5/12/2017  Pain  DVT risk  Post op hemorrhagic anemia  Arthritis  Hypertension  Peripheral neuropathy  Acute Rehab Dx:  Gait impairment  Debility    Mobility and ambulation deficits  Self Care/ADL deficits    Medical Dx:  Past Medical History:   Diagnosis Date    Anxiety     Arthritis     back and knee's     Breast cancer ([facility] [address] 1/18/2016    Left Lumpectomy    Breast cancer ([facility] [address]     Cancer ([facility] [address]     melanoma left face,  left breast ca     Chronic pain     back    Former smoker     was a passive smoker for 40 yrs; stopped in 2014    GERD (gastroesophageal reflux disease)     Hard of hearing     bilateral hearing aids    Headache     hx monthly migraines; rare now    Hypertension     controlled with med    Hypothyroidism     Insomnia     Status post total left knee replacement 5/12/2017     Subjective:     Date of Evaluation:  May 12, 2017    HPI: Alpa Hernandez is a 81 y.o. female patient at [address] [place] [address] who was admitted on 5/12/2017  by Adan Childress Jayna He MD with below mentioned medical history, is being seen for Physical Medicine and Rehabilitation consult. Roberto Velasquez who presented with progressively worsening left knee pain due to end stage DJD underwent a left total knee arthroplasty per Dr. Tahira Cash MD on 5/12/2017. Her post operative course has been medically uncomplicated. Pain, and common post op issues well tolerated. We are consulted to assist with rehab needs and placement. Patient is to be WBAT LLE. Patient is tolerating initial cute PT exercises well, with no major barriers. She is primarily limited due to knee pain, decreased ROM and strength. Roberto Velasquez is seen and examined today. Medical Records reviewed. Pt states she has distal BLE neuropathy, numbness due to past chemo/ radiation. She has been independent with ambulation, prior to admission, limited by left knee pain. Current Level of Function: bed mobility - min A, transfers - min A x 2, decreased balance , ambulation -  4' with min A x2 using a RW .       Prior Level of Function/Work/Activity:   Pt was independent without an assistive device prior to this admission        Family History   Problem Relation Age of Onset    Cancer Mother      leukemia    Hypertension Mother     Stroke Mother     Heart Disease Father     Hypertension Father     Stroke Sister     Hypertension Sister     Asthma Paternal Grandfather     Breast Cancer Neg Hx       Social History   Substance Use Topics    Smoking status: Former Smoker     Years: 40.00     Types: Cigarettes     Quit date: 1/13/2014    Smokeless tobacco: Never Used      Comment: was a passive smoker for 40 yrs    Alcohol use 0.0 oz/week     0 Standard drinks or equivalent per week      Comment: occasionally     Past Surgical History:   Procedure Laterality Date    BREAST SURGERY PROCEDURE UNLISTED Left 7/2015    biopsy    HX BREAST BIOPSY Left 1/18/2016    BREAST NEEDLE LOCALIZED PARTIAL MASTECTOMY/ LEFT //   performed by Lyndsay Tamayo MD at [address] HX BREAST LUMPECTOMY Left 01/2016    HX COLONOSCOPY      HX TONSILLECTOMY  1957    HX VASCULAR ACCESS Right     port right upper chest- removed 2016    HX WISDOM TEETH EXTRACTION      AK MASTECTOMY, PARTIAL Left 01/18/2016      Prior to Admission medications    Medication Sig Start Date End Date Taking? Authorizing Provider   aspirin delayed-release 325 mg tablet Take 1 Tab by mouth every twelve (12) hours every twelve (12) hours for 35 days. 5/12/17 6/16/17 Yes RADHA Graham   HYDROmorphone (DILAUDID) 2 mg tablet Take 1 Tab by mouth every four (4) hours as needed. Max Daily Amount: 12 mg. 5/12/17  Yes RADHA Graham   levothyroxine (SYNTHROID) 25 mcg tablet TAKE ONE TABLET BY MOUTH ONE TIME DAILY BEFORE BREAKFAST 3/11/17  Yes Maggy St MD   esomeprazole (NEXIUM) 20 mg capsule Take 20 mg by mouth every morning. Take / use AM day of surgery  per anesthesia protocols. Indications: GASTROESOPHAGEAL REFLUX   Yes Historical Provider   mupirocin calcium (BACTROBAN) 2 % nasal ointment by Both Nostrils route two (2) times a day. Pt completed 5 doses of bactroban prior to arrival for surgery    Historical Provider   NAPROXEN SODIUM (ALEVE PO) Take 2 Tabs by mouth daily as needed for Pain. Historical Provider   OTHER,NON-FORMULARY, Take 4 Tabs by mouth daily. Relief Factor Tablets    Historical Provider   Biotin 2,500 mcg cap Take 2 Tabs by mouth daily. Historical Provider   OTHER,NON-FORMULARY, Take 1 Tab by mouth daily. Air Borne Chewable tab    Historical Provider   polycarbophil calcium (EQUALACTIN) 500 mg chew Take 1,500 mg by mouth daily. Historical Provider   hydroCHLOROthiazide (HYDRODIURIL) 25 mg tablet TAKE ONE TABLET BY MOUTH ONE TIME DAILY 3/11/17   Maggy St MD   temazepam (RESTORIL) 15 mg capsule Take 1 Cap by mouth nightly.  Max Daily Amount: 15 mg. 1/17/17   Maggy St MD   multivitamin (ONE A DAY) tablet Take 1 Tab by mouth daily. Historical Provider     No Known Allergies     Review of Systems: +left knee pain, +antalgic gait. Denies chest pain, shortness of breath, cough, headache, visual problems, abdominal pain, dysurea, calf pain. Pertinent positives are as noted in the medical records and unremarkable otherwise. Objective:     Vitals:  Blood pressure 124/59, pulse (!) 104, temperature 96.1 °F (35.6 °C), resp. rate 16, height 5' 4\" (1.626 m), weight 172 lb 3.2 oz (78.1 kg), SpO2 96 %. Temp (24hrs), Av.7 °F (35.9 °C), Min:96.1 °F (35.6 °C), Max:97.5 °F (36.4 °C)    BMI (calculated): 29.5 (17 0555)   Intake and Output:   0701 -  1900  In: 2060 [P.O.:360; I.V.:1700]  Out: 4837 [Urine:675]    Physical Exam:   General: Alert and age appropriately oriented. No acute cardio respiratory distress. HEENT: Normocephalic, no conjunctival pallor. Oral mucosa moist without cyanosis. No JVD. Lungs: Clear to auscultation bilaterally. Respiration even and unlabored   Heart: Regular rate and rhythm, S1, S2   No  Murmurs. Abdomen: Soft, non-tender, non-distended. Genitourinary: defered   Neuromuscular:      Grossly no focal motor deficits. Left knee extension strong  Left ankle dorsiflexion 5/5  Left ankle plantarflexion 5/5  No sensory deficits distally BLE to soft touch. Skin/extremity: Non tender calves BLE. No rashes, no marginal erythema.                                                                                          Labs/Studies:  Recent Results (from the past 72 hour(s))   TYPE & SCREEN    Collection Time: 17  5:45 AM   Result Value Ref Range    Crossmatch Expiration 05/15/2017     ABO/Rh(D) B NEGATIVE     Antibody screen NEG    GLUCOSE, POC    Collection Time: 17  6:21 AM   Result Value Ref Range    Glucose (POC) 100 65 - 100 mg/dL   URINALYSIS W/ RFLX MICROSCOPIC    Collection Time: 17  8:30 AM   Result Value Ref Range    Color YELLOW Appearance CLEAR      Specific gravity 1.011 1.001 - 1.023      pH (UA) 6.0 5.0 - 9.0      Protein NEGATIVE  NEG mg/dL    Glucose NEGATIVE  mg/dL    Ketone NEGATIVE  NEG mg/dL    Bilirubin NEGATIVE  NEG      Blood MODERATE (A) NEG      Urobilinogen 0.2 0.2 - 1.0 EU/dL    Nitrites NEGATIVE  NEG      Leukocyte Esterase NEGATIVE  NEG      WBC 0 0 /hpf    RBC 5-10 0 /hpf    Epithelial cells 0-3 0 /hpf    Bacteria 0 0 /hpf    Casts 0-3 0 /lpf       Functional Assessment:  Reviewed participation and progress in therapies  Gross Assessment  AROM: Within functional limits (right LE) (05/12/17 1300)  Strength: Within functional limits (right LE) (05/12/17 1300)  Coordination: Within functional limits (right LE) (05/12/17 1300)   Bed Mobility  Supine to Sit: Contact guard assistance (05/12/17 1300)  Sit to Supine: Contact guard assistance (05/12/17 1300)  Scooting: Contact guard assistance (05/12/17 1300)   Balance  Sitting: Intact; Without support (05/12/17 1300)  Standing: Impaired; With support (walker) (05/12/17 1300)               Bed/Mat Mobility  Supine to Sit: Contact guard assistance (05/12/17 1300)  Sit to Supine: Contact guard assistance (05/12/17 1300)  Sit to Stand: Minimum assistance;Assist x2 (05/12/17 1300)  Bed to Chair:  (NT) (05/12/17 1300)  Scooting: Contact guard assistance (05/12/17 1300)     Ambulation:  Gait  Speed/Jennifer: Shuffled; Slow (05/12/17 1300)  Step Length: Left shortened;Right shortened (05/12/17 1300)  Stance: Left decreased (05/12/17 1300)  Gait Abnormalities: Antalgic;Decreased step clearance (05/12/17 1300)  Ambulation - Level of Assistance: Minimal assistance;Assist x2 (05/12/17 1300)  Distance (ft): 4 Feet (ft) (05/12/17 1300)  Assistive Device: Walker, rolling (05/12/17 1300)    Impression/Plan:     Principal Problem:    Status post total left knee replacement (5/12/2017)        Current Facility-Administered Medications   Medication Dose Route Frequency Provider Last Rate Last Dose    lactated ringers infusion  75 mL/hr IntraVENous CONTINUOUS Nurislevar Mckee MD 75 mL/hr at 05/12/17 0611 75 mL/hr at 05/12/17 0611    [START ON 5/13/2017] pantoprazole (PROTONIX) tablet 40 mg  40 mg Oral ACB [place], [place]        [START ON 5/13/2017] hydroCHLOROthiazide (HYDRODIURIL) tablet 25 mg  25 mg Oral DAILY [place]        [START ON 5/13/2017] levothyroxine (SYNTHROID) tablet 25 mcg  25 mcg Oral ACB [place], [place]        temazepam (RESTORIL) capsule 15 mg  15 mg Oral QHS [place], [place]        0.9% sodium chloride infusion  100 mL/hr IntraVENous CONTINUOUS Max Ross  mL/hr at 05/12/17 1426 100 mL/hr at 05/12/17 1426    sodium chloride (NS) flush 5-10 mL  5-10 mL IntraVENous Q8H [place]        sodium chloride (NS) flush 5-10 mL  5-10 mL IntraVENous PRN [facility] [place]        ceFAZolin in 0.9% NS (ANCEF) IVPB soln 2 g  2 g IntraVENous Q8H [facility] [place]  mL/hr at 05/12/17 1419 2 g at 05/12/17 1419    [START ON 5/13/2017] acetaminophen (TYLENOL) tablet 1,000 mg  1,000 mg Oral Q6H [place]        celecoxib (CELEBREX) capsule 200 mg  200 mg Oral Q12H [place]        HYDROmorphone (DILAUDID) tablet 2 mg  2 mg Oral Q4H PRN RADHA Higginbotham   2 mg at 05/12/17 1858    HYDROmorphone (PF) (DILAUDID) injection 1 mg  1 mg IntraVENous Q3H PRN Max Ross PA   1 mg at 05/12/17 1505    naloxone [facility]) injection 0.2-0.4 mg  0.2-0.4 mg IntraVENous Q10MIN PRN RADHA Higginbotham        [START ON 5/13/2017] dexamethasone (DECADRON) injection 10 mg  10 mg IntraVENous ONCE Max Ross [place]        ondansetron [facility]) injection 4 mg  4 mg IntraVENous Q4H PRN RADHA Higginbotham   4 mg at 05/12/17 1320    diphenhydrAMINE (BENADRYL) capsule 25 mg  25 mg Oral Q4H PRN RADHA Higginbotham        [START ON 5/13/2017] senna-docusate (PERICOLACE) 8.6-50 mg per tablet 2 Tab  2 Tab Oral DAILY RADHA Higginbotham       [employer] aspirin delayed-release tablet 325 mg  325 mg Oral Q12H Amira Gresham        ciprofloxacin HCl (CIPRO) tablet 500 mg  500 mg Oral Q12H Gely Pan DO   500 mg at 05/12/17 1419        Recommendations: Planning for [place] PT  Continue Acute Rehab Program. PT, OT  to focus on  gait training, transfer training, balance activities, ROM and strengthening exercises. Coordination of rehab/medical care. Counseling of Physical Medicine & Rehab care issues management. Monitoring and management of rehab conditions per the plan of care/orders. Rehabilitation Management/ Medical Management: 1. Devices:Walkers, Type: Rolling Walker  2. Consult:Rehab team including PT, OT,  and . 3. Disposition Rehab-discussed with patient. 4. Thigh-high or knee-high RICH's when out of bed. 5. DVT Prophylaxis - aspirin 325mg bid x 35days. 6. Incentive spirometer Q1H while awake  7. Post op hemorrhagic anemia-monitor. 8. Activity: WBAT LLE  9. Planned Labs: CBC,BMP  10. Pain Control: Fair control. Continue scheduled tylenol, Celebrex and  PRN meds. 11. Wound Care: Keep left TKA wound clean and dry and reinforce dressing PRN. May remove Aquacel 1 week post op ad replace with new one. Remove staples 12-14 post surgery, when incision appears appropriately closed and apply benzoin and 1/2\" steristrips. Follow up with Dr Mandie Doe  2 weeks after discharge from rehab. Follow up with ORTHO per instructions. Thank you for the opportunity to participate in the care of this patient.     Signed By: Susy Henderson MD     May 12, 2017

## 2017-05-12 NOTE — ANESTHESIA PROCEDURE NOTES
Spinal Block    Start time: 5/12/2017 8:10 AM  End time: 5/12/2017 8:16 AM  Performed by: Janis Vu  Authorized by: Janis Vu     Pre-procedure: Indications: primary anesthetic  Preanesthetic Checklist: patient identified, risks and benefits discussed, anesthesia consent, site marked, patient being monitored and timeout performed      Spinal Block:   Patient Position:  Seated  Prep Region:  Lumbar  Prep: chlorhexidine and patient draped                Needle:   Needle Type:  Pencan  Needle Gauge:  25 G  Attempts:  3 or more            Assessment:  Insertion:  Complicated  Patient tolerance:  Patient tolerated the procedure well with no immediate complications  Multiple attempts both midline and paramedian at L3-4, L2-3 without success.  Procedure abandoned in favor of general anesthesia

## 2017-05-12 NOTE — OP NOTES
[facility]  Total Knee Arthroplasty  Patient:Le Shaw   : 1945  Medical Record [id number]  Pre-operative Diagnosis:  Primary osteoarthritis of left knee [M17.12]  Post-operative Diagnosis: Status post total left knee replacement  Location: [address] [place] [address]  Surgeon: Jane Kothari MD  Assistant: Chely Carrillo PA-C    Anesthesia: Spinal and FNB    Procedure: Procedure(s) (LRB):  LEFT KNEE ARTHROPLASTY TOTAL/ ROMEL/FNB (Left)    The complexity of the total joint surgery requires the use of a first assistant for positioning, retraction and expertise in closure. Tourniquet Time: 0 minutes  EBL: 250cc  Findings: severe degenerative arthritis, patellar osteophytes, posterior femoral osteophytes   BMI: Body mass index is 29.56 kg/(m^2). Ángel Rowan was brought to the operating room and positioned on the operating table. She was anethestized with anesthesia. A gilliam catheter was placed preoperatively and IV antibiotics was administered. Prior to the incision being made a timeout was called identifying the patient, procedure ,operative side and surgeon. .The operative leg was prepped and draped in the usual sterile manner. An anterior longitudinal incision was accomplished just medial to the tibial tubercle and extending approximal 6 centimeters proximal to the superior pole of the patella. A medial parapatellar capsular incision was performed. The medial capsular flap was elevated around to the insertion of the semimembranous tendon. The patella was everted and the knee flexed and externally rotated. The medial and external menisci were excised. The lateral half of the fat pad excised and the patella femoral ligament was released. The anterior cruciate ligament was resected and the posterior cruciate ligament was substituted. Using extramedullary instrumentation, the tibial cut was accomplished with appropriate posterior slope.   Approxiamately 9mm of bone was removed from the high side of the tibia. The distal femur was next addressed. A drill hole was made above the intracondolar notch. Using appropriate intramedullary instrumentation,a five degree valgus distal cut was accomplished. The femur was sized. The anterior and posterior cuts were then made about the distal femur. The osteophytes were removed from the tibial and femoral surfaces. The flexion and extension gaps were assessed with the appropriate spacer blocks. Additional surgical procedures included: none. The flexion and extension gaps were deemed appropriately balanced. The appropriate cutting blocks were then utilized to perform the anterior chamfer, posterior chamfer and notch cuts, with appropriate lateral tranlation accomplished for the patellofemoral groove. The tibia baseplate was sized. The tibial base plate was pinned into place with the appropriate external rotation and stem site prepared. A preliminary range of motion was accomplished with  trial components. The patient was able to obtain full extension as well as appropriate flexion. The patient's ligaments were stable in flexion and extension to medial and lateral stressing and the alignment was through the appropriate mechanical axis. The patella was then everted. The bone was resected to accomodate the three peg  patella button. A trial reduction revealed appropriate tracking through the patellofemoral groove with no lateral retinacular release being accomplished. All trial components were removed. The knee was irrigated. There were no femoral deficiencies. There were no tibial deficiencies. No augmentation was utilized. The permanent Tibial and Femoral components were impacted into place. The patella component was then pressed in place. Lajoyce Spice knee was placed through range of motion and noted to be stable as mentioned above with the trail components.   The wound was dry, therefore no drain was used. The operative knee was injected with 60cc of Naropin, 10 cc's of morphine and 1 cc of 30mg of Toradol. The knee was then soaked with a diluted betadine solution for approximately 3 min. This was then thoroughly irrigated. The capsular layer was closed using a #1 PDS suture. The knee joint was then injected with transexamic acid. The subcutaneous layers were closed using 2-0 Stratafix suture. Finally the skin was closed using 3-0 Vicryl and skin staples, which were applied in occlusive fashion and sterile bandage applied. An Iceman cryo pad was applied on the operative leg. Sponge count and needle counts were correct. Heraclio Giordano left the operating room     Implants:   Implant Name Type Inv.  Item Serial No.  Lot No. LRB No. Used   BASEPLT TIB PC TRITNM SZ 3 -- TRIATHLON - [id number]  BASEPLT TIB PC TRITNM SZ 3 -- TRIATHLON [id number] [employer] HOW [id number] Left 1   COMPNT FEM PS TRIATHLN 2 L PA --  - [id number]  COMPNT FEM PS TRIATHLN 2 L PA --  [id number] [employer] HOW [id number] Left 1   PAT ASYM MTL-BK 9MM SZ A29 -- TRIATHLON - SATH2  PAT ASYM MTL-BK 9MM SZ A29 -- TRIATHLON ATH2 [employer] HOW ATH2 Left 1   INSERT TIB PS TRIATHLN 3 11MM --  - [id number]   INSERT TIB PS TRIATHLN 3 11MM --  [id number] [employer] HOW [id number] Left 1           Signed By: Kalina Armenta MD  5/12/2017,  10:12 AM

## 2017-05-12 NOTE — PERIOP NOTES
TRANSFER - OUT REPORT:    Verbal report given to elsi(name) on Elena Sanchez  being transferred to [facility] area(unit) for routine progression of care       Report consisted of patients Situation, Background, Assessment and   Recommendations(SBAR). Information from the following report(s) SBAR and MAR was reviewed with the receiving nurse. Lines:   Venous Access Device Right chest port  Upper chest (subclavicular area, right (Active)       Peripheral IV 05/12/17 Left Hand (Active)   Site Assessment Clean, dry, & intact 5/12/2017  6:00 AM   Phlebitis Assessment 0 5/12/2017  6:00 AM   Infiltration Assessment 0 5/12/2017  6:00 AM   Dressing Status Clean, dry, & intact 5/12/2017  6:00 AM   Dressing Type Tape;Transparent 5/12/2017  6:00 AM   Hub Color/Line Status Green; Infusing 5/12/2017  6:00 AM   Action Taken Blood drawn 5/12/2017  6:00 AM        Opportunity for questions and clarification was provided.       Patient transported with:   Tech   HR=[id number]

## 2017-05-12 NOTE — ROUTINE PROCESS
TRANSFER - IN REPORT:    Verbal report received from [facility]) on Paul Frazier  being received from PACU(unit) for routine post - op      Report consisted of patients Situation, Background, Assessment and   Recommendations(SBAR). Information from the following report(s) SBAR, Kardex, Procedure Summary, Intake/Output, MAR and Recent Results was reviewed with the receiving nurse. Opportunity for questions and clarification was provided. Assessment completed upon patients arrival to unit and care assumed.

## 2017-05-12 NOTE — DISCHARGE INSTRUCTIONS
[id number] [facility]   Patient Discharge Instructions    Ángel Rowan / [id number] : 1945    Admitted 2017 Discharged: 2017     IF YOU HAVE ANY PROBLEMS ONCE YOU ARE AT HOME CALL THE FOLLOWING NUMBERS:   Main office number: [phone]      Medications    · The medications you are to continue on are listed on the medication reconciliation sheet. · Narcotic pain medications as well as supplemental iron can cause constipation. If this occurs try stopping the narcotic pain medication and/or the iron. · It is important that you take the medication exactly as they are prescribed. · Medications which increase your risk of blood clots are listed to stop for 5 weeks after surgery as well as medications or supplements which increase your risk of bleeding complications. · Keep your medication in the bottles provided by the pharmacist and keep a list of the medication names, dosages, and times to be taken in your wallet. · Do not take other medications without consulting your doctor. Important Information    Do NOT smoke as this will greatly increase your risk of infection! Resume your prehospital diet. If you have excessive nausea or vomitting call your doctor's office     Leg swelling and warmth is normal for 6 months after surgery. If you experience swelling in your leg elevate you leg while laying down with your toes above your heart. If you have sudden onset severe swelling with leg pain call our office. Use Yaron Hose stockings until we see you in the office for your follow up appointment. The stitches deep inside take approximately 6 months to dissolve. There will be sharp shooting, stinging and burning pain. This is normal and will resolve between 3-6 months after surgery. Difficulty sleeping is normal following total Knee and Hip replacement. You may try melatonin, an over-the-counter sleep aid or benadryl to help with sleep.  Most patients will resume sleeping through the night 8 weeks after surgery. Home Physical Therapy is arranged. Home Health will contact you within 48 hrs of discharge that you have chosen. If you have not received a call within this time frame please contact that provider you chose. You should be given this information before you leave the hospital.     You are at a risk for falls. Use the rolling walker when walking. Patients who have had a joint replacement should not drive if they are still taking narcotic pain mediation during the daytime hours. Most patients wean themselves off of pain medication within 2-5 weeks after surgery. When to Call the office    - If you have a temperature greater then 101  - Uncontrolled vomiting   - Loose control of your bladder or bowel function  - Are unable to bear any wieght   - Need a pain medication refill     Information obtained by :  I understand that if any problems occur once I am at home I am to contact my physician. I understand and acknowledge receipt of the instructions indicated above.                                                                                                                                            Physician's or R.N.'s Signature                                                                  Date/Time                                                                                                                                              Patient or Representative Signature                                                          Date/Time

## 2017-05-12 NOTE — ANESTHESIA POSTPROCEDURE EVALUATION
Post-Anesthesia Evaluation and Assessment    Patient: Niels Richter MRN: [id number]  SSN: [id number]    YOB: 1945  Age: 81 y.o. Sex: female       Cardiovascular Function/Vital Signs  Visit Vitals    /65    Pulse (!) 116    Temp 36.4 °C (97.5 °F)    Resp 16    Ht 5' 4\" (1.626 m)    Wt 78.1 kg (172 lb 3.2 oz)    SpO2 98%    BMI 29.56 kg/m2       Patient is status post spinal anesthesia for Procedure(s):  LEFT KNEE ARTHROPLASTY TOTAL/ ROMEL/FNB. Nausea/Vomiting: None    Postoperative hydration reviewed and adequate. Pain:  Pain Scale 1: Visual (05/12/17 1107)  Pain Intensity 1: 10 (05/12/17 1107)   Managed    Neurological Status:   Neuro (WDL): Within Defined Limits (05/12/17 1044)  Neuro  Neurologic State: Drowsy (05/12/17 1044)  Orientation Level: Oriented to person;Oriented to place (05/12/17 1044)  LUE Motor Response: Purposeful (05/12/17 1044)  LLE Motor Response: Purposeful (05/12/17 1044)  RUE Motor Response: Purposeful (05/12/17 1044)  RLE Motor Response: Purposeful (05/12/17 1044)   At baseline    Mental Status and Level of Consciousness: Arousable    Pulmonary Status:   O2 Device: Nasal cannula (05/12/17 1010)   Adequate oxygenation and airway patent    Complications related to anesthesia: None    Post-anesthesia assessment completed.  No concerns    Signed By: Mary Navarrete MD     May 12, 2017

## 2017-05-12 NOTE — PROGRESS NOTES
05/12/17 1435   Oxygen Therapy   O2 Sat (%) 99 %   Pulse via Oximetry 107 beats per minute   O2 Device Nasal cannula   O2 Flow Rate (L/min) 2 l/min  (weaned to room air)   Patient achieved   1250     Ml/sec on IS. Patient encouraged to do every hour while awake-patient agreed and demonstrated. No shortness of breath or distress noted. BS are clear b/l.

## 2017-05-13 LAB
ANION GAP BLD CALC-SCNC: 9 MMOL/L (ref 7–16)
BUN SERPL-MCNC: 16 MG/DL (ref 8–23)
CALCIUM SERPL-MCNC: 8 MG/DL (ref 8.3–10.4)
CHLORIDE SERPL-SCNC: 107 MMOL/L (ref 98–107)
CO2 SERPL-SCNC: 25 MMOL/L (ref 21–32)
CREAT SERPL-MCNC: 0.93 MG/DL (ref 0.6–1)
GLUCOSE SERPL-MCNC: 123 MG/DL (ref 65–100)
HGB BLD-MCNC: 9.8 G/DL (ref 11.7–15.4)
MM INDURATION POC: 0 MM (ref 0–5)
POTASSIUM SERPL-SCNC: 3.7 MMOL/L (ref 3.5–5.1)
PPD POC: NEGATIVE NEGATIVE
SODIUM SERPL-SCNC: 141 MMOL/L (ref 136–145)

## 2017-05-13 PROCEDURE — 97116 GAIT TRAINING THERAPY: CPT

## 2017-05-13 PROCEDURE — 74011250637 HC RX REV CODE- 250/637: Performed by: INTERNAL MEDICINE

## 2017-05-13 PROCEDURE — 74011250636 HC RX REV CODE- 250/636: Performed by: PHYSICIAN ASSISTANT

## 2017-05-13 PROCEDURE — 74011250637 HC RX REV CODE- 250/637: Performed by: PHYSICIAN ASSISTANT

## 2017-05-13 PROCEDURE — 36415 COLL VENOUS BLD VENIPUNCTURE: CPT | Performed by: PHYSICIAN ASSISTANT

## 2017-05-13 PROCEDURE — 80048 BASIC METABOLIC PNL TOTAL CA: CPT | Performed by: PHYSICIAN ASSISTANT

## 2017-05-13 PROCEDURE — 65270000029 HC RM PRIVATE

## 2017-05-13 PROCEDURE — 97110 THERAPEUTIC EXERCISES: CPT

## 2017-05-13 PROCEDURE — 85018 HEMOGLOBIN: CPT | Performed by: PHYSICIAN ASSISTANT

## 2017-05-13 RX ADMIN — CIPROFLOXACIN HYDROCHLORIDE 500 MG: 500 TABLET, FILM COATED ORAL at 11:16

## 2017-05-13 RX ADMIN — HYDROMORPHONE HYDROCHLORIDE 1 MG: 1 INJECTION, SOLUTION INTRAMUSCULAR; INTRAVENOUS; SUBCUTANEOUS at 21:07

## 2017-05-13 RX ADMIN — HYDROMORPHONE HYDROCHLORIDE 2 MG: 2 TABLET ORAL at 15:15

## 2017-05-13 RX ADMIN — HYDROCHLOROTHIAZIDE 25 MG: 25 TABLET ORAL at 08:04

## 2017-05-13 RX ADMIN — ACETAMINOPHEN 1000 MG: 500 TABLET, FILM COATED ORAL at 07:03

## 2017-05-13 RX ADMIN — Medication 10 ML: at 21:13

## 2017-05-13 RX ADMIN — HYDROMORPHONE HYDROCHLORIDE 2 MG: 2 TABLET ORAL at 11:16

## 2017-05-13 RX ADMIN — ASPIRIN 325 MG: 325 TABLET, DELAYED RELEASE ORAL at 08:04

## 2017-05-13 RX ADMIN — PANTOPRAZOLE SODIUM 40 MG: 40 TABLET, DELAYED RELEASE ORAL at 07:03

## 2017-05-13 RX ADMIN — HYDROMORPHONE HYDROCHLORIDE 2 MG: 2 TABLET ORAL at 19:40

## 2017-05-13 RX ADMIN — ACETAMINOPHEN 1000 MG: 500 TABLET, FILM COATED ORAL at 18:05

## 2017-05-13 RX ADMIN — ASPIRIN 325 MG: 325 TABLET, DELAYED RELEASE ORAL at 21:07

## 2017-05-13 RX ADMIN — CELECOXIB 200 MG: 200 CAPSULE ORAL at 08:04

## 2017-05-13 RX ADMIN — HYDROMORPHONE HYDROCHLORIDE 2 MG: 2 TABLET ORAL at 02:52

## 2017-05-13 RX ADMIN — CELECOXIB 200 MG: 200 CAPSULE ORAL at 21:07

## 2017-05-13 RX ADMIN — Medication 10 ML: at 07:04

## 2017-05-13 RX ADMIN — Medication 10 ML: at 14:00

## 2017-05-13 RX ADMIN — ACETAMINOPHEN 1000 MG: 500 TABLET, FILM COATED ORAL at 11:16

## 2017-05-13 RX ADMIN — HYDROMORPHONE HYDROCHLORIDE 2 MG: 2 TABLET ORAL at 07:11

## 2017-05-13 RX ADMIN — ACETAMINOPHEN 1000 MG: 500 TABLET, FILM COATED ORAL at 23:55

## 2017-05-13 RX ADMIN — TEMAZEPAM 15 MG: 15 CAPSULE ORAL at 23:55

## 2017-05-13 RX ADMIN — CIPROFLOXACIN HYDROCHLORIDE 500 MG: 500 TABLET, FILM COATED ORAL at 23:55

## 2017-05-13 RX ADMIN — SENNOSIDES AND DOCUSATE SODIUM 2 TABLET: 8.6; 5 TABLET ORAL at 08:04

## 2017-05-13 RX ADMIN — HYDROMORPHONE HYDROCHLORIDE 2 MG: 2 TABLET ORAL at 23:55

## 2017-05-13 RX ADMIN — LEVOTHYROXINE SODIUM 25 MCG: 50 TABLET ORAL at 07:03

## 2017-05-13 NOTE — PROGRESS NOTES
Pt sitting up in bed, pleasant and talkative.  at side. Oriented to room and to call light. Notified of plan of care and discussed prn pain meds. No needs at this time.

## 2017-05-13 NOTE — PROGRESS NOTES
Problem: Mobility Impaired (Adult and Pediatric)  Goal: *Acute Goals and Plan of Care (Insert Text)  GOALS (1-4 days):  (1.)Ms. Devi Robertson will move from supine to sit and sit to supine in bed with SUPERVISION. (2.)Ms. Devi Robertson will transfer from bed to chair and chair to bed with STAND BY ASSIST using the least restrictive device. (3.)Ms. Devi Robertson will ambulate with STAND BY ASSIST for 200 feet with the least restrictive device. (4.)Ms. Devi Robertson will ambulate up/down 3 steps with bilateral railing with CONTACT GUARD ASSIST with no device. (5.)Ms. Devi Robertson will increase left knee ROM to 5°-80°.   ________________________________________________________________________________________________      PHYSICAL THERAPY JOINT CAMP TKA: Daily Note, Treatment Day: 1st and PM 5/13/2017  INPATIENT: Hospital Day: 2  Payor: Jules Mention / Plan: [id number] PPO/PFFS / Product Type: [employer] Medicare /      NAME/AGE/GENDER: Francy Velazquez is a 81 y.o. female            PRIMARY DIAGNOSIS:  Primary osteoarthritis of left knee [M17.12]              Procedure(s) and Anesthesia Type:     * LEFT KNEE ARTHROPLASTY TOTAL/ ROMEL/FNB - Spinal (Left)  ICD-10: Treatment Diagnosis:        · Pain in Left Knee (M25.562)  · Stiffness of Left Knee, Not elsewhere classified (Y08.179)       ASSESSMENT:      Ms. Devi Robertson is sitting up in chair on arrival.  She is agreeable to PT. She ambulated in hallway with slow gait. She is planning to go home tomorrow with HHPT. This section established at most recent assessment   PROBLEM LIST (Impairments causing functional limitations):  1. Decreased Strength  2. Decreased ADL/Functional Activities  3. Decreased Transfer Abilities  4. Decreased Ambulation Ability/Technique  5. Decreased Balance  6. Increased Pain  7. Decreased Activity Tolerance  8. Decreased Flexibility/Joint Mobility  9.  Decreased [place] with Home Exercise Program    INTERVENTIONS PLANNED: (Benefits and precautions of physical therapy have been discussed with the patient.)  1. Bed Mobility  2. Gait Training  3. Home Exercise Program (HEP)  4. Therapeutic Exercise/Strengthening  5. Transfer Training  6. Range of Motion: active/assisted/passive  7. Therapeutic Activities  8. Group Therapy      TREATMENT PLAN: Frequency/Duration: Follow patient BID   to address above goals. Rehabilitation Potential For Stated Goals: GOOD      RECOMMENDED REHABILITATION/EQUIPMENT: (at time of discharge pending progress): Continue Skilled Therapy and Home Health: Physical Therapy. HISTORY:   History of Present Injury/Illness (Reason for Referral): The patient has end stage arthritis of the left knee. The patient was evaluated and examined during a consultation prior to this office visit. There have been no changes to the patient's orthopedic condition since the initial consultation. The patient has failed previous conservative treatment for this condition including antiinflammatories , and lifestyle modifications. The necessity for joint replacement is present. The patient will be admitted the day of surgery for Procedure(s) (LRB):  LEFT KNEE ARTHROPLASTY TOTAL/ ROMEL (Left)      Past Medical History/Comorbidities:   Ms. Josef Glynn  has a past medical history of Anxiety; Arthritis; Breast cancer ([address] (1/18/2016); Breast cancer ([address] Cancer ([address] Chronic pain; Former smoker; GERD (gastroesophageal reflux disease); Hard of hearing; Headache; Hypertension; Hypothyroidism; Insomnia; and Status post total left knee replacement (5/12/2017). She also has no past medical history of Adverse effect of anesthesia; Aneurysm ([address] Arrhythmia; Asthma; Autoimmune disease ([address] CAD (coronary artery disease); Chronic kidney disease; Chronic obstructive pulmonary disease ([address] Coagulation disorder ([address] Diabetes ([address] Difficult intubation; Endocarditis; Heart failure ([address]  Ill-defined condition; Liver disease; Malignant hyperthermia due to anesthesia; Morbid obesity ([facility] Pseudocholinesterase deficiency; Psychiatric disorder; PUD (peptic ulcer disease); Rheumatic fever; Seizures ([facility] Sleep apnea; Stroke [facility]); Thromboembolus ([facility] or Unspecified adverse effect of anesthesia. Ms. Annalee Sheffield  has a past surgical history that includes colonoscopy; wisdom teeth extraction; tonsillectomy (1957); vascular access (Right); breast surgery procedure unlisted (Left, 7/2015); breast biopsy (Left, 1/18/2016); mastectomy, partial (Left, 01/18/2016); and breast lumpectomy (Left, 01/2016). Social History/Living Environment:   Home Environment: Private residence  # Steps to Enter: 0  One/Two Story Residence: One story  Living Alone: No  Support Systems: Spouse/Significant Other/Partner  Patient Expects to be Discharged to[de-identified] Private residence  Current DME Used/Available at Home: None  Tub or Shower Type: Shower  Prior Level of Function/Work/Activity:  Pt was independent without an assistive device prior to this admission   Number of Personal Factors/Comorbidities that affect the Plan of Care: 1-2: MODERATE COMPLEXITY   EXAMINATION:   Most Recent Physical Functioning:                               Bed Mobility  Supine to Sit: Supervision  Sit to Supine: Minimum assistance     Transfers  Sit to Stand: Contact guard assistance  Stand to Sit: Contact guard assistance  Bed to Chair: Contact guard assistance                      Weight Bearing Status  Left Side Weight Bearing: As tolerated  Distance (ft): 150 Feet (ft)  Ambulation - Level of Assistance: Contact guard assistance  Assistive Device: Walker, rolling  Speed/Jennifer: Delayed;Pace decreased (<100 feet/min)  Step Length: Left shortened;Right shortened  Stance: Left decreased  Gait Abnormalities: Antalgic;Decreased step clearance         Braces/Orthotics: none     Left Knee Cold  Type: Cryocuff       Body Structures Involved:  1. Joints  2.  Muscles Body Functions Affected:  1. Movement Related Activities and Participation Affected:  1. Mobility   Number of elements that affect the Plan of Care: 4+: HIGH COMPLEXITY   CLINICAL PRESENTATION:   Presentation: Stable and uncomplicated: LOW COMPLEXITY   CLINICAL DECISION MAKIN [facility] [address] [id number] AM-PAC 6 Clicks   Basic Mobility Inpatient Short Form  How much difficulty does the patient currently have. .. Unable A Lot A Little None   1. Turning over in bed (including adjusting bedclothes, sheets and blankets)? [ ] 1   [ ] 2   [X] 3   [ ] 4   2. Sitting down on and standing up from a chair with arms ( e.g., wheelchair, bedside commode, etc.)   [ ] 1   [ ] 2   [X] 3   [ ] 4   3. Moving from lying on back to sitting on the side of the bed? [ ] 1   [ ] 2   [X] 3   [ ] 4   How much help from another person does the patient currently need. .. Total A Lot A Little None   4. Moving to and from a bed to a chair (including a wheelchair)? [ ] 1   [ ] 2   [X] 3   [ ] 4   5. Need to walk in hospital room? [ ] 1   [ ] 2   [X] 3   [ ] 4   6. Climbing 3-5 steps with a railing? [X] 1   [ ] 2   [ ] 3   [ ] 4   © , Trustees of [address] [place] [address] [address] [address], under license to [employer]. All rights reserved       Score:  Initial: 16 Most Recent: X (Date: -- )     Interpretation of Tool:  Represents activities that are increasingly more difficult (i.e. Bed mobility, Transfers, Gait).        Score 24 23 22-20 19-15 14-10 9-7 6       Modifier CH CI CJ CK CL CM CN         · Mobility - Walking and Moving Around:               - CURRENT STATUS:    CK - 40%-59% impaired, limited or restricted               - GOAL STATUS:           CJ - 20%-39% impaired, limited or restricted               - D/C STATUS:                       ---------------To be determined---------------  Payor: [employer] MEDICARE / Plan: [facility] [employer] MEDICARE CHOICE PPO/PFFS / Product Type: Managed Care Medicare /       Medical Necessity:     · Patient is expected to demonstrate progress in strength, range of motion, balance, coordination and functional technique to decrease assistance required with bed mobility, transfers & gait. Reason for Services/Other Comments:  · Patient continues to require skilled intervention due to pt not independent with functional mobility. Use of outcome tool(s) and clinical judgement create a POC that gives a: Clear prediction of patient's progress: LOW COMPLEXITY                 TREATMENT:   (In addition to Assessment/Re-Assessment sessions the following treatments were rendered)      Pre-treatment Symptoms/Complaints:  none  Pain: Initial: 1/10     Post Session:  3/10 visually      Gait Training (15 Minutes):  Gait training to improve and/or restore physical functioning as related to mobility, strength and balance. Ambulated 150 Feet (ft) with Contact guard assistance using a Walker, rolling and minimal   related to their stance phase and stride length to promote proper body alignment, promote proper body posture and promote proper body mechanics. Therapeutic Exercise: (15 Minutes):  Exercises per grid below to improve mobility, strength and balance. Required minimal verbal cues to promote proper body alignment, promote proper body posture and promote proper body mechanics. Progressed repetitions as indicated. Date:   5/13/17 Date:    Date:      ACTIVITY/EXERCISE AM PM AM PM AM PM   GROUP THERAPY  [ ]  [ ]  [ ]  [ ]  [ ]  [ ]   Ankle Pumps  20  20           Quad Sets 10  15           Gluteal Sets 10  15           Hip ABd/ADduction 10  15           Straight Leg Raises 10  15           Knee Slides  10  15           Short Arc Quads               Long Arc Quads               Chair Slides                               B = bilateral; AA = active assistive; A = active; P = passive       Treatment/Session Assessment:         Response to Treatment:  Doing better this afternoon with gait.      Education:  [ ] Home Exercises  [X] Fall Precautions  [ ] Hip Precautions [ ] Going Home Video  [ ] Knee/Hip Prosthesis Review  [X] Walker Management/Safety [ ] Adaptive Equipment as Needed         Interdisciplinary Collaboration:   · Registered Nurse     After treatment position/precaution/s:   · Supine in bed, Bed/Chair-wheels locked, Bed in low position, Caregiver at bedside, Call light within reach, Family at bedside and Side rails x 2     Compliance with Program/Exercises: Will assess as treatment progresses. Recommendations/Intent for next treatment session:  Treatment next visit will focus on increasing Ms. Rust's independence with bed mobility, transfers, gait training, strength/ROM exercises, modalities for pain, and patient education.        Total Treatment Duration:  PT Patient Time In/Time Out  Time In: 1330  Time Out: 0273 [facility]

## 2017-05-13 NOTE — PROGRESS NOTES
Problem: Mobility Impaired (Adult and Pediatric)  Goal: *Acute Goals and Plan of Care (Insert Text)  GOALS (1-4 days):  (1.)Ms. Annalee Sheffield will move from supine to sit and sit to supine in bed with SUPERVISION. (2.)Ms. Annalee Sheffield will transfer from bed to chair and chair to bed with STAND BY ASSIST using the least restrictive device. (3.)Ms. Annalee Sheffield will ambulate with STAND BY ASSIST for 200 feet with the least restrictive device. (4.)Ms. Annalee Sheffield will ambulate up/down 3 steps with bilateral railing with CONTACT GUARD ASSIST with no device. (5.)Ms. Annalee Sheffield will increase left knee ROM to 5°-80°.   ________________________________________________________________________________________________      PHYSICAL THERAPY JOINT CAMP TKA: Daily Note, Treatment Day: 1st and AM 5/13/2017  INPATIENT: Hospital Day: 2  Payor: Dionne Pride / Plan: [facility] HUMANA MEDICARE CHOICE PPO/PFFS / Product Type: [employer] Medicare /      NAME/AGE/GENDER: Patrick Jose is a 81 y.o. female            PRIMARY DIAGNOSIS:  Primary osteoarthritis of left knee [M17.12]              Procedure(s) and Anesthesia Type:     * LEFT KNEE ARTHROPLASTY TOTAL/ ROMEL/FNB - Spinal (Left)  ICD-10: Treatment Diagnosis:        · Pain in Left Knee (M25.562)  · Stiffness of Left Knee, Not elsewhere classified (E41.441)       ASSESSMENT:      Ms. Annalee Sheffield is supine in bed on arrival.  She is dressed with her makeup on. Pt is motivated to participate with PT. She is doing well with mobility. Pt ambulated into hallway and back into room. Pt left sitting up in chair with needs in reach and friend present. This section established at most recent assessment   PROBLEM LIST (Impairments causing functional limitations):  1. Decreased Strength  2. Decreased ADL/Functional Activities  3. Decreased Transfer Abilities  4. Decreased Ambulation Ability/Technique  5. Decreased Balance  6. Increased Pain  7. Decreased Activity Tolerance  8.  Decreased Flexibility/Joint Mobility  9. Decreased [place] with Home Exercise Program    INTERVENTIONS PLANNED: (Benefits and precautions of physical therapy have been discussed with the patient.)  1. Bed Mobility  2. Gait Training  3. Home Exercise Program (HEP)  4. Therapeutic Exercise/Strengthening  5. Transfer Training  6. Range of Motion: active/assisted/passive  7. Therapeutic Activities  8. Group Therapy      TREATMENT PLAN: Frequency/Duration: Follow patient BID   to address above goals. Rehabilitation Potential For Stated Goals: GOOD      RECOMMENDED REHABILITATION/EQUIPMENT: (at time of discharge pending progress): Continue Skilled Therapy and Home Health: Physical Therapy. HISTORY:   History of Present Injury/Illness (Reason for Referral): The patient has end stage arthritis of the left knee. The patient was evaluated and examined during a consultation prior to this office visit. There have been no changes to the patient's orthopedic condition since the initial consultation. The patient has failed previous conservative treatment for this condition including antiinflammatories , and lifestyle modifications. The necessity for joint replacement is present. The patient will be admitted the day of surgery for Procedure(s) (LRB):  LEFT KNEE ARTHROPLASTY TOTAL/ ROMEL (Left)      Past Medical History/Comorbidities:   Ms. Rebecca Kaur  has a past medical history of Anxiety; Arthritis; Breast cancer ([address] (1/18/2016); Breast cancer ([address] Cancer ([address] Chronic pain; Former smoker; GERD (gastroesophageal reflux disease); Hard of hearing; Headache; Hypertension; Hypothyroidism; Insomnia; and Status post total left knee replacement (5/12/2017). She also has no past medical history of Adverse effect of anesthesia; Aneurysm ([address] Arrhythmia; Asthma; Autoimmune disease ([address] CAD (coronary artery disease); Chronic kidney disease; Chronic obstructive pulmonary disease ([address]  Coagulation disorder ([address] Diabetes ([facility] Difficult intubation; Endocarditis; Heart failure ([facility] Ill-defined condition; Liver disease; Malignant hyperthermia due to anesthesia; Morbid obesity ([facility] Pseudocholinesterase deficiency; Psychiatric disorder; PUD (peptic ulcer disease); Rheumatic fever; Seizures ([facility] Sleep apnea; Stroke [facility]); Thromboembolus ([facility] or Unspecified adverse effect of anesthesia. Ms. Abimbola Reich  has a past surgical history that includes colonoscopy; wisdom teeth extraction; tonsillectomy (1957); vascular access (Right); breast surgery procedure unlisted (Left, 7/2015); breast biopsy (Left, 1/18/2016); mastectomy, partial (Left, 01/18/2016); and breast lumpectomy (Left, 01/2016).   Social History/Living Environment:   Home Environment: Private residence  # Steps to Enter: 0  One/Two Story Residence: One story  Living Alone: No  Support Systems: Spouse/Significant Other/Partner  Patient Expects to be Discharged to[de-identified] Private residence  Current DME Used/Available at Home: None  Tub or Shower Type: Shower  Prior Level of Function/Work/Activity:  Pt was independent without an assistive device prior to this admission   Number of Personal Factors/Comorbidities that affect the Plan of Care: 1-2: MODERATE COMPLEXITY   EXAMINATION:   Most Recent Physical Functioning:                               Bed Mobility  Supine to Sit: Supervision  Sit to Supine:  (pt left up in chair)     Transfers  Sit to Stand: Contact guard assistance  Stand to Sit: Contact guard assistance                      Weight Bearing Status  Left Side Weight Bearing: As tolerated  Distance (ft): 45 Feet (ft)  Ambulation - Level of Assistance: Contact guard assistance  Assistive Device: Walker, rolling  Speed/Jennifer: Delayed;Pace decreased (<100 feet/min)  Step Length: Left shortened;Right shortened  Stance: Left decreased  Gait Abnormalities: Antalgic         Braces/Orthotics: none     Left Knee Cold  Type: Cryocuff       Body Structures Involved:  1. Joints  2. Muscles Body Functions Affected:  1. Movement Related Activities and Participation Affected:  1. Mobility   Number of elements that affect the Plan of Care: 4+: HIGH COMPLEXITY   CLINICAL PRESENTATION:   Presentation: Stable and uncomplicated: LOW COMPLEXITY   CLINICAL DECISION MAKIN [facility] [address] [address] AM-PAC 6 Clicks   Basic Mobility Inpatient Short Form  How much difficulty does the patient currently have. .. Unable A Lot A Little None   1. Turning over in bed (including adjusting bedclothes, sheets and blankets)? [ ] 1   [ ] 2   [X] 3   [ ] 4   2. Sitting down on and standing up from a chair with arms ( e.g., wheelchair, bedside commode, etc.)   [ ] 1   [ ] 2   [X] 3   [ ] 4   3. Moving from lying on back to sitting on the side of the bed? [ ] 1   [ ] 2   [X] 3   [ ] 4   How much help from another person does the patient currently need. .. Total A Lot A Little None   4. Moving to and from a bed to a chair (including a wheelchair)? [ ] 1   [ ] 2   [X] 3   [ ] 4   5. Need to walk in hospital room? [ ] 1   [ ] 2   [X] 3   [ ] 4   6. Climbing 3-5 steps with a railing? [X] 1   [ ] 2   [ ] 3   [ ] 4   © , Trustees of [address] [place] [address] [address] [address], under license to [employer]. All rights reserved       Score:  Initial: 16 Most Recent: X (Date: -- )     Interpretation of Tool:  Represents activities that are increasingly more difficult (i.e. Bed mobility, Transfers, Gait).        Score 24 23 22-20 19-15 14-10 9-7 6       Modifier CH CI CJ CK CL CM CN         · Mobility - Walking and Moving Around:               - CURRENT STATUS:    CK - 40%-59% impaired, limited or restricted               - GOAL STATUS:           CJ - 20%-39% impaired, limited or restricted               - D/C STATUS:                       ---------------To be determined---------------  Payor: HUMANA MEDICARE / Plan: [facility] HUMANA MEDICARE CHOICE PPO/PFFS / Product Type: Managed Care Medicare / Medical Necessity:     · Patient is expected to demonstrate progress in strength, range of motion, balance, coordination and functional technique to decrease assistance required with bed mobility, transfers & gait. Reason for Services/Other Comments:  · Patient continues to require skilled intervention due to pt not independent with functional mobility. Use of outcome tool(s) and clinical judgement create a POC that gives a: Clear prediction of patient's progress: LOW COMPLEXITY                 TREATMENT:   (In addition to Assessment/Re-Assessment sessions the following treatments were rendered)      Pre-treatment Symptoms/Complaints:  none  Pain: Initial: 1/10     Post Session:  2/10 visually      Gait Training (15 Minutes):  Gait training to improve and/or restore physical functioning as related to mobility, strength and balance. Ambulated 45 Feet (ft) with Contact guard assistance using a Walker, rolling and minimal   related to their stance phase and stride length to promote proper body alignment, promote proper body posture and promote proper body mechanics. Therapeutic Exercise: (15 Minutes):  Exercises per grid below to improve mobility, strength and balance. Required minimal verbal cues to promote proper body alignment, promote proper body posture and promote proper body mechanics. Progressed repetitions as indicated. Date:   5/13/17 Date:    Date:      ACTIVITY/EXERCISE AM PM AM PM AM PM   GROUP THERAPY  [ ]  [ ]  [ ]  [ ]  [ ]  [ ]   Ankle Pumps  20             Quad Sets 10             Gluteal Sets 10             Hip ABd/ADduction 10             Straight Leg Raises 10             Knee Slides  10             Short Arc Quads               Long Arc Quads               Chair Slides                               B = bilateral; AA = active assistive; A = active; P = passive       Treatment/Session Assessment:         Response to Treatment:  Tolerated well.      Education:  [ ] Home Exercises  [X] Fall Precautions  [ ] Hip Precautions [ ] Going Home Video  [ ] Knee/Hip Prosthesis Review  [X] Walker Management/Safety [ ] Adaptive Equipment as Needed         Interdisciplinary Collaboration:   · Registered Nurse     After treatment position/precaution/s:   · Up in chair, Bed/Chair-wheels locked, Bed in low position, Caregiver at bedside, Call light within reach and Family at bedside     Compliance with Program/Exercises: Will assess as treatment progresses. Recommendations/Intent for next treatment session:  Treatment next visit will focus on increasing Ms. Rust's independence with bed mobility, transfers, gait training, strength/ROM exercises, modalities for pain, and patient education.        Total Treatment Duration:  PT Patient Time In/Time Out  Time In: 0930  Time Out: 1000     Lorrin Lennox, MINA

## 2017-05-13 NOTE — PROGRESS NOTES
Alert and oriented  Wound area is benign with no obvious infection  Hg 9.8  Denies chest pain or dyspnea  No inordinate pain  Pain management is good.  .  Needs continued inpatient physiotherapy

## 2017-05-14 ENCOUNTER — HOME HEALTH ADMISSION (OUTPATIENT)
Dept: HOME HEALTH SERVICES | Facility: HOME HEALTH | Age: 72
End: 2017-05-14
Payer: MEDICARE

## 2017-05-14 VITALS
OXYGEN SATURATION: 97 % | BODY MASS INDEX: 29.4 KG/M2 | WEIGHT: 172.2 LBS | TEMPERATURE: 95.7 F | DIASTOLIC BLOOD PRESSURE: 55 MMHG | SYSTOLIC BLOOD PRESSURE: 105 MMHG | HEART RATE: 90 BPM | HEIGHT: 64 IN | RESPIRATION RATE: 18 BRPM

## 2017-05-14 LAB
BACTERIA SPEC CULT: NORMAL
HGB BLD-MCNC: 8.9 G/DL (ref 11.7–15.4)
SERVICE CMNT-IMP: NORMAL

## 2017-05-14 PROCEDURE — 74011250637 HC RX REV CODE- 250/637: Performed by: PHYSICIAN ASSISTANT

## 2017-05-14 PROCEDURE — 97150 GROUP THERAPEUTIC PROCEDURES: CPT

## 2017-05-14 PROCEDURE — 36415 COLL VENOUS BLD VENIPUNCTURE: CPT | Performed by: PHYSICIAN ASSISTANT

## 2017-05-14 PROCEDURE — 97535 SELF CARE MNGMENT TRAINING: CPT

## 2017-05-14 PROCEDURE — 97116 GAIT TRAINING THERAPY: CPT

## 2017-05-14 PROCEDURE — 85018 HEMOGLOBIN: CPT | Performed by: PHYSICIAN ASSISTANT

## 2017-05-14 RX ADMIN — HYDROCHLOROTHIAZIDE 25 MG: 25 TABLET ORAL at 09:09

## 2017-05-14 RX ADMIN — LEVOTHYROXINE SODIUM 25 MCG: 50 TABLET ORAL at 05:17

## 2017-05-14 RX ADMIN — HYDROMORPHONE HYDROCHLORIDE 2 MG: 2 TABLET ORAL at 11:32

## 2017-05-14 RX ADMIN — ACETAMINOPHEN 1000 MG: 500 TABLET, FILM COATED ORAL at 11:32

## 2017-05-14 RX ADMIN — ACETAMINOPHEN 1000 MG: 500 TABLET, FILM COATED ORAL at 05:16

## 2017-05-14 RX ADMIN — HYDROMORPHONE HYDROCHLORIDE 2 MG: 2 TABLET ORAL at 09:10

## 2017-05-14 RX ADMIN — SENNOSIDES AND DOCUSATE SODIUM 2 TABLET: 8.6; 5 TABLET ORAL at 09:10

## 2017-05-14 RX ADMIN — ASPIRIN 325 MG: 325 TABLET, DELAYED RELEASE ORAL at 09:09

## 2017-05-14 RX ADMIN — PANTOPRAZOLE SODIUM 40 MG: 40 TABLET, DELAYED RELEASE ORAL at 05:16

## 2017-05-14 RX ADMIN — HYDROMORPHONE HYDROCHLORIDE 2 MG: 2 TABLET ORAL at 05:16

## 2017-05-14 RX ADMIN — CELECOXIB 200 MG: 200 CAPSULE ORAL at 09:09

## 2017-05-14 NOTE — PROGRESS NOTES
Problem: Self Care Deficits Care Plan (Adult)  Goal: *Acute Goals and Plan of Care (Insert Text)  GOALS:   DISCHARGE GOALS (in preparation for going home/rehab): 3 days  1. Ms. Linda Pena will perform one lower body dressing activity with minimal assistance required to demonstrate improved functional mobility and safety. GOAL MET 5/14/2017  2. Ms. Linda Pena will perform one lower body bathing activity with minimal assistance required to demonstrate improved functional mobility and safety. GOAL MET 5/14/2017  3. Ms. Linda Pena will perform toileting/toilet transfer with contact guard assistance to demonstrate improved functional mobility and safety. 4. Ms. Linda Pena will perform shower transfer with contact guard assistance to demonstrate improved functional mobility and safety. GOAL MET 5/14/2017      JOINT CAMP OCCUPATIONAL THERAPY TKA: Daily Note, Treatment Day: 1st and AM 5/14/2017  INPATIENT: Hospital Day: 3  Payor: Lizeth Dove / Plan: [facility] HUMANA MEDICARE CHOICE PPO/PFFS / Product Type: [employer] Medicare /      NAME/AGE/GENDER: Roberto Velasquez is a 81 y.o. female           PRIMARY DIAGNOSIS:  Primary osteoarthritis of left knee [M17.12]              Procedure(s) and Anesthesia Type:     * LEFT KNEE ARTHROPLASTY TOTAL/ ROMEL/FNB - Spinal (Left)  ICD-10: Treatment Diagnosis:        · Pain in Left Knee (M25.562)  · Stiffness of Left Knee, Not elsewhere classified (V95.739)       ASSESSMENT:    Ms. Linda Pena is s/p L TKA and presents with decreased weight bearing on L LE and decreased independence with functional mobility and activities of daily living. Patient completed shower and dressing as charter below in ADL grid and is ambulating with rolling walker and supervision. Patient has met 3/4 goals and plans to return home with good family support. Family able to provide patient with appropriate level of assistance at this time.   OT reviewed safety precautions throughout session and therapy schedule for the remainder of today. Patient instructed to call for assistance when needing to get up from recliner and all needs in reach. Patient verbalized understanding of call light. This section established at most recent assessment   PROBLEM LIST (Impairments causing functional limitations):  1. Decreased Strength  2. Decreased ADL/Functional Activities  3. Decreased Transfer Abilities  4. Increased Pain  5. Increased Fatigue  6. Decreased Flexibility/Joint Mobility  7. Decreased Knowledge of Precautions    INTERVENTIONS PLANNED: (Benefits and precautions of occupational therapy have been discussed with the patient.)  1. Activities of daily living training  2. Adaptive equipment training  3. Balance training  4. Clothing management  5. Donning&doffing training  6. Theraputic activity      TREATMENT PLAN: Frequency/Duration: Follow patient 1-2 times to address above goals. Rehabilitation Potential For Stated Goals: GOOD      RECOMMENDED REHABILITATION/EQUIPMENT: (at time of discharge pending progress): Continue Skilled Therapy and Home Health: Physical Therapy. OCCUPATIONAL PROFILE AND HISTORY:   History of Present Injury/Illness (Reason for Referral): Pt presents this date s/p (left) TKA. Past Medical History/Comorbidities:   Ms. Gretta Oconnor  has a past medical history of Anxiety; Arthritis; Breast cancer ([address] (1/18/2016); Breast cancer ([address] Cancer ([address] Chronic pain; Former smoker; GERD (gastroesophageal reflux disease); Hard of hearing; Headache; Hypertension; Hypothyroidism; Insomnia; and Status post total left knee replacement (5/12/2017). She also has no past medical history of Adverse effect of anesthesia; Aneurysm ([address] Arrhythmia; Asthma; Autoimmune disease ([address] CAD (coronary artery disease); Chronic kidney disease; Chronic obstructive pulmonary disease ([address] Coagulation disorder ([address] Diabetes ([address] Difficult intubation; Endocarditis; Heart failure ([address]  Ill-defined condition; Liver disease; Malignant hyperthermia due to anesthesia; Morbid obesity ([facility] Pseudocholinesterase deficiency; Psychiatric disorder; PUD (peptic ulcer disease); Rheumatic fever; Seizures ([facility] Sleep apnea; Stroke [facility]); Thromboembolus ([facility] or Unspecified adverse effect of anesthesia. Ms. Barbra Evans  has a past surgical history that includes colonoscopy; wisdom teeth extraction; tonsillectomy (1957); vascular access (Right); breast surgery procedure unlisted (Left, 7/2015); breast biopsy (Left, 1/18/2016); mastectomy, partial (Left, 01/18/2016); and breast lumpectomy (Left, 01/2016). Social History/Living Environment:   Home Environment: Private residence  # Steps to Enter: 0  One/Two Story Residence: One story  Living Alone: No  Support Systems: Spouse/Significant Other/Partner  Patient Expects to be Discharged to[de-identified] Private residence  Current DME Used/Available at Home: None  Tub or Shower Type: Shower  Prior Level of Function/Work/Activity:  Independent       Number of Personal Factors/Comorbidities that affect the Plan of Care: Brief history (0):  LOW COMPLEXITY   ASSESSMENT OF OCCUPATIONAL PERFORMANCE[de-identified]   Most Recent Physical Functioning:   Balance  Sitting: Intact  Standing: With support        Patient Vitals for the past 6 hrs:   BP BP Patient Position SpO2 Pulse   05/14/17 0516 111/72 At rest 93 % (!) 105   05/14/17 0825 105/55 At rest 97 % 90                                   Mental Status  Neurologic State: Alert  Orientation Level: Oriented X4  Cognition: Appropriate decision making; Appropriate for age attention/concentration  Perception: Appears intact  Perseveration: No perseveration noted  Safety/Judgement: Fall prevention                    Basic ADLs (From Assessment) Complex ADLs (From Assessment)   Basic ADL  Feeding: Independent  Oral Facial Hygiene/Grooming: Supervision  Bathing: Moderate assistance  Upper Body Dressing: Supervision  Lower Body Dressing:  Moderate assistance  Toileting: Moderate assistance     Grooming/Bathing/Dressing Activities of Daily Living   Grooming  Applying Makeup: Independent  Brushing/Combing Hair: Independent Cognitive Retraining  Safety/Judgement: Fall prevention   Upper Body Bathing  Bathing Assistance: Modified independent  Position Performed: Seated in chair  Adaptive Equipment: Shower chair     Lower Body Bathing  Bathing Assistance: Modified independent  Perineal  : Independent  Lower Body : Modified independent  Adaptive Equipment: Shower chair     Upper Body Dressing Assistance  Dressing Assistance: Independent  Bra: [address] [facility] [address]: Independent Functional Transfers  Shower Transfer: Supervision   Lower Body Dressing Assistance  Dressing Assistance: Minimum assistance  Underpants: Minimum assistance  Pants With Button/Zipper: Minimum assistance  Socks: Minimum assistance  Antiembolitic Stockings: Compensatory technique training  Slip on Shoes with Back: Minimum assistance  Cues: ff; [place] Bed/Mat Mobility  Supine to Sit: Supervision  Sit to Stand: Supervision  Bed to Chair: Supervision           Physical Skills Involved:  1. Range of Motion  2. Balance  3. Mobility Cognitive Skills Affected (resulting in the inability to perform in a timely and safe manner): 1. none Psychosocial Skills Affected:  1. Environmental Adaptations   Number of elements that affect the Plan of Care: 3-5:  MODERATE COMPLEXITY   CLINICAL DECISION MAKIN [facility] [address] [address] AM-PAC 6 Clicks   Basic Mobility Inpatient Short Form  How much help from another person does the patient currently need. .. Total A Lot A Little None   1. Putting on and taking off regular lower body clothing?   [ ] 1   [X] 2   [ ] 3   [ ] 4   2. Bathing (including washing, rinsing, drying)? [ ] 1   [X] 2   [ ] 3   [ ] 4   3. Toileting, which includes using toilet, bedpan or urinal?   [ ] 1   [ ] 2   [X] 3   [ ] 4   4. Putting on and taking off regular upper body clothing? [ ] 1   [ ] 2   [ ] 3   [X] 4   5. Taking care of personal grooming such as brushing teeth? [ ] 1   [ ] 2   [ ] 3   [X] 4   6. Eating meals? [ ] 1   [ ] 2   [ ] 3   [X] 4   © 2007, Trustees of [address] [place] [address] [address] [address], under license to [employer]. All rights reserved   Score:  Initial: 19 Most Recent: X (Date: -- )     Interpretation of Tool:  Represents activities that are increasingly more difficult (i.e. Bed mobility, Transfers, Gait). Score 24 23 22-20 19-15 14-10 9-7 6       Modifier CH CI CJ CK CL CM CN         · Self Care:               - CURRENT STATUS:    CK - 40%-59% impaired, limited or restricted               - GOAL STATUS:           CJ - 20%-39% impaired, limited or restricted               - D/C STATUS:                       ---------------To be determined---------------  Payor: HUMANA MEDICARE / Plan: [facility] HUMANA MEDICARE CHOICE PPO/PFFS / Product Type: Managed Care Medicare /       Medical Necessity:     · Patient is expected to demonstrate progress in range of motion, balance and functional technique to increase independence with self care. Reason for Services/Other Comments:  · Patient would benefit from skilled Occupational Therapy to maximize independence and safety with self-care task and functional mobility. .   Use of outcome tool(s) and clinical judgement create a POC that gives a: LOW COMPLEXITY                 TREATMENT:   (In addition to Assessment/Re-Assessment sessions the following treatments were rendered)      Pre-treatment Symptoms/Complaints:  0  Pain: Initial:   Pain Intensity 1: 3 (before and after treatment ) 0 Post Session:  0      Self Care: (24): Procedure(s) (per grid) utilized to improve and/or restore self-care/home management as related to dressing, bathing and grooming. Required min verbal and manual cueing to facilitate activities of daily living skills.      Treatment/Session Assessment:         Response to Treatment:  Pt moves well and should do fine.     Education:  [ ] Home Exercises  [X] Fall Precautions  [ ] Hip Precautions [ ] Going Home Video  [X] Knee/Hip Prosthesis Review  [X] Walker Management/Safety [X] Adaptive Equipment as Needed         Interdisciplinary Collaboration:   · Physical Therapist, Certified Occupational Therapy Assistant and Registered Nurse     After treatment position/precautions:   · Up in chair, Bed/Chair-wheels locked, Call light within reach and RN notified     Compliance with Program/Exercises: compliant all of the time. Recommendations/Intent for next treatment session:  Treatment next visit will focus on increasing Ms. uRst's independence with bed mobility, transfers, self care training and patient education.        Total Treatment Duration:  OT Patient Time In/Time Out  Time In: 7363  Time Out: Becky Ulrich 78 Dilma Ang

## 2017-05-14 NOTE — PROGRESS NOTES
Problem: Mobility Impaired (Adult and Pediatric)  Goal: *Acute Goals and Plan of Care (Insert Text)  GOALS (1-4 days):  (1.)Ms. Sedalia Riedel will move from supine to sit and sit to supine in bed with SUPERVISION. (2.)Ms. Sedalia Riedel will transfer from bed to chair and chair to bed with STAND BY ASSIST using the least restrictive device. (3.)Ms. Sedalia Riedel will ambulate with STAND BY ASSIST for 200 feet with the least restrictive device. 100' with supervision 5/14  (4.)Ms. Sedalia Riedel will ambulate up/down 3 steps with bilateral railing with CONTACT GUARD ASSIST with no device. No steps 5/14  (5.)Ms. Sedalia Riedel will increase left knee ROM to 5°-80°. 10-78-degrees 5/14  ________________________________________________________________________________________________      PHYSICAL THERAPY JOINT CAMP TKA: Daily Note and AM 5/14/2017  INPATIENT: Hospital Day: 3  Payor: Aida Kyle / Plan: [facility] HUMANA MEDICARE CHOICE PPO/PFFS / Product Type: [employer] Care Medicare /      NAME/AGE/GENDER: Brandon Akbar is a 81 y.o. female            PRIMARY DIAGNOSIS:  Primary osteoarthritis of left knee [M17.12]              Procedure(s) and Anesthesia Type:     * LEFT KNEE ARTHROPLASTY TOTAL/ ROMEL/FNB - Spinal (Left)  ICD-10: Treatment Diagnosis:        · Pain in Left Knee (M25.562)  · Stiffness of Left Knee, Not elsewhere classified (U17.579)       ASSESSMENT:      Ms. Sedalia Riedel participated well with group therapy session. Patient noted pain prior to session but felt it was better after exercises and walked further on 2nd attempt. Patient performing exercises without assistance. Planning to discharge home and advised to perform exercises at least one more time today. This section established at most recent assessment   PROBLEM LIST (Impairments causing functional limitations):  1. Decreased Strength  2. Decreased ADL/Functional Activities  3. Decreased Transfer Abilities  4.  Decreased Ambulation Ability/Technique  5. Decreased Balance  6. Increased Pain  7. Decreased Activity Tolerance  8. Decreased Flexibility/Joint Mobility  9. Decreased Barton with Home Exercise Program    INTERVENTIONS PLANNED: (Benefits and precautions of physical therapy have been discussed with the patient.)  1. Bed Mobility  2. Gait Training  3. Home Exercise Program (HEP)  4. Therapeutic Exercise/Strengthening  5. Transfer Training  6. Range of Motion: active/assisted/passive  7. Therapeutic Activities  8. Group Therapy      TREATMENT PLAN: Frequency/Duration: Follow patient BID   to address above goals. Rehabilitation Potential For Stated Goals: GOOD      RECOMMENDED REHABILITATION/EQUIPMENT: (at time of discharge pending progress): Continue Skilled Therapy and Home Health: Physical Therapy. HISTORY:   History of Present Injury/Illness (Reason for Referral): The patient has end stage arthritis of the left knee. The patient was evaluated and examined during a consultation prior to this office visit. There have been no changes to the patient's orthopedic condition since the initial consultation. The patient has failed previous conservative treatment for this condition including antiinflammatories , and lifestyle modifications. The necessity for joint replacement is present. The patient will be admitted the day of surgery for Procedure(s) (LRB):  LEFT KNEE ARTHROPLASTY TOTAL/ ROMEL (Left)      Past Medical History/Comorbidities:   Ms. Linda Pena  has a past medical history of Anxiety; Arthritis; Breast cancer ([address] (1/18/2016); Breast cancer ([address] Cancer ([address] Chronic pain; Former smoker; GERD (gastroesophageal reflux disease); Hard of hearing; Headache; Hypertension; Hypothyroidism; Insomnia; and Status post total left knee replacement (5/12/2017). She also has no past medical history of Adverse effect of anesthesia; Aneurysm ([address] Arrhythmia; Asthma;  Autoimmune disease ([address] CAD (coronary artery disease); Chronic kidney disease; Chronic obstructive pulmonary disease ([facility] Coagulation disorder ([facility] Diabetes ([facility] Difficult intubation; Endocarditis; Heart failure ([facility] Ill-defined condition; Liver disease; Malignant hyperthermia due to anesthesia; Morbid obesity ([facility] Pseudocholinesterase deficiency; Psychiatric disorder; PUD (peptic ulcer disease); Rheumatic fever; Seizures ([facility] Sleep apnea; Stroke [facility]); Thromboembolus ([facility] or Unspecified adverse effect of anesthesia. Ms. Lei Wong  has a past surgical history that includes colonoscopy; wisdom teeth extraction; tonsillectomy (1957); vascular access (Right); breast surgery procedure unlisted (Left, 7/2015); breast biopsy (Left, 1/18/2016); mastectomy, partial (Left, 01/18/2016); and breast lumpectomy (Left, 01/2016). Social History/Living Environment:   Home Environment: Private residence  # Steps to Enter: 0  One/Two Story Residence: One story  Living Alone: No  Support Systems: Spouse/Significant Other/Partner  Patient Expects to be Discharged to[de-identified] Private residence  Current DME Used/Available at Home: None  Tub or Shower Type: Shower  Prior Level of Function/Work/Activity:  Pt was independent without an assistive device prior to this admission   Number of Personal Factors/Comorbidities that affect the Plan of Care: 1-2: MODERATE COMPLEXITY   EXAMINATION:   Most Recent Physical Functioning:      Gross Assessment  AROM: Generally decreased, functional (L TKA)  Strength: Generally decreased, functional (L TKA)  Coordination: Generally decreased, functional           LLE AROM  L Knee Flexion: 78  L Knee Extension: 10      LLE Strength  L Hip Flexion: 3  L Knee Extension: 3  L Ankle Dorsiflexion: 4     Bed Mobility  Supine to Sit: Supervision     Transfers  Sit to Stand: Supervision  Stand to Sit: Supervision  Bed to Chair: Supervision     Balance  Sitting: Intact  Standing: Pull to stand; With support                Weight Bearing Status  Left Side Weight Bearing: As tolerated  Distance (ft): 100 Feet (ft) (x 1; 82' x 1)  Ambulation - Level of Assistance: Supervision  Assistive Device: Walker, rolling  Speed/Jennifer: Pace decreased (<100 feet/min)  Step Length: Left lengthened;Right shortened  Stance: Left decreased  Gait Abnormalities: Antalgic; Step to gait  Interventions: Safety awareness training;Verbal cues; Visual/Demos      Braces/Orthotics: none     Left Knee Cold  Type: Cryocuff       Body Structures Involved:  1. Joints  2. Muscles Body Functions Affected:  1. Movement Related Activities and Participation Affected:  1. Mobility   Number of elements that affect the Plan of Care: 4+: HIGH COMPLEXITY   CLINICAL PRESENTATION:   Presentation: Stable and uncomplicated: LOW COMPLEXITY   CLINICAL DECISION MAKIN[address] [place] [id number] AM-PAC 6 Clicks   Basic Mobility Inpatient Short Form  How much difficulty does the patient currently have. .. Unable A Lot A Little None   1. Turning over in bed (including adjusting bedclothes, sheets and blankets)? [ ] 1   [ ] 2   [X] 3   [ ] 4   2. Sitting down on and standing up from a chair with arms ( e.g., wheelchair, bedside commode, etc.)   [ ] 1   [ ] 2   [X] 3   [ ] 4   3. Moving from lying on back to sitting on the side of the bed? [ ] 1   [ ] 2   [X] 3   [ ] 4   How much help from another person does the patient currently need. .. Total A Lot A Little None   4. Moving to and from a bed to a chair (including a wheelchair)? [ ] 1   [ ] 2   [X] 3   [ ] 4   5. Need to walk in hospital room? [ ] 1   [ ] 2   [X] 3   [ ] 4   6. Climbing 3-5 steps with a railing? [X] 1   [ ] 2   [ ] 3   [ ] 4   © , Trustees of [address] [place] [id number], under license to [employer]. All rights reserved       Score:  Initial: 16 Most Recent: X (Date: -- )     Interpretation of Tool:  Represents activities that are increasingly more difficult (i.e. Bed mobility, Transfers, Gait).        Score 24 23 22-20 19-15 14-10 9-7 6 Modifier CH CI CJ CK CL CM CN         · Mobility - Walking and Moving Around:               - CURRENT STATUS:    CK - 40%-59% impaired, limited or restricted               - GOAL STATUS:           CJ - 20%-39% impaired, limited or restricted               - D/C STATUS:                       ---------------To be determined---------------  Payor: HUMANA MEDICARE / Plan: [facility] HUMANA MEDICARE CHOICE PPO/PFFS / Product Type: Managed Care Medicare /       Medical Necessity:     · Patient is expected to demonstrate progress in strength, range of motion, balance, coordination and functional technique to decrease assistance required with bed mobility, transfers & gait. Reason for Services/Other Comments:  · Patient continues to require skilled intervention due to pt not independent with functional mobility. Use of outcome tool(s) and clinical judgement create a POC that gives a: Clear prediction of patient's progress: LOW COMPLEXITY                 TREATMENT:   (In addition to Assessment/Re-Assessment sessions the following treatments were rendered)      Pre-treatment Symptoms/Complaints:  Patient noted increased pain overnight after therapy yesterday. Pain: Initial: 1/10  Pain Intensity 1: 5  Pain Location 1: Knee  Pain Orientation 1: Left  Post Session:  4/10      Gait Training (10 Minutes):  Gait training to improve and/or restore physical functioning as related to mobility, strength and balance. Ambulated 100 Feet (ft) (x 1; 82' x 1) with Supervision using a Walker, rolling and minimal Safety awareness training;Verbal cues; Visual/Demos related to their stance phase and stride length to promote proper body alignment, promote proper body posture and promote proper body mechanics. Therapeutic Exercise: (20 Minutes (group)):  Exercises per grid below to improve mobility, strength and balance.   Required minimal verbal cues to promote proper body alignment, promote proper body posture and promote proper body mechanics. Progressed repetitions as indicated. Date:   5/13/17 Date:  5/14/17  Date:      ACTIVITY/EXERCISE AM PM AM PM AM PM   GROUP THERAPY  [ ]  [ ]  [X]  [ ]  [ ]  [ ]   Ankle Pumps  20  20 20          Quad Sets 10  15  20         Gluteal Sets 10  15  20         Hip ABd/ADduction 10  15  20         Straight Leg Raises 10  15  20         Knee Slides  10  15  20         Short Arc Quads      20         Long Arc Quads               Chair Slides      20                         B = bilateral; AA = active assistive; A = active; P = passive       Treatment/Session Assessment:         Response to Treatment:  Patient participated well with group session. Better gait distance with 2nd walking attempt. Education:  [X] Home Exercises  [X] Fall Precautions  [ ] Hip Precautions [ ] Going Home Video  [ ] Knee/Hip Prosthesis Review  [X] Walker Management/Safety [ ] Adaptive Equipment as Needed         Interdisciplinary Collaboration:   · Physical Therapist, Registered Nurse and Rehabilitation Attendant     After treatment position/precaution/s:   · Up in chair, Bed/Chair-wheels locked and Call light within reach     Compliance with Program/Exercises: Will assess as treatment progresses. Recommendations/Intent for next treatment session:  Treatment next visit will focus on increasing Ms. Rust's independence with bed mobility, transfers, gait training, strength/ROM exercises, modalities for pain, and patient education.        Total Treatment Duration:  PT Patient Time In/Time Out  Time In: 1000  Time Out: [facility] Aaliyah Quiroz PT

## 2017-05-14 NOTE — PROGRESS NOTES
[address]  Face to Face Encounter    Patients Name: Heraclio Giordano    YOB: 1945    Ordering Physician: Kalina Armenta      Primary Diagnosis: Myrtie Lobe    Date of Face to Face:   5/14/2017                                  Face to Face Encounter findings are related to primary reason for home care:   yes. 1. I certify that the patient needs intermittent care as follows: physical therapy: strengthening    2. I certify that this patient is homebound, that is: 1) patient requires the use of a walker device, special transportation, or assistance of another to leave the home; or 2) patient's condition makes leaving the home medically contraindicated; and 3) patient has a normal inability to leave the home and leaving the home requires considerable and taxing effort. Patient may leave the home for infrequent and short duration for medical reasons, and occasional absences for non-medical reasons. Homebound status is due to the following functional limitations: Patient with strength deficits limiting the performance of all ADL's without caregiver assistance or the use of an assistive device. 3. I certify that this patient is under my care and that I, or a nurse practitioner or  [id number], or clinical nurse specialist, or certified nurse midwife, working with me, had a Face-to-Face Encounter that meets the physician Face-to-Face Encounter requirements. The following are the clinical findings from the [address] [place] [address] encounter that support the need for skilled services and is a summary of the encounter: See hospital chart. See attached progess note      Tanya Pascual LMSW  5/14/2017      THE FOLLOWING TO BE COMPLETED BY THE COMMUNITY PHYSICIAN:    I concur with the findings described above from the F encounter that this patient is homebound and in need of a skilled service.     Certifying Physician: _____________________________________      Printed Certifying Physician Name: _____________________________________    Date: _________________

## 2017-05-14 NOTE — PROGRESS NOTES
Shift assessment complete. Pt resting in bed. Call light within reach, Bed low to ground and wheels locked. Pt able to dosi/plantar flex bilaterally with +2 pedal pulses. IS at bedside. No needs at this time.

## 2017-05-14 NOTE — PROGRESS NOTES
Sitting up in bed- pleasant and talkative. Pt states she is hurting more since PT today. Notified of plan of care and discussed prn pain meds.

## 2017-05-14 NOTE — PROGRESS NOTES
Alert and oriented  No inordinate pain  Pain management improving. She requested more but I think she is over the worst of her pain by far.  Explained my logic to her  OK to go home today

## 2017-05-15 ENCOUNTER — HOME CARE VISIT (OUTPATIENT)
Dept: SCHEDULING | Facility: HOME HEALTH | Age: 72
End: 2017-05-15
Payer: MEDICARE

## 2017-05-15 VITALS — RESPIRATION RATE: 18 BRPM | SYSTOLIC BLOOD PRESSURE: 108 MMHG | DIASTOLIC BLOOD PRESSURE: 60 MMHG | TEMPERATURE: 98.3 F

## 2017-05-15 PROCEDURE — 400013 HH SOC

## 2017-05-15 PROCEDURE — 3331090001 HH PPS REVENUE CREDIT

## 2017-05-15 PROCEDURE — G0151 HHCP-SERV OF PT,EA 15 MIN: HCPCS

## 2017-05-15 PROCEDURE — 3331090002 HH PPS REVENUE DEBIT

## 2017-05-16 PROCEDURE — 3331090001 HH PPS REVENUE CREDIT

## 2017-05-16 PROCEDURE — 3331090002 HH PPS REVENUE DEBIT

## 2017-05-17 ENCOUNTER — HOME CARE VISIT (OUTPATIENT)
Dept: SCHEDULING | Facility: HOME HEALTH | Age: 72
End: 2017-05-17
Payer: MEDICARE

## 2017-05-17 VITALS
SYSTOLIC BLOOD PRESSURE: 102 MMHG | HEART RATE: 84 BPM | TEMPERATURE: 98.6 F | DIASTOLIC BLOOD PRESSURE: 58 MMHG | OXYGEN SATURATION: 95 %

## 2017-05-17 PROCEDURE — 3331090002 HH PPS REVENUE DEBIT

## 2017-05-17 PROCEDURE — G0157 HHC PT ASSISTANT EA 15: HCPCS

## 2017-05-17 PROCEDURE — 3331090001 HH PPS REVENUE CREDIT

## 2017-05-18 ENCOUNTER — HOME CARE VISIT (OUTPATIENT)
Dept: HOME HEALTH SERVICES | Facility: HOME HEALTH | Age: 72
End: 2017-05-18
Payer: MEDICARE

## 2017-05-18 PROCEDURE — 3331090002 HH PPS REVENUE DEBIT

## 2017-05-18 PROCEDURE — 3331090001 HH PPS REVENUE CREDIT

## 2017-05-19 ENCOUNTER — HOME CARE VISIT (OUTPATIENT)
Dept: SCHEDULING | Facility: HOME HEALTH | Age: 72
End: 2017-05-19
Payer: MEDICARE

## 2017-05-19 PROCEDURE — 3331090001 HH PPS REVENUE CREDIT

## 2017-05-19 PROCEDURE — G0157 HHC PT ASSISTANT EA 15: HCPCS

## 2017-05-19 PROCEDURE — 3331090002 HH PPS REVENUE DEBIT

## 2017-05-20 PROCEDURE — 3331090002 HH PPS REVENUE DEBIT

## 2017-05-20 PROCEDURE — 3331090001 HH PPS REVENUE CREDIT

## 2017-05-21 VITALS
TEMPERATURE: 97.1 F | SYSTOLIC BLOOD PRESSURE: 122 MMHG | RESPIRATION RATE: 18 BRPM | HEART RATE: 78 BPM | DIASTOLIC BLOOD PRESSURE: 74 MMHG

## 2017-05-21 PROCEDURE — 3331090001 HH PPS REVENUE CREDIT

## 2017-05-21 PROCEDURE — 3331090002 HH PPS REVENUE DEBIT

## 2017-05-22 ENCOUNTER — HOME CARE VISIT (OUTPATIENT)
Dept: SCHEDULING | Facility: HOME HEALTH | Age: 72
End: 2017-05-22
Payer: MEDICARE

## 2017-05-22 VITALS
HEART RATE: 82 BPM | SYSTOLIC BLOOD PRESSURE: 118 MMHG | TEMPERATURE: 96.6 F | RESPIRATION RATE: 17 BRPM | OXYGEN SATURATION: 98 % | DIASTOLIC BLOOD PRESSURE: 70 MMHG

## 2017-05-22 PROCEDURE — 3331090002 HH PPS REVENUE DEBIT

## 2017-05-22 PROCEDURE — 3331090001 HH PPS REVENUE CREDIT

## 2017-05-22 PROCEDURE — G0157 HHC PT ASSISTANT EA 15: HCPCS

## 2017-05-23 PROCEDURE — 3331090001 HH PPS REVENUE CREDIT

## 2017-05-23 PROCEDURE — 3331090002 HH PPS REVENUE DEBIT

## 2017-05-24 PROCEDURE — 3331090001 HH PPS REVENUE CREDIT

## 2017-05-24 PROCEDURE — 3331090002 HH PPS REVENUE DEBIT

## 2017-05-25 PROCEDURE — 3331090001 HH PPS REVENUE CREDIT

## 2017-05-25 PROCEDURE — 3331090002 HH PPS REVENUE DEBIT

## 2017-05-26 ENCOUNTER — HOME CARE VISIT (OUTPATIENT)
Dept: SCHEDULING | Facility: HOME HEALTH | Age: 72
End: 2017-05-26
Payer: MEDICARE

## 2017-05-26 VITALS
HEART RATE: 76 BPM | OXYGEN SATURATION: 97 % | SYSTOLIC BLOOD PRESSURE: 120 MMHG | TEMPERATURE: 97.6 F | DIASTOLIC BLOOD PRESSURE: 60 MMHG

## 2017-05-26 PROCEDURE — 3331090001 HH PPS REVENUE CREDIT

## 2017-05-26 PROCEDURE — G0157 HHC PT ASSISTANT EA 15: HCPCS

## 2017-05-26 PROCEDURE — 3331090002 HH PPS REVENUE DEBIT

## 2017-05-27 PROCEDURE — 3331090001 HH PPS REVENUE CREDIT

## 2017-05-27 PROCEDURE — 3331090002 HH PPS REVENUE DEBIT

## 2017-05-28 PROCEDURE — 3331090002 HH PPS REVENUE DEBIT

## 2017-05-28 PROCEDURE — 3331090001 HH PPS REVENUE CREDIT

## 2017-05-29 PROCEDURE — 3331090002 HH PPS REVENUE DEBIT

## 2017-05-29 PROCEDURE — 3331090001 HH PPS REVENUE CREDIT

## 2017-05-30 ENCOUNTER — HOME CARE VISIT (OUTPATIENT)
Dept: SCHEDULING | Facility: HOME HEALTH | Age: 72
End: 2017-05-30
Payer: MEDICARE

## 2017-05-30 VITALS
TEMPERATURE: 97.8 F | SYSTOLIC BLOOD PRESSURE: 118 MMHG | OXYGEN SATURATION: 95 % | HEART RATE: 80 BPM | DIASTOLIC BLOOD PRESSURE: 74 MMHG

## 2017-05-30 PROCEDURE — 3331090002 HH PPS REVENUE DEBIT

## 2017-05-30 PROCEDURE — 3331090001 HH PPS REVENUE CREDIT

## 2017-05-30 PROCEDURE — G0157 HHC PT ASSISTANT EA 15: HCPCS

## 2017-05-31 PROCEDURE — 3331090002 HH PPS REVENUE DEBIT

## 2017-05-31 PROCEDURE — 3331090001 HH PPS REVENUE CREDIT

## 2017-06-01 ENCOUNTER — HOME CARE VISIT (OUTPATIENT)
Dept: SCHEDULING | Facility: HOME HEALTH | Age: 72
End: 2017-06-01
Payer: MEDICARE

## 2017-06-01 VITALS — TEMPERATURE: 96.9 F | HEART RATE: 82 BPM | OXYGEN SATURATION: 95 %

## 2017-06-01 PROCEDURE — 3331090001 HH PPS REVENUE CREDIT

## 2017-06-01 PROCEDURE — G0157 HHC PT ASSISTANT EA 15: HCPCS

## 2017-06-01 PROCEDURE — A4649 SURGICAL SUPPLIES: HCPCS

## 2017-06-01 PROCEDURE — 3331090002 HH PPS REVENUE DEBIT

## 2017-06-02 PROCEDURE — 3331090002 HH PPS REVENUE DEBIT

## 2017-06-02 PROCEDURE — 3331090001 HH PPS REVENUE CREDIT

## 2017-06-03 PROCEDURE — 3331090002 HH PPS REVENUE DEBIT

## 2017-06-03 PROCEDURE — 3331090001 HH PPS REVENUE CREDIT

## 2017-06-04 PROCEDURE — 3331090001 HH PPS REVENUE CREDIT

## 2017-06-04 PROCEDURE — 3331090002 HH PPS REVENUE DEBIT

## 2017-06-05 PROCEDURE — 3331090002 HH PPS REVENUE DEBIT

## 2017-06-05 PROCEDURE — 3331090001 HH PPS REVENUE CREDIT

## 2017-06-06 ENCOUNTER — HOME CARE VISIT (OUTPATIENT)
Dept: SCHEDULING | Facility: HOME HEALTH | Age: 72
End: 2017-06-06
Payer: MEDICARE

## 2017-06-06 VITALS
HEART RATE: 84 BPM | TEMPERATURE: 96.5 F | SYSTOLIC BLOOD PRESSURE: 120 MMHG | RESPIRATION RATE: 18 BRPM | DIASTOLIC BLOOD PRESSURE: 70 MMHG

## 2017-06-06 PROCEDURE — 3331090002 HH PPS REVENUE DEBIT

## 2017-06-06 PROCEDURE — G0151 HHCP-SERV OF PT,EA 15 MIN: HCPCS

## 2017-06-06 PROCEDURE — 3331090001 HH PPS REVENUE CREDIT

## 2017-06-07 PROCEDURE — 3331090002 HH PPS REVENUE DEBIT

## 2017-06-07 PROCEDURE — 3331090001 HH PPS REVENUE CREDIT

## 2017-06-08 PROCEDURE — 3331090001 HH PPS REVENUE CREDIT

## 2017-06-08 PROCEDURE — 3331090002 HH PPS REVENUE DEBIT

## 2017-06-09 PROCEDURE — 3331090002 HH PPS REVENUE DEBIT

## 2017-06-09 PROCEDURE — 3331090001 HH PPS REVENUE CREDIT

## 2017-06-14 ENCOUNTER — HOSPITAL ENCOUNTER (OUTPATIENT)
Dept: PHYSICAL THERAPY | Age: 72
Discharge: HOME | End: 2017-06-14
Payer: MEDICARE

## 2017-06-14 PROCEDURE — G8979 MOBILITY GOAL STATUS: HCPCS

## 2017-06-14 PROCEDURE — G8978 MOBILITY CURRENT STATUS: HCPCS

## 2017-06-14 PROCEDURE — 97162 PT EVAL MOD COMPLEX 30 MIN: CPT

## 2017-06-14 PROCEDURE — 97016 VASOPNEUMATIC DEVICE THERAPY: CPT

## 2017-06-14 PROCEDURE — 97110 THERAPEUTIC EXERCISES: CPT

## 2017-06-14 NOTE — PROGRESS NOTES
Ambulatory/Rehab Services H2 Model Falls Risk Assessment    Risk Factor Pts. ·   Confusion/Disorientation/Impulsivity  []    4 ·   Symptomatic Depression  []   2 ·   Altered Elimination  []   1 ·   Dizziness/Vertigo  []   1 ·   Gender (Male)  []   1 ·   Any administered antiepileptics (anticonvulsants):  []   2 ·   Any administered benzodiazepines:  []   1 ·   Visual Impairment (specify):  []   1 ·   Portable Oxygen Use  []   1 ·   Orthostatic ? BP  []   1 ·   History of Recent Falls (within 3 mos.)  []   5     Ability to Rise from Chair (choose one) Pts. ·   Ability to rise in a single movement  []   0 ·   Pushes up, successful in one attempt  [x]   1 ·   Multiple attempts, but successful  []   3 ·   Unable to rise without assistance  []   4   Total: (5 or greater = High Risk) 1     Falls Prevention Plan:   []                Physical Limitations to Exercise (specify):   []                Mobility Assistance Device (type):   []                Exercise/Equipment Adaptation (specify):    ©2010 [facility] of [address] Patent #[id number].  Federal Law prohibits the replication, distribution or use without written permission from [facility] [employer]

## 2017-06-14 NOTE — PROGRESS NOTES
Mitchell Agent  : 1945 [facility] at [address] [place] [address] 2050, [address] [address], [facility] [address]  [phone]   [phone]           OUTPATIENT PHYSICAL THERAPY:Initial Assessment 2017    ICD-10: Treatment Diagnosis: Pain in left knee (M25.562); Stiffness of left knee, not elsewhere classified (M25.662); Difficulty in walking, not elsewhere classified (R26.2)  Precautions/Allergies:   Review of patient's allergies indicates no known allergies. Fall Risk Score: 1 (? 5 = High Risk)  MD Orders: Evaluate and Treat MEDICAL/REFERRING DIAGNOSIS:  Presence of left artificial knee joint [Z96.652]   DATE OF ONSET: Surgery 17  REFERRING PHYSICIAN: Juani Myers, *  RETURN PHYSICIAN APPOINTMENT: 06-15-17     INITIAL ASSESSMENT:  Ms. Colleen Ibarra presents with decreased ROM/strength L knee with increased pain leading to decreased functional status. Pt would benefit from skilled physical therapy services to address the above deficits and help patient return to prior level of function. PROBLEM LIST (Impacting functional limitations):  1. Decreased Strength  2. Decreased ADL/Functional Activities  3. Increased Pain  4. Decreased Flexibility/Joint Mobility  5. Decreased [place] with Home Exercise Program INTERVENTIONS PLANNED:  1. Balance Exercise  2. Cold  3. Cryotherapy  4. Electrical Stimulation  5. Gait Training  6. Home Exercise Program (HEP)  7. Manual Therapy  8. Range of Motion (ROM)  9. Therapeutic Exercise/Strengthening  10. Aquatic Therapy   TREATMENT PLAN:  Effective Dates: 17 TO 17. Frequency/Duration: 2 times a week for 3 months  GOALS: (Goals have been discussed and agreed upon with patient.)  Short-Term Functional Goals: Time Frame: 4 weeks  1. Pt will increase ROM L knee 0-120 degrees to assist with ascending/descending stairs  2.  Pt will increase strength L knee 4+/5 to assist with ascending/descending stairs  Discharge Goals: Time Frame: 12 weeks  1. Pt will increase strength L knee 5/5 to assist with ascending/descending stairs  2. Pt will be independent with HEP  3. Pt will ascend/descend 10 eight inch steps independently with no c/o L knee pain  4. Pt will work perform 20 minutes household cooking and cleaning activities independently with min to no c/o L knee pain  Rehabilitation Potential For Stated Goals: Good  Regarding Jagdeep Rust's therapy, I certify that the treatment plan above will be carried out by a therapist or under their direction. Thank you for this referral,  Shameka Wong, PT     Referring Physician Signature: Karrie Kellogg, *              Date                    The information in this section was collected on 06-14-17 (except where otherwise noted). HISTORY:   History of Present Injury/Illness (Reason for Referral):  Pt reports insidious onset bilateral knee pain of several years duration. She had physical therapy with continued pain so she opted for Left TKA which was performed 05-12-17. Pt spent 2 nights in the hospital followed by home health PT until last Friday. She rates her current L knee pain 3-4/10, increasing to 10/10 at times. She is taking hydromorphone for pain. Pt is walking with a straight cane. She reports difficulties ascending/descending stairs due to her L knee pain. She is planning to have her R knee replaced in November 2017. Pt is currently having difficulties with all household cooking and cleaning activities due to her L knee surgery. Past Medical History/Comorbidities:   Ms. Annalee Sheffield  has a past medical history of Anxiety; Arthritis; Breast cancer ([facility] [address] (1/18/2016); Breast cancer ([facility] [address] Cancer ([facility] [address] Chronic pain; Former smoker; GERD (gastroesophageal reflux disease); Hard of hearing; Headache; Hypertension; Hypothyroidism; Insomnia; and Status post total left knee replacement (5/12/2017).  She also has no past medical history of Adverse effect of anesthesia; Aneurysm ([address] Arrhythmia; Asthma; Autoimmune disease ([address] CAD (coronary artery disease); Chronic kidney disease; Chronic obstructive pulmonary disease ([address] Coagulation disorder ([address] Diabetes ([address] Difficult intubation; Endocarditis; Heart failure ([address] Ill-defined condition; Liver disease; Malignant hyperthermia due to anesthesia; Morbid obesity ([facility] [address] Pseudocholinesterase deficiency; Psychiatric disorder; PUD (peptic ulcer disease); Rheumatic fever; Seizures ([facility] [address] Sleep apnea; Stroke [facility]); Thromboembolus ([facility] [address] or Unspecified adverse effect of anesthesia. Ms. Reshma Ayala  has a past surgical history that includes colonoscopy; wisdom teeth extraction; tonsillectomy (1957); vascular access (Right); breast surgery procedure unlisted (Left, 7/2015); breast biopsy (Left, 1/18/2016); mastectomy, partial (Left, 01/18/2016); and breast lumpectomy (Left, 01/2016). Social History/Living Environment:     Pt lives with her spouse (currently has colon cancer) in a one-story house  Prior Level of Function/Work/Activity:  Independent with all household ADL's  Dominant Side:         RIGHT  Other Clinical Tests:          X-rays  Previous Treatment Approaches:          Left TKA 05-12-17  Personal Factors:          Sex:  female        Age:  81 y.o. Profession:  Pt is retired        Past/Current Experience:  Pt helps care for her  who has cancer  Current Medications:       Current Outpatient Prescriptions:     temazepam (RESTORIL) 30 mg capsule, Take 1 Cap by mouth nightly. Max Daily Amount: 30 mg., Disp: 30 Cap, Rfl: 5    acetaminophen (TYLENOL) 500 mg tablet, Take 1,000 mg by mouth every six (6) hours as needed for Pain., Disp: , Rfl:     aspirin delayed-release 325 mg tablet, Take 1 Tab by mouth every twelve (12) hours every twelve (12) hours for 35 days. , Disp: 70 Tab, Rfl: 0    HYDROmorphone (DILAUDID) 2 mg tablet, Take 1 Tab by mouth every four (4) hours as needed.  Max Daily Amount: 12 mg., Disp: 40 Tab, Rfl: 0    OTHER,NON-FORMULARY,, Take 4 Tabs by mouth daily. Relief Factor Tablets, Disp: , Rfl:     Biotin 2,500 mcg cap, Take 2 Tabs by mouth daily. , Disp: , Rfl:     OTHER,NON-FORMULARY,, Take 1 Tab by mouth daily. Air Borne Chewable tab, Disp: , Rfl:     polycarbophil calcium (EQUALACTIN) 500 mg chew, Take 1,500 mg by mouth daily. , Disp: , Rfl:     levothyroxine (SYNTHROID) 25 mcg tablet, TAKE ONE TABLET BY MOUTH ONE TIME DAILY BEFORE BREAKFAST, Disp: 30 Tab, Rfl: 6    hydroCHLOROthiazide (HYDRODIURIL) 25 mg tablet, TAKE ONE TABLET BY MOUTH ONE TIME DAILY, Disp: 30 Tab, Rfl: 5    multivitamin (ONE A DAY) tablet, Take 1 Tab by mouth daily. , Disp: , Rfl:     esomeprazole (NEXIUM) 20 mg capsule, Take 20 mg by mouth every morning. Take / use AM day of surgery  per anesthesia protocols. Indications: GASTROESOPHAGEAL REFLUX, Disp: , Rfl:    Date Last Reviewed:  06-14-17   Number of Personal Factors/Comorbidities that affect the Plan of Care: 1-2: MODERATE COMPLEXITY   EXAMINATION:   Observation/Orthostatic Postural Assessment:          Pt ambulates with mild stiff knee gait and uses a straight cane  Palpation:          Generalized tenderness throughout L knee  ROM:    R knee extension = -10 degrees  R knee flexion = 128 degrees    L knee extension = -10 degrees  L knee flexion = 115 degrees    Strength:    R knee extension = 5/5  R knee flexion = 5/5    L knee extension = 4/5  L knee flexion = 4/5    Neurological Screen:        Sensation: Intact to light touch L LE  Functional Mobility:         Gait/Ambulation:  Pt ambulates with straight cane        Transfers:  Pt transitions sit to supine and supine to sit independently     Body Structures Involved:  1. Bones  2. Joints  3. Muscles Body Functions Affected:  1. Sensory/Pain  2. Neuromusculoskeletal Activities and Participation Affected:  1. Mobility  2. Self Care  3.  Domestic Life   Number of elements (examined above) that affect the Plan of Care: 3: MODERATE COMPLEXITY   CLINICAL PRESENTATION:   Presentation: Evolving clinical presentation with changing clinical characteristics: MODERATE COMPLEXITY   CLINICAL DECISION MAKING:   Outcome Measure: Tool Used: Lower Extremity Functional Scale (LEFS)  Score:  Initial: 53/80 Most Recent: X/80 (Date: -- )   Interpretation of Score: 20 questions each scored on a 5 point scale with 0 representing \"extreme difficulty or unable to perform\" and 4 representing \"no difficulty\". The lower the score, the greater the functional disability. 80/80 represents no disability. Minimal detectable change is 9 points. Score 80 79-63 62-48 47-32 31-16 15-1 0   Modifier CH CI CJ CK CL CM CN     ? Mobility - Walking and Moving Around:     - CURRENT STATUS: CJ - 20%-39% impaired, limited or restricted    - GOAL STATUS: CI - 1%-19% impaired, limited or restricted    - D/C STATUS:  ---------------To be determined---------------    Medical Necessity:   · Patient is expected to demonstrate progress in strength, range of motion and functional technique to increase independence with household ADL's. · Patient demonstrates good rehab potential due to higher previous functional level. Reason for Services/Other Comments:  · Patient continues to require skilled intervention due to decreased ROM/strength L knee with increased pain leading to decreased functional status. Use of outcome tool(s) and clinical judgement create a POC that gives a: Questionable prediction of patient's progress: MODERATE COMPLEXITY            TREATMENT:   (In addition to Assessment/Re-Assessment sessions the following treatments were rendered)  Pre-treatment Symptoms/Complaints:  L knee pain, decreased ROM and decreased strength  Pain: Initial:     3-4/10 Post Session:  2/10     THERAPEUTIC EXERCISE: (20 minutes):  Exercises per grid below to improve mobility and strength.   Required minimal verbal cues to promote proper body alignment and promote proper body posture. Progressed resistance, range and repetitions as indicated. MODALITIES: (10 minutes):      Game Ready Vasopneumatic Compresion with Ice to L knee x10 minutes to decrease pain. Skin clear afterwards. Date:  06-14-17 Date:   Date:     Activity/Exercise Parameters Parameters Parameters   Quad Sets with Towel Roll Under Heel 15 reps  5 sec holds     SLR Flexion 15 reps  3 sec holds     SAQ's 15 reps  5 sec holds     Heel Slides with Strap for OP 15 reps  5 sec holds     Gastroc Stretch with Strap 3 reps  20 sec holds     NuStep Level 3  6 minutes           Treatment/Session Assessment:    · Response to Treatment:  Pt tolerated evaluation and treatments well with no c/o increased pain. · Compliance with Program/Exercises: Will assess as treatment progresses. · Recommendations/Intent for next treatment session: \"Next visit will focus on advancements to more challenging activities\".   Total Treatment Duration:  30 minutes  PT Patient Time In/Time Out  Time In: 1345  Time Out: [address], PT

## 2017-06-16 ENCOUNTER — HOSPITAL ENCOUNTER (OUTPATIENT)
Dept: MAMMOGRAPHY | Age: 72
Discharge: HOME | End: 2017-06-16
Attending: INTERNAL MEDICINE
Payer: MEDICARE

## 2017-06-16 DIAGNOSIS — Z12.31 ENCOUNTER FOR SCREENING MAMMOGRAM FOR HIGH-RISK PATIENT: ICD-10-CM

## 2017-06-16 PROCEDURE — 77066 DX MAMMO INCL CAD BI: CPT

## 2017-06-19 ENCOUNTER — HOSPITAL ENCOUNTER (OUTPATIENT)
Dept: PHYSICAL THERAPY | Age: 72
Discharge: HOME | End: 2017-06-19
Payer: MEDICARE

## 2017-06-21 ENCOUNTER — HOSPITAL ENCOUNTER (OUTPATIENT)
Dept: PHYSICAL THERAPY | Age: 72
Discharge: HOME | End: 2017-06-21
Payer: MEDICARE

## 2017-06-21 PROCEDURE — 97140 MANUAL THERAPY 1/> REGIONS: CPT

## 2017-06-21 PROCEDURE — 97110 THERAPEUTIC EXERCISES: CPT

## 2017-06-21 NOTE — PROGRESS NOTES
Nga Gudino  : 1945 [facility] at [address] [address], [place] [address].  [phone]   [phone]           OUTPATIENT PHYSICAL THERAPY:Daily Note 2017    ICD-10: Treatment Diagnosis: Pain in left knee (M25.562); Stiffness of left knee, not elsewhere classified (M25.662); Difficulty in walking, not elsewhere classified (R26.2)  Precautions/Allergies:   Review of patient's allergies indicates no known allergies. Fall Risk Score: 1 (? 5 = High Risk)  MD Orders: Evaluate and Treat MEDICAL/REFERRING DIAGNOSIS:  Presence of left artificial knee joint [Z96.652]   DATE OF ONSET: Surgery 17  REFERRING PHYSICIAN: Rikki Canas., *  RETURN PHYSICIAN APPOINTMENT: 06-15-17     INITIAL ASSESSMENT:  Ms. Josef Gylnn presents with decreased ROM/strength L knee with increased pain leading to decreased functional status. Pt would benefit from skilled physical therapy services to address the above deficits and help patient return to prior level of function. PROBLEM LIST (Impacting functional limitations):  1. Decreased Strength  2. Decreased ADL/Functional Activities  3. Increased Pain  4. Decreased Flexibility/Joint Mobility  5. Decreased [place] with Home Exercise Program INTERVENTIONS PLANNED:  1. Balance Exercise  2. Cold  3. Cryotherapy  4. Electrical Stimulation  5. Gait Training  6. Home Exercise Program (HEP)  7. Manual Therapy  8. Range of Motion (ROM)  9. Therapeutic Exercise/Strengthening  10. Aquatic Therapy   TREATMENT PLAN:  Effective Dates: 17 TO 17. Frequency/Duration: 2 times a week for 3 months  GOALS: (Goals have been discussed and agreed upon with patient.)  Short-Term Functional Goals: Time Frame: 4 weeks  1. Pt will increase ROM L knee 0-120 degrees to assist with ascending/descending stairs  2.  Pt will increase strength L knee 4+/5 to assist with ascending/descending stairs  Discharge Goals: Time Frame: 12 weeks  1. Pt will increase strength L knee 5/5 to assist with ascending/descending stairs  2. Pt will be independent with HEP  3. Pt will ascend/descend 10 eight inch steps independently with no c/o L knee pain  4. Pt will work perform 20 minutes household cooking and cleaning activities independently with min to no c/o L knee pain  Rehabilitation Potential For Stated Goals: Good  Regarding Bhavya Rust's therapy, I certify that the treatment plan above will be carried out by a therapist or under their direction. Thank you for this referral,  Kleber Recinos PTA     Referring Physician Signature: Brittaney Sage., *              Date                    The information in this section was collected on 06-14-17 (except where otherwise noted). HISTORY:   History of Present Injury/Illness (Reason for Referral):  Pt reports insidious onset bilateral knee pain of several years duration. She had physical therapy with continued pain so she opted for Left TKA which was performed 05-12-17. Pt spent 2 nights in the hospital followed by home health PT until last Friday. She rates her current L knee pain 3-4/10, increasing to 10/10 at times. She is taking hydromorphone for pain. Pt is walking with a straight cane. She reports difficulties ascending/descending stairs due to her L knee pain. She is planning to have her R knee replaced in November 2017. Pt is currently having difficulties with all household cooking and cleaning activities due to her L knee surgery. Past Medical History/Comorbidities:   Ms. Lei Wong  has a past medical history of Anxiety; Arthritis; Breast cancer ([facility] [address] (1/18/2016); Breast cancer ([facility] [address] Cancer ([facility] [address] Chronic pain; Former smoker; GERD (gastroesophageal reflux disease); Hard of hearing; Headache; Hypertension; Hypothyroidism; Insomnia; and Status post total left knee replacement (5/12/2017). She also has no past medical history of Adverse effect of anesthesia;  Aneurysm ([facility] [address] Arrhythmia; Asthma; Autoimmune disease ([facility] [address] CAD (coronary artery disease); Chronic kidney disease; Chronic obstructive pulmonary disease ([facility] [address] Coagulation disorder ([facility] [address] Diabetes ([facility] [address] Difficult intubation; Endocarditis; Heart failure ([facility] [address] Ill-defined condition; Liver disease; Malignant hyperthermia due to anesthesia; Morbid obesity ([facility] [address] Pseudocholinesterase deficiency; Psychiatric disorder; PUD (peptic ulcer disease); Rheumatic fever; Seizures ([facility] [address] Sleep apnea; Stroke [facility]); Thromboembolus ([facility] [address] or Unspecified adverse effect of anesthesia. Ms. Johanna Pepe  has a past surgical history that includes colonoscopy; wisdom teeth extraction; tonsillectomy (1957); vascular access (Right); breast surgery procedure unlisted (Left, 7/2015); breast biopsy (Left, 1/18/2016); mastectomy, partial (Left, 01/18/2016); and breast lumpectomy (Left, 01/2016). Social History/Living Environment:     Pt lives with her spouse (currently has colon cancer) in a one-story house  Prior Level of Function/Work/Activity:  Independent with all household ADL's  Dominant Side:         RIGHT  Other Clinical Tests:          X-rays  Previous Treatment Approaches:          Left TKA 05-12-17  Personal Factors:          Sex:  female        Age:  81 y.o. Profession:  Pt is retired        Past/Current Experience:  Pt helps care for her  who has cancer  Current Medications:       Current Outpatient Prescriptions:     temazepam (RESTORIL) 30 mg capsule, Take 1 Cap by mouth nightly. Max Daily Amount: 30 mg., Disp: 30 Cap, Rfl: 5    acetaminophen (TYLENOL) 500 mg tablet, Take 1,000 mg by mouth every six (6) hours as needed for Pain., Disp: , Rfl:     HYDROmorphone (DILAUDID) 2 mg tablet, Take 1 Tab by mouth every four (4) hours as needed. Max Daily Amount: 12 mg., Disp: 40 Tab, Rfl: 0    OTHER,NON-FORMULARY,, Take 4 Tabs by mouth daily.  Relief Factor Tablets, Disp: , Rfl:     Biotin 2,500 mcg cap, Take 2 Tabs by mouth daily., Disp: , Rfl:     OTHER,NON-FORMULARY,, Take 1 Tab by mouth daily. Air Borne Chewable tab, Disp: , Rfl:     polycarbophil calcium (EQUALACTIN) 500 mg chew, Take 1,500 mg by mouth daily. , Disp: , Rfl:     levothyroxine (SYNTHROID) 25 mcg tablet, TAKE ONE TABLET BY MOUTH ONE TIME DAILY BEFORE BREAKFAST, Disp: 30 Tab, Rfl: 6    hydroCHLOROthiazide (HYDRODIURIL) 25 mg tablet, TAKE ONE TABLET BY MOUTH ONE TIME DAILY, Disp: 30 Tab, Rfl: 5    multivitamin (ONE A DAY) tablet, Take 1 Tab by mouth daily. , Disp: , Rfl:     esomeprazole (NEXIUM) 20 mg capsule, Take 20 mg by mouth every morning. Take / use AM day of surgery  per anesthesia protocols. Indications: GASTROESOPHAGEAL REFLUX, Disp: , Rfl:    Date Last Reviewed:  06-14-17   Number of Personal Factors/Comorbidities that affect the Plan of Care: 1-2: MODERATE COMPLEXITY   EXAMINATION:   Observation/Orthostatic Postural Assessment:          Pt ambulates with mild stiff knee gait and uses a straight cane  Palpation:          Generalized tenderness throughout L knee  ROM:    R knee extension = -10 degrees  R knee flexion = 128 degrees    L knee extension = -5 degrees  L knee flexion = 115 degrees    Strength:    R knee extension = 5/5  R knee flexion = 5/5    L knee extension = 4/5  L knee flexion = 4/5    Neurological Screen:        Sensation: Intact to light touch L LE  Functional Mobility:         Gait/Ambulation:  Pt ambulates with straight cane        Transfers:  Pt transitions sit to supine and supine to sit independently     Body Structures Involved:  1. Bones  2. Joints  3. Muscles Body Functions Affected:  1. Sensory/Pain  2. Neuromusculoskeletal Activities and Participation Affected:  1. Mobility  2. Self Care  3.  Domestic Life   Number of elements (examined above) that affect the Plan of Care: 3: MODERATE COMPLEXITY   CLINICAL PRESENTATION:   Presentation: Evolving clinical presentation with changing clinical characteristics: MODERATE COMPLEXITY CLINICAL DECISION MAKING:   Outcome Measure: Tool Used: Lower Extremity Functional Scale (LEFS)  Score:  Initial: 53/80 Most Recent: X/80 (Date: -- )   Interpretation of Score: 20 questions each scored on a 5 point scale with 0 representing \"extreme difficulty or unable to perform\" and 4 representing \"no difficulty\". The lower the score, the greater the functional disability. 80/80 represents no disability. Minimal detectable change is 9 points. Score 80 79-63 62-48 47-32 31-16 15-1 0   Modifier CH CI CJ CK CL CM CN     ? Mobility - Walking and Moving Around:     - CURRENT STATUS: CJ - 20%-39% impaired, limited or restricted    - GOAL STATUS: CI - 1%-19% impaired, limited or restricted    - D/C STATUS:  ---------------To be determined---------------    Medical Necessity:   · Patient is expected to demonstrate progress in strength, range of motion and functional technique to increase independence with household ADL's. · Patient demonstrates good rehab potential due to higher previous functional level. Reason for Services/Other Comments:  · Patient continues to require skilled intervention due to decreased ROM/strength L knee with increased pain leading to decreased functional status. Use of outcome tool(s) and clinical judgement create a POC that gives a: Questionable prediction of patient's progress: MODERATE COMPLEXITY            TREATMENT:   (In addition to Assessment/Re-Assessment sessions the following treatments were rendered)  Pre-treatment Symptoms/Complaints:  I am still not feeling the best- I will do what I can. Pain: Initial:     7/10 Post Session:  5/10     THERAPEUTIC EXERCISE: (30 minutes):  Exercises per grid below to improve mobility and strength. Required minimal verbal cues to promote proper body alignment and promote proper body posture. Progressed resistance, range and repetitions as indicated.   MANUAL THERAPY: (10 minutes): Joint mobilization and Soft tissue mobilization was utilized and necessary because of the patient's restricted joint motion. MODALITIES: (10 minutes):      Game Ready Vasopneumatic Compresion with Ice to L knee x10 minutes to decrease pain. Skin clear afterwards. Date:  06-20-17 Date:   Date:     Activity/Exercise Parameters Parameters Parameters   Quad Sets with Towel Roll Under Heel 15 reps  5 sec holds     SLR Flexion 15 reps  3 sec holds     SAQ's  LAQ 15 reps  5 sec holds #1     Heel Slides with Strap for OP 15 reps  5 sec holds     Gastroc Stretch with Strap 3 reps  20 sec holds     NuStep Level 4  8 minutes           · Treatment/Session Assessment:  Measure next visit. Patient did well today, some pain with bending into flexion today. Continue plan of care. · Response to Treatment:    · Compliance with Program/Exercises: Compliant. · Recommendations/Intent for next treatment session: \"Next visit will focus on advancements to more challenging activities\".   Total Treatment Duration: 50 minutes  PT Patient Time In/Time Out  Time In: 1030  Time Out: [address] [place], [place]

## 2017-06-23 ENCOUNTER — HOSPITAL ENCOUNTER (OUTPATIENT)
Dept: PHYSICAL THERAPY | Age: 72
Discharge: HOME | End: 2017-06-23
Payer: MEDICARE

## 2017-06-23 PROCEDURE — 97140 MANUAL THERAPY 1/> REGIONS: CPT

## 2017-06-23 PROCEDURE — 97110 THERAPEUTIC EXERCISES: CPT

## 2017-06-23 NOTE — PROGRESS NOTES
Neal Oliva  : 1945 [facility] [place] [address], [place] [address].  [phone]   [phone]           OUTPATIENT PHYSICAL THERAPY:Daily Note 2017    ICD-10: Treatment Diagnosis: Pain in left knee (M25.562); Stiffness of left knee, not elsewhere classified (M25.662); Difficulty in walking, not elsewhere classified (R26.2)  Precautions/Allergies:   Review of patient's allergies indicates no known allergies. Fall Risk Score: 1 (? 5 = High Risk)  MD Orders: Evaluate and Treat MEDICAL/REFERRING DIAGNOSIS:  Presence of left artificial knee joint [Z96.652]   DATE OF ONSET: Surgery 17  REFERRING PHYSICIAN: Mile Zamarripa., *  RETURN PHYSICIAN APPOINTMENT: 06-15-17     INITIAL ASSESSMENT:  Ms. Maryann Hebert presents with decreased ROM/strength L knee with increased pain leading to decreased functional status. Pt would benefit from skilled physical therapy services to address the above deficits and help patient return to prior level of function. PROBLEM LIST (Impacting functional limitations):  1. Decreased Strength  2. Decreased ADL/Functional Activities  3. Increased Pain  4. Decreased Flexibility/Joint Mobility  5. Decreased [place] with Home Exercise Program INTERVENTIONS PLANNED:  1. Balance Exercise  2. Cold  3. Cryotherapy  4. Electrical Stimulation  5. Gait Training  6. Home Exercise Program (HEP)  7. Manual Therapy  8. Range of Motion (ROM)  9. Therapeutic Exercise/Strengthening  10. Aquatic Therapy   TREATMENT PLAN:  Effective Dates: 17 TO 17. Frequency/Duration: 2 times a week for 3 months  GOALS: (Goals have been discussed and agreed upon with patient.)  Short-Term Functional Goals: Time Frame: 4 weeks  1. Pt will increase ROM L knee 0-120 degrees to assist with ascending/descending stairs  2.  Pt will increase strength L knee 4+/5 to assist with ascending/descending stairs  Discharge Goals: Time Frame: 12 weeks  1. Pt will increase strength L knee 5/5 to assist with ascending/descending stairs  2. Pt will be independent with HEP  3. Pt will ascend/descend 10 eight inch steps independently with no c/o L knee pain  4. Pt will work perform 20 minutes household cooking and cleaning activities independently with min to no c/o L knee pain  Rehabilitation Potential For Stated Goals: Good  Regarding Romel Rust's therapy, I certify that the treatment plan above will be carried out by a therapist or under their direction. Thank you for this referral,  Nancy Cabrera, PT     Referring Physician Signature: Trenton Ibarra., *              Date                    The information in this section was collected on 06-14-17 (except where otherwise noted). HISTORY:   History of Present Injury/Illness (Reason for Referral):  Pt reports insidious onset bilateral knee pain of several years duration. She had physical therapy with continued pain so she opted for Left TKA which was performed 05-12-17. Pt spent 2 nights in the hospital followed by home health PT until last Friday. She rates her current L knee pain 3-4/10, increasing to 10/10 at times. She is taking hydromorphone for pain. Pt is walking with a straight cane. She reports difficulties ascending/descending stairs due to her L knee pain. She is planning to have her R knee replaced in November 2017. Pt is currently having difficulties with all household cooking and cleaning activities due to her L knee surgery. Past Medical History/Comorbidities:   Ms. Ulises Moe  has a past medical history of Anxiety; Arthritis; Breast cancer ([facility] (1/18/2016); Breast cancer ([facility] Cancer ([facility] Chronic pain; Former smoker; GERD (gastroesophageal reflux disease); Hard of hearing; Headache; Hypertension; Hypothyroidism; Insomnia; and Status post total left knee replacement (5/12/2017).  She also has no past medical history of Adverse effect of anesthesia; Aneurysm ([facility] Arrhythmia; Asthma; Autoimmune disease ([facility] CAD (coronary artery disease); Chronic kidney disease; Chronic obstructive pulmonary disease ([address] Coagulation disorder ([facility] [facility] Diabetes ([address] Difficult intubation; Endocarditis; Heart failure ([address] Ill-defined condition; Liver disease; Malignant hyperthermia due to anesthesia; Morbid obesity ([facility] Pseudocholinesterase deficiency; Psychiatric disorder; PUD (peptic ulcer disease); Rheumatic fever; Seizures ([facility] Sleep apnea; Stroke [facility]); Thromboembolus ([facility] [address] [facility] or Unspecified adverse effect of anesthesia. Ms. Hussain Chung  has a past surgical history that includes colonoscopy; wisdom teeth extraction; tonsillectomy (1957); vascular access (Right); breast surgery procedure unlisted (Left, 7/2015); breast biopsy (Left, 1/18/2016); mastectomy, partial (Left, 01/18/2016); and breast lumpectomy (Left, 01/2016). Social History/Living Environment:     Pt lives with her spouse (currently has colon cancer) in a one-story house  Prior Level of Function/Work/Activity:  Independent with all household ADL's  Dominant Side:         RIGHT  Other Clinical Tests:          X-rays  Previous Treatment Approaches:          Left TKA 05-12-17  Personal Factors:          Sex:  female        Age:  81 y.o. Profession:  Pt is retired        Past/Current Experience:  Pt helps care for her  who has cancer  Current Medications:       Current Outpatient Prescriptions:     temazepam (RESTORIL) 30 mg capsule, Take 1 Cap by mouth nightly. Max Daily Amount: 30 mg., Disp: 30 Cap, Rfl: 5    acetaminophen (TYLENOL) 500 mg tablet, Take 1,000 mg by mouth every six (6) hours as needed for Pain., Disp: , Rfl:     HYDROmorphone (DILAUDID) 2 mg tablet, Take 1 Tab by mouth every four (4) hours as needed. Max Daily Amount: 12 mg., Disp: 40 Tab, Rfl: 0    OTHER,NON-FORMULARY,, Take 4 Tabs by mouth daily.  Relief Factor Tablets, Disp: , Rfl:     Biotin 2,500 mcg cap, Take 2 Tabs by mouth daily. , Disp: , Rfl:     OTHER,NON-FORMULARY,, Take 1 Tab by mouth daily. Air Borne Chewable tab, Disp: , Rfl:     polycarbophil calcium (EQUALACTIN) 500 mg chew, Take 1,500 mg by mouth daily. , Disp: , Rfl:     levothyroxine (SYNTHROID) 25 mcg tablet, TAKE ONE TABLET BY MOUTH ONE TIME DAILY BEFORE BREAKFAST, Disp: 30 Tab, Rfl: 6    hydroCHLOROthiazide (HYDRODIURIL) 25 mg tablet, TAKE ONE TABLET BY MOUTH ONE TIME DAILY, Disp: 30 Tab, Rfl: 5    multivitamin (ONE A DAY) tablet, Take 1 Tab by mouth daily. , Disp: , Rfl:     esomeprazole (NEXIUM) 20 mg capsule, Take 20 mg by mouth every morning. Take / use AM day of surgery  per anesthesia protocols. Indications: GASTROESOPHAGEAL REFLUX, Disp: , Rfl:    Date Last Reviewed:  06-14-17   Number of Personal Factors/Comorbidities that affect the Plan of Care: 1-2: MODERATE COMPLEXITY   EXAMINATION:   Observation/Orthostatic Postural Assessment:          Pt ambulates with mild stiff knee gait and uses a straight cane  Palpation:          Generalized tenderness throughout L knee  ROM:    R knee extension = -10 degrees  R knee flexion = 128 degrees    L knee extension = -5 degrees  L knee flexion = 115 degrees    Strength:    R knee extension = 5/5  R knee flexion = 5/5    L knee extension = 4/5  L knee flexion = 4/5    Neurological Screen:        Sensation: Intact to light touch L LE  Functional Mobility:         Gait/Ambulation:  Pt ambulates with straight cane        Transfers:  Pt transitions sit to supine and supine to sit independently     Body Structures Involved:  1. Bones  2. Joints  3. Muscles Body Functions Affected:  1. Sensory/Pain  2. Neuromusculoskeletal Activities and Participation Affected:  1. Mobility  2. Self Care  3.  Domestic Life   Number of elements (examined above) that affect the Plan of Care: 3: MODERATE COMPLEXITY   CLINICAL PRESENTATION:   Presentation: Evolving clinical presentation with changing clinical characteristics: MODERATE COMPLEXITY   CLINICAL DECISION MAKING:   Outcome Measure: Tool Used: Lower Extremity Functional Scale (LEFS)  Score:  Initial: 53/80 Most Recent: X/80 (Date: -- )   Interpretation of Score: 20 questions each scored on a 5 point scale with 0 representing \"extreme difficulty or unable to perform\" and 4 representing \"no difficulty\". The lower the score, the greater the functional disability. 80/80 represents no disability. Minimal detectable change is 9 points. Score 80 79-63 62-48 47-32 31-16 15-1 0   Modifier CH CI CJ CK CL CM CN     ? Mobility - Walking and Moving Around:     - CURRENT STATUS: CJ - 20%-39% impaired, limited or restricted    - GOAL STATUS: CI - 1%-19% impaired, limited or restricted    - D/C STATUS:  ---------------To be determined---------------    Medical Necessity:   · Patient is expected to demonstrate progress in strength, range of motion and functional technique to increase independence with household ADL's. · Patient demonstrates good rehab potential due to higher previous functional level. Reason for Services/Other Comments:  · Patient continues to require skilled intervention due to decreased ROM/strength L knee with increased pain leading to decreased functional status. Use of outcome tool(s) and clinical judgement create a POC that gives a: Questionable prediction of patient's progress: MODERATE COMPLEXITY            TREATMENT:   (In addition to Assessment/Re-Assessment sessions the following treatments were rendered)  Pre-treatment Symptoms/Complaints:  I am feeling better than I was. Still not 100 %. Pains been around a 5/10  Pain: Initial:     7/10 Post Session:  5/10     THERAPEUTIC EXERCISE: (25 minutes):  Exercises per grid below to improve mobility and strength. Required minimal verbal cues to promote proper body alignment and promote proper body posture. Progressed resistance, range and repetitions as indicated.   MANUAL THERAPY: (10 minutes): Joint mobilization and Soft tissue mobilization was utilized and necessary because of the patient's restricted joint motion. MODALITIES: (10 minutes):      Game Ready Vasopneumatic Compresion with Ice to L knee x10 minutes to decrease pain. Skin clear afterwards. Date:  06-23-17 Date:   Date:     Activity/Exercise Parameters Parameters Parameters   Quad Sets with Towel Roll Under Heel 15 reps  5 sec holds     SLR Flexion 15 reps  3 sec holds     SAQ's  LAQ 15 reps  5 sec holds #1     Heel Slides with Strap for OP 15 reps  5 sec holds     Gastroc Stretch with Strap 3 reps  20 sec holds     NuStep Level 4  8 minutes           · Treatment/Session Assessment:   Continue plan of care. good tolerance today with treatment. Still challenged by current program, no increase today. 123 degrees flexion today L knee. · Response to Treatment:    · Compliance with Program/Exercises: Compliant. · Recommendations/Intent for next treatment session: \"Next visit will focus on advancements to more challenging activities\".   Total Treatment Duration: 45 minutes  PT Patient Time In/Time Out  Time In: 1115  Time Out: 1200    Sean Lopez PT

## 2017-06-26 ENCOUNTER — HOSPITAL ENCOUNTER (OUTPATIENT)
Dept: PHYSICAL THERAPY | Age: 72
Discharge: HOME | End: 2017-06-26
Payer: MEDICARE

## 2017-06-26 PROCEDURE — 97110 THERAPEUTIC EXERCISES: CPT

## 2017-06-26 PROCEDURE — 97140 MANUAL THERAPY 1/> REGIONS: CPT

## 2017-06-26 NOTE — PROGRESS NOTES
Harish Wang  : 1945 [facility] [address] [place] [address], [address] [place] [address],[address] [place], [facility] [place].  [phone]   [phone]           OUTPATIENT PHYSICAL THERAPY:Daily Note 2017    ICD-10: Treatment Diagnosis: Pain in left knee (M25.562); Stiffness of left knee, not elsewhere classified (M25.662); Difficulty in walking, not elsewhere classified (R26.2)  Precautions/Allergies:   Review of patient's allergies indicates no known allergies. Fall Risk Score: 1 (? 5 = High Risk)  MD Orders: Evaluate and Treat MEDICAL/REFERRING DIAGNOSIS:  Presence of left artificial knee joint [Z96.652]   DATE OF ONSET: Surgery 17  REFERRING PHYSICIAN: Brandon Rodriguez, *  RETURN PHYSICIAN APPOINTMENT: 06-15-17     INITIAL ASSESSMENT:  Ms. Gretta Oconnor presents with decreased ROM/strength L knee with increased pain leading to decreased functional status. Pt would benefit from skilled physical therapy services to address the above deficits and help patient return to prior level of function. PROBLEM LIST (Impacting functional limitations):  1. Decreased Strength  2. Decreased ADL/Functional Activities  3. Increased Pain  4. Decreased Flexibility/Joint Mobility  5. Decreased [place] with Home Exercise Program INTERVENTIONS PLANNED:  1. Balance Exercise  2. Cold  3. Cryotherapy  4. Electrical Stimulation  5. Gait Training  6. Home Exercise Program (HEP)  7. Manual Therapy  8. Range of Motion (ROM)  9. Therapeutic Exercise/Strengthening  10. Aquatic Therapy   TREATMENT PLAN:  Effective Dates: 17 TO 17. Frequency/Duration: 2 times a week for 3 months  GOALS: (Goals have been discussed and agreed upon with patient.)  Short-Term Functional Goals: Time Frame: 4 weeks  1. Pt will increase ROM L knee 0-120 degrees to assist with ascending/descending stairs  2.  Pt will increase strength L knee 4+/5 to assist with ascending/descending stairs  Discharge Goals: Time Frame: 12 weeks  1. Pt will increase strength L knee 5/5 to assist with ascending/descending stairs  2. Pt will be independent with HEP  3. Pt will ascend/descend 10 eight inch steps independently with no c/o L knee pain  4. Pt will work perform 20 minutes household cooking and cleaning activities independently with min to no c/o L knee pain  Rehabilitation Potential For Stated Goals: Good  Regarding Sara Rust's therapy, I certify that the treatment plan above will be carried out by a therapist or under their direction. Thank you for this referral,  Sim Martinez, PT     Referring Physician Signature: Manjula Edwards., *              Date                    The information in this section was collected on 06-14-17 (except where otherwise noted). HISTORY:   History of Present Injury/Illness (Reason for Referral):  Pt reports insidious onset bilateral knee pain of several years duration. She had physical therapy with continued pain so she opted for Left TKA which was performed 05-12-17. Pt spent 2 nights in the hospital followed by home health PT until last Friday. She rates her current L knee pain 3-4/10, increasing to 10/10 at times. She is taking hydromorphone for pain. Pt is walking with a straight cane. She reports difficulties ascending/descending stairs due to her L knee pain. She is planning to have her R knee replaced in November 2017. Pt is currently having difficulties with all household cooking and cleaning activities due to her L knee surgery. Past Medical History/Comorbidities:   Ms. Sedalia Riedel  has a past medical history of Anxiety; Arthritis; Breast cancer ([facility] (1/18/2016); Breast cancer ([facility] Cancer ([facility] Chronic pain; Former smoker; GERD (gastroesophageal reflux disease); Hard of hearing; Headache; Hypertension; Hypothyroidism; Insomnia; and Status post total left knee replacement (5/12/2017). She also has no past medical history of Adverse effect of anesthesia;  Aneurysm ([facility] Arrhythmia; Asthma; Autoimmune disease ([facility] [address] [facility] CAD (coronary artery disease); Chronic kidney disease; Chronic obstructive pulmonary disease ([facility] [facility] Coagulation disorder ([facility] [address] [facility] Diabetes ([facility] [address] [facility] Difficult intubation; Endocarditis; Heart failure ([facility] [address] [facility] Ill-defined condition; Liver disease; Malignant hyperthermia due to anesthesia; Morbid obesity ([facility] [address] Pseudocholinesterase deficiency; Psychiatric disorder; PUD (peptic ulcer disease); Rheumatic fever; Seizures ([facility] [address] Sleep apnea; Stroke [facility]); Thromboembolus ([facility] [address] or Unspecified adverse effect of anesthesia. Ms. Reshma Ayala  has a past surgical history that includes colonoscopy; wisdom teeth extraction; tonsillectomy (1957); vascular access (Right); breast surgery procedure unlisted (Left, 7/2015); breast biopsy (Left, 1/18/2016); mastectomy, partial (Left, 01/18/2016); and breast lumpectomy (Left, 01/2016). Social History/Living Environment:     Pt lives with her spouse (currently has colon cancer) in a one-story house  Prior Level of Function/Work/Activity:  Independent with all household ADL's  Dominant Side:         RIGHT  Other Clinical Tests:          X-rays  Previous Treatment Approaches:          Left TKA 05-12-17  Personal Factors:          Sex:  female        Age:  81 y.o. Profession:  Pt is retired        Past/Current Experience:  Pt helps care for her  who has cancer  Current Medications:       Current Outpatient Prescriptions:     temazepam (RESTORIL) 30 mg capsule, Take 1 Cap by mouth nightly. Max Daily Amount: 30 mg., Disp: 30 Cap, Rfl: 5    acetaminophen (TYLENOL) 500 mg tablet, Take 1,000 mg by mouth every six (6) hours as needed for Pain., Disp: , Rfl:     HYDROmorphone (DILAUDID) 2 mg tablet, Take 1 Tab by mouth every four (4) hours as needed. Max Daily Amount: 12 mg., Disp: 40 Tab, Rfl: 0    OTHER,NON-FORMULARY,, Take 4 Tabs by mouth daily.  Relief Factor Tablets, Disp: , Rfl:     Biotin 2,500 mcg cap, Take 2 Tabs by mouth daily., Disp: , Rfl:     OTHER,NON-FORMULARY,, Take 1 Tab by mouth daily. Air Borne Chewable tab, Disp: , Rfl:     polycarbophil calcium (EQUALACTIN) 500 mg chew, Take 1,500 mg by mouth daily. , Disp: , Rfl:     levothyroxine (SYNTHROID) 25 mcg tablet, TAKE ONE TABLET BY MOUTH ONE TIME DAILY BEFORE BREAKFAST, Disp: 30 Tab, Rfl: 6    hydroCHLOROthiazide (HYDRODIURIL) 25 mg tablet, TAKE ONE TABLET BY MOUTH ONE TIME DAILY, Disp: 30 Tab, Rfl: 5    multivitamin (ONE A DAY) tablet, Take 1 Tab by mouth daily. , Disp: , Rfl:     esomeprazole (NEXIUM) 20 mg capsule, Take 20 mg by mouth every morning. Take / use AM day of surgery  per anesthesia protocols. Indications: GASTROESOPHAGEAL REFLUX, Disp: , Rfl:    Date Last Reviewed:  06-14-17   Number of Personal Factors/Comorbidities that affect the Plan of Care: 1-2: MODERATE COMPLEXITY   EXAMINATION:   Observation/Orthostatic Postural Assessment:          Pt ambulates with mild stiff knee gait and uses a straight cane  Palpation:          Generalized tenderness throughout L knee  ROM:    R knee extension = -10 degrees  R knee flexion = 128 degrees    L knee extension = -5 degrees  L knee flexion = 120 degrees    Strength:    R knee extension = 5/5  R knee flexion = 5/5    L knee extension = 4/5  L knee flexion = 4/5    Neurological Screen:        Sensation: Intact to light touch L LE  Functional Mobility:         Gait/Ambulation:  Pt ambulates with straight cane        Transfers:  Pt transitions sit to supine and supine to sit independently     Body Structures Involved:  1. Bones  2. Joints  3. Muscles Body Functions Affected:  1. Sensory/Pain  2. Neuromusculoskeletal Activities and Participation Affected:  1. Mobility  2. Self Care  3.  Domestic Life   Number of elements (examined above) that affect the Plan of Care: 3: MODERATE COMPLEXITY   CLINICAL PRESENTATION:   Presentation: Evolving clinical presentation with changing clinical characteristics: MODERATE COMPLEXITY CLINICAL DECISION MAKING:   Outcome Measure: Tool Used: Lower Extremity Functional Scale (LEFS)  Score:  Initial: 53/80 Most Recent: X/80 (Date: -- )   Interpretation of Score: 20 questions each scored on a 5 point scale with 0 representing \"extreme difficulty or unable to perform\" and 4 representing \"no difficulty\". The lower the score, the greater the functional disability. 80/80 represents no disability. Minimal detectable change is 9 points. Score 80 79-63 62-48 47-32 31-16 15-1 0   Modifier CH CI CJ CK CL CM CN     ? Mobility - Walking and Moving Around:     - CURRENT STATUS: CJ - 20%-39% impaired, limited or restricted    - GOAL STATUS: CI - 1%-19% impaired, limited or restricted    - D/C STATUS:  ---------------To be determined---------------    Medical Necessity:   · Patient is expected to demonstrate progress in strength, range of motion and functional technique to increase independence with household ADL's. · Patient demonstrates good rehab potential due to higher previous functional level. Reason for Services/Other Comments:  · Patient continues to require skilled intervention due to decreased ROM/strength L knee with increased pain leading to decreased functional status. Use of outcome tool(s) and clinical judgement create a POC that gives a: Questionable prediction of patient's progress: MODERATE COMPLEXITY            TREATMENT:   (In addition to Assessment/Re-Assessment sessions the following treatments were rendered)  Pre-treatment Symptoms/Complaints:  Pt states her knee is feeling ok. Pain: Initial:     6-7/10 Post Session:  5/10     THERAPEUTIC EXERCISE: (30 minutes):  Exercises per grid below to improve mobility and strength. Required minimal verbal cues to promote proper body alignment and promote proper body posture. Progressed resistance, range and repetitions as indicated.   MANUAL THERAPY: (10 minutes): Joint mobilization and Soft tissue mobilization was utilized and necessary because of the patient's restricted joint motion. MODALITIES: (10 minutes):      Game Ready Vasopneumatic Compresion with Ice to L knee x10 minutes to decrease pain. Skin clear afterwards. Date:  06-23-17 Date:  06-26-17 Date:     Activity/Exercise Parameters Parameters Parameters   Quad Sets with Towel Roll Under Heel 15 reps  5 sec holds 20 reps  5 sec holds    SLR Flexion 15 reps  3 sec holds 20 reps  3 sec holds    SAQ's  LAQ 15 reps  5 sec holds #1 2 Lbs  20 reps each    Heel Slides with Strap for OP 15 reps  5 sec holds 20 reps  5 sec holds    Gastroc Stretch with Strap 3 reps  20 sec holds On Slantboard  3 reps  20 sec holds    NuStep Level 4  8 minutes Level 4  8 minutes    Standing Heel/Toe Raises  15 reps    Step-Ups  4 inch  20 reps          · Treatment/Session Assessment:   Pt tolerated treatments well today. ROM and strength improving L knee. · Response to Treatment:    · Compliance with Program/Exercises: Compliant. · Recommendations/Intent for next treatment session: \"Next visit will focus on advancements to more challenging activities\".   Total Treatment Duration: 50 minutes  PT Patient Time In/Time Out  Time In: 1300  [facility] [address] [facility]

## 2017-06-28 ENCOUNTER — HOSPITAL ENCOUNTER (OUTPATIENT)
Dept: PHYSICAL THERAPY | Age: 72
Discharge: HOME | End: 2017-06-28
Payer: MEDICARE

## 2017-06-28 PROCEDURE — 97140 MANUAL THERAPY 1/> REGIONS: CPT

## 2017-06-28 PROCEDURE — 97110 THERAPEUTIC EXERCISES: CPT

## 2017-06-28 NOTE — PROGRESS NOTES
Candelaria Arroyo  : 1945 [facility] [place] [address], [place] [address].  [phone]   [phone]           OUTPATIENT PHYSICAL THERAPY:Daily Note 2017    ICD-10: Treatment Diagnosis: Pain in left knee (M25.562); Stiffness of left knee, not elsewhere classified (M25.662); Difficulty in walking, not elsewhere classified (R26.2)  Precautions/Allergies:   Review of patient's allergies indicates no known allergies. Fall Risk Score: 1 (? 5 = High Risk)  MD Orders: Evaluate and Treat MEDICAL/REFERRING DIAGNOSIS:  Presence of left artificial knee joint [Z96.652]   DATE OF ONSET: Surgery 17  REFERRING PHYSICIAN: Fanny Plasencia., *  RETURN PHYSICIAN APPOINTMENT: 06-15-17     INITIAL ASSESSMENT:  Ms. Katya aMnuel presents with decreased ROM/strength L knee with increased pain leading to decreased functional status. Pt would benefit from skilled physical therapy services to address the above deficits and help patient return to prior level of function. PROBLEM LIST (Impacting functional limitations):  1. Decreased Strength  2. Decreased ADL/Functional Activities  3. Increased Pain  4. Decreased Flexibility/Joint Mobility  5. Decreased [place] with Home Exercise Program INTERVENTIONS PLANNED:  1. Balance Exercise  2. Cold  3. Cryotherapy  4. Electrical Stimulation  5. Gait Training  6. Home Exercise Program (HEP)  7. Manual Therapy  8. Range of Motion (ROM)  9. Therapeutic Exercise/Strengthening  10. Aquatic Therapy   TREATMENT PLAN:  Effective Dates: 17 TO 17. Frequency/Duration: 2 times a week for 3 months  GOALS: (Goals have been discussed and agreed upon with patient.)  Short-Term Functional Goals: Time Frame: 4 weeks  1. Pt will increase ROM L knee 0-120 degrees to assist with ascending/descending stairs  2.  Pt will increase strength L knee 4+/5 to assist with ascending/descending stairs  Discharge Goals: Time Frame: 12 weeks  1. Pt will increase strength L knee 5/5 to assist with ascending/descending stairs  2. Pt will be independent with HEP  3. Pt will ascend/descend 10 eight inch steps independently with no c/o L knee pain  4. Pt will work perform 20 minutes household cooking and cleaning activities independently with min to no c/o L knee pain  Rehabilitation Potential For Stated Goals: Good  Regarding Valeriano Harris Barrera's therapy, I certify that the treatment plan above will be carried out by a therapist or under their direction. Thank you for this referral,  Lizett Salazar, PT     Referring Physician Signature: Fanny Plasencia., *              Date                    The information in this section was collected on 06-14-17 (except where otherwise noted). HISTORY:   History of Present Injury/Illness (Reason for Referral):  Pt reports insidious onset bilateral knee pain of several years duration. She had physical therapy with continued pain so she opted for Left TKA which was performed 05-12-17. Pt spent 2 nights in the hospital followed by home health PT until last Friday. She rates her current L knee pain 3-4/10, increasing to 10/10 at times. She is taking hydromorphone for pain. Pt is walking with a straight cane. She reports difficulties ascending/descending stairs due to her L knee pain. She is planning to have her R knee replaced in November 2017. Pt is currently having difficulties with all household cooking and cleaning activities due to her L knee surgery. Past Medical History/Comorbidities:   Ms. Katya Manuel  has a past medical history of Anxiety; Arthritis; Breast cancer ([address] (1/18/2016); Breast cancer ([address] Cancer ([address] Chronic pain; Former smoker; GERD (gastroesophageal reflux disease); Hard of hearing; Headache; Hypertension; Hypothyroidism; Insomnia; and Status post total left knee replacement (5/12/2017). She also has no past medical history of Adverse effect of anesthesia;  Aneurysm ([facility] Arrhythmia; Asthma; Autoimmune disease ([facility] CAD (coronary artery disease); Chronic kidney disease; Chronic obstructive pulmonary disease ([address] Coagulation disorder ([facility] [facility] Diabetes ([address] Difficult intubation; Endocarditis; Heart failure ([facility] [address] [facility] Ill-defined condition; Liver disease; Malignant hyperthermia due to anesthesia; Morbid obesity ([facility] [address] [facility] Pseudocholinesterase deficiency; Psychiatric disorder; PUD (peptic ulcer disease); Rheumatic fever; Seizures ([facility] [address] [facility] Sleep apnea; Stroke [facility]); Thromboembolus ([facility] [address] [facility] or Unspecified adverse effect of anesthesia. Ms. Marcela Bowman  has a past surgical history that includes colonoscopy; wisdom teeth extraction; tonsillectomy (1957); vascular access (Right); breast surgery procedure unlisted (Left, 7/2015); breast biopsy (Left, 1/18/2016); mastectomy, partial (Left, 01/18/2016); and breast lumpectomy (Left, 01/2016). Social History/Living Environment:     Pt lives with her spouse (currently has colon cancer) in a one-story house  Prior Level of Function/Work/Activity:  Independent with all household ADL's  Dominant Side:         RIGHT  Other Clinical Tests:          X-rays  Previous Treatment Approaches:          Left TKA 05-12-17  Personal Factors:          Sex:  female        Age:  81 y.o. Profession:  Pt is retired        Past/Current Experience:  Pt helps care for her  who has cancer  Current Medications:       Current Outpatient Prescriptions:     temazepam (RESTORIL) 30 mg capsule, Take 1 Cap by mouth nightly. Max Daily Amount: 30 mg., Disp: 30 Cap, Rfl: 5    acetaminophen (TYLENOL) 500 mg tablet, Take 1,000 mg by mouth every six (6) hours as needed for Pain., Disp: , Rfl:     HYDROmorphone (DILAUDID) 2 mg tablet, Take 1 Tab by mouth every four (4) hours as needed. Max Daily Amount: 12 mg., Disp: 40 Tab, Rfl: 0    OTHER,NON-FORMULARY,, Take 4 Tabs by mouth daily.  Relief Factor Tablets, Disp: , Rfl:     Biotin 2,500 mcg cap, Take 2 Tabs by mouth daily., Disp: , Rfl:     OTHER,NON-FORMULARY,, Take 1 Tab by mouth daily. Air Borne Chewable tab, Disp: , Rfl:     polycarbophil calcium (EQUALACTIN) 500 mg chew, Take 1,500 mg by mouth daily. , Disp: , Rfl:     levothyroxine (SYNTHROID) 25 mcg tablet, TAKE ONE TABLET BY MOUTH ONE TIME DAILY BEFORE BREAKFAST, Disp: 30 Tab, Rfl: 6    hydroCHLOROthiazide (HYDRODIURIL) 25 mg tablet, TAKE ONE TABLET BY MOUTH ONE TIME DAILY, Disp: 30 Tab, Rfl: 5    multivitamin (ONE A DAY) tablet, Take 1 Tab by mouth daily. , Disp: , Rfl:     esomeprazole (NEXIUM) 20 mg capsule, Take 20 mg by mouth every morning. Take / use AM day of surgery  per anesthesia protocols. Indications: GASTROESOPHAGEAL REFLUX, Disp: , Rfl:    Date Last Reviewed:  06-14-17   Number of Personal Factors/Comorbidities that affect the Plan of Care: 1-2: MODERATE COMPLEXITY   EXAMINATION:   Observation/Orthostatic Postural Assessment:          Pt ambulates with mild stiff knee gait and uses a straight cane  Palpation:          Generalized tenderness throughout L knee  ROM:    R knee extension = -10 degrees  R knee flexion = 128 degrees    L knee extension = -5 degrees  L knee flexion = 120 degrees    Strength:    R knee extension = 5/5  R knee flexion = 5/5    L knee extension = 4/5  L knee flexion = 4/5    Neurological Screen:        Sensation: Intact to light touch L LE  Functional Mobility:         Gait/Ambulation:  Pt ambulates with straight cane        Transfers:  Pt transitions sit to supine and supine to sit independently     Body Structures Involved:  1. Bones  2. Joints  3. Muscles Body Functions Affected:  1. Sensory/Pain  2. Neuromusculoskeletal Activities and Participation Affected:  1. Mobility  2. Self Care  3.  Domestic Life   Number of elements (examined above) that affect the Plan of Care: 3: MODERATE COMPLEXITY   CLINICAL PRESENTATION:   Presentation: Evolving clinical presentation with changing clinical characteristics: MODERATE COMPLEXITY CLINICAL DECISION MAKING:   Outcome Measure: Tool Used: Lower Extremity Functional Scale (LEFS)  Score:  Initial: 53/80 Most Recent: X/80 (Date: -- )   Interpretation of Score: 20 questions each scored on a 5 point scale with 0 representing \"extreme difficulty or unable to perform\" and 4 representing \"no difficulty\". The lower the score, the greater the functional disability. 80/80 represents no disability. Minimal detectable change is 9 points. Score 80 79-63 62-48 47-32 31-16 15-1 0   Modifier CH CI CJ CK CL CM CN     ? Mobility - Walking and Moving Around:     - CURRENT STATUS: CJ - 20%-39% impaired, limited or restricted    - GOAL STATUS: CI - 1%-19% impaired, limited or restricted    - D/C STATUS:  ---------------To be determined---------------    Medical Necessity:   · Patient is expected to demonstrate progress in strength, range of motion and functional technique to increase independence with household ADL's. · Patient demonstrates good rehab potential due to higher previous functional level. Reason for Services/Other Comments:  · Patient continues to require skilled intervention due to decreased ROM/strength L knee with increased pain leading to decreased functional status. Use of outcome tool(s) and clinical judgement create a POC that gives a: Questionable prediction of patient's progress: MODERATE COMPLEXITY            TREATMENT:   (In addition to Assessment/Re-Assessment sessions the following treatments were rendered)  Pre-treatment Symptoms/Complaints:  Pt states her knee is feeling pretty good today. Pain: Initial:     6/10 Post Session:  5/10     THERAPEUTIC EXERCISE: (30 minutes):  Exercises per grid below to improve mobility and strength. Required minimal verbal cues to promote proper body alignment and promote proper body posture. Progressed resistance, range and repetitions as indicated.   MANUAL THERAPY: (10 minutes): Joint mobilization and Soft tissue mobilization was utilized and necessary because of the patient's restricted joint motion. MODALITIES: (10 minutes):      Cold pack to L knee x10 minutes to decrease pain. Skin clear afterwards. Date:  06-23-17 Date:  06-26-17 Date:  06-28-17   Activity/Exercise Parameters Parameters Parameters   Quad Sets with Towel Roll Under Heel 15 reps  5 sec holds 20 reps  5 sec holds 20 reps  5 sec holds   SLR Flexion 15 reps  3 sec holds 20 reps  3 sec holds 20 reps  3 sec holds   SAQ's  LAQ 15 reps  5 sec holds #1 2 Lbs  20 reps each 2 Lbs  20 reps each   Heel Slides with Strap for OP 15 reps  5 sec holds 20 reps  5 sec holds    Gastroc Stretch with Strap 3 reps  20 sec holds On Slantboard  3 reps  20 sec holds On Slantboard  3 reps  20 sec holds   NuStep Level 4  8 minutes Level 4  8 minutes Level 4  8 minutes   Standing Heel/Toe Raises  15 reps 20 reps   Step-Ups  4 inch  20 reps 4 inch  20 reps   TKE with T-band   Black  20 reps         · Treatment/Session Assessment:   Pt progressing well. No c/o with treatments today. · Response to Treatment:    · Compliance with Program/Exercises: Compliant. · Recommendations/Intent for next treatment session: \"Next visit will focus on advancements to more challenging activities\".   Total Treatment Duration: 50 minutes  PT Patient Time In/Time Out  Time In: 1345  Time Out: [address] [place] [address], PT

## 2017-07-03 ENCOUNTER — HOSPITAL ENCOUNTER (OUTPATIENT)
Dept: PHYSICAL THERAPY | Age: 72
Discharge: HOME | End: 2017-07-03
Payer: MEDICARE

## 2017-07-05 ENCOUNTER — HOSPITAL ENCOUNTER (OUTPATIENT)
Dept: PHYSICAL THERAPY | Age: 72
Discharge: HOME | End: 2017-07-05
Payer: MEDICARE

## 2017-07-05 PROCEDURE — 97110 THERAPEUTIC EXERCISES: CPT

## 2017-07-05 PROCEDURE — 97140 MANUAL THERAPY 1/> REGIONS: CPT

## 2017-07-05 NOTE — PROGRESS NOTES
Maryann Harvey  : 1945 [facility]  [address], [address] [place] [address], [facility] [address]  [phone]   [phone]           OUTPATIENT PHYSICAL THERAPY:Daily Note 2017    ICD-10: Treatment Diagnosis: Pain in left knee (M25.562); Stiffness of left knee, not elsewhere classified (M25.662); Difficulty in walking, not elsewhere classified (R26.2)  Precautions/Allergies:   Review of patient's allergies indicates no known allergies. Fall Risk Score: 1 (? 5 = High Risk)  MD Orders: Evaluate and Treat MEDICAL/REFERRING DIAGNOSIS:  Presence of left artificial knee joint [Z96.652]   DATE OF ONSET: Surgery 17  REFERRING PHYSICIAN: Nikki Wise, *  RETURN PHYSICIAN APPOINTMENT: 06-15-17     INITIAL ASSESSMENT:  Ms. Alexander Mendoza presents with decreased ROM/strength L knee with increased pain leading to decreased functional status. Pt would benefit from skilled physical therapy services to address the above deficits and help patient return to prior level of function. PROBLEM LIST (Impacting functional limitations):  1. Decreased Strength  2. Decreased ADL/Functional Activities  3. Increased Pain  4. Decreased Flexibility/Joint Mobility  5. Decreased [place] with Home Exercise Program INTERVENTIONS PLANNED:  1. Balance Exercise  2. Cold  3. Cryotherapy  4. Electrical Stimulation  5. Gait Training  6. Home Exercise Program (HEP)  7. Manual Therapy  8. Range of Motion (ROM)  9. Therapeutic Exercise/Strengthening  10. Aquatic Therapy   TREATMENT PLAN:  Effective Dates: 17 TO 17. Frequency/Duration: 2 times a week for 3 months  GOALS: (Goals have been discussed and agreed upon with patient.)  Short-Term Functional Goals: Time Frame: 4 weeks  1. Pt will increase ROM L knee 0-120 degrees to assist with ascending/descending stairs  2.  Pt will increase strength L knee 4+/5 to assist with ascending/descending stairs  Discharge Goals: Time Frame: 12 weeks  1. Pt will increase strength L knee 5/5 to assist with ascending/descending stairs  2. Pt will be independent with HEP  3. Pt will ascend/descend 10 eight inch steps independently with no c/o L knee pain  4. Pt will work perform 20 minutes household cooking and cleaning activities independently with min to no c/o L knee pain  Rehabilitation Potential For Stated Goals: Good  Regarding Sandeep Rust's therapy, I certify that the treatment plan above will be carried out by a therapist or under their direction. Thank you for this referral,  Abdoulaye Wallace, PT     Referring Physician Signature: Nikki Wise, *              Date                    The information in this section was collected on 06-14-17 (except where otherwise noted). HISTORY:   History of Present Injury/Illness (Reason for Referral):  Pt reports insidious onset bilateral knee pain of several years duration. She had physical therapy with continued pain so she opted for Left TKA which was performed 05-12-17. Pt spent 2 nights in the hospital followed by home health PT until last Friday. She rates her current L knee pain 3-4/10, increasing to 10/10 at times. She is taking hydromorphone for pain. Pt is walking with a straight cane. She reports difficulties ascending/descending stairs due to her L knee pain. She is planning to have her R knee replaced in November 2017. Pt is currently having difficulties with all household cooking and cleaning activities due to her L knee surgery. Past Medical History/Comorbidities:   Ms. Alexander Mendoza  has a past medical history of Anxiety; Arthritis; Breast cancer ([address] (1/18/2016); Breast cancer ([address] Cancer ([address] Chronic pain; Former smoker; GERD (gastroesophageal reflux disease); Hard of hearing; Headache; Hypertension; Hypothyroidism; Insomnia; and Status post total left knee replacement (5/12/2017). She also has no past medical history of Adverse effect of anesthesia;  Aneurysm ([address] Arrhythmia; Asthma; Autoimmune disease ([facility] CAD (coronary artery disease); Chronic kidney disease; Chronic obstructive pulmonary disease ([facility] Coagulation disorder ([facility] Diabetes ([facility] Difficult intubation; Endocarditis; Heart failure ([facility] Ill-defined condition; Liver disease; Malignant hyperthermia due to anesthesia; Morbid obesity ([facility] Pseudocholinesterase deficiency; Psychiatric disorder; PUD (peptic ulcer disease); Rheumatic fever; Seizures ([facility] [facility] Sleep apnea; Stroke [facility]); Thromboembolus ([facility] or Unspecified adverse effect of anesthesia. Ms. Cornelio Boateng  has a past surgical history that includes colonoscopy; wisdom teeth extraction; tonsillectomy (1957); vascular access (Right); breast surgery procedure unlisted (Left, 7/2015); breast biopsy (Left, 1/18/2016); mastectomy, partial (Left, 01/18/2016); and breast lumpectomy (Left, 01/2016). Social History/Living Environment:     Pt lives with her spouse (currently has colon cancer) in a one-story house  Prior Level of Function/Work/Activity:  Independent with all household ADL's  Dominant Side:         RIGHT  Other Clinical Tests:          X-rays  Previous Treatment Approaches:          Left TKA 05-12-17  Personal Factors:          Sex:  female        Age:  81 y.o. Profession:  Pt is retired        Past/Current Experience:  Pt helps care for her  who has cancer  Current Medications:       Current Outpatient Prescriptions:     temazepam (RESTORIL) 30 mg capsule, Take 1 Cap by mouth nightly. Max Daily Amount: 30 mg., Disp: 30 Cap, Rfl: 5    acetaminophen (TYLENOL) 500 mg tablet, Take 1,000 mg by mouth every six (6) hours as needed for Pain., Disp: , Rfl:     HYDROmorphone (DILAUDID) 2 mg tablet, Take 1 Tab by mouth every four (4) hours as needed. Max Daily Amount: 12 mg., Disp: 40 Tab, Rfl: 0    OTHER,NON-FORMULARY,, Take 4 Tabs by mouth daily. Relief Factor Tablets, Disp: , Rfl:     Biotin 2,500 mcg cap, Take 2 Tabs by mouth daily. , Disp: , Rfl:     OTHER,NON-FORMULARY,, Take 1 Tab by mouth daily. Air Borne Chewable tab, Disp: , Rfl:     polycarbophil calcium (EQUALACTIN) 500 mg chew, Take 1,500 mg by mouth daily. , Disp: , Rfl:     levothyroxine (SYNTHROID) 25 mcg tablet, TAKE ONE TABLET BY MOUTH ONE TIME DAILY BEFORE BREAKFAST, Disp: 30 Tab, Rfl: 6    hydroCHLOROthiazide (HYDRODIURIL) 25 mg tablet, TAKE ONE TABLET BY MOUTH ONE TIME DAILY, Disp: 30 Tab, Rfl: 5    multivitamin (ONE A DAY) tablet, Take 1 Tab by mouth daily. , Disp: , Rfl:     esomeprazole (NEXIUM) 20 mg capsule, Take 20 mg by mouth every morning. Take / use AM day of surgery  per anesthesia protocols. Indications: GASTROESOPHAGEAL REFLUX, Disp: , Rfl:    Date Last Reviewed:  06-14-17   Number of Personal Factors/Comorbidities that affect the Plan of Care: 1-2: MODERATE COMPLEXITY   EXAMINATION:   Observation/Orthostatic Postural Assessment:          Pt ambulates with mild stiff knee gait and uses a straight cane  Palpation:          Generalized tenderness throughout L knee  ROM:    R knee extension = -10 degrees  R knee flexion = 128 degrees    L knee extension = -5 degrees  L knee flexion = 120 degrees    Strength:    R knee extension = 5/5  R knee flexion = 5/5    L knee extension = 4/5  L knee flexion = 4/5    Neurological Screen:        Sensation: Intact to light touch L LE  Functional Mobility:         Gait/Ambulation:  Pt ambulates with straight cane        Transfers:  Pt transitions sit to supine and supine to sit independently     Body Structures Involved:  1. Bones  2. Joints  3. Muscles Body Functions Affected:  1. Sensory/Pain  2. Neuromusculoskeletal Activities and Participation Affected:  1. Mobility  2. Self Care  3.  Domestic Life   Number of elements (examined above) that affect the Plan of Care: 3: MODERATE COMPLEXITY   CLINICAL PRESENTATION:   Presentation: Evolving clinical presentation with changing clinical characteristics: MODERATE COMPLEXITY   CLINICAL DECISION MAKING:   Outcome Measure: Tool Used: Lower Extremity Functional Scale (LEFS)  Score:  Initial: 53/80 Most Recent: X/80 (Date: -- )   Interpretation of Score: 20 questions each scored on a 5 point scale with 0 representing \"extreme difficulty or unable to perform\" and 4 representing \"no difficulty\". The lower the score, the greater the functional disability. 80/80 represents no disability. Minimal detectable change is 9 points. Score 80 79-63 62-48 47-32 31-16 15-1 0   Modifier CH CI CJ CK CL CM CN     ? Mobility - Walking and Moving Around:     - CURRENT STATUS: CJ - 20%-39% impaired, limited or restricted    - GOAL STATUS: CI - 1%-19% impaired, limited or restricted    - D/C STATUS:  ---------------To be determined---------------    Medical Necessity:   · Patient is expected to demonstrate progress in strength, range of motion and functional technique to increase independence with household ADL's. · Patient demonstrates good rehab potential due to higher previous functional level. Reason for Services/Other Comments:  · Patient continues to require skilled intervention due to decreased ROM/strength L knee with increased pain leading to decreased functional status. Use of outcome tool(s) and clinical judgement create a POC that gives a: Questionable prediction of patient's progress: MODERATE COMPLEXITY            TREATMENT:   (In addition to Assessment/Re-Assessment sessions the following treatments were rendered)  Pre-treatment Symptoms/Complaints:  Pt states her knee is doing pretty good. She states she gets soreness in the knee with prolonged sitting. She states she feels better after her therapy sessions. Pt reports some R knee and LBP also. Pain: Initial:     5/10 Post Session:  5/10     THERAPEUTIC EXERCISE: (30 minutes):  Exercises per grid below to improve mobility and strength.   Required minimal verbal cues to promote proper body alignment and promote proper body posture. Progressed resistance, range and repetitions as indicated. MANUAL THERAPY: (10 minutes): Joint mobilization and Soft tissue mobilization was utilized and necessary because of the patient's restricted joint motion. MODALITIES: (10 minutes):      Cold pack to L knee x10 minutes to decrease pain. Skin clear afterwards. Date:  06-23-17 Date:  06-26-17 Date:  06-28-17 Date  07-05-17   Activity/Exercise Parameters Parameters Parameters    Quad Sets with Towel Roll Under Heel 15 reps  5 sec holds 20 reps  5 sec holds 20 reps  5 sec holds 20 reps  5 sec holds   SLR Flexion 15 reps  3 sec holds 20 reps  3 sec holds 20 reps  3 sec holds 20 reps  3 sec holds   SAQ's  LAQ 15 reps  5 sec holds #1 2 Lbs  20 reps each 2 Lbs  20 reps each 3 Lbs  20 reps each   Heel Slides with Strap for OP 15 reps  5 sec holds 20 reps  5 sec holds     Gastroc Stretch with Strap 3 reps  20 sec holds On Slantboard  3 reps  20 sec holds On Slantboard  3 reps  20 sec holds On Slantboard  3 reps  20 sec holds   NuStep Level 4  8 minutes Level 4  8 minutes Level 4  8 minutes Level 4  8 minutes   Standing Heel/Toe Raises  15 reps 20 reps 20 reps   Step-Ups  4 inch  20 reps 4 inch  20 reps 4 inch  20 reps   TKE with T-band   Black  20 reps Black  20 reps   Nautilus Leg Press    35 Lbs  20 reps         · Treatment/Session Assessment:   Pt tolerated progression of exercises well today. · Response to Treatment:    · Compliance with Program/Exercises: Compliant. · Recommendations/Intent for next treatment session: \"Next visit will focus on advancements to more challenging activities\".   Total Treatment Duration: 50 minutes  PT Patient Time In/Time Out  Time In: 1300  Time Out: [address] [facility], PT

## 2017-07-09 ENCOUNTER — HOSPITAL ENCOUNTER (EMERGENCY)
Age: 72
Discharge: HOME | End: 2017-07-09
Attending: EMERGENCY MEDICINE
Payer: MEDICARE

## 2017-07-09 ENCOUNTER — APPOINTMENT (OUTPATIENT)
Dept: ULTRASOUND IMAGING | Age: 72
End: 2017-07-09
Attending: EMERGENCY MEDICINE
Payer: MEDICARE

## 2017-07-09 VITALS
WEIGHT: 170 LBS | OXYGEN SATURATION: 98 % | HEART RATE: 86 BPM | DIASTOLIC BLOOD PRESSURE: 76 MMHG | TEMPERATURE: 98 F | BODY MASS INDEX: 29.18 KG/M2 | RESPIRATION RATE: 20 BRPM | SYSTOLIC BLOOD PRESSURE: 148 MMHG

## 2017-07-09 DIAGNOSIS — R53.83 FATIGUE, UNSPECIFIED TYPE: Primary | ICD-10-CM

## 2017-07-09 LAB
ALBUMIN SERPL BCP-MCNC: 4 G/DL (ref 3.2–4.6)
ALBUMIN/GLOB SERPL: 1 {RATIO} (ref 1.2–3.5)
ALP SERPL-CCNC: 77 U/L (ref 50–136)
ALT SERPL-CCNC: 20 U/L (ref 12–65)
ANION GAP BLD CALC-SCNC: 11 MMOL/L (ref 7–16)
AST SERPL W P-5'-P-CCNC: 18 U/L (ref 15–37)
BACTERIA URNS QL MICRO: 0 /HPF
BASOPHILS # BLD AUTO: 0 K/UL (ref 0–0.2)
BASOPHILS # BLD: 0 % (ref 0–2)
BILIRUB SERPL-MCNC: 0.3 MG/DL (ref 0.2–1.1)
BUN SERPL-MCNC: 20 MG/DL (ref 8–23)
CALCIUM SERPL-MCNC: 9.7 MG/DL (ref 8.3–10.4)
CASTS URNS QL MICRO: 0 /LPF
CHLORIDE SERPL-SCNC: 102 MMOL/L (ref 98–107)
CO2 SERPL-SCNC: 29 MMOL/L (ref 21–32)
CREAT SERPL-MCNC: 0.97 MG/DL (ref 0.6–1)
DIFFERENTIAL METHOD BLD: ABNORMAL
EOSINOPHIL # BLD: 0.1 K/UL (ref 0–0.8)
EOSINOPHIL NFR BLD: 1 % (ref 0.5–7.8)
EPI CELLS #/AREA URNS HPF: NORMAL /HPF
ERYTHROCYTE [DISTWIDTH] IN BLOOD BY AUTOMATED COUNT: 13.9 % (ref 11.9–14.6)
GLOBULIN SER CALC-MCNC: 4.2 G/DL (ref 2.3–3.5)
GLUCOSE SERPL-MCNC: 86 MG/DL (ref 65–100)
HCT VFR BLD AUTO: 39.7 % (ref 35.8–46.3)
HGB BLD-MCNC: 12.6 G/DL (ref 11.7–15.4)
IMM GRANULOCYTES # BLD: 0 K/UL (ref 0–0.5)
IMM GRANULOCYTES NFR BLD AUTO: 0.1 % (ref 0–5)
LYMPHOCYTES # BLD AUTO: 24 % (ref 13–44)
LYMPHOCYTES # BLD: 1.7 K/UL (ref 0.5–4.6)
MAGNESIUM SERPL-MCNC: 1.8 MG/DL (ref 1.8–2.4)
MCH RBC QN AUTO: 27 PG (ref 26.1–32.9)
MCHC RBC AUTO-ENTMCNC: 31.7 G/DL (ref 31.4–35)
MCV RBC AUTO: 85.2 FL (ref 79.6–97.8)
MONOCYTES # BLD: 0.9 K/UL (ref 0.1–1.3)
MONOCYTES NFR BLD AUTO: 13 % (ref 4–12)
NEUTS SEG # BLD: 4.5 K/UL (ref 1.7–8.2)
NEUTS SEG NFR BLD AUTO: 62 % (ref 43–78)
PLATELET # BLD AUTO: 256 K/UL (ref 150–450)
PMV BLD AUTO: 11.7 FL (ref 10.8–14.1)
POTASSIUM SERPL-SCNC: 3.2 MMOL/L (ref 3.5–5.1)
PROT SERPL-MCNC: 8.2 G/DL (ref 6.3–8.2)
RBC # BLD AUTO: 4.66 M/UL (ref 4.05–5.25)
RBC #/AREA URNS HPF: NORMAL /HPF
SODIUM SERPL-SCNC: 142 MMOL/L (ref 136–145)
WBC # BLD AUTO: 7.2 K/UL (ref 4.3–11.1)
WBC URNS QL MICRO: 0 /HPF

## 2017-07-09 PROCEDURE — 93971 EXTREMITY STUDY: CPT

## 2017-07-09 PROCEDURE — 81015 MICROSCOPIC EXAM OF URINE: CPT | Performed by: EMERGENCY MEDICINE

## 2017-07-09 PROCEDURE — 80053 COMPREHEN METABOLIC PANEL: CPT | Performed by: EMERGENCY MEDICINE

## 2017-07-09 PROCEDURE — 85025 COMPLETE CBC W/AUTO DIFF WBC: CPT | Performed by: EMERGENCY MEDICINE

## 2017-07-09 PROCEDURE — 99283 EMERGENCY DEPT VISIT LOW MDM: CPT | Performed by: EMERGENCY MEDICINE

## 2017-07-09 PROCEDURE — 81003 URINALYSIS AUTO W/O SCOPE: CPT | Performed by: EMERGENCY MEDICINE

## 2017-07-09 PROCEDURE — 83735 ASSAY OF MAGNESIUM: CPT | Performed by: EMERGENCY MEDICINE

## 2017-07-09 RX ORDER — HYDROCODONE BITARTRATE AND ACETAMINOPHEN 7.5; 325 MG/1; MG/1
TABLET ORAL
COMMUNITY
End: 2017-11-21

## 2017-07-09 NOTE — ED PROVIDER NOTES
HPI Comments: Patient presents to the ER complaining of some generalized fatigue. Patient states symptoms have been present for the past week and a half. Denies any shortness of breath. Does report some sinus drainage and some minimal cough. She is status post remote left knee replacement. Was seen at urgent care's directly to the ER for further evaluation. She does report some left lower extremity swelling however she feels this is stable and slowly improving since surgery. Denies any chest pain or pleuritic type symptoms. She denies any fevers or chills. Denies any difficulty ambulating    Patient is a 81 y.o. female presenting with fatigue. The history is provided by the patient. Fatigue   This is a new problem. The current episode started more than 1 week ago. The problem has not changed since onset. Pertinent negatives include no focal weakness, no movement disorder, no agitation, no mental status change, no unresponsiveness and no disorientation. Pertinent negatives include no chest pain, no vomiting, no confusion, no headaches, no choking and no bowel incontinence.         Past Medical History:   Diagnosis Date    Anxiety     Arthritis     back and knee's     Breast cancer ([facility] [address] 1/18/2016    Left Lumpectomy    Breast cancer ([facility] [address]     Cancer ([facility] [address]     melanoma left face,  left breast ca     Chronic pain     back    Former smoker     was a passive smoker for 40 yrs; stopped in 2014    GERD (gastroesophageal reflux disease)     Hard of hearing     bilateral hearing aids    Headache     hx monthly migraines; rare now    Hypertension     controlled with med    Hypothyroidism     Insomnia     Status post total left knee replacement 5/12/2017       Past Surgical History:   Procedure Laterality Date    BREAST SURGERY PROCEDURE UNLISTED Left 7/2015    biopsy    HX BREAST BIOPSY Left 1/18/2016    BREAST NEEDLE LOCALIZED PARTIAL MASTECTOMY/ LEFT //   performed by Erik Teran MD at [place] [address] OR    HX BREAST LUMPECTOMY Left 01/2016    HX COLONOSCOPY      HX TONSILLECTOMY  1957    HX VASCULAR ACCESS Right     port right upper chest- removed 2016    HX WISDOM TEETH EXTRACTION      UT MASTECTOMY, PARTIAL Left 01/18/2016         Family History:   Problem Relation Age of Onset    Cancer Mother      leukemia    Hypertension Mother     Stroke Mother     Heart Disease Father     Hypertension Father     Stroke Sister     Hypertension Sister     Asthma Paternal Grandfather     Breast Cancer Neg Hx        Social History     Social History    Marital status:      Spouse name: N/A    Number of children: N/A    Years of education: N/A     Occupational History    Not on file. Social History Main Topics    Smoking status: Former Smoker     Years: 40.00     Types: Cigarettes     Quit date: 1/13/2014    Smokeless tobacco: Never Used      Comment: was a passive smoker for 40 yrs    Alcohol use 0.0 oz/week     0 Standard drinks or equivalent per week      Comment: occasionally    Drug use: No    Sexual activity: Not on file     Other Topics Concern    Not on file     Social History Narrative         ALLERGIES: Review of patient's allergies indicates no known allergies. Review of Systems   Constitutional: Positive for fatigue. Negative for chills and diaphoresis. HENT: Positive for congestion. Negative for dental problem. Eyes: Negative for photophobia, redness and visual disturbance. Respiratory: Negative for choking, chest tightness, wheezing and stridor. Cardiovascular: Positive for leg swelling. Negative for chest pain and palpitations. Gastrointestinal: Negative for abdominal pain, bowel incontinence and vomiting. Endocrine: Negative for polydipsia, polyphagia and polyuria. Genitourinary: Negative for flank pain, frequency and urgency. Musculoskeletal: Negative for back pain and gait problem. Skin: Negative for pallor and rash.    Allergic/Immunologic: Negative for food allergies and immunocompromised state. Neurological: Negative for focal weakness, seizures, numbness and headaches. Hematological: Negative for adenopathy. Does not bruise/bleed easily. Psychiatric/Behavioral: Negative for agitation and confusion. All other systems reviewed and are negative. Vitals:    07/09/17 1353   BP: 147/74   Pulse: 88   Resp: 18   Temp: 98 °F (36.7 °C)   SpO2: 98%   Weight: 77.1 kg (170 lb)            Physical Exam   Constitutional: She is oriented to person, place, and time. She appears well-developed and well-nourished. HENT:   Head: Normocephalic and atraumatic. Mouth/Throat: Oropharynx is clear and moist.   Eyes: Conjunctivae and EOM are normal. Pupils are equal, round, and reactive to light. No scleral icterus. Neck: Normal range of motion. Neck supple. No thyromegaly present. Cardiovascular: Normal rate, regular rhythm and intact distal pulses. Exam reveals no gallop. No murmur heard. Pulmonary/Chest: Effort normal and breath sounds normal. No respiratory distress. Abdominal: Soft. Bowel sounds are normal. She exhibits no distension. Musculoskeletal: She exhibits edema. Left lower leg: She exhibits swelling. Legs:  Neurological: She is alert and oriented to person, place, and time. She has normal reflexes. No cranial nerve deficit. Nursing note and vitals reviewed. MDM  Number of Diagnoses or Management Options  Fatigue, unspecified type:   Diagnosis management comments: We'll check basic labs, urine as well as magnesium here  Patient has a history of hypomagnesemia as well as frequent UTIs. These may be causing her generalized fatigue  Her vital signs are stable including normal heart rate, blood pressure as well as room air oxygen saturation  Low suspicion for any emergent pathology although I will obtain left lower extremity duplex to rule out DVT    5:24 PM  Lab stable. Normal urinalysis. Ultrasound negative for DVT. Results discussed the patient. Patient states she currently is getting a migraine as well as getting anxious and is ready to go home. She is accompanied by her . Patient left the ER before discharge paperwork could be given to her inside       Amount and/or Complexity of Data Reviewed  Clinical lab tests: ordered and reviewed  Tests in the radiology section of CPT®: ordered and reviewed    Risk of Complications, Morbidity, and/or Mortality  Presenting problems: moderate  Diagnostic procedures: moderate  Management options: moderate    Patient Progress  Patient progress: stable    ED Course       Procedures           Results Include:    Recent Results (from the past 24 hour(s))   URINE MICROSCOPIC    Collection Time: 07/09/17  3:50 PM   Result Value Ref Range    WBC 0 0 /hpf    RBC 5-10 0 /hpf    Epithelial cells 0-3 0 /hpf    Bacteria 0 0 /hpf    Casts 0 0 /lpf   CBC WITH AUTOMATED DIFF    Collection Time: 07/09/17  3:52 PM   Result Value Ref Range    WBC 7.2 4.3 - 11.1 K/uL    RBC 4.66 4.05 - 5.25 M/uL    HGB 12.6 11.7 - 15.4 g/dL    HCT 39.7 35.8 - 46.3 %    MCV 85.2 79.6 - 97.8 FL    MCH 27.0 26.1 - 32.9 PG    MCHC 31.7 31.4 - 35.0 g/dL    RDW 13.9 11.9 - 14.6 %    PLATELET 674 837 - 064 K/uL    MPV 11.7 10.8 - 14.1 FL    DF AUTOMATED      NEUTROPHILS 62 43 - 78 %    LYMPHOCYTES 24 13 - 44 %    MONOCYTES 13 (H) 4.0 - 12.0 %    EOSINOPHILS 1 0.5 - 7.8 %    BASOPHILS 0 0.0 - 2.0 %    IMMATURE GRANULOCYTES 0.1 0.0 - 5.0 %    ABS. NEUTROPHILS 4.5 1.7 - 8.2 K/UL    ABS. LYMPHOCYTES 1.7 0.5 - 4.6 K/UL    ABS. MONOCYTES 0.9 0.1 - 1.3 K/UL    ABS. EOSINOPHILS 0.1 0.0 - 0.8 K/UL    ABS. BASOPHILS 0.0 0.0 - 0.2 K/UL    ABS. IMM.  GRANS. 0.0 0.0 - 0.5 K/UL   METABOLIC PANEL, COMPREHENSIVE    Collection Time: 07/09/17  3:52 PM   Result Value Ref Range    Sodium 142 136 - 145 mmol/L    Potassium 3.2 (L) 3.5 - 5.1 mmol/L    Chloride 102 98 - 107 mmol/L    CO2 29 21 - 32 mmol/L    Anion gap 11 7 - 16 mmol/L    Glucose 86 65 - 100 mg/dL    BUN 20 8 - 23 MG/DL    Creatinine 0.97 0.6 - 1.0 MG/DL    GFR est AA >60 >60 ml/min/1.73m2    GFR est non-AA >60 >60 ml/min/1.73m2    Calcium 9.7 8.3 - 10.4 MG/DL    Bilirubin, total 0.3 0.2 - 1.1 MG/DL    ALT (SGPT) 20 12 - 65 U/L    AST (SGOT) 18 15 - 37 U/L    Alk.  phosphatase 77 50 - 136 U/L    Protein, total 8.2 6.3 - 8.2 g/dL    Albumin 4.0 3.2 - 4.6 g/dL    Globulin 4.2 (H) 2.3 - 3.5 g/dL    A-G Ratio 1.0 (L) 1.2 - 3.5     MAGNESIUM    Collection Time: 07/09/17  3:52 PM   Result Value Ref Range    Magnesium 1.8 1.8 - 2.4 mg/dL

## 2017-07-09 NOTE — DISCHARGE INSTRUCTIONS
Fatigue: Care Instructions  Your Care Instructions  Fatigue is a feeling of tiredness, exhaustion, or lack of energy. You may feel fatigue because of too much or not enough activity. It can also come from stress, lack of sleep, boredom, and poor diet. Many medical problems, such as viral infections, can cause fatigue. Emotional problems, especially depression, are often the cause of fatigue. Fatigue is most often a symptom of another problem. Treatment for fatigue depends on the cause. For example, if you have fatigue because you have a certain health problem, treating this problem also treats your fatigue. If depression or anxiety is the cause, treatment may help. Follow-up care is a key part of your treatment and safety. Be sure to make and go to all appointments, and call your doctor if you are having problems. It's also a good idea to know your test results and keep a list of the medicines you take. How can you care for yourself at home? · Get regular exercise. But don't overdo it. Go back and forth between rest and exercise. · Get plenty of rest.  · Eat a healthy diet. Do not skip meals, especially breakfast.  · Reduce your use of caffeine, tobacco, and alcohol. Caffeine is most often found in coffee, tea, cola drinks, and chocolate. · Limit medicines that can cause fatigue. This includes tranquilizers and cold and allergy medicines. When should you call for help? Watch closely for changes in your health, and be sure to contact your doctor if:  · You have new symptoms such as fever or a rash. · Your fatigue gets worse. · You have been feeling down, depressed, or hopeless. Or you may have lost interest in things that you usually enjoy. · You are not getting better as expected. Where can you learn more? Go to http://teo-avis.info/. Enter F027 in the search box to learn more about \"Fatigue: Care Instructions. \"  Current as of: March 20, 2017  Content Version: 11.3  © 9940-0459 [employer]. Care instructions adapted under license by [employer] (which disclaims liability or warranty for this information). If you have questions about a medical condition or this instruction, always ask your healthcare professional. [place] [address] any warranty or liability for your use of this information.

## 2017-07-09 NOTE — ED TRIAGE NOTES
Pt states at urgent care, told to come for possible blood clot. Pt states fatigue, had left knee surgery 6 weeks ago.

## 2017-07-10 ENCOUNTER — HOSPITAL ENCOUNTER (OUTPATIENT)
Dept: PHYSICAL THERAPY | Age: 72
Discharge: HOME | End: 2017-07-10
Payer: MEDICARE

## 2017-07-12 ENCOUNTER — HOSPITAL ENCOUNTER (OUTPATIENT)
Dept: PHYSICAL THERAPY | Age: 72
Discharge: HOME | End: 2017-07-12
Payer: MEDICARE

## 2017-07-12 PROCEDURE — 97110 THERAPEUTIC EXERCISES: CPT

## 2017-07-12 PROCEDURE — 97140 MANUAL THERAPY 1/> REGIONS: CPT

## 2017-07-14 ENCOUNTER — APPOINTMENT (OUTPATIENT)
Dept: PHYSICAL THERAPY | Age: 72
End: 2017-07-14
Payer: MEDICARE

## 2017-07-18 ENCOUNTER — HOSPITAL ENCOUNTER (OUTPATIENT)
Dept: PHYSICAL THERAPY | Age: 72
Discharge: HOME | End: 2017-07-18
Payer: MEDICARE

## 2017-07-18 PROCEDURE — 97140 MANUAL THERAPY 1/> REGIONS: CPT

## 2017-07-18 PROCEDURE — 97110 THERAPEUTIC EXERCISES: CPT

## 2017-07-18 NOTE — PROGRESS NOTES
Maryann Harvey  : 1945 [facility] [place] [address] [place] [address] [facility]  [phone]   [phone]           OUTPATIENT PHYSICAL THERAPY:Daily Note 2017    ICD-10: Treatment Diagnosis: Pain in left knee (M25.562); Stiffness of left knee, not elsewhere classified (M25.662); Difficulty in walking, not elsewhere classified (R26.2)  Precautions/Allergies:   Review of patient's allergies indicates no known allergies. Fall Risk Score: 1 (? 5 = High Risk)  MD Orders: Evaluate and Treat MEDICAL/REFERRING DIAGNOSIS:  Presence of left artificial knee joint [Z96.652]   DATE OF ONSET: Surgery 17  REFERRING PHYSICIAN: Nikki Wise, *  RETURN PHYSICIAN APPOINTMENT: 06-15-17     INITIAL ASSESSMENT:  Ms. Alexander Mendoza presents with decreased ROM/strength L knee with increased pain leading to decreased functional status. Pt would benefit from skilled physical therapy services to address the above deficits and help patient return to prior level of function. PROBLEM LIST (Impacting functional limitations):  1. Decreased Strength  2. Decreased ADL/Functional Activities  3. Increased Pain  4. Decreased Flexibility/Joint Mobility  5. Decreased [place] with Home Exercise Program INTERVENTIONS PLANNED:  1. Balance Exercise  2. Cold  3. Cryotherapy  4. Electrical Stimulation  5. Gait Training  6. Home Exercise Program (HEP)  7. Manual Therapy  8. Range of Motion (ROM)  9. Therapeutic Exercise/Strengthening  10. Aquatic Therapy   TREATMENT PLAN:  Effective Dates: 17 TO 17. Frequency/Duration: 2 times a week for 3 months  GOALS: (Goals have been discussed and agreed upon with patient.)  Short-Term Functional Goals: Time Frame: 4 weeks  1. Pt will increase ROM L knee 0-120 degrees to assist with ascending/descending stairs  2.  Pt will increase strength L knee 4+/5 to assist with ascending/descending stairs  Discharge Goals: Time Frame: 12 weeks  1. Pt will increase strength L knee 5/5 to assist with ascending/descending stairs  2. Pt will be independent with HEP  3. Pt will ascend/descend 10 eight inch steps independently with no c/o L knee pain  4. Pt will work perform 20 minutes household cooking and cleaning activities independently with min to no c/o L knee pain  Rehabilitation Potential For Stated Goals: Good  Regarding [facility] therapy, I certify that the treatment plan above will be carried out by a therapist or under their direction. Thank you for this referral,  Mariia Stevens, PT     Referring Physician Signature: Laura Briceño, *              Date                    The information in this section was collected on 06-14-17 (except where otherwise noted). HISTORY:   History of Present Injury/Illness (Reason for Referral):  Pt reports insidious onset bilateral knee pain of several years duration. She had physical therapy with continued pain so she opted for Left TKA which was performed 05-12-17. Pt spent 2 nights in the hospital followed by home health PT until last Friday. She rates her current L knee pain 3-4/10, increasing to 10/10 at times. She is taking hydromorphone for pain. Pt is walking with a straight cane. She reports difficulties ascending/descending stairs due to her L knee pain. She is planning to have her R knee replaced in November 2017. Pt is currently having difficulties with all household cooking and cleaning activities due to her L knee surgery. Past Medical History/Comorbidities:   Ms. Gisela Cochran  has a past medical history of Anxiety; Arthritis; Breast cancer ([facility] [address] (1/18/2016); Breast cancer ([facility] [address] Cancer ([facility] [address] Chronic pain; Former smoker; GERD (gastroesophageal reflux disease); Hard of hearing; Headache; Hypertension; Hypothyroidism; Insomnia; and Status post total left knee replacement (5/12/2017).  She also has no past medical history of Adverse effect of anesthesia; Aneurysm ([address] Arrhythmia; Asthma; Autoimmune disease ([address] CAD (coronary artery disease); Chronic kidney disease; Chronic obstructive pulmonary disease ([address] Coagulation disorder ([address] Diabetes ([address] Difficult intubation; Endocarditis; Heart failure ([address] Ill-defined condition; Liver disease; Malignant hyperthermia due to anesthesia; Morbid obesity ([address] Pseudocholinesterase deficiency; Psychiatric disorder; PUD (peptic ulcer disease); Rheumatic fever; Seizures ([address] Sleep apnea; Stroke [facility]); Thromboembolus ([facility] [address] or Unspecified adverse effect of anesthesia. Ms. [facility]  has a past surgical history that includes colonoscopy; wisdom teeth extraction; tonsillectomy (1957); vascular access (Right); breast surgery procedure unlisted (Left, 7/2015); breast biopsy (Left, 1/18/2016); mastectomy, partial (Left, 01/18/2016); and breast lumpectomy (Left, 01/2016). Social History/Living Environment:     Pt lives with her spouse (currently has colon cancer) in a one-story house  Prior Level of Function/Work/Activity:  Independent with all household ADL's  Dominant Side:         RIGHT  Other Clinical Tests:          X-rays  Previous Treatment Approaches:          Left TKA 05-12-17  Personal Factors:          Sex:  female        Age:  81 y.o. Profession:  Pt is retired        Past/Current Experience:  Pt helps care for her  who has cancer  Current Medications:       Current Outpatient Prescriptions:     temazepam (RESTORIL) 15 mg capsule, Take 1 Cap by mouth nightly. Max Daily Amount: 15 mg., Disp: 30 Cap, Rfl: 5    HYDROcodone-acetaminophen (NORCO) 7.5-325 mg per tablet, Take  by mouth., Disp: , Rfl:     acetaminophen (TYLENOL) 500 mg tablet, Take 1,000 mg by mouth every six (6) hours as needed for Pain., Disp: , Rfl:     HYDROmorphone (DILAUDID) 2 mg tablet, Take 1 Tab by mouth every four (4) hours as needed.  Max Daily Amount: 12 mg., Disp: 40 Tab, Rfl: 0    OTHER,NON-FORMULARY,, Take 4 Tabs by mouth daily. Relief Factor Tablets, Disp: , Rfl:     Biotin 2,500 mcg cap, Take 2 Tabs by mouth daily. , Disp: , Rfl:     OTHER,NON-FORMULARY,, Take 1 Tab by mouth daily. Air Borne Chewable tab, Disp: , Rfl:     polycarbophil calcium (EQUALACTIN) 500 mg chew, Take 1,500 mg by mouth daily. , Disp: , Rfl:     levothyroxine (SYNTHROID) 25 mcg tablet, TAKE ONE TABLET BY MOUTH ONE TIME DAILY BEFORE BREAKFAST, Disp: 30 Tab, Rfl: 6    hydroCHLOROthiazide (HYDRODIURIL) 25 mg tablet, TAKE ONE TABLET BY MOUTH ONE TIME DAILY, Disp: 30 Tab, Rfl: 5    multivitamin (ONE A DAY) tablet, Take 1 Tab by mouth daily. , Disp: , Rfl:     esomeprazole (NEXIUM) 20 mg capsule, Take 20 mg by mouth every morning. Take / use AM day of surgery  per anesthesia protocols. Indications: GASTROESOPHAGEAL REFLUX, Disp: , Rfl:    Date Last Reviewed:  06-14-17   Number of Personal Factors/Comorbidities that affect the Plan of Care: 1-2: MODERATE COMPLEXITY   EXAMINATION:   Observation/Orthostatic Postural Assessment:          Pt ambulates with mild stiff knee gait and uses a straight cane  Palpation:          Generalized tenderness throughout L knee  ROM:    R knee extension = -10 degrees  R knee flexion = 128 degrees    L knee extension = -5 degrees  L knee flexion = 123 degrees    Strength:    R knee extension = 5/5  R knee flexion = 5/5    L knee extension = 4/5  L knee flexion = 4/5    Neurological Screen:        Sensation: Intact to light touch L LE  Functional Mobility:         Gait/Ambulation:  Pt ambulates with straight cane        Transfers:  Pt transitions sit to supine and supine to sit independently     Body Structures Involved:  1. Bones  2. Joints  3. Muscles Body Functions Affected:  1. Sensory/Pain  2. Neuromusculoskeletal Activities and Participation Affected:  1. Mobility  2. Self Care  3.  Domestic Life   Number of elements (examined above) that affect the Plan of Care: 3: MODERATE COMPLEXITY   CLINICAL PRESENTATION: Presentation: Evolving clinical presentation with changing clinical characteristics: MODERATE COMPLEXITY   CLINICAL DECISION MAKING:   Outcome Measure: Tool Used: Lower Extremity Functional Scale (LEFS)  Score:  Initial: 53/80 Most Recent: X/80 (Date: -- )   Interpretation of Score: 20 questions each scored on a 5 point scale with 0 representing \"extreme difficulty or unable to perform\" and 4 representing \"no difficulty\". The lower the score, the greater the functional disability. 80/80 represents no disability. Minimal detectable change is 9 points. Score 80 79-63 62-48 47-32 31-16 15-1 0   Modifier CH CI CJ CK CL CM CN     ? Mobility - Walking and Moving Around:     - CURRENT STATUS: CJ - 20%-39% impaired, limited or restricted    - GOAL STATUS: CI - 1%-19% impaired, limited or restricted    - D/C STATUS:  ---------------To be determined---------------    Medical Necessity:   · Patient is expected to demonstrate progress in strength, range of motion and functional technique to increase independence with household ADL's. · Patient demonstrates good rehab potential due to higher previous functional level. Reason for Services/Other Comments:  · Patient continues to require skilled intervention due to decreased ROM/strength L knee with increased pain leading to decreased functional status. Use of outcome tool(s) and clinical judgement create a POC that gives a: Questionable prediction of patient's progress: MODERATE COMPLEXITY            TREATMENT:   (In addition to Assessment/Re-Assessment sessions the following treatments were rendered)  Pre-treatment Symptoms/Complaints:  Pt reports that her knee is swollen and sore today. Pain: Initial:   Pain Intensity 1: 2  Post Session:  2/10     THERAPEUTIC EXERCISE: (30 minutes):  Exercises per grid below to improve mobility and strength. Required minimal verbal cues to promote proper body alignment and promote proper body posture.   Progressed resistance, range and repetitions as indicated. MANUAL THERAPY: (10 minutes): Joint mobilization and Soft tissue mobilization was utilized and necessary because of the patient's restricted joint motion. MODALITIES: (0 minutes):      Cold pack to L knee x10 minutes to decrease pain. Skin clear afterwards. Date  07-12-17 Date:  07-18-17   Activity/Exercise     Quad Sets with Towel Roll Under Heel     SLR Flexion 20 reps 20 reps   SAQ's  LAQ 3 Lbs  20 reps each    Heel Slides with Strap for OP 20 reps    Gastroc Stretch with Strap On Slantboard  3 reps  20 sec holds slantboard 3x20 sec horld   NuStep Level 4  10 minutes 10 min level 4.0   Standing Heel/Toe Raises 20 reps 20 reps   Step-Ups 4 inch   20 reps 6 inches x 20 reps   TKE with T-band Black  20 reps Black x 20 reps   Nautilus Leg Press 35 Lbs  20 reps 35 lbs x 20 reps         · Treatment/Session Assessment:   Pt demonstrated excellent improvement in joint mobility and gait by end of session. · Response to Treatment:    · Compliance with Program/Exercises: Compliant. · Recommendations/Intent for next treatment session: \"Next visit will focus on advancements to more challenging activities\".   Total Treatment Duration: 50 minutes  PT Patient Time In/Time Out  Time In: 1307  Time Out: 1 [facility]

## 2017-07-20 ENCOUNTER — HOSPITAL ENCOUNTER (OUTPATIENT)
Dept: PHYSICAL THERAPY | Age: 72
Discharge: HOME | End: 2017-07-20
Payer: MEDICARE

## 2017-07-20 PROCEDURE — 97016 VASOPNEUMATIC DEVICE THERAPY: CPT

## 2017-07-20 PROCEDURE — 97140 MANUAL THERAPY 1/> REGIONS: CPT

## 2017-07-20 PROCEDURE — 97110 THERAPEUTIC EXERCISES: CPT

## 2017-07-20 NOTE — PROGRESS NOTES
David Kaplan  : 1945 [facility] [place] [address], [place] [address].  [phone]   [phone]           OUTPATIENT PHYSICAL THERAPY:Daily Note 2017    ICD-10: Treatment Diagnosis: Pain in left knee (M25.562); Stiffness of left knee, not elsewhere classified (M25.662); Difficulty in walking, not elsewhere classified (R26.2)  Precautions/Allergies:   Review of patient's allergies indicates no known allergies. Fall Risk Score: 1 (? 5 = High Risk)  MD Orders: Evaluate and Treat MEDICAL/REFERRING DIAGNOSIS:  Presence of left artificial knee joint [Z96.652]   DATE OF ONSET: Surgery 17  REFERRING PHYSICIAN: David Mason., *  RETURN PHYSICIAN APPOINTMENT: 06-15-17     INITIAL ASSESSMENT:  Ms. Odilon Armstrong presents with decreased ROM/strength L knee with increased pain leading to decreased functional status. Pt would benefit from skilled physical therapy services to address the above deficits and help patient return to prior level of function. PROBLEM LIST (Impacting functional limitations):  1. Decreased Strength  2. Decreased ADL/Functional Activities  3. Increased Pain  4. Decreased Flexibility/Joint Mobility  5. Decreased [place] with Home Exercise Program INTERVENTIONS PLANNED:  1. Balance Exercise  2. Cold  3. Cryotherapy  4. Electrical Stimulation  5. Gait Training  6. Home Exercise Program (HEP)  7. Manual Therapy  8. Range of Motion (ROM)  9. Therapeutic Exercise/Strengthening  10. Aquatic Therapy   TREATMENT PLAN:  Effective Dates: 17 TO 17. Frequency/Duration: 2 times a week for 3 months  GOALS: (Goals have been discussed and agreed upon with patient.)  Short-Term Functional Goals: Time Frame: 4 weeks  1. Pt will increase ROM L knee 0-120 degrees to assist with ascending/descending stairs  2.  Pt will increase strength L knee 4+/5 to assist with ascending/descending stairs  Discharge Goals: Time Frame: 12 weeks  1. Pt will increase strength L knee 5/5 to assist with ascending/descending stairs  2. Pt will be independent with HEP  3. Pt will ascend/descend 10 eight inch steps independently with no c/o L knee pain  4. Pt will work perform 20 minutes household cooking and cleaning activities independently with min to no c/o L knee pain  Rehabilitation Potential For Stated Goals: Good  Regarding Amarilys Rust's therapy, I certify that the treatment plan above will be carried out by a therapist or under their direction. Thank you for this referral,  Mira Cunningham, PT     Referring Physician Signature: Shira Gaspar., *              Date                    The information in this section was collected on 06-14-17 (except where otherwise noted). HISTORY:   History of Present Injury/Illness (Reason for Referral):  Pt reports insidious onset bilateral knee pain of several years duration. She had physical therapy with continued pain so she opted for Left TKA which was performed 05-12-17. Pt spent 2 nights in the hospital followed by home health PT until last Friday. She rates her current L knee pain 3-4/10, increasing to 10/10 at times. She is taking hydromorphone for pain. Pt is walking with a straight cane. She reports difficulties ascending/descending stairs due to her L knee pain. She is planning to have her R knee replaced in November 2017. Pt is currently having difficulties with all household cooking and cleaning activities due to her L knee surgery. Past Medical History/Comorbidities:   Ms. Rubio Pinon  has a past medical history of Anxiety; Arthritis; Breast cancer ([facility] [address] (1/18/2016); Breast cancer ([address] Cancer ([address] Chronic pain; Former smoker; GERD (gastroesophageal reflux disease); Hard of hearing; Headache; Hypertension; Hypothyroidism; Insomnia; and Status post total left knee replacement (5/12/2017).  She also has no past medical history of Adverse effect of anesthesia; Aneurysm ([facility] [address] Arrhythmia; Asthma; Autoimmune disease ([facility] [address] CAD (coronary artery disease); Chronic kidney disease; Chronic obstructive pulmonary disease ([address] Coagulation disorder ([facility] [address] Diabetes ([address] Difficult intubation; Endocarditis; Heart failure ([address] Ill-defined condition; Liver disease; Malignant hyperthermia due to anesthesia; Morbid obesity ([facility] [address] Pseudocholinesterase deficiency; Psychiatric disorder; PUD (peptic ulcer disease); Rheumatic fever; Seizures ([facility] [address] Sleep apnea; Stroke [facility]); Thromboembolus ([facility] [address] or Unspecified adverse effect of anesthesia. Ms. Radha Gaines  has a past surgical history that includes colonoscopy; wisdom teeth extraction; tonsillectomy (1957); vascular access (Right); breast surgery procedure unlisted (Left, 7/2015); breast biopsy (Left, 1/18/2016); mastectomy, partial (Left, 01/18/2016); and breast lumpectomy (Left, 01/2016). Social History/Living Environment:     Pt lives with her spouse (currently has colon cancer) in a one-story house  Prior Level of Function/Work/Activity:  Independent with all household ADL's  Dominant Side:         RIGHT  Other Clinical Tests:          X-rays  Previous Treatment Approaches:          Left TKA 05-12-17  Personal Factors:          Sex:  female        Age:  81 y.o. Profession:  Pt is retired        Past/Current Experience:  Pt helps care for her  who has cancer  Current Medications:       Current Outpatient Prescriptions:     temazepam (RESTORIL) 15 mg capsule, Take 1 Cap by mouth nightly. Max Daily Amount: 15 mg., Disp: 30 Cap, Rfl: 5    HYDROcodone-acetaminophen (NORCO) 7.5-325 mg per tablet, Take  by mouth., Disp: , Rfl:     acetaminophen (TYLENOL) 500 mg tablet, Take 1,000 mg by mouth every six (6) hours as needed for Pain., Disp: , Rfl:     HYDROmorphone (DILAUDID) 2 mg tablet, Take 1 Tab by mouth every four (4) hours as needed.  Max Daily Amount: 12 mg., Disp: 40 Tab, Rfl: 0    OTHER,NON-FORMULARY,, Take 4 Tabs by mouth daily. Relief Factor Tablets, Disp: , Rfl:     Biotin 2,500 mcg cap, Take 2 Tabs by mouth daily. , Disp: , Rfl:     OTHER,NON-FORMULARY,, Take 1 Tab by mouth daily. Air Borne Chewable tab, Disp: , Rfl:     polycarbophil calcium (EQUALACTIN) 500 mg chew, Take 1,500 mg by mouth daily. , Disp: , Rfl:     levothyroxine (SYNTHROID) 25 mcg tablet, TAKE ONE TABLET BY MOUTH ONE TIME DAILY BEFORE BREAKFAST, Disp: 30 Tab, Rfl: 6    hydroCHLOROthiazide (HYDRODIURIL) 25 mg tablet, TAKE ONE TABLET BY MOUTH ONE TIME DAILY, Disp: 30 Tab, Rfl: 5    multivitamin (ONE A DAY) tablet, Take 1 Tab by mouth daily. , Disp: , Rfl:     esomeprazole (NEXIUM) 20 mg capsule, Take 20 mg by mouth every morning. Take / use AM day of surgery  per anesthesia protocols. Indications: GASTROESOPHAGEAL REFLUX, Disp: , Rfl:    Date Last Reviewed:  06-14-17   Number of Personal Factors/Comorbidities that affect the Plan of Care: 1-2: MODERATE COMPLEXITY   EXAMINATION:   Observation/Orthostatic Postural Assessment:          Pt ambulates with mild stiff knee gait and uses a straight cane  Palpation:          Generalized tenderness throughout L knee  ROM:    R knee extension = -10 degrees  R knee flexion = 128 degrees    L knee extension = -5 degrees  L knee flexion = 123 degrees    Strength:    R knee extension = 5/5  R knee flexion = 5/5    L knee extension = 4/5  L knee flexion = 4/5    Neurological Screen:        Sensation: Intact to light touch L LE  Functional Mobility:         Gait/Ambulation:  Pt ambulates with straight cane        Transfers:  Pt transitions sit to supine and supine to sit independently     Body Structures Involved:  1. Bones  2. Joints  3. Muscles Body Functions Affected:  1. Sensory/Pain  2. Neuromusculoskeletal Activities and Participation Affected:  1. Mobility  2. Self Care  3.  Domestic Life   Number of elements (examined above) that affect the Plan of Care: 3: MODERATE COMPLEXITY   CLINICAL PRESENTATION: Presentation: Evolving clinical presentation with changing clinical characteristics: MODERATE COMPLEXITY   CLINICAL DECISION MAKING:   Outcome Measure: Tool Used: Lower Extremity Functional Scale (LEFS)  Score:  Initial: 53/80 Most Recent: X/80 (Date: -- )   Interpretation of Score: 20 questions each scored on a 5 point scale with 0 representing \"extreme difficulty or unable to perform\" and 4 representing \"no difficulty\". The lower the score, the greater the functional disability. 80/80 represents no disability. Minimal detectable change is 9 points. Score 80 79-63 62-48 47-32 31-16 15-1 0   Modifier CH CI CJ CK CL CM CN     ? Mobility - Walking and Moving Around:     - CURRENT STATUS: CJ - 20%-39% impaired, limited or restricted    - GOAL STATUS: CI - 1%-19% impaired, limited or restricted    - D/C STATUS:  ---------------To be determined---------------    Medical Necessity:   · Patient is expected to demonstrate progress in strength, range of motion and functional technique to increase independence with household ADL's. · Patient demonstrates good rehab potential due to higher previous functional level. Reason for Services/Other Comments:  · Patient continues to require skilled intervention due to decreased ROM/strength L knee with increased pain leading to decreased functional status. Use of outcome tool(s) and clinical judgement create a POC that gives a: Questionable prediction of patient's progress: MODERATE COMPLEXITY            TREATMENT:   (In addition to Assessment/Re-Assessment sessions the following treatments were rendered)  Pre-treatment Symptoms/Complaints:  Pt reports that she overdid it shopping yesterday and had trouble sleeping last night. Pain: Initial:   Pain Intensity 1: 2  Post Session:  2/10     THERAPEUTIC EXERCISE: (30 minutes):  Exercises per grid below to improve mobility and strength.   Required minimal verbal cues to promote proper body alignment and promote proper body posture. Progressed resistance, range and repetitions as indicated. MANUAL THERAPY: (10 minutes): Joint mobilization and Soft tissue mobilization was utilized and necessary because of the patient's restricted joint motion. MODALITIES: (15 minutes):      Vasopneumatic ice and compression. Date  07-12-17 Date:  07-18-17 Date:  07-20-17   Activity/Exercise      Quad Sets with Towel Roll Under Heel      SLR Flexion 20 reps 20 reps    SAQ's  LAQ 3 Lbs  20 reps each     Heel Slides with Strap for OP 20 reps     Gastroc Stretch with Strap On Slantboard  3 reps  20 sec holds slantboard 3x20 sec hold slantboard 3x20 sec hold   NuStep Level 4  10 minutes 10 min level 4.0 8 min level 4.0   Standing Heel/Toe Raises 20 reps 20 reps    Step-Ups 4 inch   20 reps 6 inches x 20 reps 8 inches x 15 reps   TKE with T-band Black  20 reps Black x 20 reps    Nautilus Leg Press 35 Lbs  20 reps 35 lbs x 20 reps 40 pounds x 20 reps   Tandem gait   2x30 ft   march   2x30 ft               · Treatment/Session Assessment:   Pt is improving in extension ROM but cont. To lack balance and proprioception. · Response to Treatment:    · Compliance with Program/Exercises: Compliant. · Recommendations/Intent for next treatment session: \"Next visit will focus on advancements to more challenging activities\".   Total Treatment Duration: 50 minutes  PT Patient Time In/Time Out  Time In: 1300  Time Out: BOOKER Fritz

## 2017-07-21 ENCOUNTER — APPOINTMENT (OUTPATIENT)
Dept: PHYSICAL THERAPY | Age: 72
End: 2017-07-21
Payer: MEDICARE

## 2017-07-24 ENCOUNTER — HOSPITAL ENCOUNTER (OUTPATIENT)
Dept: LAB | Age: 72
Discharge: HOME | End: 2017-07-24
Payer: MEDICARE

## 2017-07-24 DIAGNOSIS — C50.912 MALIGNANT NEOPLASM OF LEFT FEMALE BREAST, UNSPECIFIED SITE OF BREAST: Chronic | ICD-10-CM

## 2017-07-24 LAB
ALBUMIN SERPL BCP-MCNC: 4.1 G/DL (ref 3.2–4.6)
ALBUMIN/GLOB SERPL: 1.1 {RATIO} (ref 1.2–3.5)
ALP SERPL-CCNC: 82 U/L (ref 50–136)
ALT SERPL-CCNC: 19 U/L (ref 12–65)
ANION GAP BLD CALC-SCNC: 6 MMOL/L (ref 7–16)
AST SERPL W P-5'-P-CCNC: 17 U/L (ref 15–37)
BASOPHILS # BLD AUTO: 0 K/UL (ref 0–0.2)
BASOPHILS # BLD: 0 % (ref 0–2)
BILIRUB SERPL-MCNC: 0.4 MG/DL (ref 0.2–1.1)
BUN SERPL-MCNC: 18 MG/DL (ref 8–23)
CALCIUM SERPL-MCNC: 9.3 MG/DL (ref 8.3–10.4)
CANCER AG15-3 SERPL-ACNC: 14.2 U/ML (ref 1–35)
CHLORIDE SERPL-SCNC: 104 MMOL/L (ref 98–107)
CO2 SERPL-SCNC: 29 MMOL/L (ref 21–32)
CREAT SERPL-MCNC: 0.86 MG/DL (ref 0.6–1)
DIFFERENTIAL METHOD BLD: NORMAL
EOSINOPHIL # BLD: 0.1 K/UL (ref 0–0.8)
EOSINOPHIL NFR BLD: 1 % (ref 0.5–7.8)
ERYTHROCYTE [DISTWIDTH] IN BLOOD BY AUTOMATED COUNT: 14.2 % (ref 11.9–14.6)
GLOBULIN SER CALC-MCNC: 3.6 G/DL (ref 2.3–3.5)
GLUCOSE SERPL-MCNC: 111 MG/DL (ref 65–100)
HCT VFR BLD AUTO: 39 % (ref 35.8–46.3)
HGB BLD-MCNC: 12.8 G/DL (ref 11.7–15.4)
LYMPHOCYTES # BLD AUTO: 22 % (ref 13–44)
LYMPHOCYTES # BLD: 1.5 K/UL (ref 0.5–4.6)
MCH RBC QN AUTO: 27.3 PG (ref 26.1–32.9)
MCHC RBC AUTO-ENTMCNC: 32.8 G/DL (ref 31.4–35)
MCV RBC AUTO: 83.2 FL (ref 79.6–97.8)
MONOCYTES # BLD: 0.7 K/UL (ref 0.1–1.3)
MONOCYTES NFR BLD AUTO: 9 % (ref 4–12)
NEUTS SEG # BLD: 4.8 K/UL (ref 1.7–8.2)
NEUTS SEG NFR BLD AUTO: 68 % (ref 43–78)
NRBC # BLD: 0 K/UL (ref 0–0.2)
PLATELET # BLD AUTO: 214 K/UL (ref 150–450)
PMV BLD AUTO: 11.1 FL (ref 10.8–14.1)
POTASSIUM SERPL-SCNC: 3.1 MMOL/L (ref 3.5–5.1)
PROT SERPL-MCNC: 7.7 G/DL (ref 6.3–8.2)
RBC # BLD AUTO: 4.69 M/UL (ref 4.05–5.25)
SODIUM SERPL-SCNC: 139 MMOL/L (ref 136–145)
WBC # BLD AUTO: 7.1 K/UL (ref 4.3–11.1)

## 2017-07-24 PROCEDURE — 36415 COLL VENOUS BLD VENIPUNCTURE: CPT | Performed by: INTERNAL MEDICINE

## 2017-07-24 PROCEDURE — 86300 IMMUNOASSAY TUMOR CA 15-3: CPT | Performed by: INTERNAL MEDICINE

## 2017-07-24 PROCEDURE — 85025 COMPLETE CBC W/AUTO DIFF WBC: CPT | Performed by: INTERNAL MEDICINE

## 2017-07-24 PROCEDURE — 80053 COMPREHEN METABOLIC PANEL: CPT | Performed by: INTERNAL MEDICINE

## 2017-07-25 ENCOUNTER — HOSPITAL ENCOUNTER (OUTPATIENT)
Dept: PHYSICAL THERAPY | Age: 72
Discharge: HOME | End: 2017-07-25
Payer: MEDICARE

## 2017-07-25 PROCEDURE — 97140 MANUAL THERAPY 1/> REGIONS: CPT

## 2017-07-25 PROCEDURE — 97016 VASOPNEUMATIC DEVICE THERAPY: CPT

## 2017-07-25 PROCEDURE — 97110 THERAPEUTIC EXERCISES: CPT

## 2017-07-25 NOTE — PROGRESS NOTES
Liza Boles  : 1945 [facility]  [address], [address] [place] [address], [address] [facility]  [phone]   [phone]           OUTPATIENT PHYSICAL THERAPY:Daily Note and Progress Report 2017    ICD-10: Treatment Diagnosis: Pain in left knee (M25.562); Stiffness of left knee, not elsewhere classified (M25.662); Difficulty in walking, not elsewhere classified (R26.2)  Precautions/Allergies:   Review of patient's allergies indicates no known allergies. Fall Risk Score: 1 (? 5 = High Risk)  MD Orders: Evaluate and Treat MEDICAL/REFERRING DIAGNOSIS:  Presence of left artificial knee joint [Z96.652]   DATE OF ONSET: Surgery 17  REFERRING PHYSICIAN: Byron Hayes., *  RETURN PHYSICIAN APPOINTMENT: 06-15-17     INITIAL ASSESSMENT:  Ms. Antione Ruff presents with decreased ROM/strength L knee with increased pain leading to decreased functional status. Pt would benefit from skilled physical therapy services to address the above deficits and help patient return to prior level of function. PROBLEM LIST (Impacting functional limitations):  1. Decreased Strength  2. Decreased ADL/Functional Activities  3. Increased Pain  4. Decreased Flexibility/Joint Mobility  5. Decreased [place] with Home Exercise Program INTERVENTIONS PLANNED:  1. Balance Exercise  2. Cold  3. Cryotherapy  4. Electrical Stimulation  5. Gait Training  6. Home Exercise Program (HEP)  7. Manual Therapy  8. Range of Motion (ROM)  9. Therapeutic Exercise/Strengthening  10. Aquatic Therapy   TREATMENT PLAN:  Effective Dates: 17 TO 17. Frequency/Duration: 2 times a week for 3 months  GOALS: (Goals have been discussed and agreed upon with patient.)  Short-Term Functional Goals: Time Frame: 4 weeks  1. Pt will increase ROM L knee 0-120 degrees to assist with ascending/descending stairs (Goal Met)  2.  Pt will increase strength L knee 4+/5 to assist with ascending/descending stairs (Goal Met)  Discharge Goals: Time Frame: 12 weeks  1. Pt will increase strength L knee 5/5 to assist with ascending/descending stairs (Goal Ongoing)  2. Pt will be independent with HEP (Goal Met)  3. Pt will ascend/descend 10 eight inch steps independently with no c/o L knee pain (Goal Ongoing)  4. Pt will work perform 20 minutes household cooking and cleaning activities independently with min to no c/o L knee pain (Goal Ongoing)  Rehabilitation Potential For Stated Goals: Good  Regarding Mattie Rust's therapy, I certify that the treatment plan above will be carried out by a therapist or under their direction. Thank you for this referral,  Gaye Salinas, PT     Referring Physician Signature: Tamara Holbrook, *              Date                    The information in this section was collected on 06-14-17 (except where otherwise noted). HISTORY:   History of Present Injury/Illness (Reason for Referral):  Pt reports insidious onset bilateral knee pain of several years duration. She had physical therapy with continued pain so she opted for Left TKA which was performed 05-12-17. Pt spent 2 nights in the hospital followed by home health PT until last Friday. She rates her current L knee pain 3-4/10, increasing to 10/10 at times. She is taking hydromorphone for pain. Pt is walking with a straight cane. She reports difficulties ascending/descending stairs due to her L knee pain. She is planning to have her R knee replaced in November 2017. Pt is currently having difficulties with all household cooking and cleaning activities due to her L knee surgery. Past Medical History/Comorbidities:   Ms. Tommy Kayser  has a past medical history of Anxiety; Arthritis; Breast cancer ([facility] [address] (1/18/2016); Breast cancer ([facility] [address] Cancer ([facility] [address] Chronic pain; Former smoker; GERD (gastroesophageal reflux disease); Hard of hearing; Headache; Hypertension; Hypothyroidism;  Insomnia; and Status post total left knee replacement (5/12/2017). She also has no past medical history of Adverse effect of anesthesia; Aneurysm ([facility] [address] Arrhythmia; Asthma; Autoimmune disease ([address] CAD (coronary artery disease); Chronic kidney disease; Chronic obstructive pulmonary disease ([address] Coagulation disorder ([address] Diabetes ([facility] [address] Difficult intubation; Endocarditis; Heart failure ([facility] [address] Ill-defined condition; Liver disease; Malignant hyperthermia due to anesthesia; Morbid obesity ([address] Pseudocholinesterase deficiency; Psychiatric disorder; PUD (peptic ulcer disease); Rheumatic fever; Seizures ([facility] [address] Sleep apnea; Stroke [facility]); Thromboembolus ([facility] [address] or Unspecified adverse effect of anesthesia. Ms. Antione Ruff  has a past surgical history that includes colonoscopy; wisdom teeth extraction; tonsillectomy (1957); vascular access (Right); breast surgery procedure unlisted (Left, 7/2015); breast biopsy (Left, 1/18/2016); mastectomy, partial (Left, 01/18/2016); breast lumpectomy (Left, 01/2016); orthopaedic; and knee replacement (Left, 05/07/2017). Social History/Living Environment:     Pt lives with her spouse (currently has colon cancer) in a one-story house  Prior Level of Function/Work/Activity:  Independent with all household ADL's  Dominant Side:         RIGHT  Other Clinical Tests:          X-rays  Previous Treatment Approaches:          Left TKA 05-12-17  Personal Factors:          Sex:  female        Age:  81 y.o. Profession:  Pt is retired        Past/Current Experience:  Pt helps care for her  who has cancer  Current Medications:       Current Outpatient Prescriptions:     potassium chloride (KLOR-CON) 10 mEq tablet, Take 1 Tab by mouth daily. , Disp: 30 Tab, Rfl: 0    temazepam (RESTORIL) 15 mg capsule, Take 1 Cap by mouth nightly.  Max Daily Amount: 15 mg., Disp: 30 Cap, Rfl: 5    HYDROcodone-acetaminophen (NORCO) 7.5-325 mg per tablet, Take  by mouth., Disp: , Rfl:     acetaminophen (TYLENOL) 500 mg tablet, Take 1,000 mg by mouth every six (6) hours as needed for Pain., Disp: , Rfl:     HYDROmorphone (DILAUDID) 2 mg tablet, Take 1 Tab by mouth every four (4) hours as needed. Max Daily Amount: 12 mg., Disp: 40 Tab, Rfl: 0    OTHER,NON-FORMULARY,, Take 4 Tabs by mouth daily. Relief Factor Tablets, Disp: , Rfl:     Biotin 2,500 mcg cap, Take 2 Tabs by mouth daily. , Disp: , Rfl:     OTHER,NON-FORMULARY,, Take 1 Tab by mouth daily. Air Borne Chewable tab, Disp: , Rfl:     polycarbophil calcium (EQUALACTIN) 500 mg chew, Take 1,500 mg by mouth daily. , Disp: , Rfl:     levothyroxine (SYNTHROID) 25 mcg tablet, TAKE ONE TABLET BY MOUTH ONE TIME DAILY BEFORE BREAKFAST, Disp: 30 Tab, Rfl: 6    hydroCHLOROthiazide (HYDRODIURIL) 25 mg tablet, TAKE ONE TABLET BY MOUTH ONE TIME DAILY, Disp: 30 Tab, Rfl: 5    multivitamin (ONE A DAY) tablet, Take 1 Tab by mouth daily. , Disp: , Rfl:     esomeprazole (NEXIUM) 20 mg capsule, Take 20 mg by mouth every morning. Take / use AM day of surgery  per anesthesia protocols. Indications: GASTROESOPHAGEAL REFLUX, Disp: , Rfl:    Date Last Reviewed:  06-14-17   Number of Personal Factors/Comorbidities that affect the Plan of Care: 1-2: MODERATE COMPLEXITY   EXAMINATION:   Observation/Orthostatic Postural Assessment:          Pt ambulates with mild stiff knee gait and uses a straight cane  Palpation:          Generalized tenderness throughout L knee  ROM:    R knee extension = -10 degrees  R knee flexion = 128 degrees    L knee extension = -5 degrees  L knee flexion = 123 degrees    Strength:    R knee extension = 5/5  R knee flexion = 5/5    L knee extension = 4+/5  L knee flexion = 4+/5    Neurological Screen:        Sensation: Intact to light touch L LE  Functional Mobility:         Gait/Ambulation:  Pt ambulates with straight cane        Transfers:  Pt transitions sit to supine and supine to sit independently     Body Structures Involved:  1. Bones  2. Joints  3.  Muscles Body Functions Affected:  1. Sensory/Pain  2. Neuromusculoskeletal Activities and Participation Affected:  1. Mobility  2. Self Care  3. Domestic Life   Number of elements (examined above) that affect the Plan of Care: 3: MODERATE COMPLEXITY   CLINICAL PRESENTATION:   Presentation: Evolving clinical presentation with changing clinical characteristics: MODERATE COMPLEXITY   CLINICAL DECISION MAKING:   Outcome Measure: Tool Used: Lower Extremity Functional Scale (LEFS)  Score:  Initial: 53/80 Most Recent: 58/80 (Date: 07-25-17)   Interpretation of Score: 20 questions each scored on a 5 point scale with 0 representing \"extreme difficulty or unable to perform\" and 4 representing \"no difficulty\". The lower the score, the greater the functional disability. 80/80 represents no disability. Minimal detectable change is 9 points. Score 80 79-63 62-48 47-32 31-16 15-1 0   Modifier CH CI CJ CK CL CM CN     ? Mobility - Walking and Moving Around:     - CURRENT STATUS: CJ - 20%-39% impaired, limited or restricted    - GOAL STATUS: CI - 1%-19% impaired, limited or restricted    - D/C STATUS:  ---------------To be determined---------------    Medical Necessity:   · Patient is expected to demonstrate progress in strength, range of motion and functional technique to increase independence with household ADL's. · Patient demonstrates good rehab potential due to higher previous functional level. Reason for Services/Other Comments:  · Patient continues to require skilled intervention due to decreased ROM/strength L knee with increased pain leading to decreased functional status. Use of outcome tool(s) and clinical judgement create a POC that gives a: Questionable prediction of patient's progress: MODERATE COMPLEXITY            TREATMENT:   (In addition to Assessment/Re-Assessment sessions the following treatments were rendered)  Pre-treatment Symptoms/Complaints:  Pt states she is feeling pretty good today.   She states that she continues to have some balance difficulties. Pain: Initial: 3/10     Post Session:  2/10     THERAPEUTIC EXERCISE: (30 minutes):  Exercises per grid below to improve mobility and strength. Required minimal verbal cues to promote proper body alignment and promote proper body posture. Progressed resistance, range and repetitions as indicated. MANUAL THERAPY: (15 minutes): Joint mobilization and Soft tissue mobilization was utilized and necessary because of the patient's restricted joint motion. MODALITIES: (10 minutes):      Vasopneumatic ice and compression to L knee x10 minutes to decrease pain and swelling. Skin clear afterwards. Date  07-12-17 Date:  07-18-17 Date:  07-20-17 Date  07-25-17   Activity/Exercise       Quad Sets with Towel Roll Under Heel       SLR Flexion 20 reps 20 reps     SAQ's  LAQ 3 Lbs  20 reps each      Heel Slides with Strap for OP 20 reps      Gastroc Stretch with Strap On Slantboard  3 reps  20 sec holds slantboard 3x20 sec hold slantboard 3x20 sec hold On Slantboard  3 reps  20 sec holds   NuStep Level 4  10 minutes 10 min level 4.0 8 min level 4.0 Level 4  8 minutes   Standing Heel/Toe Raises 20 reps 20 reps     Step-Ups 4 inch   20 reps 6 inches x 20 reps 8 inches x 15 reps 8 inches  20 reps   TKE with T-band Black  20 reps Black x 20 reps  Black  20 reps   Nautilus Leg Press 35 Lbs  20 reps 35 lbs x 20 reps 40 pounds x 20 reps 60 Lbs  20 reps   Tandem gait   2x30 ft 2x30 ft   march   2x30 ft    Standing Balance Board  FW/BW and Sideways    20 reps each         · Treatment/Session Assessment:   Pt progressing well with ROM and strength L knee. She continues to lack a few degrees of terminal knee extension and needs some further work on L LE strength and balance. · Response to Treatment:    · Compliance with Program/Exercises: Compliant. · Recommendations/Intent for next treatment session: \"Next visit will focus on advancements to more challenging activities\".   Total Treatment Duration: 55 minutes  PT Patient Time In/Time Out  Time In: 9090  Time Out: [facility] Cayla Cool, PT

## 2017-07-27 ENCOUNTER — APPOINTMENT (OUTPATIENT)
Dept: PHYSICAL THERAPY | Age: 72
End: 2017-07-27
Payer: MEDICARE

## 2017-07-28 ENCOUNTER — HOSPITAL ENCOUNTER (OUTPATIENT)
Dept: PHYSICAL THERAPY | Age: 72
Discharge: HOME | End: 2017-07-28
Payer: MEDICARE

## 2017-07-28 PROCEDURE — G8978 MOBILITY CURRENT STATUS: HCPCS

## 2017-07-28 PROCEDURE — 97016 VASOPNEUMATIC DEVICE THERAPY: CPT

## 2017-07-28 PROCEDURE — 97110 THERAPEUTIC EXERCISES: CPT

## 2017-07-28 PROCEDURE — 97140 MANUAL THERAPY 1/> REGIONS: CPT

## 2017-07-28 PROCEDURE — G8979 MOBILITY GOAL STATUS: HCPCS

## 2017-07-28 NOTE — PROGRESS NOTES
Quinn Oconnor  : 1945 [facility] [place] [id number] [place] [address], [address] [facility]  [phone]   [phone]           OUTPATIENT PHYSICAL THERAPY:Daily Note 2017    ICD-10: Treatment Diagnosis: Pain in left knee (M25.562); Stiffness of left knee, not elsewhere classified (M25.662); Difficulty in walking, not elsewhere classified (R26.2)  Precautions/Allergies:   Review of patient's allergies indicates no known allergies. Fall Risk Score: 1 (? 5 = High Risk)  MD Orders: Evaluate and Treat MEDICAL/REFERRING DIAGNOSIS:  Presence of left artificial knee joint [Z96.652]   DATE OF ONSET: Surgery 17  REFERRING PHYSICIAN: Kenzie Reyes, *  RETURN PHYSICIAN APPOINTMENT: 06-15-17     INITIAL ASSESSMENT:  Ms. Hattie Shaikh presents with decreased ROM/strength L knee with increased pain leading to decreased functional status. Pt would benefit from skilled physical therapy services to address the above deficits and help patient return to prior level of function. PROBLEM LIST (Impacting functional limitations):  1. Decreased Strength  2. Decreased ADL/Functional Activities  3. Increased Pain  4. Decreased Flexibility/Joint Mobility  5. Decreased [place] with Home Exercise Program INTERVENTIONS PLANNED:  1. Balance Exercise  2. Cold  3. Cryotherapy  4. Electrical Stimulation  5. Gait Training  6. Home Exercise Program (HEP)  7. Manual Therapy  8. Range of Motion (ROM)  9. Therapeutic Exercise/Strengthening  10. Aquatic Therapy   TREATMENT PLAN:  Effective Dates: 17 TO 17. Frequency/Duration: 2 times a week for 3 months  GOALS: (Goals have been discussed and agreed upon with patient.)  Short-Term Functional Goals: Time Frame: 4 weeks  1. Pt will increase ROM L knee 0-120 degrees to assist with ascending/descending stairs (Goal Met)  2.  Pt will increase strength L knee 4+/5 to assist with ascending/descending stairs (Goal Met)  Discharge Goals: Time Frame: 12 weeks  1. Pt will increase strength L knee 5/5 to assist with ascending/descending stairs (Goal Ongoing)  2. Pt will be independent with HEP (Goal Met)  3. Pt will ascend/descend 10 eight inch steps independently with no c/o L knee pain (Goal Ongoing)  4. Pt will work perform 20 minutes household cooking and cleaning activities independently with min to no c/o L knee pain (Goal Ongoing)  Rehabilitation Potential For Stated Goals: Good            The information in this section was collected on 06-14-17 (except where otherwise noted). HISTORY:   History of Present Injury/Illness (Reason for Referral):  Pt reports insidious onset bilateral knee pain of several years duration. She had physical therapy with continued pain so she opted for Left TKA which was performed 05-12-17. Pt spent 2 nights in the hospital followed by home health PT until last Friday. She rates her current L knee pain 3-4/10, increasing to 10/10 at times. She is taking hydromorphone for pain. Pt is walking with a straight cane. She reports difficulties ascending/descending stairs due to her L knee pain. She is planning to have her R knee replaced in November 2017. Pt is currently having difficulties with all household cooking and cleaning activities due to her L knee surgery. Past Medical History/Comorbidities:   Ms. Ana Laura Murdock  has a past medical history of Anxiety; Arthritis; Breast cancer ([facility] [address] (1/18/2016); Breast cancer ([address] Cancer ([address] Chronic pain; Former smoker; GERD (gastroesophageal reflux disease); Hard of hearing; Headache; Hypertension; Hypothyroidism; Insomnia; and Status post total left knee replacement (5/12/2017). She also has no past medical history of Adverse effect of anesthesia; Aneurysm ([address] Arrhythmia; Asthma; Autoimmune disease ([address] CAD (coronary artery disease); Chronic kidney disease; Chronic obstructive pulmonary disease ([address] Coagulation disorder ([address]  Diabetes ([facility] [address] Difficult intubation; Endocarditis; Heart failure ([facility] [address] Ill-defined condition; Liver disease; Malignant hyperthermia due to anesthesia; Morbid obesity ([facility] [address] Pseudocholinesterase deficiency; Psychiatric disorder; PUD (peptic ulcer disease); Rheumatic fever; Seizures ([facility] [address] Sleep apnea; Stroke [facility]); Thromboembolus ([facility] [address] or Unspecified adverse effect of anesthesia. Ms. Arlana Meckel  has a past surgical history that includes colonoscopy; wisdom teeth extraction; tonsillectomy (1957); vascular access (Right); breast surgery procedure unlisted (Left, 7/2015); breast biopsy (Left, 1/18/2016); mastectomy, partial (Left, 01/18/2016); breast lumpectomy (Left, 01/2016); orthopaedic; and knee replacement (Left, 05/07/2017). Social History/Living Environment:     Pt lives with her spouse (currently has colon cancer) in a one-story house  Prior Level of Function/Work/Activity:  Independent with all household ADL's  Dominant Side:         RIGHT  Other Clinical Tests:          X-rays  Previous Treatment Approaches:          Left TKA 05-12-17  Personal Factors:          Sex:  female        Age:  81 y.o. Profession:  Pt is retired        Past/Current Experience:  Pt helps care for her  who has cancer  Current Medications:       Current Outpatient Prescriptions:     potassium chloride (KLOR-CON) 10 mEq tablet, Take 1 Tab by mouth daily. , Disp: 30 Tab, Rfl: 0    temazepam (RESTORIL) 15 mg capsule, Take 1 Cap by mouth nightly. Max Daily Amount: 15 mg., Disp: 30 Cap, Rfl: 5    HYDROcodone-acetaminophen (NORCO) 7.5-325 mg per tablet, Take  by mouth., Disp: , Rfl:     acetaminophen (TYLENOL) 500 mg tablet, Take 1,000 mg by mouth every six (6) hours as needed for Pain., Disp: , Rfl:     HYDROmorphone (DILAUDID) 2 mg tablet, Take 1 Tab by mouth every four (4) hours as needed. Max Daily Amount: 12 mg., Disp: 40 Tab, Rfl: 0    OTHER,NON-FORMULARY,, Take 4 Tabs by mouth daily.  Relief Factor Tablets, Disp: , Rfl:   Biotin 2,500 mcg cap, Take 2 Tabs by mouth daily. , Disp: , Rfl:     OTHER,NON-FORMULARY,, Take 1 Tab by mouth daily. Air Borne Chewable tab, Disp: , Rfl:     polycarbophil calcium (EQUALACTIN) 500 mg chew, Take 1,500 mg by mouth daily. , Disp: , Rfl:     levothyroxine (SYNTHROID) 25 mcg tablet, TAKE ONE TABLET BY MOUTH ONE TIME DAILY BEFORE BREAKFAST, Disp: 30 Tab, Rfl: 6    hydroCHLOROthiazide (HYDRODIURIL) 25 mg tablet, TAKE ONE TABLET BY MOUTH ONE TIME DAILY, Disp: 30 Tab, Rfl: 5    multivitamin (ONE A DAY) tablet, Take 1 Tab by mouth daily. , Disp: , Rfl:     esomeprazole (NEXIUM) 20 mg capsule, Take 20 mg by mouth every morning. Take / use AM day of surgery  per anesthesia protocols. Indications: GASTROESOPHAGEAL REFLUX, Disp: , Rfl:    Date Last Reviewed:  06-14-17   Number of Personal Factors/Comorbidities that affect the Plan of Care: 1-2: MODERATE COMPLEXITY   EXAMINATION:   Observation/Orthostatic Postural Assessment:          Pt ambulates with mild stiff knee gait and uses a straight cane  Palpation:          Generalized tenderness throughout L knee  ROM:    R knee extension = -10 degrees  R knee flexion = 128 degrees    L knee extension = -5 degrees  L knee flexion = 123 degrees    Strength:    R knee extension = 5/5  R knee flexion = 5/5    L knee extension = 4+/5  L knee flexion = 4+/5    Neurological Screen:        Sensation: Intact to light touch L LE  Functional Mobility:         Gait/Ambulation:  Pt ambulates with straight cane        Transfers:  Pt transitions sit to supine and supine to sit independently     Body Structures Involved:  1. Bones  2. Joints  3. Muscles Body Functions Affected:  1. Sensory/Pain  2. Neuromusculoskeletal Activities and Participation Affected:  1. Mobility  2. Self Care  3.  Domestic Life   Number of elements (examined above) that affect the Plan of Care: 3: MODERATE COMPLEXITY   CLINICAL PRESENTATION:   Presentation: Evolving clinical presentation with changing clinical characteristics: MODERATE COMPLEXITY   CLINICAL DECISION MAKING:   Outcome Measure: Tool Used: Lower Extremity Functional Scale (LEFS)  Score:  Initial: 53/80 Most Recent: 58/80 (Date: 07-25-17)   Interpretation of Score: 20 questions each scored on a 5 point scale with 0 representing \"extreme difficulty or unable to perform\" and 4 representing \"no difficulty\". The lower the score, the greater the functional disability. 80/80 represents no disability. Minimal detectable change is 9 points. Score 80 79-63 62-48 47-32 31-16 15-1 0   Modifier CH CI CJ CK CL CM CN     ? Mobility - Walking and Moving Around:     - CURRENT STATUS: CJ - 20%-39% impaired, limited or restricted    - GOAL STATUS: CI - 1%-19% impaired, limited or restricted    - D/C STATUS:  ---------------To be determined---------------    Medical Necessity:   · Patient is expected to demonstrate progress in strength, range of motion and functional technique to increase independence with household ADL's. · Patient demonstrates good rehab potential due to higher previous functional level. Reason for Services/Other Comments:  · Patient continues to require skilled intervention due to decreased ROM/strength L knee with increased pain leading to decreased functional status. Use of outcome tool(s) and clinical judgement create a POC that gives a: Questionable prediction of patient's progress: MODERATE COMPLEXITY            TREATMENT:   (In addition to Assessment/Re-Assessment sessions the following treatments were rendered)  Pre-treatment Symptoms/Complaints:  7/28/2017: Patient reports she has a little pain on the outside of her knee. Pain: Initial: 3/10  Pain Intensity 1: 2  Post Session:  2/10     THERAPEUTIC EXERCISE: (30 minutes):  Exercises per grid below to improve mobility and strength. Required minimal verbal cues to promote proper body alignment and promote proper body posture.   Progressed resistance, range and repetitions as indicated. Date:  07-20-17 Date  07-25-17 Date:  7/28/2017   Activity/Exercise   Parameters   Quad Sets with Towel Roll Under Heel      SLR Flexion      SAQ's  LAQ      Heel Slides with Strap for OP      Gastroc Stretch with Strap slantboard 3x20 sec hold On Slantboard  3 reps  20 sec holds On Slantboard  3 reps  20 sec holds   NuStep 8 min level 4.0 Level 4  8 minutes Level 4  8 minutes   Standing Heel/Toe Raises      Step-Ups 8 inches x 15 reps 8 inches  20 reps 8 inches  20 reps   TKE with T-band  Black  20 reps Black  20 reps   Nautilus Leg Press 40 pounds x 20 reps 60 Lbs  20 reps 60 Lbs  20 reps   Tandem gait 2x30 ft 2x30 ft 2 laps  30 feet   march 2x30 ft  2 laps  30 feet   Standing Balance Board  FW/BW and Sideways  20 reps each 20 reps each     MANUAL THERAPY: (15 minutes): Joint mobilization and Soft tissue mobilization was utilized and necessary because of the patient's restricted joint motion. MODALITIES: (10 minutes):      Vasopneumatic ice and compression to L knee x10 minutes to decrease pain and swelling. Skin clear afterwards. Treatment/Session Assessment:     · Response to Treatment:  Pt progressing well with ROM and strength L knee. She continues to lack a few degrees of terminal knee extension and needs some further work on L LE strength and balance. · Compliance with Program/Exercises: Compliant. · Recommendations/Intent for next treatment session: \"Next visit will focus on advancements to more challenging activities\".   Total Treatment Duration:   PT Patient Time In/Time Out  Time In: 1345  Time Out: 88 Steve Moser PT

## 2017-08-01 NOTE — PROGRESS NOTES
Caleb Valdez  : 1945 [facility] [place] [address] [place] [address], [address] [facility]  [phone]   [phone]             OUTPATIENT PHYSICAL THERAPY:Discontinuation Summary 2017    ICD-10: Treatment Diagnosis: pain in left knee M25.562, Pain in right knee M25.561, Abnormality of Gait R26.89  Precautions/Allergies:   Review of patient's allergies indicates no known allergies. Fall Risk Score: 1 (? 5 = High Risk)  MD Orders: Eval and treat MEDICAL/REFERRING DIAGNOSIS:  Unilateral primary osteoarthritis, right knee [M17.11]  Unilateral primary osteoarthritis, left knee [M17.12]   DATE OF ONSET: chronic  REFERRING PHYSICIAN: Lord Jacobsen., *  RETURN PHYSICIAN APPOINTMENT: TBD     INITIAL ASSESSMENT:  Ms. Darcie Wilkerson has attended 6 visits over the past 3 weeks with good improvement in gait and progress with strength, ROM and balance. Continues to present with moderate gait deviations, poor tolerance for gait, decreased glute and quad strength, impaired TKE and end-range extension ROM, decreased tibiofemoral and PF joint mobility and impaired balance and proprioception that limits ability to prolonged ambulate and perform functional activities. Pt would benefit from skilled therapy to address these deficits for return to previous level of function. PROBLEM LIST (Impacting functional limitations):  1. Decreased Strength  2. Decreased ADL/Functional Activities  3. Decreased Transfer Abilities  4. Decreased Ambulation Ability/Technique  5. Decreased Balance  6. Increased Pain  7. Decreased Activity Tolerance  8. Decreased Flexibility/Joint Mobility  9. Decreased Knowledge of Precautions INTERVENTIONS PLANNED:  1. Cold  2. Gait Training  3. Home Exercise Program (HEP)  4. Manual Therapy  5. Range of Motion (ROM)  6. Therapeutic Exercise/Strengthening   Unable to assess secondary to pt self-DC.    TREATMENT PLAN:  Effective Dates: 17 TO 04-03-17  Frequency/Duration: 2 times a week for 12 weeks  GOALS: (Goals have been discussed and agreed upon with patient.)  Short-Term Functional Goals: Time Frame: 4 weeks  1. Pt will be I with HEP to allow for continued progress with symptom management. MET 01-25-17  2. Pt will ambulate with moderate gait deviations to improve tolerance for gait. MET 01-25-17  Discharge Goals: Time Frame: 8 weeks  1. Pt will improve LEFS score to >47 to reflect an improvement in function. Ongoing  2. Pt will improve c/o pain to 5/10 to allow for improved tolerance for ambulation. Ongoing  3. Pt will improve quad and glute strength to 4+/5 for improved strength for functional activities. Ongoing  4. Pt will demonstrate trace gait deviations with use of single point cane for all distance for improved tolerance for ambulation. Ongoing  Rehabilitation Potential For Stated Goals: Good  Regarding Tomy Officer Barrera's therapy, I certify that the treatment plan above will be carried out by a therapist or under their direction. Thank you for this referral,  Tala Kitchen, PT     Referring Physician Signature: Yael Mccord., *              Date                    HISTORY:   Unable to assess secondary to pt self-DC. History of Present Injury/Illness (Reason for Referral):  Pt is a 73 y.o. Female presenting to PT with bilateral knee pain with TKA scheduled in the next 6 months. Has had chemotherapy last year and feels that her knees worsened during this time period. States that her left knee is worse than her right. Reports that she has trouble with any walking and can only do stairs with a hand rail non-reciprocally. Limited to walking for about 30 minutes before having to take a break. Has a  so she is not having to do heavy house cleaning and states that her knees do not limit her from doing the light work that she does around the house. Difficulty rising from a chair using bilateral UE support. 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 Tarah Cruz  has a past medical history of Anxiety; Arthritis; Breast cancer ([address] (1/18/2016); Breast cancer ([address] Cancer ([address] Chronic pain; Former smoker; GERD (gastroesophageal reflux disease); Hard of hearing; Headache; Hypertension; Hypothyroidism; Insomnia; and Status post total left knee replacement (5/12/2017). She also has no past medical history of Adverse effect of anesthesia; Aneurysm ([address] Arrhythmia; Asthma; Autoimmune disease ([address] CAD (coronary artery disease); Chronic kidney disease; Chronic obstructive pulmonary disease ([address] Coagulation disorder ([address] Diabetes ([address] Difficult intubation; Endocarditis; Heart failure ([address] Ill-defined condition; Liver disease; Malignant hyperthermia due to anesthesia; Morbid obesity ([address] Pseudocholinesterase deficiency; Psychiatric disorder; PUD (peptic ulcer disease); Rheumatic fever; Seizures ([address] Sleep apnea; Stroke [facility]); Thromboembolus ([address] or Unspecified adverse effect of anesthesia. Ms. Tarah Cruz  has a past surgical history that includes colonoscopy; wisdom teeth extraction; tonsillectomy (1957); vascular access (Right); breast surgery procedure unlisted (Left, 7/2015); breast biopsy (Left, 1/18/2016); mastectomy, partial (Left, 01/18/2016); breast lumpectomy (Left, 01/2016); orthopaedic; and knee replacement (Left, 05/07/2017). Social History/Living Environment:     lives alone  Prior Level of Function/Work/Activity:  Restricted by multi-joint arthritis and effects of chemotherapy and radiation  Previous Treatment Approaches:          History of BRCA  Current Medications:    Current Outpatient Prescriptions:     potassium chloride (KLOR-CON) 10 mEq tablet, Take 1 Tab by mouth daily. , Disp: 30 Tab, Rfl: 0    temazepam (RESTORIL) 15 mg capsule, Take 1 Cap by mouth nightly.  Max Daily Amount: 15 mg., Disp: 30 Cap, Rfl: 5    HYDROcodone-acetaminophen (Benetta Pock) 7.5-325 mg per tablet, Take  by mouth., Disp: , Rfl:     acetaminophen (TYLENOL) 500 mg tablet, Take 1,000 mg by mouth every six (6) hours as needed for Pain., Disp: , Rfl:     HYDROmorphone (DILAUDID) 2 mg tablet, Take 1 Tab by mouth every four (4) hours as needed. Max Daily Amount: 12 mg., Disp: 40 Tab, Rfl: 0    OTHER,NON-FORMULARY,, Take 4 Tabs by mouth daily. Relief Factor Tablets, Disp: , Rfl:     Biotin 2,500 mcg cap, Take 2 Tabs by mouth daily. , Disp: , Rfl:     OTHER,NON-FORMULARY,, Take 1 Tab by mouth daily. Air Borne Chewable tab, Disp: , Rfl:     polycarbophil calcium (EQUALACTIN) 500 mg chew, Take 1,500 mg by mouth daily. , Disp: , Rfl:     levothyroxine (SYNTHROID) 25 mcg tablet, TAKE ONE TABLET BY MOUTH ONE TIME DAILY BEFORE BREAKFAST, Disp: 30 Tab, Rfl: 6    hydroCHLOROthiazide (HYDRODIURIL) 25 mg tablet, TAKE ONE TABLET BY MOUTH ONE TIME DAILY, Disp: 30 Tab, Rfl: 5    multivitamin (ONE A DAY) tablet, Take 1 Tab by mouth daily. , Disp: , Rfl:     esomeprazole (NEXIUM) 20 mg capsule, Take 20 mg by mouth every morning. Take / use AM day of surgery  per anesthesia protocols. Indications: GASTROESOPHAGEAL REFLUX, Disp: , Rfl:      Date Last Reviewed:  8/1/2017     Number of Personal Factors/Comorbidities that affect the Plan of Care: 1-2: MODERATE COMPLEXITY   EXAMINATION:   Unable to assess secondary to pt self-DC.    Observation/Orthostatic Postural Assessment:     Joint effusion L>R  Knee posture-  Genu valgus L>R  Palpation:          Prominent bony build up in lateral joint line L>R  ROM:    LEFT        Knee flexion- 124 degrees  Knee extension-  4 degrees    RIGHT  Knee flexion- 130 degrees  Knee extension-  5 degrees    Strength:    LEFT  Knee flexion- 4/5   Knee extension-  4/5   Hip Ext- 4-/5   Hip Flexion-  4/5   Hip ER- 4-/5   Hip IR- 4/5   Hip ABD- 4-/5     RIGHT  Knee flexion- 4+/5   Knee extension-  4/5   Hip Ext- 4/5   Hip Flexion-  4/5   Hip ER- 4/5   Hip IR- 4/5   Hip ABD- 4/5 Functional Mobility:         Gait/Ambulation: Moderately Stiff knee with hip hike bilaterally, mild L trendelenberg, slow and antalgic        Transfers: Moderate use of B UE for sit-to-stand        Bed Mobility:  WFL        Stairs:  Non-reciprocally relying on B UE for support with hand-rail  Balance:          SLS:  L- 3 sec, R 3 sec  Skin Integrity:          normal   Body Structures Involved:  1. Bones  2. Joints  3. Muscles  4. Ligaments Body Functions Affected:  1. Sensory/Pain  2. Neuromusculoskeletal  3. Movement Related Activities and Participation Affected:  1. Learning and Applying Knowledge  2. General Tasks and Demands  3. Mobility  4. Self Care   Number of elements that affect the Plan of Care: 3: MODERATE COMPLEXITY   CLINICAL PRESENTATION:   Presentation: Evolving clinical presentation with changing clinical characteristics: MODERATE COMPLEXITY   CLINICAL DECISION MAKING:   Unable to assess secondary to pt self-DC. Outcome Measure: Tool Used: Lower Extremity Functional Scale (LEFS)  Score:  Initial: 35/80 Most Recent: 33/80 (Date: 01-25-17 )   Interpretation of Score: 20 questions each scored on a 5 point scale with 0 representing \"extreme difficulty or unable to perform\" and 4 representing \"no difficulty\". The lower the score, the greater the functional disability. 80/80 represents no disability. Minimal detectable change is 9 points. Score 80 79-63 62-48 47-32 31-16 15-1 0   Modifier CH CI CJ CK CL CM CN     ? Mobility - Walking and Moving Around:     - CURRENT STATUS: CK - 40%-59% impaired, limited or restricted    - GOAL STATUS: CJ - 20%-39% impaired, limited or restricted    - D/C STATUS:  ---------------To be determined---------------    Medical Necessity:   · Patient is expected to demonstrate progress in strength, range of motion and balance to improve ambulation.   Reason for Services/Other Comments:  · Patient continues to require present interventions due to patient's inability to prolonged ambulate.    Use of outcome tool(s) and clinical judgement create a POC that gives a: Questionable prediction of patient's progress: MODERATE COMPLEXITY

## 2017-08-04 ENCOUNTER — HOSPITAL ENCOUNTER (OUTPATIENT)
Dept: PHYSICAL THERAPY | Age: 72
Discharge: HOME | End: 2017-08-04
Payer: MEDICARE

## 2017-08-04 PROCEDURE — 97140 MANUAL THERAPY 1/> REGIONS: CPT

## 2017-08-04 PROCEDURE — 97110 THERAPEUTIC EXERCISES: CPT

## 2017-08-04 NOTE — PROGRESS NOTES
Lorraine Trimble  : 1945 [facility]  [address], [address] [address],[address], [address] [facility]  [phone]   [phone]           OUTPATIENT PHYSICAL THERAPY:Daily Note and Re-evaluation 2017    ICD-10: Treatment Diagnosis: Pain in left knee (M25.562); Stiffness of left knee, not elsewhere classified (M25.662); Difficulty in walking, not elsewhere classified (R26.2)  Precautions/Allergies:   Review of patient's allergies indicates no known allergies. Fall Risk Score: 1 (? 5 = High Risk)  MD Orders: Evaluate and Treat MEDICAL/REFERRING DIAGNOSIS:  Presence of left artificial knee joint [Z96.652]   DATE OF ONSET: Surgery 17  REFERRING PHYSICIAN: Chrystal Robb., *  RETURN PHYSICIAN APPOINTMENT: 06-15-17     INITIAL ASSESSMENT:  Ms. Cornelio Boateng has attended 12 visits over the past 6 weeks with excellent progress in functional mobility, strength, ROM and balance. Continues to be limited in ability to community ambulate, perform house duties and ascend/descend stairs due to cont. Strength, ROM and balance/proprioception deficits. Pt is steadily improving with skilled therapy and would benefit from physical therapy services to address the above deficits and help patient return to prior level of function. PROBLEM LIST (Impacting functional limitations):  1. Decreased Strength  2. Decreased ADL/Functional Activities  3. Increased Pain  4. Decreased Flexibility/Joint Mobility  5. Decreased Nuckolls with Home Exercise Program INTERVENTIONS PLANNED:  1. Balance Exercise  2. Cold  3. Cryotherapy  4. Electrical Stimulation  5. Gait Training  6. Home Exercise Program (HEP)  7. Manual Therapy  8. Range of Motion (ROM)  9. Therapeutic Exercise/Strengthening  10. Aquatic Therapy   TREATMENT PLAN:  Effective Dates: 17 TO 17.   Frequency/Duration: 2 times a week for 3 months  GOALS: (Goals have been discussed and agreed upon with patient.)  Short-Term Functional Goals: Time Frame: 4 weeks  1. Pt will increase ROM L knee 0-120 degrees to assist with ascending/descending stairs (Goal Met)  2. Pt will increase strength L knee 4+/5 to assist with ascending/descending stairs (Goal Met)  Discharge Goals: Time Frame: 12 weeks  1. Pt will increase strength L knee 5/5 to assist with ascending/descending stairs (Goal Ongoing)  2. Pt will be independent with HEP (Goal Met)  3. Pt will ascend/descend 10 eight inch steps independently with no c/o L knee pain (Goal Ongoing)  4. Pt will work perform 20 minutes household cooking and cleaning activities independently with min to no c/o L knee pain (Goal Ongoing)  Rehabilitation Potential For Stated Goals: Good            The information in this section was collected on 08-04-17 (except where otherwise noted). HISTORY:   History of Present Injury/Illness (Reason for Referral):  Pt reports insidious onset bilateral knee pain of several years duration. She had physical therapy with continued pain so she opted for Left TKA which was performed 05-12-17. Pt spent 2 nights in the hospital followed by home health PT until last Friday. She rates her current L knee pain 3-4/10, increasing to 10/10 at times. She is taking hydromorphone for pain. Pt is walking with a straight cane. She reports difficulties ascending/descending stairs due to her L knee pain. She is planning to have her R knee replaced in November 2017. Pt is currently having difficulties with all household cooking and cleaning activities due to her L knee surgery. Past Medical History/Comorbidities:   Ms. Candis Velarde  has a past medical history of Anxiety; Arthritis; Breast cancer ([facility] [address] (1/18/2016); Breast cancer ([facility] [address] Cancer ([facility] [address] Chronic pain; Former smoker; GERD (gastroesophageal reflux disease); Hard of hearing; Headache; Hypertension; Hypothyroidism; Insomnia; and Status post total left knee replacement (5/12/2017).  She also has no past medical history of Adverse effect of anesthesia; Aneurysm ([facility] [address] Arrhythmia; Asthma; Autoimmune disease ([facility] [address] CAD (coronary artery disease); Chronic kidney disease; Chronic obstructive pulmonary disease ([address] Coagulation disorder ([address] Diabetes ([address] Difficult intubation; Endocarditis; Heart failure ([address] Ill-defined condition; Liver disease; Malignant hyperthermia due to anesthesia; Morbid obesity ([address] Pseudocholinesterase deficiency; Psychiatric disorder; PUD (peptic ulcer disease); Rheumatic fever; Seizures ([facility] [address] Sleep apnea; Stroke [facility]); Thromboembolus ([facility] [address] or Unspecified adverse effect of anesthesia. Ms. Lyly Ayers  has a past surgical history that includes colonoscopy; wisdom teeth extraction; tonsillectomy (1957); vascular access (Right); breast surgery procedure unlisted (Left, 7/2015); breast biopsy (Left, 1/18/2016); mastectomy, partial (Left, 01/18/2016); breast lumpectomy (Left, 01/2016); orthopaedic; and knee replacement (Left, 05/07/2017). Social History/Living Environment:     Pt lives with her spouse (currently has colon cancer) in a one-story house  Prior Level of Function/Work/Activity:  Independent with all household ADL's  Dominant Side:         RIGHT  Other Clinical Tests:          X-rays  Previous Treatment Approaches:          Left TKA 05-12-17  Personal Factors:          Sex:  female        Age:  81 y.o. Profession:  Pt is retired        Past/Current Experience:  Pt helps care for her  who has cancer  Current Medications:       Current Outpatient Prescriptions:     potassium chloride (KLOR-CON) 10 mEq tablet, Take 1 Tab by mouth daily. , Disp: 30 Tab, Rfl: 0    temazepam (RESTORIL) 15 mg capsule, Take 1 Cap by mouth nightly. Max Daily Amount: 15 mg., Disp: 30 Cap, Rfl: 5    HYDROcodone-acetaminophen (NORCO) 7.5-325 mg per tablet, Take  by mouth., Disp: , Rfl:     acetaminophen (TYLENOL) 500 mg tablet, Take 1,000 mg by mouth every six (6) hours as needed for Pain. , Disp: , Rfl:     HYDROmorphone (DILAUDID) 2 mg tablet, Take 1 Tab by mouth every four (4) hours as needed. Max Daily Amount: 12 mg., Disp: 40 Tab, Rfl: 0    OTHER,NON-FORMULARY,, Take 4 Tabs by mouth daily. Relief Factor Tablets, Disp: , Rfl:     Biotin 2,500 mcg cap, Take 2 Tabs by mouth daily. , Disp: , Rfl:     OTHER,NON-FORMULARY,, Take 1 Tab by mouth daily. Air Borne Chewable tab, Disp: , Rfl:     polycarbophil calcium (EQUALACTIN) 500 mg chew, Take 1,500 mg by mouth daily. , Disp: , Rfl:     levothyroxine (SYNTHROID) 25 mcg tablet, TAKE ONE TABLET BY MOUTH ONE TIME DAILY BEFORE BREAKFAST, Disp: 30 Tab, Rfl: 6    hydroCHLOROthiazide (HYDRODIURIL) 25 mg tablet, TAKE ONE TABLET BY MOUTH ONE TIME DAILY, Disp: 30 Tab, Rfl: 5    multivitamin (ONE A DAY) tablet, Take 1 Tab by mouth daily. , Disp: , Rfl:     esomeprazole (NEXIUM) 20 mg capsule, Take 20 mg by mouth every morning. Take / use AM day of surgery  per anesthesia protocols. Indications: GASTROESOPHAGEAL REFLUX, Disp: , Rfl:    Date Last Reviewed:  06-14-17   Number of Personal Factors/Comorbidities that affect the Plan of Care: 1-2: MODERATE COMPLEXITY   EXAMINATION:   Observation/Orthostatic Postural Assessment:          Pt ambulates with mild stiff knee gait  Palpation:          Generalized tenderness throughout L knee  ROM:    R knee extension = -10 degrees  R knee flexion = 128 degrees    L knee extension = -2 degrees  L knee flexion = 124 degrees, 120 degrees actively    Strength:    R knee extension = 5/5  R knee flexion = 5/5    L knee extension = 4+/5  L knee flexion = 5-/5      Balance:   SLS: R- 5 sec, L- 4 sec    Neurological Screen:        Sensation: Intact to light touch L LE      Functional Mobility:         Gait/Ambulation:  Pt ambulates with slight stiff knee gait and lateral trunk movement        Transfers:  Pt transitions sit to supine and supine to sit independently     Body Structures Involved:  1. Bones  2. Joints  3.  Muscles Body Functions Affected:  1. Sensory/Pain  2. Neuromusculoskeletal Activities and Participation Affected:  1. Mobility  2. Self Care  3. Domestic Life   Number of elements (examined above) that affect the Plan of Care: 3: MODERATE COMPLEXITY   CLINICAL PRESENTATION:   Presentation: Evolving clinical presentation with changing clinical characteristics: MODERATE COMPLEXITY   CLINICAL DECISION MAKING:   Outcome Measure: Tool Used: Lower Extremity Functional Scale (LEFS)  Score:  Initial: 53/80 Most Recent: 58/80 (Date: 07-25-17)   Interpretation of Score: 20 questions each scored on a 5 point scale with 0 representing \"extreme difficulty or unable to perform\" and 4 representing \"no difficulty\". The lower the score, the greater the functional disability. 80/80 represents no disability. Minimal detectable change is 9 points. Score 80 79-63 62-48 47-32 31-16 15-1 0   Modifier CH CI CJ CK CL CM CN     ? Mobility - Walking and Moving Around:     - CURRENT STATUS: CJ - 20%-39% impaired, limited or restricted    - GOAL STATUS: CI - 1%-19% impaired, limited or restricted    - D/C STATUS:  ---------------To be determined---------------    Medical Necessity:   · Patient is expected to demonstrate progress in strength, range of motion and functional technique to increase independence with household ADL's. · Patient demonstrates good rehab potential due to higher previous functional level. Reason for Services/Other Comments:  · Patient continues to require skilled intervention due to decreased ROM/strength L knee with increased pain leading to decreased functional status.    Use of outcome tool(s) and clinical judgement create a POC that gives a: Questionable prediction of patient's progress: MODERATE COMPLEXITY            TREATMENT:   (In addition to Assessment/Re-Assessment sessions the following treatments were rendered)  Pre-treatment Symptoms/Complaints:  8/4/2017: Patient reports that she is very sore today but has been trying to walk more. Pain: Initial:   Pain Intensity 1: 2  Post Session:  2/10     THERAPEUTIC EXERCISE: (30 minutes):  Exercises per grid below to improve mobility and strength. Required minimal verbal cues to promote proper body alignment and promote proper body posture. Progressed resistance, range and repetitions as indicated. Date:  07-20-17 Date  07-25-17 Date:  7/28/2017    Activity/Exercise   Parameters    Quad Sets with Towel Roll Under Heel       SLR Flexion       SAQ's  LAQ       Heel Slides with Strap for OP       Gastroc Stretch with Strap slantboard 3x20 sec hold On Slantboard  3 reps  20 sec holds On Slantboard  3 reps  20 sec holds    NuStep 8 min level 4.0 Level 4  8 minutes Level 4  8 minutes 10 min level 4.0   Standing Heel/Toe Raises       Step-Ups 8 inches x 15 reps 8 inches  20 reps 8 inches  20 reps 6 inches x 20 reps   TKE with T-band  Black  20 reps Black  20 reps Black x 20 reps   Nautilus Leg Press 40 pounds x 20 reps 60 Lbs  20 reps 60 Lbs  20 reps 60 pounds  x20 reps B LE   Tandem gait 2x30 ft 2x30 ft 2 laps  30 feet 2x30 ft   march 2x30 ft  2 laps  30 feet 2x30 ft   Standing Balance Board  FW/BW and Sideways  20 reps each 20 reps each    Side stepping    2x30 ft   Backward walking    2x30 ft   SLS    3 min                   MANUAL THERAPY: (15 minutes): Joint mobilization and Soft tissue mobilization was utilized and necessary because of the patient's restricted joint motion. MODALITIES: (0 minutes):      Vasopneumatic ice and compression to L knee x10 minutes to decrease pain and swelling. Skin clear afterwards. Treatment/Session Assessment:     · Response to Treatment:  Pt is progressing steadily with strength, ROM and balance. Some difficulty with backward walking today. · Compliance with Program/Exercises: Compliant. · Recommendations/Intent for next treatment session: \"Next visit will focus on advancements to more challenging activities\".   Total Treatment Duration:   PT Patient Time In/Time Out  Time In: 1530  Time Out: [address]

## 2017-08-16 ENCOUNTER — HOSPITAL ENCOUNTER (OUTPATIENT)
Dept: PHYSICAL THERAPY | Age: 72
Discharge: HOME | End: 2017-08-16
Payer: MEDICARE

## 2017-08-16 PROCEDURE — 97140 MANUAL THERAPY 1/> REGIONS: CPT

## 2017-08-16 PROCEDURE — 97110 THERAPEUTIC EXERCISES: CPT

## 2017-08-16 NOTE — PROGRESS NOTES
Sanjay Broderick  : 1945 [facility]  [address], [address], [address] [facility]  [phone]   [phone]           OUTPATIENT PHYSICAL THERAPY:Daily Note 2017    ICD-10: Treatment Diagnosis: Pain in left knee (M25.562); Stiffness of left knee, not elsewhere classified (M25.662); Difficulty in walking, not elsewhere classified (R26.2)  Precautions/Allergies:   Review of patient's allergies indicates no known allergies. Fall Risk Score: 1 (? 5 = High Risk)  MD Orders: Evaluate and Treat MEDICAL/REFERRING DIAGNOSIS:  Presence of left artificial knee joint [Z96.652]   DATE OF ONSET: Surgery 17  REFERRING PHYSICIAN: Timbo Katz, *  RETURN PHYSICIAN APPOINTMENT: 06-15-17     INITIAL ASSESSMENT:  Ms. Rachele Favre has attended 12 visits over the past 6 weeks with excellent progress in functional mobility, strength, ROM and balance. Continues to be limited in ability to community ambulate, perform house duties and ascend/descend stairs due to cont. Strength, ROM and balance/proprioception deficits. Pt is steadily improving with skilled therapy and would benefit from physical therapy services to address the above deficits and help patient return to prior level of function. PROBLEM LIST (Impacting functional limitations):  1. Decreased Strength  2. Decreased ADL/Functional Activities  3. Increased Pain  4. Decreased Flexibility/Joint Mobility  5. Decreased [place] with Home Exercise Program INTERVENTIONS PLANNED:  1. Balance Exercise  2. Cold  3. Cryotherapy  4. Electrical Stimulation  5. Gait Training  6. Home Exercise Program (HEP)  7. Manual Therapy  8. Range of Motion (ROM)  9. Therapeutic Exercise/Strengthening  10. Aquatic Therapy   TREATMENT PLAN:  Effective Dates: 17 TO 17.   Frequency/Duration: 2 times a week for 3 months  GOALS: (Goals have been discussed and agreed upon with patient.)  Short-Term Functional Goals: Time Frame: 4 weeks  1. Pt will increase ROM L knee 0-120 degrees to assist with ascending/descending stairs (Goal Met)  2. Pt will increase strength L knee 4+/5 to assist with ascending/descending stairs (Goal Met)  Discharge Goals: Time Frame: 12 weeks  1. Pt will increase strength L knee 5/5 to assist with ascending/descending stairs (Goal Ongoing)  2. Pt will be independent with HEP (Goal Met)  3. Pt will ascend/descend 10 eight inch steps independently with no c/o L knee pain (Goal Ongoing)  4. Pt will work perform 20 minutes household cooking and cleaning activities independently with min to no c/o L knee pain (Goal Ongoing)  Rehabilitation Potential For Stated Goals: Good            The information in this section was collected on 08-04-17 (except where otherwise noted). HISTORY:   History of Present Injury/Illness (Reason for Referral):  Pt reports insidious onset bilateral knee pain of several years duration. She had physical therapy with continued pain so she opted for Left TKA which was performed 05-12-17. Pt spent 2 nights in the hospital followed by home health PT until last Friday. She rates her current L knee pain 3-4/10, increasing to 10/10 at times. She is taking hydromorphone for pain. Pt is walking with a straight cane. She reports difficulties ascending/descending stairs due to her L knee pain. She is planning to have her R knee replaced in November 2017. Pt is currently having difficulties with all household cooking and cleaning activities due to her L knee surgery. Past Medical History/Comorbidities:   Ms. Radha Gaines  has a past medical history of Anxiety; Arthritis; Breast cancer ([facility] [address] (1/18/2016); Breast cancer ([facility] [address] Cancer ([facility] [address] Chronic pain; Former smoker; GERD (gastroesophageal reflux disease); Hard of hearing; Headache; Hypertension; Hypothyroidism; Insomnia; and Status post total left knee replacement (5/12/2017).  She also has no past medical history of Adverse effect of anesthesia; Aneurysm ([facility] [address] Arrhythmia; Asthma; Autoimmune disease ([address] CAD (coronary artery disease); Chronic kidney disease; Chronic obstructive pulmonary disease ([address] Coagulation disorder ([address] Diabetes ([address] Difficult intubation; Endocarditis; Heart failure ([address] Ill-defined condition; Liver disease; Malignant hyperthermia due to anesthesia; Morbid obesity ([address] Pseudocholinesterase deficiency; Psychiatric disorder; PUD (peptic ulcer disease); Rheumatic fever; Seizures ([address] Sleep apnea; Stroke [facility]); Thromboembolus ([facility] [address] or Unspecified adverse effect of anesthesia. Ms. Ni Yun  has a past surgical history that includes colonoscopy; wisdom teeth extraction; tonsillectomy (1957); vascular access (Right); breast surgery procedure unlisted (Left, 7/2015); breast biopsy (Left, 1/18/2016); mastectomy, partial (Left, 01/18/2016); breast lumpectomy (Left, 01/2016); orthopaedic; and knee replacement (Left, 05/07/2017). Social History/Living Environment:     Pt lives with her spouse (currently has colon cancer) in a one-story house  Prior Level of Function/Work/Activity:  Independent with all household ADL's  Dominant Side:         RIGHT  Other Clinical Tests:          X-rays  Previous Treatment Approaches:          Left TKA 05-12-17  Personal Factors:          Sex:  female        Age:  81 y.o. Profession:  Pt is retired        Past/Current Experience:  Pt helps care for her  who has cancer  Current Medications:       Current Outpatient Prescriptions:     potassium chloride (KLOR-CON) 10 mEq tablet, Take 1 Tab by mouth daily. , Disp: 30 Tab, Rfl: 0    temazepam (RESTORIL) 15 mg capsule, Take 1 Cap by mouth nightly.  Max Daily Amount: 15 mg., Disp: 30 Cap, Rfl: 5    HYDROcodone-acetaminophen (NORCO) 7.5-325 mg per tablet, Take  by mouth., Disp: , Rfl:     acetaminophen (TYLENOL) 500 mg tablet, Take 1,000 mg by mouth every six (6) hours as needed for Pain., Disp: , Rfl:     HYDROmorphone (DILAUDID) 2 mg tablet, Take 1 Tab by mouth every four (4) hours as needed. Max Daily Amount: 12 mg., Disp: 40 Tab, Rfl: 0    OTHER,NON-FORMULARY,, Take 4 Tabs by mouth daily. Relief Factor Tablets, Disp: , Rfl:     Biotin 2,500 mcg cap, Take 2 Tabs by mouth daily. , Disp: , Rfl:     OTHER,NON-FORMULARY,, Take 1 Tab by mouth daily. Air Borne Chewable tab, Disp: , Rfl:     polycarbophil calcium (EQUALACTIN) 500 mg chew, Take 1,500 mg by mouth daily. , Disp: , Rfl:     levothyroxine (SYNTHROID) 25 mcg tablet, TAKE ONE TABLET BY MOUTH ONE TIME DAILY BEFORE BREAKFAST, Disp: 30 Tab, Rfl: 6    hydroCHLOROthiazide (HYDRODIURIL) 25 mg tablet, TAKE ONE TABLET BY MOUTH ONE TIME DAILY, Disp: 30 Tab, Rfl: 5    multivitamin (ONE A DAY) tablet, Take 1 Tab by mouth daily. , Disp: , Rfl:     esomeprazole (NEXIUM) 20 mg capsule, Take 20 mg by mouth every morning. Take / use AM day of surgery  per anesthesia protocols. Indications: GASTROESOPHAGEAL REFLUX, Disp: , Rfl:    Date Last Reviewed:  06-14-17   Number of Personal Factors/Comorbidities that affect the Plan of Care: 1-2: MODERATE COMPLEXITY   EXAMINATION:   Observation/Orthostatic Postural Assessment:          Pt ambulates with mild stiff knee gait  Palpation:          Generalized tenderness throughout L knee  ROM:    R knee extension = -10 degrees  R knee flexion = 128 degrees    L knee extension = -2 degrees  L knee flexion = 124 degrees, 120 degrees actively    Strength:    R knee extension = 5/5  R knee flexion = 5/5    L knee extension = 4+/5  L knee flexion = 5-/5      Balance:   SLS: R- 5 sec, L- 4 sec    Neurological Screen:        Sensation: Intact to light touch L LE      Functional Mobility:         Gait/Ambulation:  Pt ambulates with slight stiff knee gait and lateral trunk movement        Transfers:  Pt transitions sit to supine and supine to sit independently     Body Structures Involved:  1. Bones  2. Joints  3.  Muscles Body Functions Affected:  1. Sensory/Pain  2. Neuromusculoskeletal Activities and Participation Affected:  1. Mobility  2. Self Care  3. Domestic Life   Number of elements (examined above) that affect the Plan of Care: 3: MODERATE COMPLEXITY   CLINICAL PRESENTATION:   Presentation: Evolving clinical presentation with changing clinical characteristics: MODERATE COMPLEXITY   CLINICAL DECISION MAKING:   Outcome Measure: Tool Used: Lower Extremity Functional Scale (LEFS)  Score:  Initial: 53/80 Most Recent: 58/80 (Date: 07-25-17)   Interpretation of Score: 20 questions each scored on a 5 point scale with 0 representing \"extreme difficulty or unable to perform\" and 4 representing \"no difficulty\". The lower the score, the greater the functional disability. 80/80 represents no disability. Minimal detectable change is 9 points. Score 80 79-63 62-48 47-32 31-16 15-1 0   Modifier CH CI CJ CK CL CM CN     ? Mobility - Walking and Moving Around:     - CURRENT STATUS: CJ - 20%-39% impaired, limited or restricted    - GOAL STATUS: CI - 1%-19% impaired, limited or restricted    - D/C STATUS:  ---------------To be determined---------------    Medical Necessity:   · Patient is expected to demonstrate progress in strength, range of motion and functional technique to increase independence with household ADL's. · Patient demonstrates good rehab potential due to higher previous functional level. Reason for Services/Other Comments:  · Patient continues to require skilled intervention due to decreased ROM/strength L knee with increased pain leading to decreased functional status.    Use of outcome tool(s) and clinical judgement create a POC that gives a: Questionable prediction of patient's progress: MODERATE COMPLEXITY            TREATMENT:   (In addition to Assessment/Re-Assessment sessions the following treatments were rendered)  Pre-treatment Symptoms/Complaints:  8/16/2017: Patient reports that she is very sore today but has been trying to walk more. Pain: Initial:      Post Session:  2/10     THERAPEUTIC EXERCISE: (30 minutes):  Exercises per grid below to improve mobility and strength. Required minimal verbal cues to promote proper body alignment and promote proper body posture. Progressed resistance, range and repetitions as indicated. Date:  07-20-17 Date  07-25-17 Date:  7/28/2017  Date:  08-16-17   Activity/Exercise   Parameters     Quad Sets with Towel Roll Under Heel        SLR Flexion        SAQ's  LAQ        Heel Slides with Strap for OP        Gastroc Stretch with Strap slantboard 3x20 sec hold On Slantboard  3 reps  20 sec holds On Slantboard  3 reps  20 sec holds     NuStep 8 min level 4.0 Level 4  8 minutes Level 4  8 minutes 10 min level 4.0 Level 4  10 minutes   Standing Heel/Toe Raises        Step-Ups 8 inches x 15 reps 8 inches  20 reps 8 inches  20 reps 6 inches x 20 reps 6 inches  20 reps   TKE with T-band  Black  20 reps Black  20 reps Black x 20 reps Black  20 reps   Nautilus Leg Press 40 pounds x 20 reps 60 Lbs  20 reps 60 Lbs  20 reps 60 pounds  x20 reps B LE 60 pounds  20 reps  B LE's   Tandem gait 2x30 ft 2x30 ft 2 laps  30 feet 2x30 ft 2x30 ft   march 2x30 ft  2 laps  30 feet 2x30 ft 2x30 ft   Standing Balance Board  FW/BW and Sideways  20 reps each 20 reps each     Side stepping    2x30 ft 2x30 ft   Backward walking    2x30 ft    SLS L LE    3 min 10 reps  To Fatigue                     MANUAL THERAPY: (10 minutes): Joint mobilization and Soft tissue mobilization was utilized and necessary because of the patient's restricted joint motion. MODALITIES: (10 minutes):      Cold pack to L knee x10 minutes to decrease pain and swelling. Skin clear afterwards. Treatment/Session Assessment:     · Response to Treatment:  Pt tolerated treatments well. ROM doing well and strength improving.   She continues to have some difficulties with balance L LE.  · Compliance with Program/Exercises: Compliant. · Recommendations/Intent for next treatment session: \"Next visit will focus on advancements to more challenging activities\".   Total Treatment Duration:  50 minutes  PT Patient Time In/Time Out  Time In: 1115  Time Out: [address] [place],

## 2017-08-23 ENCOUNTER — HOSPITAL ENCOUNTER (OUTPATIENT)
Dept: PHYSICAL THERAPY | Age: 72
Discharge: HOME | End: 2017-08-23
Payer: MEDICARE

## 2017-08-23 PROCEDURE — 97140 MANUAL THERAPY 1/> REGIONS: CPT

## 2017-08-23 PROCEDURE — 97110 THERAPEUTIC EXERCISES: CPT

## 2017-08-23 NOTE — PROGRESS NOTES
Elina Winters  : [facility] at [address], [address] [place] [address],[address], [place] [address].  [phone]   [phone]           OUTPATIENT PHYSICAL THERAPY:Daily Note 2017    ICD-10: Treatment Diagnosis: Pain in left knee (M25.562); Stiffness of left knee, not elsewhere classified (M25.662); Difficulty in walking, not elsewhere classified (R26.2)  Precautions/Allergies:   Review of patient's allergies indicates no known allergies. Fall Risk Score: 1 (? 5 = High Risk)  MD Orders: Evaluate and Treat MEDICAL/REFERRING DIAGNOSIS:  Presence of left artificial knee joint [Z96.652]   DATE OF ONSET: Surgery 17  REFERRING PHYSICIAN: Laverne Monday., *  RETURN PHYSICIAN APPOINTMENT: 06-15-17     INITIAL ASSESSMENT:  Ms. RECINOS [facility] has attended 12 visits over the past 6 weeks with excellent progress in functional mobility, strength, ROM and balance. Continues to be limited in ability to community ambulate, perform house duties and ascend/descend stairs due to cont. Strength, ROM and balance/proprioception deficits. Pt is steadily improving with skilled therapy and would benefit from physical therapy services to address the above deficits and help patient return to prior level of function. PROBLEM LIST (Impacting functional limitations):  1. Decreased Strength  2. Decreased ADL/Functional Activities  3. Increased Pain  4. Decreased Flexibility/Joint Mobility  5. Decreased [place] with Home Exercise Program INTERVENTIONS PLANNED:  1. Balance Exercise  2. Cold  3. Cryotherapy  4. Electrical Stimulation  5. Gait Training  6. Home Exercise Program (HEP)  7. Manual Therapy  8. Range of Motion (ROM)  9. Therapeutic Exercise/Strengthening  10. Aquatic Therapy   TREATMENT PLAN:  Effective Dates: 17 TO 17.   Frequency/Duration: 2 times a week for 3 months  GOALS: (Goals have been discussed and agreed upon with patient.)  Short-Term Functional Goals: Time Frame: 4 weeks  1. Pt will increase ROM L knee 0-120 degrees to assist with ascending/descending stairs (Goal Met)  2. Pt will increase strength L knee 4+/5 to assist with ascending/descending stairs (Goal Met)  Discharge Goals: Time Frame: 12 weeks  1. Pt will increase strength L knee 5/5 to assist with ascending/descending stairs (Goal Ongoing)  2. Pt will be independent with HEP (Goal Met)  3. Pt will ascend/descend 10 eight inch steps independently with no c/o L knee pain (Goal Ongoing)  4. Pt will work perform 20 minutes household cooking and cleaning activities independently with min to no c/o L knee pain (Goal Ongoing)  Rehabilitation Potential For Stated Goals: Good            The information in this section was collected on 08-04-17 (except where otherwise noted). HISTORY:   History of Present Injury/Illness (Reason for Referral):  Pt reports insidious onset bilateral knee pain of several years duration. She had physical therapy with continued pain so she opted for Left TKA which was performed 05-12-17. Pt spent 2 nights in the hospital followed by home health PT until last Friday. She rates her current L knee pain 3-4/10, increasing to 10/10 at times. She is taking hydromorphone for pain. Pt is walking with a straight cane. She reports difficulties ascending/descending stairs due to her L knee pain. She is planning to have her R knee replaced in November 2017. Pt is currently having difficulties with all household cooking and cleaning activities due to her L knee surgery. Past Medical History/Comorbidities:   Ms. Antione Ruff  has a past medical history of Anxiety; Arthritis; Breast cancer ([facility] [address] (1/18/2016); Breast cancer ([facility] [address] Cancer ([facility] [address] Chronic pain; Former smoker; GERD (gastroesophageal reflux disease); Hard of hearing; Headache; Hypertension; Hypothyroidism; Insomnia; and Status post total left knee replacement (5/12/2017).  She also has no past medical history of Adverse effect of anesthesia; Aneurysm ([facility] [address] Arrhythmia; Asthma; Autoimmune disease ([facility] [address] CAD (coronary artery disease); Chronic kidney disease; Chronic obstructive pulmonary disease ([address] Coagulation disorder ([address] Diabetes ([address] Difficult intubation; Endocarditis; Heart failure ([address] Ill-defined condition; Liver disease; Malignant hyperthermia due to anesthesia; Morbid obesity ([facility] [address] Pseudocholinesterase deficiency; Psychiatric disorder; PUD (peptic ulcer disease); Rheumatic fever; Seizures ([facility] [address] Sleep apnea; Stroke [facility]); Thromboembolus ([facility] [address] or Unspecified adverse effect of anesthesia. Ms. Cornelio Boateng  has a past surgical history that includes colonoscopy; wisdom teeth extraction; tonsillectomy (1957); vascular access (Right); breast surgery procedure unlisted (Left, 7/2015); breast biopsy (Left, 1/18/2016); mastectomy, partial (Left, 01/18/2016); breast lumpectomy (Left, 01/2016); orthopaedic; and knee replacement (Left, 05/07/2017). Social History/Living Environment:     Pt lives with her spouse (currently has colon cancer) in a one-story house  Prior Level of Function/Work/Activity:  Independent with all household ADL's  Dominant Side:         RIGHT  Other Clinical Tests:          X-rays  Previous Treatment Approaches:          Left TKA 05-12-17  Personal Factors:          Sex:  female        Age:  81 y.o. Profession:  Pt is retired        Past/Current Experience:  Pt helps care for her  who has cancer  Current Medications:       Current Outpatient Prescriptions:     potassium chloride (KLOR-CON) 10 mEq tablet, Take 1 Tab by mouth daily. , Disp: 30 Tab, Rfl: 0    temazepam (RESTORIL) 15 mg capsule, Take 1 Cap by mouth nightly.  Max Daily Amount: 15 mg., Disp: 30 Cap, Rfl: 5    HYDROcodone-acetaminophen (NORCO) 7.5-325 mg per tablet, Take  by mouth., Disp: , Rfl:     acetaminophen (TYLENOL) 500 mg tablet, Take 1,000 mg by mouth every six (6) hours as needed for Pain., Disp: , Rfl:     HYDROmorphone (DILAUDID) 2 mg tablet, Take 1 Tab by mouth every four (4) hours as needed. Max Daily Amount: 12 mg., Disp: 40 Tab, Rfl: 0    OTHER,NON-FORMULARY,, Take 4 Tabs by mouth daily. Relief Factor Tablets, Disp: , Rfl:     Biotin 2,500 mcg cap, Take 2 Tabs by mouth daily. , Disp: , Rfl:     OTHER,NON-FORMULARY,, Take 1 Tab by mouth daily. Air Borne Chewable tab, Disp: , Rfl:     polycarbophil calcium (EQUALACTIN) 500 mg chew, Take 1,500 mg by mouth daily. , Disp: , Rfl:     levothyroxine (SYNTHROID) 25 mcg tablet, TAKE ONE TABLET BY MOUTH ONE TIME DAILY BEFORE BREAKFAST, Disp: 30 Tab, Rfl: 6    hydroCHLOROthiazide (HYDRODIURIL) 25 mg tablet, TAKE ONE TABLET BY MOUTH ONE TIME DAILY, Disp: 30 Tab, Rfl: 5    multivitamin (ONE A DAY) tablet, Take 1 Tab by mouth daily. , Disp: , Rfl:     esomeprazole (NEXIUM) 20 mg capsule, Take 20 mg by mouth every morning. Take / use AM day of surgery  per anesthesia protocols. Indications: GASTROESOPHAGEAL REFLUX, Disp: , Rfl:    Date Last Reviewed:  06-14-17   Number of Personal Factors/Comorbidities that affect the Plan of Care: 1-2: MODERATE COMPLEXITY   EXAMINATION:   Observation/Orthostatic Postural Assessment:          Pt ambulates with mild stiff knee gait  Palpation:          Generalized tenderness throughout L knee  ROM:    R knee extension = -10 degrees  R knee flexion = 128 degrees    L knee extension = -2 degrees  L knee flexion = 124 degrees, 120 degrees actively    Strength:    R knee extension = 5/5  R knee flexion = 5/5    L knee extension = 4+/5  L knee flexion = 5-/5      Balance:   SLS: R- 5 sec, L- 4 sec    Neurological Screen:        Sensation: Intact to light touch L LE      Functional Mobility:         Gait/Ambulation:  Pt ambulates with slight stiff knee gait and lateral trunk movement        Transfers:  Pt transitions sit to supine and supine to sit independently     Body Structures Involved:  1. Bones  2. Joints  3.  Muscles Body Functions Affected:  1. Sensory/Pain  2. Neuromusculoskeletal Activities and Participation Affected:  1. Mobility  2. Self Care  3. Domestic Life   Number of elements (examined above) that affect the Plan of Care: 3: MODERATE COMPLEXITY   CLINICAL PRESENTATION:   Presentation: Evolving clinical presentation with changing clinical characteristics: MODERATE COMPLEXITY   CLINICAL DECISION MAKING:   Outcome Measure: Tool Used: Lower Extremity Functional Scale (LEFS)  Score:  Initial: 53/80 Most Recent: 58/80 (Date: 07-25-17)   Interpretation of Score: 20 questions each scored on a 5 point scale with 0 representing \"extreme difficulty or unable to perform\" and 4 representing \"no difficulty\". The lower the score, the greater the functional disability. 80/80 represents no disability. Minimal detectable change is 9 points. Score 80 79-63 62-48 47-32 31-16 15-1 0   Modifier CH CI CJ CK CL CM CN     ? Mobility - Walking and Moving Around:     - CURRENT STATUS: CJ - 20%-39% impaired, limited or restricted    - GOAL STATUS: CI - 1%-19% impaired, limited or restricted    - D/C STATUS:  ---------------To be determined---------------    Medical Necessity:   · Patient is expected to demonstrate progress in strength, range of motion and functional technique to increase independence with household ADL's. · Patient demonstrates good rehab potential due to higher previous functional level. Reason for Services/Other Comments:  · Patient continues to require skilled intervention due to decreased ROM/strength L knee with increased pain leading to decreased functional status.    Use of outcome tool(s) and clinical judgement create a POC that gives a: Questionable prediction of patient's progress: MODERATE COMPLEXITY            TREATMENT:   (In addition to Assessment/Re-Assessment sessions the following treatments were rendered)  Pre-treatment Symptoms/Complaints:  8/23/2017: Patient states she was on her feet a lot yesterday and her knee is sore today-she states most of her pain is at night though. Pain: Initial: 2/10     Post Session:  2/10     THERAPEUTIC EXERCISE: (30 minutes):  Exercises per grid below to improve mobility and strength. Required minimal verbal cues to promote proper body alignment and promote proper body posture. Progressed resistance, range and repetitions as indicated. Date:  08-16-17 Date  08-23-17   Activity/Exercise     Quad Sets with Towel Roll Under Heel     SLR Flexion  20 reps   SAQ's  LAQ     Heel Slides with Strap for OP     Gastroc Stretch with Strap     NuStep Level 4  10 minutes Level 4  8 minutes   Standing Heel/Toe Raises     Step-Ups 6 inches  20 reps 6 inches  20 reps   TKE with T-band Black  20 reps Black  20 reps   Nautilus Leg Press 60 pounds  20 reps  B LE's 60 pounds  20 reps  B LE's   Tandem gait 2x30 ft    march 2x30 ft 2x30 ft   Standing Balance Board  FW/BW and Sideways     Side stepping 2x30 ft    Backward walking     SLS L LE 10 reps  To Fatigue 10 reps to Fatigue               MANUAL THERAPY: (10 minutes): Joint mobilization and Soft tissue mobilization was utilized and necessary because of the patient's restricted joint motion. MODALITIES: (10 minutes):      Cold pack to L knee x10 minutes to decrease pain and swelling. Skin clear afterwards. Treatment/Session Assessment:     · Response to Treatment:  Pt tolerated treatments with no c/o. Difficulties with balance activities today. · Compliance with Program/Exercises: Compliant. · Recommendations/Intent for next treatment session: \"Next visit will focus on advancements to more challenging activities\".   Total Treatment Duration:  50 minutes  PT Patient Time In/Time Out  Time In: 1115  Time Out: [address], PT

## 2017-08-30 ENCOUNTER — HOSPITAL ENCOUNTER (OUTPATIENT)
Dept: PHYSICAL THERAPY | Age: 72
Discharge: HOME | End: 2017-08-30
Payer: MEDICARE

## 2017-08-30 PROCEDURE — 97110 THERAPEUTIC EXERCISES: CPT

## 2017-08-30 PROCEDURE — 97140 MANUAL THERAPY 1/> REGIONS: CPT

## 2017-08-30 NOTE — PROGRESS NOTES
Marissa Hess  : 1945 [facility] [place] [address], [place] [address].  [phone]   [phone]           OUTPATIENT PHYSICAL THERAPY:Daily Note 2017    ICD-10: Treatment Diagnosis: Pain in left knee (M25.562); Stiffness of left knee, not elsewhere classified (M25.662); Difficulty in walking, not elsewhere classified (R26.2)  Precautions/Allergies:   Review of patient's allergies indicates no known allergies. Fall Risk Score: 1 (? 5 = High Risk)  MD Orders: Evaluate and Treat MEDICAL/REFERRING DIAGNOSIS:  Presence of left artificial knee joint [Z96.652]   DATE OF ONSET: Surgery 17  REFERRING PHYSICIAN: Jemima Rose, *  RETURN PHYSICIAN APPOINTMENT: 06-15-17     INITIAL ASSESSMENT:  Ms. Hector Palomino has attended 12 visits over the past 6 weeks with excellent progress in functional mobility, strength, ROM and balance. Continues to be limited in ability to community ambulate, perform house duties and ascend/descend stairs due to cont. Strength, ROM and balance/proprioception deficits. Pt is steadily improving with skilled therapy and would benefit from physical therapy services to address the above deficits and help patient return to prior level of function. PROBLEM LIST (Impacting functional limitations):  1. Decreased Strength  2. Decreased ADL/Functional Activities  3. Increased Pain  4. Decreased Flexibility/Joint Mobility  5. Decreased Edward with Home Exercise Program INTERVENTIONS PLANNED:  1. Balance Exercise  2. Cold  3. Cryotherapy  4. Electrical Stimulation  5. Gait Training  6. Home Exercise Program (HEP)  7. Manual Therapy  8. Range of Motion (ROM)  9. Therapeutic Exercise/Strengthening  10. Aquatic Therapy   TREATMENT PLAN:  Effective Dates: 17 TO 17.   Frequency/Duration: 2 times a week for 3 months  GOALS: (Goals have been discussed and agreed upon with patient.)  Short-Term Functional Goals: Time Frame: 4 weeks  1. Pt will increase ROM L knee 0-120 degrees to assist with ascending/descending stairs (Goal Met)  2. Pt will increase strength L knee 4+/5 to assist with ascending/descending stairs (Goal Met)  Discharge Goals: Time Frame: 12 weeks  1. Pt will increase strength L knee 5/5 to assist with ascending/descending stairs (Goal Ongoing)  2. Pt will be independent with HEP (Goal Met)  3. Pt will ascend/descend 10 eight inch steps independently with no c/o L knee pain (Goal Ongoing)  4. Pt will work perform 20 minutes household cooking and cleaning activities independently with min to no c/o L knee pain (Goal Ongoing)  Rehabilitation Potential For Stated Goals: Good            The information in this section was collected on 08-04-17 (except where otherwise noted). HISTORY:   History of Present Injury/Illness (Reason for Referral):  Pt reports insidious onset bilateral knee pain of several years duration. She had physical therapy with continued pain so she opted for Left TKA which was performed 05-12-17. Pt spent 2 nights in the hospital followed by home health PT until last Friday. She rates her current L knee pain 3-4/10, increasing to 10/10 at times. She is taking hydromorphone for pain. Pt is walking with a straight cane. She reports difficulties ascending/descending stairs due to her L knee pain. She is planning to have her R knee replaced in November 2017. Pt is currently having difficulties with all household cooking and cleaning activities due to her L knee surgery. Past Medical History/Comorbidities:   Ms. [facility]  has a past medical history of Anxiety; Arthritis; Breast cancer ([facility] [address] (1/18/2016); Breast cancer ([facility] [address] Cancer ([facility] [address] Chronic pain; Former smoker; GERD (gastroesophageal reflux disease); Hard of hearing; Headache; Hypertension; Hypothyroidism; Insomnia; and Status post total left knee replacement (5/12/2017).  She also has no past medical history of Adverse effect of anesthesia; Aneurysm ([facility] [address] Arrhythmia; Asthma; Autoimmune disease ([facility] [address] CAD (coronary artery disease); Chronic kidney disease; Chronic obstructive pulmonary disease ([address] Coagulation disorder ([address] Diabetes ([address] Difficult intubation; Endocarditis; Heart failure ([address] Ill-defined condition; Liver disease; Malignant hyperthermia due to anesthesia; Morbid obesity ([address] Pseudocholinesterase deficiency; Psychiatric disorder; PUD (peptic ulcer disease); Rheumatic fever; Seizures ([facility] [address] Sleep apnea; Stroke [facility]); Thromboembolus ([facility] [address] or Unspecified adverse effect of anesthesia. Ms. Alexander Mendoza  has a past surgical history that includes colonoscopy; wisdom teeth extraction; tonsillectomy (1957); vascular access (Right); breast surgery procedure unlisted (Left, 7/2015); breast biopsy (Left, 1/18/2016); mastectomy, partial (Left, 01/18/2016); breast lumpectomy (Left, 01/2016); orthopaedic; and knee replacement (Left, 05/07/2017). Social History/Living Environment:     Pt lives with her spouse (currently has colon cancer) in a one-story house  Prior Level of Function/Work/Activity:  Independent with all household ADL's  Dominant Side:         RIGHT  Other Clinical Tests:          X-rays  Previous Treatment Approaches:          Left TKA 05-12-17  Personal Factors:          Sex:  female        Age:  81 y.o. Profession:  Pt is retired        Past/Current Experience:  Pt helps care for her  who has cancer  Current Medications:       Current Outpatient Prescriptions:     celecoxib (CELEBREX) 200 mg capsule, , Disp: , Rfl:     potassium chloride SR (KLOR-CON 10) 10 mEq tablet, TAKE ONE TABLET BY MOUTH ONE TIME DAILY, Disp: , Rfl: 0    temazepam (RESTORIL) 15 mg capsule, Take 1 Cap by mouth nightly.  Max Daily Amount: 15 mg., Disp: 30 Cap, Rfl: 5    HYDROcodone-acetaminophen (NORCO) 7.5-325 mg per tablet, Take  by mouth., Disp: , Rfl:     acetaminophen (TYLENOL) 500 mg tablet, Take 1,000 mg by mouth every six (6) hours as needed for Pain., Disp: , Rfl:     HYDROmorphone (DILAUDID) 2 mg tablet, Take 1 Tab by mouth every four (4) hours as needed. Max Daily Amount: 12 mg., Disp: 40 Tab, Rfl: 0    OTHER,NON-FORMULARY,, Take 4 Tabs by mouth daily. Relief Factor Tablets, Disp: , Rfl:     Biotin 2,500 mcg cap, Take 2 Tabs by mouth daily. , Disp: , Rfl:     OTHER,NON-FORMULARY,, Take 1 Tab by mouth daily. Air Borne Chewable tab, Disp: , Rfl:     polycarbophil calcium (EQUALACTIN) 500 mg chew, Take 1,500 mg by mouth daily. , Disp: , Rfl:     levothyroxine (SYNTHROID) 25 mcg tablet, TAKE ONE TABLET BY MOUTH ONE TIME DAILY BEFORE BREAKFAST, Disp: 30 Tab, Rfl: 6    hydroCHLOROthiazide (HYDRODIURIL) 25 mg tablet, TAKE ONE TABLET BY MOUTH ONE TIME DAILY, Disp: 30 Tab, Rfl: 5    multivitamin (ONE A DAY) tablet, Take 1 Tab by mouth daily. , Disp: , Rfl:     esomeprazole (NEXIUM) 20 mg capsule, Take 20 mg by mouth every morning. Take / use AM day of surgery  per anesthesia protocols.   Indications: GASTROESOPHAGEAL REFLUX, Disp: , Rfl:    Date Last Reviewed:  06-14-17   Number of Personal Factors/Comorbidities that affect the Plan of Care: 1-2: MODERATE COMPLEXITY   EXAMINATION:   Observation/Orthostatic Postural Assessment:          Pt ambulates with mild stiff knee gait  Palpation:          Generalized tenderness throughout L knee  ROM:    R knee extension = -10 degrees  R knee flexion = 128 degrees    L knee extension = -2 degrees  L knee flexion = 124 degrees, 120 degrees actively    Strength:    R knee extension = 5/5  R knee flexion = 5/5    L knee extension = 4+/5  L knee flexion = 5-/5      Balance:   SLS: R- 5 sec, L- 4 sec    Neurological Screen:        Sensation: Intact to light touch L LE      Functional Mobility:         Gait/Ambulation:  Pt ambulates with slight stiff knee gait and lateral trunk movement        Transfers:  Pt transitions sit to supine and supine to sit independently     Body Structures Involved:  1. Bones  2. Joints  3. Muscles Body Functions Affected:  1. Sensory/Pain  2. Neuromusculoskeletal Activities and Participation Affected:  1. Mobility  2. Self Care  3. Domestic Life   Number of elements (examined above) that affect the Plan of Care: 3: MODERATE COMPLEXITY   CLINICAL PRESENTATION:   Presentation: Evolving clinical presentation with changing clinical characteristics: MODERATE COMPLEXITY   CLINICAL DECISION MAKING:   Outcome Measure: Tool Used: Lower Extremity Functional Scale (LEFS)  Score:  Initial: 53/80 Most Recent: 58/80 (Date: 07-25-17)   Interpretation of Score: 20 questions each scored on a 5 point scale with 0 representing \"extreme difficulty or unable to perform\" and 4 representing \"no difficulty\". The lower the score, the greater the functional disability. 80/80 represents no disability. Minimal detectable change is 9 points. Score 80 79-63 62-48 47-32 31-16 15-1 0   Modifier CH CI CJ CK CL CM CN     ? Mobility - Walking and Moving Around:     - CURRENT STATUS: CJ - 20%-39% impaired, limited or restricted    - GOAL STATUS: CI - 1%-19% impaired, limited or restricted    - D/C STATUS:  ---------------To be determined---------------    Medical Necessity:   · Patient is expected to demonstrate progress in strength, range of motion and functional technique to increase independence with household ADL's. · Patient demonstrates good rehab potential due to higher previous functional level. Reason for Services/Other Comments:  · Patient continues to require skilled intervention due to decreased ROM/strength L knee with increased pain leading to decreased functional status.    Use of outcome tool(s) and clinical judgement create a POC that gives a: Questionable prediction of patient's progress: MODERATE COMPLEXITY            TREATMENT:   (In addition to Assessment/Re-Assessment sessions the following treatments were rendered)  Pre-treatment Symptoms/Complaints: 8/30/2017: Pt states her knee is sore from walking and going up/down stairs a lot. Pain: Initial: 2/10     Post Session:  2/10     THERAPEUTIC EXERCISE: (30 minutes):  Exercises per grid below to improve mobility and strength. Required minimal verbal cues to promote proper body alignment and promote proper body posture. Progressed resistance, range and repetitions as indicated. Date:  08-16-17 Date  08-23-17 Date  08-30-17   Activity/Exercise      Quad Sets with Towel Roll Under Heel      SLR Flexion  20 reps 20 reps   SAQ's  LAQ      Heel Slides with Strap for OP   20 reps   Gastroc Stretch with Strap      NuStep Level 4  10 minutes Level 4  8 minutes Level 4  8 minutes   Standing Heel/Toe Raises      Step-Ups 6 inches  20 reps 6 inches  20 reps 6 inches  20 reps   TKE with T-band Black  20 reps Black  20 reps Black  20 reps   Nautilus Leg Press 60 pounds  20 reps  B LE's 60 pounds  20 reps  B LE's 60 pounds  20 reps  B LE's   Tandem gait 2x30 ft     march 2x30 ft 2x30 ft    Standing Balance Board  FW/BW and Sideways      Side stepping 2x30 ft     Backward walking      SLS L LE 10 reps  To Fatigue 10 reps to Fatigue    Nautilus Leg Ext   20 Lbs  20 reps   Nautilus Leg Curls   25 Lbs  20 reps     MANUAL THERAPY: (10 minutes): Joint mobilization and Soft tissue mobilization was utilized and necessary because of the patient's restricted joint motion. MODALITIES: (10 minutes):      Cold pack to L knee x10 minutes to decrease pain and swelling. Skin clear afterwards. Treatment/Session Assessment:     · Response to Treatment:  Pt tolerated treatments well today. ROM/strength improving. Pt reported decreased pain after treatments. · Compliance with Program/Exercises: Compliant. · Recommendations/Intent for next treatment session: \"Next visit will focus on advancements to more challenging activities\".   Total Treatment Duration:  50 minutes  PT Patient Time In/Time Out  Time In: 0520  Time Out: Teri 20 [employer]

## 2017-09-06 ENCOUNTER — HOSPITAL ENCOUNTER (OUTPATIENT)
Dept: PHYSICAL THERAPY | Age: 72
Discharge: HOME | End: 2017-09-06
Payer: MEDICARE

## 2017-09-06 PROCEDURE — 97110 THERAPEUTIC EXERCISES: CPT

## 2017-09-06 PROCEDURE — 97140 MANUAL THERAPY 1/> REGIONS: CPT

## 2017-09-06 NOTE — PROGRESS NOTES
Quinn Oconnor  : 1945 [facility] [place] [address] Formerly [facility], [facility], [place] [address].  [phone]   [phone]           OUTPATIENT PHYSICAL THERAPY:Daily Note 2017    ICD-10: Treatment Diagnosis: Pain in left knee (M25.562); Stiffness of left knee, not elsewhere classified (M25.662); Difficulty in walking, not elsewhere classified (R26.2)  Precautions/Allergies:   Review of patient's allergies indicates no known allergies. Fall Risk Score: 1 (? 5 = High Risk)  MD Orders: Evaluate and Treat MEDICAL/REFERRING DIAGNOSIS:  Presence of left artificial knee joint [Z96.652]   DATE OF ONSET: Surgery 17  REFERRING PHYSICIAN: Kenzie Reyes, *  RETURN PHYSICIAN APPOINTMENT: 06-15-17     INITIAL ASSESSMENT:  Ms. Hattie Shaikh has attended 12 visits over the past 6 weeks with excellent progress in functional mobility, strength, ROM and balance. Continues to be limited in ability to community ambulate, perform house duties and ascend/descend stairs due to cont. Strength, ROM and balance/proprioception deficits. Pt is steadily improving with skilled therapy and would benefit from physical therapy services to address the above deficits and help patient return to prior level of function. PROBLEM LIST (Impacting functional limitations):  1. Decreased Strength  2. Decreased ADL/Functional Activities  3. Increased Pain  4. Decreased Flexibility/Joint Mobility  5. Decreased [place] with Home Exercise Program INTERVENTIONS PLANNED:  1. Balance Exercise  2. Cold  3. Cryotherapy  4. Electrical Stimulation  5. Gait Training  6. Home Exercise Program (HEP)  7. Manual Therapy  8. Range of Motion (ROM)  9. Therapeutic Exercise/Strengthening  10. Aquatic Therapy   TREATMENT PLAN:  Effective Dates: 17 TO 17.   Frequency/Duration: 2 times a week for 3 months  GOALS: (Goals have been discussed and agreed upon with patient.)  Short-Term Functional Goals: Time Frame: 4 weeks  1. Pt will increase ROM L knee 0-120 degrees to assist with ascending/descending stairs (Goal Met)  2. Pt will increase strength L knee 4+/5 to assist with ascending/descending stairs (Goal Met)  Discharge Goals: Time Frame: 12 weeks  1. Pt will increase strength L knee 5/5 to assist with ascending/descending stairs (Goal Ongoing)  2. Pt will be independent with HEP (Goal Met)  3. Pt will ascend/descend 10 eight inch steps independently with no c/o L knee pain (Goal Ongoing)  4. Pt will work perform 20 minutes household cooking and cleaning activities independently with min to no c/o L knee pain (Goal Ongoing)  Rehabilitation Potential For Stated Goals: Good            The information in this section was collected on 08-04-17 (except where otherwise noted). HISTORY:   History of Present Injury/Illness (Reason for Referral):  Pt reports insidious onset bilateral knee pain of several years duration. She had physical therapy with continued pain so she opted for Left TKA which was performed 05-12-17. Pt spent 2 nights in the hospital followed by home health PT until last Friday. She rates her current L knee pain 3-4/10, increasing to 10/10 at times. She is taking hydromorphone for pain. Pt is walking with a straight cane. She reports difficulties ascending/descending stairs due to her L knee pain. She is planning to have her R knee replaced in November 2017. Pt is currently having difficulties with all household cooking and cleaning activities due to her L knee surgery. Past Medical History/Comorbidities:   Ms. William Chawla  has a past medical history of Anxiety; Arthritis; Breast cancer ([facility] [address] (1/18/2016); Breast cancer ([facility] [address] Cancer ([facility] [address] Chronic pain; Former smoker; GERD (gastroesophageal reflux disease); Hard of hearing; Headache; Hypertension; Hypothyroidism; Insomnia; and Status post total left knee replacement (5/12/2017).  She also has no past medical history of Adverse effect of anesthesia; Aneurysm ([facility] [address] Arrhythmia; Asthma; Autoimmune disease ([facility] [address] CAD (coronary artery disease); Chronic kidney disease; Chronic obstructive pulmonary disease ([address] Coagulation disorder ([address] Diabetes ([address] Difficult intubation; Endocarditis; Heart failure ([address] Ill-defined condition; Liver disease; Malignant hyperthermia due to anesthesia; Morbid obesity ([address] Pseudocholinesterase deficiency; Psychiatric disorder; PUD (peptic ulcer disease); Rheumatic fever; Seizures ([facility] [address] Sleep apnea; Stroke [facility]); Thromboembolus ([facility] [address] or Unspecified adverse effect of anesthesia. Ms. Sally Lewis  has a past surgical history that includes colonoscopy; wisdom teeth extraction; tonsillectomy (1957); vascular access (Right); breast surgery procedure unlisted (Left, 7/2015); breast biopsy (Left, 1/18/2016); mastectomy, partial (Left, 01/18/2016); breast lumpectomy (Left, 01/2016); orthopaedic; and knee replacement (Left, 05/07/2017). Social History/Living Environment:     Pt lives with her spouse (currently has colon cancer) in a one-story house  Prior Level of Function/Work/Activity:  Independent with all household ADL's  Dominant Side:         RIGHT  Other Clinical Tests:          X-rays  Previous Treatment Approaches:          Left TKA 05-12-17  Personal Factors:          Sex:  female        Age:  81 y.o. Profession:  Pt is retired        Past/Current Experience:  Pt helps care for her  who has cancer  Current Medications:       Current Outpatient Prescriptions:     celecoxib (CELEBREX) 200 mg capsule, , Disp: , Rfl:     potassium chloride SR (KLOR-CON 10) 10 mEq tablet, TAKE ONE TABLET BY MOUTH ONE TIME DAILY, Disp: , Rfl: 0    temazepam (RESTORIL) 15 mg capsule, Take 1 Cap by mouth nightly.  Max Daily Amount: 15 mg., Disp: 30 Cap, Rfl: 5    HYDROcodone-acetaminophen (NORCO) 7.5-325 mg per tablet, Take  by mouth., Disp: , Rfl:     acetaminophen (TYLENOL) 500 mg tablet, Take 1,000 mg by mouth every six (6) hours as needed for Pain., Disp: , Rfl:     HYDROmorphone (DILAUDID) 2 mg tablet, Take 1 Tab by mouth every four (4) hours as needed. Max Daily Amount: 12 mg., Disp: 40 Tab, Rfl: 0    OTHER,NON-FORMULARY,, Take 4 Tabs by mouth daily. Relief Factor Tablets, Disp: , Rfl:     Biotin 2,500 mcg cap, Take 2 Tabs by mouth daily. , Disp: , Rfl:     OTHER,NON-FORMULARY,, Take 1 Tab by mouth daily. Air Borne Chewable tab, Disp: , Rfl:     polycarbophil calcium (EQUALACTIN) 500 mg chew, Take 1,500 mg by mouth daily. , Disp: , Rfl:     levothyroxine (SYNTHROID) 25 mcg tablet, TAKE ONE TABLET BY MOUTH ONE TIME DAILY BEFORE BREAKFAST, Disp: 30 Tab, Rfl: 6    hydroCHLOROthiazide (HYDRODIURIL) 25 mg tablet, TAKE ONE TABLET BY MOUTH ONE TIME DAILY, Disp: 30 Tab, Rfl: 5    multivitamin (ONE A DAY) tablet, Take 1 Tab by mouth daily. , Disp: , Rfl:     esomeprazole (NEXIUM) 20 mg capsule, Take 20 mg by mouth every morning. Take / use AM day of surgery  per anesthesia protocols.   Indications: GASTROESOPHAGEAL REFLUX, Disp: , Rfl:    Date Last Reviewed:  06-14-17   Number of Personal Factors/Comorbidities that affect the Plan of Care: 1-2: MODERATE COMPLEXITY   EXAMINATION:   Observation/Orthostatic Postural Assessment:          Pt ambulates with mild stiff knee gait  Palpation:          Generalized tenderness throughout L knee  ROM:    R knee extension = -10 degrees  R knee flexion = 128 degrees    L knee extension = -2 degrees  L knee flexion = 124 degrees, 120 degrees actively    Strength:    R knee extension = 5/5  R knee flexion = 5/5    L knee extension = 4+/5  L knee flexion = 5-/5      Balance:   SLS: R- 5 sec, L- 4 sec    Neurological Screen:        Sensation: Intact to light touch L LE      Functional Mobility:         Gait/Ambulation:  Pt ambulates with slight stiff knee gait and lateral trunk movement        Transfers:  Pt transitions sit to supine and supine to sit independently     Body Structures Involved:  1. Bones  2. Joints  3. Muscles Body Functions Affected:  1. Sensory/Pain  2. Neuromusculoskeletal Activities and Participation Affected:  1. Mobility  2. Self Care  3. Domestic Life   Number of elements (examined above) that affect the Plan of Care: 3: MODERATE COMPLEXITY   CLINICAL PRESENTATION:   Presentation: Evolving clinical presentation with changing clinical characteristics: MODERATE COMPLEXITY   CLINICAL DECISION MAKING:   Outcome Measure: Tool Used: Lower Extremity Functional Scale (LEFS)  Score:  Initial: 53/80 Most Recent: 58/80 (Date: 07-25-17)   Interpretation of Score: 20 questions each scored on a 5 point scale with 0 representing \"extreme difficulty or unable to perform\" and 4 representing \"no difficulty\". The lower the score, the greater the functional disability. 80/80 represents no disability. Minimal detectable change is 9 points. Score 80 79-63 62-48 47-32 31-16 15-1 0   Modifier CH CI CJ CK CL CM CN     ? Mobility - Walking and Moving Around:     - CURRENT STATUS: CJ - 20%-39% impaired, limited or restricted    - GOAL STATUS: CI - 1%-19% impaired, limited or restricted    - D/C STATUS:  ---------------To be determined---------------    Medical Necessity:   · Patient is expected to demonstrate progress in strength, range of motion and functional technique to increase independence with household ADL's. · Patient demonstrates good rehab potential due to higher previous functional level. Reason for Services/Other Comments:  · Patient continues to require skilled intervention due to decreased ROM/strength L knee with increased pain leading to decreased functional status.    Use of outcome tool(s) and clinical judgement create a POC that gives a: Questionable prediction of patient's progress: MODERATE COMPLEXITY            TREATMENT:   (In addition to Assessment/Re-Assessment sessions the following treatments were rendered)  Pre-treatment Symptoms/Complaints: 9/6/2017: Pt states her knee is feeling good today. Pain: Initial: 1-2/10     Post Session:  1-2/10     THERAPEUTIC EXERCISE: (30 minutes):  Exercises per grid below to improve mobility and strength. Required minimal verbal cues to promote proper body alignment and promote proper body posture. Progressed resistance, range and repetitions as indicated. Date:  08-16-17 Date  08-23-17 Date  08-30-17 Date  09-06-17   Activity/Exercise       Quad Sets with Towel Roll Under Heel       SLR Flexion  20 reps 20 reps 20 reps   SAQ's  LAQ       Heel Slides with Strap for OP   20 reps 20 reps   Gastroc Stretch with Strap    On Slantboard  3 reps  20 sec holds   NuStep Level 4  10 minutes Level 4  8 minutes Level 4  8 minutes Level 4  8 minutes   Standing Heel/Toe Raises       Step-Ups 6 inches  20 reps 6 inches  20 reps 6 inches  20 reps 6 inch  20 reps   TKE with T-band Black  20 reps Black  20 reps Black  20 reps Black  20 reps   Nautilus Leg Press 60 pounds  20 reps  B LE's 60 pounds  20 reps  B LE's 60 pounds  20 reps  B LE's 60 pounds  20 reps  B LE's   Tandem gait 2x30 ft      march 2x30 ft 2x30 ft     Standing Balance Board  FW/BW and Sideways       Side stepping 2x30 ft      Backward walking       SLS L LE 10 reps  To Fatigue 10 reps to Fatigue     Nautilus Leg Ext   20 Lbs  20 reps 25 Lbs  20 reps   Nautilus Leg Curls   25 Lbs  20 reps 25 Lbs  20 reps     MANUAL THERAPY: (10 minutes): Joint mobilization and Soft tissue mobilization was utilized and necessary because of the patient's restricted joint motion. MODALITIES: (10 minutes):      Cold pack to L knee x10 minutes to decrease pain and swelling. Skin clear afterwards. Treatment/Session Assessment:     · Response to Treatment:  Pt tolerated treatments well today. ROM, strength and pain improving. Pt has 2 more scheduled appointments. · Compliance with Program/Exercises: Compliant. · Recommendations/Intent for next treatment session:  \"Next visit will focus on advancements to more challenging activities\".   Total Treatment Duration:  50 minutes  PT Patient Time In/Time Out  Time In: 1030  Time Out: [address] Leighann Arias, PT

## 2017-09-13 ENCOUNTER — HOSPITAL ENCOUNTER (OUTPATIENT)
Dept: PHYSICAL THERAPY | Age: 72
Discharge: HOME | End: 2017-09-13
Payer: MEDICARE

## 2017-09-13 PROCEDURE — G8978 MOBILITY CURRENT STATUS: HCPCS

## 2017-09-13 PROCEDURE — G8979 MOBILITY GOAL STATUS: HCPCS

## 2017-09-13 PROCEDURE — 97110 THERAPEUTIC EXERCISES: CPT

## 2017-09-13 PROCEDURE — 97140 MANUAL THERAPY 1/> REGIONS: CPT

## 2017-09-13 NOTE — PROGRESS NOTES
Rashmi Euceda  : 1945 [facility]  [address], [address], [address] [facility]  [phone]   [phone]           OUTPATIENT PHYSICAL THERAPY:Daily Note and Recertification     ICD-10: Treatment Diagnosis: Pain in left knee (M25.562); Stiffness of left knee, not elsewhere classified (M25.662); Difficulty in walking, not elsewhere classified (R26.2)  Precautions/Allergies:   Review of patient's allergies indicates no known allergies. Fall Risk Score: 1 (? 5 = High Risk)  MD Orders: Evaluate and Treat MEDICAL/REFERRING DIAGNOSIS:  Presence of left artificial knee joint [Z96.652]   DATE OF ONSET: Surgery 17  REFERRING PHYSICIAN: Lovely Ni., *  RETURN PHYSICIAN APPOINTMENT: 06-15-17     INITIAL ASSESSMENT:  Ms. Lyly Aeyrs has attended 12 visits over the past 6 weeks with excellent progress in functional mobility, strength, ROM and balance. Continues to be limited in ability to community ambulate, perform house duties and ascend/descend stairs due to cont. Strength, ROM and balance/proprioception deficits. Pt is steadily improving with skilled therapy and would benefit from physical therapy services to address the above deficits and help patient return to prior level of function. PROBLEM LIST (Impacting functional limitations):  1. Decreased Strength  2. Decreased ADL/Functional Activities  3. Increased Pain  4. Decreased Flexibility/Joint Mobility  5. Decreased Oliver with Home Exercise Program INTERVENTIONS PLANNED:  1. Balance Exercise  2. Cold  3. Cryotherapy  4. Electrical Stimulation  5. Gait Training  6. Home Exercise Program (HEP)  7. Manual Therapy  8. Range of Motion (ROM)  9. Therapeutic Exercise/Strengthening  10. Aquatic Therapy   TREATMENT PLAN:  Effective Dates: 17 TO 17.   Frequency/Duration: 1 time a week for 2 more visits  GOALS: (Goals have been discussed and agreed upon with patient.)  Short-Term Functional Goals: Time Frame: 4 weeks  1. Pt will increase ROM L knee 0-120 degrees to assist with ascending/descending stairs (Goal Met)  2. Pt will increase strength L knee 4+/5 to assist with ascending/descending stairs (Goal Met)  Discharge Goals: Time Frame: 12 weeks  1. Pt will increase strength L knee 5/5 to assist with ascending/descending stairs (Goal Ongoing)  2. Pt will be independent with HEP (Goal Met)  3. Pt will ascend/descend 10 eight inch steps independently with no c/o L knee pain (Goal Ongoing)  4. Pt will work perform 20 minutes household cooking and cleaning activities independently with min to no c/o L knee pain (Goal Ongoing)  Rehabilitation Potential For Stated Goals: Good            The information in this section was collected on 08-04-17 (except where otherwise noted). HISTORY:   History of Present Injury/Illness (Reason for Referral):  Pt reports insidious onset bilateral knee pain of several years duration. She had physical therapy with continued pain so she opted for Left TKA which was performed 05-12-17. Pt spent 2 nights in the hospital followed by home health PT until last Friday. She rates her current L knee pain 3-4/10, increasing to 10/10 at times. She is taking hydromorphone for pain. Pt is walking with a straight cane. She reports difficulties ascending/descending stairs due to her L knee pain. She is planning to have her R knee replaced in November 2017. Pt is currently having difficulties with all household cooking and cleaning activities due to her L knee surgery. Past Medical History/Comorbidities:   Ms. César Osorio  has a past medical history of Anxiety; Arthritis; Breast cancer ([facility] [address] (1/18/2016); Breast cancer ([facility] [address] Cancer ([facility] [address] Chronic pain; Former smoker; GERD (gastroesophageal reflux disease); Hard of hearing; Headache; Hypertension; Hypothyroidism; Insomnia; and Status post total left knee replacement (5/12/2017).  She also has no past medical history of Adverse effect of anesthesia; Aneurysm ([facility] [address] Arrhythmia; Asthma; Autoimmune disease ([facility] [address] CAD (coronary artery disease); Chronic kidney disease; Chronic obstructive pulmonary disease ([address] Coagulation disorder ([address] Diabetes ([address] Difficult intubation; Endocarditis; Heart failure ([address] Ill-defined condition; Liver disease; Malignant hyperthermia due to anesthesia; Morbid obesity ([address] Pseudocholinesterase deficiency; Psychiatric disorder; PUD (peptic ulcer disease); Rheumatic fever; Seizures ([facility] [address] Sleep apnea; Stroke [facility]); Thromboembolus ([facility] [address] or Unspecified adverse effect of anesthesia. Ms. Lyly Ayers  has a past surgical history that includes colonoscopy; wisdom teeth extraction; tonsillectomy (1957); vascular access (Right); breast surgery procedure unlisted (Left, 7/2015); breast biopsy (Left, 1/18/2016); mastectomy, partial (Left, 01/18/2016); breast lumpectomy (Left, 01/2016); orthopaedic; and knee replacement (Left, 05/07/2017). Social History/Living Environment:     Pt lives with her spouse (currently has colon cancer) in a one-story house  Prior Level of Function/Work/Activity:  Independent with all household ADL's  Dominant Side:         RIGHT  Other Clinical Tests:          X-rays  Previous Treatment Approaches:          Left TKA 05-12-17  Personal Factors:          Sex:  female        Age:  81 y.o. Profession:  Pt is retired        Past/Current Experience:  Pt helps care for her  who has cancer  Current Medications:       Current Outpatient Prescriptions:     hydroCHLOROthiazide (HYDRODIURIL) 25 mg tablet, TAKE ONE TABLET BY MOUTH ONE TIME DAILY, Disp: 30 Tab, Rfl: 5    celecoxib (CELEBREX) 200 mg capsule, , Disp: , Rfl:     potassium chloride SR (KLOR-CON 10) 10 mEq tablet, TAKE ONE TABLET BY MOUTH ONE TIME DAILY, Disp: , Rfl: 0    temazepam (RESTORIL) 15 mg capsule, Take 1 Cap by mouth nightly.  Max Daily Amount: 15 mg., Disp: 30 Cap, Rfl: 5   HYDROcodone-acetaminophen (NORCO) 7.5-325 mg per tablet, Take  by mouth., Disp: , Rfl:     acetaminophen (TYLENOL) 500 mg tablet, Take 1,000 mg by mouth every six (6) hours as needed for Pain., Disp: , Rfl:     HYDROmorphone (DILAUDID) 2 mg tablet, Take 1 Tab by mouth every four (4) hours as needed. Max Daily Amount: 12 mg., Disp: 40 Tab, Rfl: 0    OTHER,NON-FORMULARY,, Take 4 Tabs by mouth daily. Relief Factor Tablets, Disp: , Rfl:     Biotin 2,500 mcg cap, Take 2 Tabs by mouth daily. , Disp: , Rfl:     OTHER,NON-FORMULARY,, Take 1 Tab by mouth daily. Air Borne Chewable tab, Disp: , Rfl:     polycarbophil calcium (EQUALACTIN) 500 mg chew, Take 1,500 mg by mouth daily. , Disp: , Rfl:     levothyroxine (SYNTHROID) 25 mcg tablet, TAKE ONE TABLET BY MOUTH ONE TIME DAILY BEFORE BREAKFAST, Disp: 30 Tab, Rfl: 6    multivitamin (ONE A DAY) tablet, Take 1 Tab by mouth daily. , Disp: , Rfl:     esomeprazole (NEXIUM) 20 mg capsule, Take 20 mg by mouth every morning. Take / use AM day of surgery  per anesthesia protocols.   Indications: GASTROESOPHAGEAL REFLUX, Disp: , Rfl:    Date Last Reviewed:  06-14-17   Number of Personal Factors/Comorbidities that affect the Plan of Care: 1-2: MODERATE COMPLEXITY   EXAMINATION:   Observation/Orthostatic Postural Assessment:          Pt ambulates with mild stiff knee gait  Palpation:          Generalized tenderness throughout L knee  ROM:    R knee extension = -10 degrees  R knee flexion = 128 degrees    L knee extension = 0 degrees  L knee flexion = 125 degrees     Strength:    R knee extension = 5/5  R knee flexion = 5/5    L knee extension = 4+/5  L knee flexion = 5-/5      Balance:   SLS: R- 5 sec, L- 4 sec    Neurological Screen:        Sensation: Intact to light touch L LE      Functional Mobility:         Gait/Ambulation:  Pt ambulates with slight stiff knee gait and lateral trunk movement        Transfers:  Pt transitions sit to supine and supine to sit independently     Body Structures Involved:  1. Bones  2. Joints  3. Muscles Body Functions Affected:  1. Sensory/Pain  2. Neuromusculoskeletal Activities and Participation Affected:  1. Mobility  2. Self Care  3. Domestic Life   Number of elements (examined above) that affect the Plan of Care: 3: MODERATE COMPLEXITY   CLINICAL PRESENTATION:   Presentation: Evolving clinical presentation with changing clinical characteristics: MODERATE COMPLEXITY   CLINICAL DECISION MAKING:   Outcome Measure: Tool Used: Lower Extremity Functional Scale (LEFS)  Score:  Initial: 53/80 Most Recent: 64/80 (Date: 09-13-17)   Interpretation of Score: 20 questions each scored on a 5 point scale with 0 representing \"extreme difficulty or unable to perform\" and 4 representing \"no difficulty\". The lower the score, the greater the functional disability. 80/80 represents no disability. Minimal detectable change is 9 points. Score 80 79-63 62-48 47-32 31-16 15-1 0   Modifier CH CI CJ CK CL CM CN     ? Mobility - Walking and Moving Around:     - CURRENT STATUS: CI - 1%-19% impaired, limited or restricted    - GOAL STATUS: CI - 1%-19% impaired, limited or restricted    - D/C STATUS:  ---------------To be determined---------------    Medical Necessity:   · Patient is expected to demonstrate progress in strength, range of motion and functional technique to increase independence with household ADL's. · Patient demonstrates good rehab potential due to higher previous functional level. Reason for Services/Other Comments:  · Patient continues to require skilled intervention due to decreased ROM/strength L knee with increased pain leading to decreased functional status.    Use of outcome tool(s) and clinical judgement create a POC that gives a: Questionable prediction of patient's progress: MODERATE COMPLEXITY            TREATMENT:   (In addition to Assessment/Re-Assessment sessions the following treatments were rendered)  Pre-treatment Symptoms/Complaints:  9/13/2017: Pt states her knee is improving. She states she started working out at [email or web address]. Pain: Initial: 1-2/10     Post Session:  1-2/10     THERAPEUTIC EXERCISE: (30 minutes):  Exercises per grid below to improve mobility and strength. Required minimal verbal cues to promote proper body alignment and promote proper body posture. Progressed resistance, range and repetitions as indicated. Date:  08-16-17 Date  08-23-17 Date  08-30-17 Date  09-06-17 Date  09-13-17   Activity/Exercise        Quad Sets with Towel Roll Under Heel        SLR Flexion  20 reps 20 reps 20 reps 20 reps   SAQ's  LAQ        Heel Slides with Strap for OP   20 reps 20 reps    Gastroc Stretch with Strap    On Slantboard  3 reps  20 sec holds On Slantboard  3 reps  20 sec holds   NuStep Level 4  10 minutes Level 4  8 minutes Level 4  8 minutes Level 4  8 minutes Level 4  8 minutes   Standing Heel/Toe Raises        Step-Ups 6 inches  20 reps 6 inches  20 reps 6 inches  20 reps 6 inch  20 reps 6 inch  20 reps   TKE with T-band Black  20 reps Black  20 reps Black  20 reps Black  20 reps Black  20 reps   Nautilus Leg Press 60 pounds  20 reps  B LE's 60 pounds  20 reps  B LE's 60 pounds  20 reps  B LE's 60 pounds  20 reps  B LE's 60 pounds  20 reps  B LE's   Tandem gait 2x30 ft       march 2x30 ft 2x30 ft      Standing Balance Board  FW/BW and Sideways        Side stepping 2x30 ft       Backward walking        SLS L LE 10 reps  To Fatigue 10 reps to Fatigue      Nautilus Leg Ext   20 Lbs  20 reps 25 Lbs  20 reps 30 Lbs  20 reps   B LE's   Nautilus Leg Curls   25 Lbs  20 reps 25 Lbs  20 reps 35 Lbs  20 reps  B LE's     MANUAL THERAPY: (10 minutes): Joint mobilization and Soft tissue mobilization was utilized and necessary because of the patient's restricted joint motion. MODALITIES: (10 minutes):      Cold pack to L knee x10 minutes to decrease pain and swelling. Skin clear afterwards.        Treatment/Session Assessment: · Response to Treatment:  Pt tolerated treatments well today. Continue x1 more visit, then discharge to independent [facility]. · Compliance with Program/Exercises: Compliant. · Recommendations/Intent for next treatment session: \"Next visit will focus on advancements to more challenging activities\".   Total Treatment Duration:  50 minutes  PT Patient Time In/Time Out  Time In: 1115  Time Out: [address] [place],

## 2017-09-20 ENCOUNTER — HOSPITAL ENCOUNTER (OUTPATIENT)
Dept: PHYSICAL THERAPY | Age: 72
Discharge: HOME | End: 2017-09-20
Payer: MEDICARE

## 2017-09-26 ENCOUNTER — HOSPITAL ENCOUNTER (OUTPATIENT)
Dept: PHYSICAL THERAPY | Age: 72
Discharge: HOME | End: 2017-09-26
Payer: MEDICARE

## 2017-09-26 PROCEDURE — 97110 THERAPEUTIC EXERCISES: CPT

## 2017-09-26 PROCEDURE — 97140 MANUAL THERAPY 1/> REGIONS: CPT

## 2017-09-26 PROCEDURE — G8980 MOBILITY D/C STATUS: HCPCS

## 2017-09-26 PROCEDURE — G8979 MOBILITY GOAL STATUS: HCPCS

## 2017-09-26 NOTE — PROGRESS NOTES
Laura Lopez  : 1945 [facility]  [address], [address] [address], [facility] [address]  [phone]   [phone]           OUTPATIENT PHYSICAL THERAPY:Daily Note and Discharge 2017    ICD-10: Treatment Diagnosis: Pain in left knee (M25.562); Stiffness of left knee, not elsewhere classified (M25.662); Difficulty in walking, not elsewhere classified (R26.2)  Precautions/Allergies:   Review of patient's allergies indicates no known allergies. Fall Risk Score: 1 (? 5 = High Risk)  MD Orders: Evaluate and Treat MEDICAL/REFERRING DIAGNOSIS:  Presence of left artificial knee joint [Z96.652]   DATE OF ONSET: Surgery 17  REFERRING PHYSICIAN: Gemma Chávez., *  RETURN PHYSICIAN APPOINTMENT: 06-15-17     INITIAL ASSESSMENT:  Ms. Radha Gaines has attended 12 visits over the past 6 weeks with excellent progress in functional mobility, strength, ROM and balance. Continues to be limited in ability to community ambulate, perform house duties and ascend/descend stairs due to cont. Strength, ROM and balance/proprioception deficits. Pt is steadily improving with skilled therapy and would benefit from physical therapy services to address the above deficits and help patient return to prior level of function. PROBLEM LIST (Impacting functional limitations):  1. Decreased Strength  2. Decreased ADL/Functional Activities  3. Increased Pain  4. Decreased Flexibility/Joint Mobility  5. Decreased [place] with Home Exercise Program INTERVENTIONS PLANNED:  1. Balance Exercise  2. Cold  3. Cryotherapy  4. Electrical Stimulation  5. Gait Training  6. Home Exercise Program (HEP)  7. Manual Therapy  8. Range of Motion (ROM)  9. Therapeutic Exercise/Strengthening  10. Aquatic Therapy   TREATMENT PLAN:  Effective Dates: 17 TO 17.   Frequency/Duration: 1 time a week for 2 more visits  GOALS: (Goals have been discussed and agreed upon with patient.)  Short-Term Functional Goals: Time Frame: 4 weeks  1. Pt will increase ROM L knee 0-120 degrees to assist with ascending/descending stairs (Goal Met)  2. Pt will increase strength L knee 4+/5 to assist with ascending/descending stairs (Goal Met)  Discharge Goals: Time Frame: 12 weeks  1. Pt will increase strength L knee 5/5 to assist with ascending/descending stairs (Goal Met)  2. Pt will be independent with HEP (Goal Met)  3. Pt will ascend/descend 10 eight inch steps independently with no c/o L knee pain (Goal Ongoing)  4. Pt will work perform 20 minutes household cooking and cleaning activities independently with min to no c/o L knee pain (Goal Met)  Rehabilitation Potential For Stated Goals: Good            The information in this section was collected on 08-04-17 (except where otherwise noted). HISTORY:   History of Present Injury/Illness (Reason for Referral):  Pt reports insidious onset bilateral knee pain of several years duration. She had physical therapy with continued pain so she opted for Left TKA which was performed 05-12-17. Pt spent 2 nights in the hospital followed by home health PT until last Friday. She rates her current L knee pain 3-4/10, increasing to 10/10 at times. She is taking hydromorphone for pain. Pt is walking with a straight cane. She reports difficulties ascending/descending stairs due to her L knee pain. She is planning to have her R knee replaced in November 2017. Pt is currently having difficulties with all household cooking and cleaning activities due to her L knee surgery. Past Medical History/Comorbidities:   Ms. Rubio Pinon  has a past medical history of Anxiety; Arthritis; Breast cancer ([facility] [address] (1/18/2016); Breast cancer ([facility] [address] Cancer ([facility] [address] Chronic pain; Former smoker; GERD (gastroesophageal reflux disease); Hard of hearing; Headache; Hypertension; Hypothyroidism; Insomnia; and Status post total left knee replacement (5/12/2017).  She also has no past medical history of Adverse effect of anesthesia; Aneurysm ([facility] [address] Arrhythmia; Asthma; Autoimmune disease ([facility] [address] CAD (coronary artery disease); Chronic kidney disease; Chronic obstructive pulmonary disease ([address] Coagulation disorder ([address] Diabetes ([address] Difficult intubation; Endocarditis; Heart failure ([address] Ill-defined condition; Liver disease; Malignant hyperthermia due to anesthesia; Morbid obesity ([address] Pseudocholinesterase deficiency; Psychiatric disorder; PUD (peptic ulcer disease); Rheumatic fever; Seizures ([facility] [address] Sleep apnea; Stroke [facility]); Thromboembolus ([facility] [address] or Unspecified adverse effect of anesthesia. Ms. Terell Simons  has a past surgical history that includes colonoscopy; wisdom teeth extraction; tonsillectomy (1957); vascular access (Right); breast surgery procedure unlisted (Left, 7/2015); breast biopsy (Left, 1/18/2016); mastectomy, partial (Left, 01/18/2016); breast lumpectomy (Left, 01/2016); orthopaedic; and knee replacement (Left, 05/07/2017). Social History/Living Environment:     Pt lives with her spouse (currently has colon cancer) in a one-story house  Prior Level of Function/Work/Activity:  Independent with all household ADL's  Dominant Side:         RIGHT  Other Clinical Tests:          X-rays  Previous Treatment Approaches:          Left TKA 05-12-17  Personal Factors:          Sex:  female        Age:  81 y.o. Profession:  Pt is retired        Past/Current Experience:  Pt helps care for her  who has cancer  Current Medications:       Current Outpatient Prescriptions:     hydroCHLOROthiazide (HYDRODIURIL) 25 mg tablet, TAKE ONE TABLET BY MOUTH ONE TIME DAILY, Disp: 30 Tab, Rfl: 5    celecoxib (CELEBREX) 200 mg capsule, , Disp: , Rfl:     potassium chloride SR (KLOR-CON 10) 10 mEq tablet, TAKE ONE TABLET BY MOUTH ONE TIME DAILY, Disp: , Rfl: 0    temazepam (RESTORIL) 15 mg capsule, Take 1 Cap by mouth nightly.  Max Daily Amount: 15 mg., Disp: 30 Cap, Rfl: 5   HYDROcodone-acetaminophen (NORCO) 7.5-325 mg per tablet, Take  by mouth., Disp: , Rfl:     acetaminophen (TYLENOL) 500 mg tablet, Take 1,000 mg by mouth every six (6) hours as needed for Pain., Disp: , Rfl:     HYDROmorphone (DILAUDID) 2 mg tablet, Take 1 Tab by mouth every four (4) hours as needed. Max Daily Amount: 12 mg., Disp: 40 Tab, Rfl: 0    OTHER,NON-FORMULARY,, Take 4 Tabs by mouth daily. Relief Factor Tablets, Disp: , Rfl:     Biotin 2,500 mcg cap, Take 2 Tabs by mouth daily. , Disp: , Rfl:     OTHER,NON-FORMULARY,, Take 1 Tab by mouth daily. Air Borne Chewable tab, Disp: , Rfl:     polycarbophil calcium (EQUALACTIN) 500 mg chew, Take 1,500 mg by mouth daily. , Disp: , Rfl:     levothyroxine (SYNTHROID) 25 mcg tablet, TAKE ONE TABLET BY MOUTH ONE TIME DAILY BEFORE BREAKFAST, Disp: 30 Tab, Rfl: 6    multivitamin (ONE A DAY) tablet, Take 1 Tab by mouth daily. , Disp: , Rfl:     esomeprazole (NEXIUM) 20 mg capsule, Take 20 mg by mouth every morning. Take / use AM day of surgery  per anesthesia protocols.   Indications: GASTROESOPHAGEAL REFLUX, Disp: , Rfl:    Date Last Reviewed:  06-14-17   Number of Personal Factors/Comorbidities that affect the Plan of Care: 1-2: MODERATE COMPLEXITY   EXAMINATION:   Observation/Orthostatic Postural Assessment:          Pt ambulates with mild stiff knee gait  Palpation:          Generalized tenderness throughout L knee  ROM:    R knee extension = -10 degrees  R knee flexion = 128 degrees    L knee extension = 0 degrees  L knee flexion = 125 degrees     Strength:    R knee extension = 5/5  R knee flexion = 5/5    L knee extension = 5/5  L knee flexion = 5/5      Balance:   SLS: R- 5 sec, L- 4 sec    Neurological Screen:        Sensation: Intact to light touch L LE      Functional Mobility:         Gait/Ambulation:  Pt ambulates with slight stiff knee gait and lateral trunk movement        Transfers:  Pt transitions sit to supine and supine to sit independently     Body Structures Involved:  1. Bones  2. Joints  3. Muscles Body Functions Affected:  1. Sensory/Pain  2. Neuromusculoskeletal Activities and Participation Affected:  1. Mobility  2. Self Care  3. Domestic Life   Number of elements (examined above) that affect the Plan of Care: 3: MODERATE COMPLEXITY   CLINICAL PRESENTATION:   Presentation: Evolving clinical presentation with changing clinical characteristics: MODERATE COMPLEXITY   CLINICAL DECISION MAKING:   Outcome Measure: Tool Used: Lower Extremity Functional Scale (LEFS)  Score:  Initial: 53/80 Most Recent: 64/80 (Date: 09-26-17)   Interpretation of Score: 20 questions each scored on a 5 point scale with 0 representing \"extreme difficulty or unable to perform\" and 4 representing \"no difficulty\". The lower the score, the greater the functional disability. 80/80 represents no disability. Minimal detectable change is 9 points. Score 80 79-63 62-48 47-32 31-16 15-1 0   Modifier CH CI CJ CK CL CM CN     ? Mobility - Walking and Moving Around:     - CURRENT STATUS: CI - 1%-19% impaired, limited or restricted    - GOAL STATUS: CI - 1%-19% impaired, limited or restricted    - D/C STATUS:  CI - 1%-19% impaired, limited or restricted    Medical Necessity:   · Patient is expected to demonstrate progress in strength, range of motion and functional technique to increase independence with household ADL's. · Patient demonstrates good rehab potential due to higher previous functional level. Reason for Services/Other Comments:  · Patient continues to require skilled intervention due to decreased ROM/strength L knee with increased pain leading to decreased functional status.    Use of outcome tool(s) and clinical judgement create a POC that gives a: Questionable prediction of patient's progress: MODERATE COMPLEXITY            TREATMENT:   (In addition to Assessment/Re-Assessment sessions the following treatments were rendered)  Pre-treatment Symptoms/Complaints: 9/26/2017: Pt states her knee is feeling good and the swelling is decreasing. Pain: Initial: 0-1/10     Post Session:  0-1/10     THERAPEUTIC EXERCISE: (30 minutes):  Exercises per grid below to improve mobility and strength. Required minimal verbal cues to promote proper body alignment and promote proper body posture. Progressed resistance, range and repetitions as indicated. Date  09-13-17 Date  09-26-17   Activity/Exercise     Quad Sets with Towel Roll Under Heel     SLR Flexion 20 reps 20 reps   SAQ's  LAQ     Heel Slides with Strap for OP     Gastroc Stretch with Strap On Slantboard  3 reps  20 sec holds On Slantboard  3 reps  20 sec holds   NuStep Level 4  8 minutes Level 4  8 minutes   Standing Heel/Toe Raises     Step-Ups 6 inch  20 reps 6 inch  20 reps   TKE with T-band Black  20 reps Black  20 reps   Nautilus Leg Press 60 pounds  20 reps  B LE's 60 pounds  20 reps  B LE's   Tandem gait     march     Standing Balance Board  FW/BW and Sideways     Side stepping     Backward walking     SLS L LE     Nautilus Leg Ext 30 Lbs  20 reps   B LE's 35 Lbs  20 reps  B LE's   Nautilus Leg Curls 35 Lbs  20 reps  B LE's 35 Lbs  20 reps  B LE's     MANUAL THERAPY: (10 minutes): Joint mobilization and Soft tissue mobilization was utilized and necessary because of the patient's restricted joint motion. MODALITIES: (10 minutes):      Cold pack to L knee x10 minutes to decrease pain and swelling. Skin clear afterwards. Treatment/Session Assessment:     · Response to Treatment:  Pt tolerated treatments well today with no c/o. ROM and strength L knee doing well. Discharge from PT to [facility]. · Compliance with Program/Exercises: Compliant. · Recommendations/Intent for next treatment session: \"Next visit will focus on advancements to more challenging activities\".   Total Treatment Duration:  50 minutes  PT Patient Time In/Time Out  Time In: 1345  Time Out: [address], PT

## 2017-11-30 ENCOUNTER — HOSPITAL ENCOUNTER (OUTPATIENT)
Dept: ULTRASOUND IMAGING | Age: 72
Discharge: HOME | End: 2017-11-30
Attending: UROLOGY
Payer: MEDICARE

## 2017-11-30 DIAGNOSIS — R31.29 MICROSCOPIC HEMATURIA: ICD-10-CM

## 2017-11-30 PROCEDURE — 76770 US EXAM ABDO BACK WALL COMP: CPT

## 2018-01-01 NOTE — IP AVS SNAPSHOT
[address] [place] [address] 
[phone] Patient: Mendez Mak MRN: [id number] You are allergic to the following No active allergies Immunizations Administered for This Admission Name Date  
 TB Skin Test (PPD) Intradermal 5/12/2017 Recent Documentation Height Weight BMI OB Status Smoking Status 1.626 m 78.1 kg 29.56 kg/m2 Postmenopausal Former Smoker Unresulted Labs Order Current Status PLEASE READ & DOCUMENT PPD TEST IN 24 HRS Preliminary result Emergency Contacts Name Discharge Info Relation Home Work Mobile Bhavesh Rust DISCHARGE CAREGIVER [3] Spouse [3] [phone] About your hospitalization You were admitted on:  May 12, 2017 You last received care in the:  [facility] You were discharged on:  May 14, 2017 Unit phone number:  [phone] Why you were hospitalized Your primary diagnosis was:  Status Post Total Left Knee Replacement Providers Seen During Your Hospitalizations Provider Role Specialty Primary office phone Samina Becker MD Attending Provider Orthopedic Surgery [phone] Your Primary Care Physician (PCP) Primary Care Physician Office Phone Office Fax Yulissa Maharaj [phone] Follow-up Information Follow up With Details Comments Contact Info Beti Mix MD   [address] [place] [address] 
[address] [facility] [place] [id number] [phone] Samina Becker MD  Keep scheduled appointment [address] [address] 
[phone] [address]  Will call within 24/48 hours [address] [facility] [address] 
[phone] Your Appointments  Friday June 16, 2017 10:20 AM EDT  
[facility]ACK MAMMO DIAGNOSTIC with [facility] MACK BI ROOM 1  
 [address] [facility] ([address]) [address] [place] [address]  
[phone] OUTSIDE FILMS -Are required to be present for all Diagnostic Mammo and Breast US patients-NO EXCEPTIONS. -If the films are not present, the patient will be rescheduled. -Allow 2 days for local and 1 week for out of state films to be recieved. -If the patient has their films reiterate they bring them. PATIENT ARRIVAL - Please report 30 minutes early to check in. GENERAL INSTRUCTIONS -  On the day of your exam do not use any bath powder, deodorant or lotions on the chest or armpit area. Wear two-piece outfit for ease of changing. Allow at least 1 hour for test.  
  
    
 Tuesday June 27, 2017  1:40 PM EDT  
COMPLETE PHYSICAL with Maggy St MD  
[facility] ([address] [facility]) [address]  
[address] [place] [address]  
[phone] Current Discharge Medication List  
  
START taking these medications Dose & Instructions Dispensing Information Comments Morning Noon Evening Bedtime  
 aspirin delayed-release 325 mg tablet Your next dose is: This evening Dose:  325 mg Take 1 Tab by mouth every twelve (12) hours every twelve (12) hours for 35 days. Quantity:  70 Tab Refills:  0 HYDROmorphone 2 mg tablet Commonly known as:  DILAUDID Your next dose is:  As needed Dose:  2 mg Take 1 Tab by mouth every four (4) hours as needed. Max Daily Amount: 12 mg. Quantity:  40 Tab Refills:  0 CONTINUE these medications which have NOT CHANGED Dose & Instructions Dispensing Information Comments Morning Noon Evening Bedtime Biotin 2,500 mcg Cap Your next dose is: This evening Dose:  2 Tab Take 2 Tabs by mouth daily. Refills:  0  
     
   
   
   
  
 hydroCHLOROthiazide 25 mg tablet Commonly known as:  HYDRODIURIL Your next dose is:  tomorrow TAKE ONE TABLET BY MOUTH ONE TIME DAILY Quantity:  30 Tab Refills:  5  
     
   
   
   
  
 levothyroxine 25 mcg tablet Commonly known as:  SYNTHROID Your next dose is:  tomorrow TAKE ONE TABLET BY MOUTH ONE TIME DAILY BEFORE BREAKFAST Quantity:  30 Tab Refills:  6  
     
   
   
   
  
 multivitamin tablet Commonly known as:  ONE A DAY Your next dose is:  tomorrow Dose:  1 Tab Take 1 Tab by mouth daily. Refills:  0 NexIUM 20 mg capsule Generic drug:  esomeprazole Your next dose is:  tomorrow Dose:  20 mg Take 20 mg by mouth every morning. Take / use AM day of surgery  per anesthesia protocols. Indications: GASTROESOPHAGEAL REFLUX Refills:  0  
     
   
   
   
  
 * OTHER(NON-FORMULARY) Dose:  4 Tab Take 4 Tabs by mouth daily. Relief Factor Tablets Refills:  0  
     
   
   
   
  
 * OTHER(NON-FORMULARY) Dose:  1 Tab Take 1 Tab by mouth daily. Air Borne Chewable tab Refills:  0  
     
   
   
   
  
 polycarbophil calcium 500 mg Chew Commonly known as:  [place] Your next dose is:  tomorrow Dose:  1500 mg Take 1,500 mg by mouth daily. Refills:  0  
     
   
   
   
  
 temazepam 15 mg capsule Commonly known as:  RESTORIL Your next dose is: Tonight Dose:  15 mg Take 1 Cap by mouth nightly. Max Daily Amount: 15 mg. Quantity:  30 Cap Refills:  5  
     
   
   
   
  
 * Notice: This list has 2 medication(s) that are the same as other medications prescribed for you. Read the directions carefully, and ask your doctor or other care provider to review them with you. STOP taking these medications ALEVE PO  
   
  
 mupirocin calcium 2 % nasal ointment Commonly known as:  [facility] Where to Get Your Medications Information on where to get these meds will be given to you by the nurse or doctor. ! Ask your nurse or doctor about these medications  
  aspirin delayed-release 325 mg tablet HYDROmorphone 2 mg tablet Discharge Instructions [facility] [employer] Patient Discharge Instructions Nga Terese / [id number] : 1945 Admitted 2017 Discharged: 2017 IF YOU HAVE ANY PROBLEMS ONCE YOU ARE AT HOME CALL THE FOLLOWING NUMBERS:  
Main office number: [phone] Medications · The medications you are to continue on are listed on the medication reconciliation sheet. · Narcotic pain medications as well as supplemental iron can cause constipation. If this occurs try stopping the narcotic pain medication and/or the iron. · It is important that you take the medication exactly as they are prescribed. · Medications which increase your risk of blood clots are listed to stop for 5 weeks after surgery as well as medications or supplements which increase your risk of bleeding complications. · Keep your medication in the bottles provided by the pharmacist and keep a list of the medication names, dosages, and times to be taken in your wallet. · Do not take other medications without consulting your doctor. Important Information Do NOT smoke as this will greatly increase your risk of infection! Resume your prehospital diet. If you have excessive nausea or vomitting call your doctor's office Leg swelling and warmth is normal for 6 months after surgery. If you experience swelling in your leg elevate you leg while laying down with your toes above your heart. If you have sudden onset severe swelling with leg pain call our office. Use Yaron Hose stockings until we see you in the office for your follow up appointment. The stitches deep inside take approximately 6 months to dissolve.  There will be sharp shooting, stinging and burning pain. This is normal and will resolve between 3-6 months after surgery. Difficulty sleeping is normal following total Knee and Hip replacement. You may try melatonin, an over-the-counter sleep aid or benadryl to help with sleep. Most patients will resume sleeping through the night 8 weeks after surgery. Home Physical Therapy is arranged. Home Health will contact you within 48 hrs of discharge that you have chosen. If you have not received a call within this time frame please contact that provider you chose. You should be given this information before you leave the hospital.  
 
You are at a risk for falls. Use the rolling walker when walking. Patients who have had a joint replacement should not drive if they are still taking narcotic pain mediation during the daytime hours. Most patients wean themselves off of pain medication within 2-5 weeks after surgery. When to Call the office - If you have a temperature greater then 101 
- Uncontrolled vomiting - Loose control of your bladder or bowel function - Are unable to bear any wieght  
- Need a pain medication refill Information obtained by : 
I understand that if any problems occur once I am at home I am to contact my physician. I understand and acknowledge receipt of the instructions indicated above. Physician's or R.N.'s Signature                                                                  Date/Time Patient or Representative Signature                                                          Date/Time Discharge Orders Procedure Order Date Status Priority Quantity Spec Type Associated Dx CALL YOUR DOCTOR For: Temperature greater than 100.4., Severe uncontrolled pain. , Persistant nausea and vomiting., Persistant dizziness or light-headedness. , Hives, Difficulty breathing, headache, or visual disturbances. , Redness, tenderness, or s. .. 05/12/17 1503 Normal Routine 1  Status post total left knee replacement [[id number]] Questions: For:  Temperature greater than 100.4. For:  Severe uncontrolled pain. For:  Persistant nausea and vomiting. For:  Persistant dizziness or light-headedness. For:  Rosezella Traci For:  Difficulty breathing, headache, or visual disturbances. For:  Redness, tenderness, or signs of infection. ACTIVITY AFTER DISCHARGE Patient should: Restrict driving, Restrict lifting, Other (specify) 05/12/17 1503 Normal Routine 1  Status post total left knee replacement [[id number]] Questions: Patient should:  Restrict driving Patient should:  Restrict lifting Patient should: Other (specify) DRESSING, CHANGE SPECIFY 05/12/17 1503 Normal Routine 1  Status post total left knee replacement [[id number]] Comments:  Routine dressing changes. Notify if excessive drainage. If staples are present they are to be removed 10 days post surgery and steri strips applied. REFERRAL TO HOME HEALTH 05/12/17 1503 Normal Routine 1  Status post total left knee replacement [[id number]] REFERRAL TO PHYSICAL THERAPY 05/12/17 1503 Normal Routine 1  Status post total left knee replacement [[id number]] Comments:  Referral to Home PT Introducing [facility]! Mabel Esteban introduces [employer] patient portal. Now you can access parts of your medical record, email your doctor's office, and request medication refills online. 1. In your internet browser, go to [email or web address] 2. Click on the First Time User? Click Here link in the Sign In box. You will see the New Member Sign Up page. 3. Enter your [employer] Access Code exactly as it appears below. You will not need to use this code after youve completed the sign-up process. If you do not sign up before the expiration date, you must request a new code. · [employer] Access Code: [address] Expires: 6/21/2017  2:06 PM 
 
4. Enter the last four digits of your Social Security Number (xxxx) and Date of Birth (mm/dd/yyyy) as indicated and click Submit. You will be taken to the next sign-up page. 5. Create a [employer] ID. This will be your [employer] login ID and cannot be changed, so think of one that is secure and easy to remember. 6. Create a [employer] password. You can change your password at any time. 7. Enter your Password Reset Question and Answer. This can be used at a later time if you forget your password. 8. Enter your e-mail address. You will receive e-mail notification when new information is available in [address] 9. Click Sign Up. You can now view and download portions of your medical record. 10. Click the Download Summary menu link to download a portable copy of your medical information. If you have questions, please visit the Frequently Asked Questions section of the [employer] website. Remember, [employer] is NOT to be used for urgent needs. For medical emergencies, dial 911. Now available from your iPhone and Android! General Information Please provide this summary of care documentation to your next provider. Patient Signature:  ____________________________________________________________ Date:  ____________________________________________________________  
  
Monet Terry Provider Signature:  ____________________________________________________________ Date:  ____________________________________________________________ 4

## 2018-01-16 ENCOUNTER — HOSPITAL ENCOUNTER (OUTPATIENT)
Dept: LAB | Age: 73
Discharge: HOME | End: 2018-01-16
Payer: MEDICARE

## 2018-01-16 DIAGNOSIS — C50.912 MALIGNANT NEOPLASM OF LEFT FEMALE BREAST, UNSPECIFIED ESTROGEN RECEPTOR STATUS, UNSPECIFIED SITE OF BREAST (HCC): Chronic | ICD-10-CM

## 2018-01-16 LAB
ALBUMIN SERPL-MCNC: 4.1 G/DL (ref 3.2–4.6)
ALBUMIN/GLOB SERPL: 1.1 {RATIO} (ref 1.2–3.5)
ALP SERPL-CCNC: 72 U/L (ref 50–136)
ALT SERPL-CCNC: 28 U/L (ref 12–65)
ANION GAP SERPL CALC-SCNC: 7 MMOL/L (ref 7–16)
AST SERPL-CCNC: 18 U/L (ref 15–37)
BASOPHILS # BLD: 0 K/UL (ref 0–0.2)
BASOPHILS NFR BLD: 0 % (ref 0–2)
BILIRUB SERPL-MCNC: 0.4 MG/DL (ref 0.2–1.1)
BUN SERPL-MCNC: 22 MG/DL (ref 8–23)
CALCIUM SERPL-MCNC: 9.2 MG/DL (ref 8.3–10.4)
CANCER AG15-3 SERPL-ACNC: 14.1 U/ML (ref 1–35)
CHLORIDE SERPL-SCNC: 103 MMOL/L (ref 98–107)
CO2 SERPL-SCNC: 28 MMOL/L (ref 21–32)
CREAT SERPL-MCNC: 0.99 MG/DL (ref 0.6–1)
DIFFERENTIAL METHOD BLD: ABNORMAL
EOSINOPHIL # BLD: 0.1 K/UL (ref 0–0.8)
EOSINOPHIL NFR BLD: 2 % (ref 0.5–7.8)
ERYTHROCYTE [DISTWIDTH] IN BLOOD BY AUTOMATED COUNT: 14.2 % (ref 11.9–14.6)
GLOBULIN SER CALC-MCNC: 3.7 G/DL (ref 2.3–3.5)
GLUCOSE SERPL-MCNC: 85 MG/DL (ref 65–100)
HCT VFR BLD AUTO: 41.6 % (ref 35.8–46.3)
HGB BLD-MCNC: 13.7 G/DL (ref 11.7–15.4)
LYMPHOCYTES # BLD: 1.4 K/UL (ref 0.5–4.6)
LYMPHOCYTES NFR BLD: 17 % (ref 13–44)
MCH RBC QN AUTO: 29 PG (ref 26.1–32.9)
MCHC RBC AUTO-ENTMCNC: 32.9 G/DL (ref 31.4–35)
MCV RBC AUTO: 87.9 FL (ref 79.6–97.8)
MONOCYTES # BLD: 1 K/UL (ref 0.1–1.3)
MONOCYTES NFR BLD: 13 % (ref 4–12)
NEUTS SEG # BLD: 5.3 K/UL (ref 1.7–8.2)
NEUTS SEG NFR BLD: 68 % (ref 43–78)
NRBC # BLD: 0 K/UL (ref 0–0.2)
PLATELET # BLD AUTO: 210 K/UL (ref 150–450)
PMV BLD AUTO: 11.8 FL (ref 10.8–14.1)
POTASSIUM SERPL-SCNC: 3.8 MMOL/L (ref 3.5–5.1)
PROT SERPL-MCNC: 7.8 G/DL (ref 6.3–8.2)
RBC # BLD AUTO: 4.73 M/UL (ref 4.05–5.25)
SODIUM SERPL-SCNC: 138 MMOL/L (ref 136–145)
WBC # BLD AUTO: 7.9 K/UL (ref 4.3–11.1)

## 2018-01-16 PROCEDURE — 36415 COLL VENOUS BLD VENIPUNCTURE: CPT | Performed by: INTERNAL MEDICINE

## 2018-01-16 PROCEDURE — 80053 COMPREHEN METABOLIC PANEL: CPT | Performed by: INTERNAL MEDICINE

## 2018-01-16 PROCEDURE — 86300 IMMUNOASSAY TUMOR CA 15-3: CPT | Performed by: INTERNAL MEDICINE

## 2018-01-16 PROCEDURE — 85025 COMPLETE CBC W/AUTO DIFF WBC: CPT | Performed by: INTERNAL MEDICINE

## 2018-01-17 ENCOUNTER — HOSPITAL ENCOUNTER (OUTPATIENT)
Dept: SURGERY | Age: 73
Discharge: HOME | End: 2018-01-17
Attending: ORTHOPAEDIC SURGERY
Payer: MEDICARE

## 2018-01-17 VITALS
HEART RATE: 79 BPM | DIASTOLIC BLOOD PRESSURE: 80 MMHG | SYSTOLIC BLOOD PRESSURE: 151 MMHG | BODY MASS INDEX: 29.41 KG/M2 | HEIGHT: 65 IN | WEIGHT: 176.5 LBS | OXYGEN SATURATION: 100 % | TEMPERATURE: 96.5 F

## 2018-01-17 LAB
APPEARANCE UR: CLEAR
APTT PPP: 25.2 SEC (ref 23.2–35.3)
BACTERIA SPEC CULT: ABNORMAL
BACTERIA URNS QL MICRO: 0 /HPF
BILIRUB UR QL: NEGATIVE
CASTS URNS QL MICRO: 0 /LPF
COLOR UR: YELLOW
EPI CELLS #/AREA URNS HPF: ABNORMAL /HPF
GLUCOSE UR STRIP.AUTO-MCNC: NEGATIVE MG/DL
HGB UR QL STRIP: ABNORMAL
INR PPP: 0.9
KETONES UR QL STRIP.AUTO: NEGATIVE MG/DL
LEUKOCYTE ESTERASE UR QL STRIP.AUTO: NEGATIVE
NITRITE UR QL STRIP.AUTO: NEGATIVE
PH UR STRIP: 5.5 [PH] (ref 5–9)
PROT UR STRIP-MCNC: NEGATIVE MG/DL
PROTHROMBIN TIME: 12.4 SEC (ref 11.5–14.5)
RBC #/AREA URNS HPF: ABNORMAL /HPF
SERVICE CMNT-IMP: ABNORMAL
SP GR UR REFRACTOMETRY: 1.01 (ref 1–1.02)
UROBILINOGEN UR QL STRIP.AUTO: 0.2 EU/DL (ref 0.2–1)
WBC URNS QL MICRO: ABNORMAL /HPF

## 2018-01-17 PROCEDURE — 85730 THROMBOPLASTIN TIME PARTIAL: CPT | Performed by: PHYSICIAN ASSISTANT

## 2018-01-17 PROCEDURE — 81001 URINALYSIS AUTO W/SCOPE: CPT | Performed by: PHYSICIAN ASSISTANT

## 2018-01-17 PROCEDURE — 85610 PROTHROMBIN TIME: CPT | Performed by: PHYSICIAN ASSISTANT

## 2018-01-17 PROCEDURE — 87641 MR-STAPH DNA AMP PROBE: CPT | Performed by: PHYSICIAN ASSISTANT

## 2018-01-17 RX ORDER — ASCORBIC ACID 500 MG
1000 TABLET ORAL DAILY
COMMUNITY
End: 2018-08-24

## 2018-01-17 NOTE — PERIOP NOTES
Patient verified name, , and surgery as listed in [facility]. Type 3 surgery, walk in assessment complete. Labs per surgeon: MRSA nasal swab, PT/PTT, U/A ; results pending. Patient had CMP & CBC on 18. Results within [facility] protocols and on chart for reference. Labs per anesthesia protocol: included in surgeons orders. EKG: last done 17; result within [facility] protocols and on chart for reference. Hibiclens and instructions to return bottle on DOS given per hospital policy. Nasal Swab collected per MD order and instructions for Mupirocin nasal ointment if required. Patient provided with handouts including Guide to Surgery, Pain Management, Hand Hygiene, Blood Transfusion Education, and [facility] Brochure. Patient answered medical/surgical history questions at their best of ability. All prior to admission medications documented in [facility]. Original medication prescription bottles visualized during patient appointment. Patient instructed to hold all vitamins 7 days prior to surgery and NSAIDS 5 days prior to surgery. Medications to be held: vitamins. Patient instructed to continue previous medications as prescribed prior to surgery and to take the following medications the day of surgery according to anesthesia guidelines with a small sip of water: tylenol if needed, nexium, levothyroxine. Patient teach back successful and patient demonstrates knowledge of instruction.

## 2018-01-17 NOTE — PERIOP NOTES
Dutch Shen MD    Your patient recently had labs drawn during a hospital appointment due to an upcoming surgery. The results are attached. If you have any questions or concerns please reach out to your patient for a follow-up in your office. Please do not respond to this message as my mailbox is not monitored. You may call [phone] with questions or concerns. Recent Results (from the past 24 hour(s))   CBC WITH AUTOMATED DIFF    Collection Time: 01/16/18 12:42 PM   Result Value Ref Range    WBC 7.9 4.3 - 11.1 K/uL    RBC 4.73 4.05 - 5.25 M/uL    HGB 13.7 11.7 - 15.4 g/dL    HCT 41.6 35.8 - 46.3 %    MCV 87.9 79.6 - 97.8 FL    MCH 29.0 26.1 - 32.9 PG    MCHC 32.9 31.4 - 35.0 g/dL    RDW 14.2 11.9 - 14.6 %    PLATELET 974 126 - 008 K/uL    MPV 11.8 10.8 - 14.1 FL    ABSOLUTE NRBC 0.00 0.0 - 0.2 K/uL    DF AUTOMATED      NEUTROPHILS 68 43 - 78 %    LYMPHOCYTES 17 13 - 44 %    MONOCYTES 13 (H) 4.0 - 12.0 %    EOSINOPHILS 2 0.5 - 7.8 %    BASOPHILS 0 0.0 - 2.0 %    ABS. NEUTROPHILS 5.3 1.7 - 8.2 K/UL    ABS. LYMPHOCYTES 1.4 0.5 - 4.6 K/UL    ABS. MONOCYTES 1.0 0.1 - 1.3 K/UL    ABS. EOSINOPHILS 0.1 0.0 - 0.8 K/UL    ABS. BASOPHILS 0.0 0.0 - 0.2 K/UL   METABOLIC PANEL, COMPREHENSIVE    Collection Time: 01/16/18 12:42 PM   Result Value Ref Range    Sodium 138 136 - 145 mmol/L    Potassium 3.8 3.5 - 5.1 mmol/L    Chloride 103 98 - 107 mmol/L    CO2 28 21 - 32 mmol/L    Anion gap 7 7 - 16 mmol/L    Glucose 85 65 - 100 mg/dL    BUN 22 8 - 23 MG/DL    Creatinine 0.99 0.6 - 1.0 MG/DL    GFR est AA >60 >60 ml/min/1.73m2    GFR est non-AA 59 (L) >60 ml/min/1.73m2    Calcium 9.2 8.3 - 10.4 MG/DL    Bilirubin, total 0.4 0.2 - 1.1 MG/DL    ALT (SGPT) 28 12 - 65 U/L    AST (SGOT) 18 15 - 37 U/L    Alk.  phosphatase 72 50 - 136 U/L    Protein, total 7.8 6.3 - 8.2 g/dL    Albumin 4.1 3.2 - 4.6 g/dL    Globulin 3.7 (H) 2.3 - 3.5 g/dL    A-G Ratio 1.1 (L) 1.2 - 3.5     CANCER ANTIGEN (CA) 15-3    Collection Time: 01/16/18 12:42 PM   Result Value Ref Range    Cancer antigen 15-3 14.10 1.0 - 35.0 U/mL   PROTHROMBIN TIME + INR    Collection Time: 01/17/18 10:27 AM   Result Value Ref Range    Prothrombin time 12.4 11.5 - 14.5 sec    INR 0.9     PTT    Collection Time: 01/17/18 10:27 AM   Result Value Ref Range    aPTT 25.2 23.2 - 35.3 SEC   URINALYSIS W/ RFLX MICROSCOPIC    Collection Time: 01/17/18 10:27 AM   Result Value Ref Range    Color YELLOW      Appearance CLEAR      Specific gravity 1.015 1.001 - 1.023      pH (UA) 5.5 5.0 - 9.0      Protein NEGATIVE  NEG mg/dL    Glucose NEGATIVE  mg/dL    Ketone NEGATIVE  NEG mg/dL    Bilirubin NEGATIVE  NEG      Blood MODERATE (A) NEG      Urobilinogen 0.2 0.2 - 1.0 EU/dL    Nitrites NEGATIVE  NEG      Leukocyte Esterase NEGATIVE  NEG      WBC 0-3 0 /hpf    RBC 3-5 0 /hpf    Epithelial cells 0-3 0 /hpf    Bacteria 0 0 /hpf    Casts 0 0 /lpf

## 2018-01-18 NOTE — H&P
[id number] [facility]  Pre Operative History and Physical Exam    Patient ID:  Roz Reinoso  [id number]  33 y.o.  1945    Today: January 18, 2018       Assessment:   1. Arthritis of the right knee        Plan:    1. Proceed with scheduled Procedure(s) (LRB):  RIGHT KNEE ARTHROPLASTY TOTAL/ROMEL (Right)            CC:  Right knee pain    HPI:   The patient has end stage arthritis of the right knee. The patient was evaluated and examined during a consultation prior to this office visit. There have been no changes to the patient's orthopedic condition since the initial consultation. The patient has failed previous conservative treatment for this condition including antiinflammatories , and lifestyle modifications. The necessity for joint replacement is present.  The patient will be admitted the day of surgery for Procedure(s) (LRB):  RIGHT KNEE ARTHROPLASTY TOTAL/ROMEL (Right)      Past Medical/Surgical History:  Past Medical History:   Diagnosis Date    Anxiety     Arthritis     back and knee's     Breast cancer ([facility] [address] 1/18/2016    Left Lumpectomy    Cancer ([facility] [address]     melanoma left face,  left breast ca     Chronic pain     back    Former smoker     was a passive smoker for 40 yrs; stopped in 2014    GERD (gastroesophageal reflux disease)     nexium    Hard of hearing     bilateral hearing aids    Headache     hx monthly migraines; rare now    Hypertension     controlled with med    Hypothyroidism     Insomnia     Status post total left knee replacement 5/12/2017     Past Surgical History:   Procedure Laterality Date    BREAST SURGERY PROCEDURE UNLISTED Left 7/2015    biopsy    HX BREAST BIOPSY Left 1/18/2016    BREAST NEEDLE LOCALIZED PARTIAL MASTECTOMY/ LEFT //   performed by Gautam Armstrong MD at [facility] HX BREAST LUMPECTOMY Left 01/2016    HX COLONOSCOPY      HX KNEE REPLACEMENT Left 05/12/2017    HX ORTHOPAEDIC      HX TONSILLECTOMY  1957    HX VASCULAR ACCESS Right     port right upper chest- removed 2016    HX WISDOM TEETH EXTRACTION      [place] MASTECTOMY, PARTIAL Left 01/18/2016        Allergies: No Known Allergies     Objective:                    HEENT: NC/AT                   Lungs:  Unlabored respirations, clear breath sounds                    Heart:   RRR                    Abdomen: soft                   Extremities:  Pain with ROM of the right knee    Meds:   No current facility-administered medications for this encounter. Current Outpatient Prescriptions   Medication Sig    mupirocin calcium (BACTROBAN) 2 % nasal ointment by Both Nostrils route two (2) times a day.  ascorbic acid, vitamin C, (VITAMIN C) 500 mg tablet Take 1,000 mg by mouth daily.  celecoxib (CELEBREX) 200 mg capsule Take 1 Cap by mouth two (2) times a day.  levothyroxine (SYNTHROID) 25 mcg tablet TAKE ONE TABLET BY MOUTH ONE TIME DAILY BEFORE BREAKFAST    SUMAtriptan (IMITREX) 100 mg tablet Take 1 Tab by mouth once as needed for Migraine for up to 1 dose.  hydroCHLOROthiazide (HYDRODIURIL) 25 mg tablet TAKE ONE TABLET BY MOUTH ONE TIME DAILY    temazepam (RESTORIL) 15 mg capsule Take 1 Cap by mouth nightly. Max Daily Amount: 15 mg. (Patient taking differently: Take 15 mg by mouth nightly as needed for Sleep.)    acetaminophen (TYLENOL) 500 mg tablet Take 1,000 mg by mouth every six (6) hours as needed for Pain. Take / use AM day of surgery  per anesthesia protocols if needed.  Biotin 2,500 mcg cap Take 2 Tabs by mouth daily.  multivitamin (ONE A DAY) tablet Take 1 Tab by mouth daily.  esomeprazole (NEXIUM) 20 mg capsule Take 20 mg by mouth every morning. Take / use AM day of surgery  per anesthesia protocols.   Indications: GASTROESOPHAGEAL REFLUX         Labs:  Hospital Outpatient Visit on 01/17/2018   Component Date Value Ref Range Status    Prothrombin time 01/17/2018 12.4  11.5 - 14.5 sec Final    ** Note new reference range and method **    INR 01/17/2018 0.9 Final    Comment: Suggested therapeutic INR range:  Venous thrombosis and embolus  2.0-3.0  Prosthetic heart valve         2.5-3.5  ** Note new reference range and method **      aPTT 01/17/2018 25.2  23.2 - 35.3 SEC Final    Comment: Heparin Therapeutic Range = 74 - 123 seconds  In addition to factor deficiency, monitoring heparin therapy, etc., evaluation of a prolonged aPTT result should include consideration of preanalytic variables such as heparin flush contamination, specimen integrity issues, etc.  ** Note new reference range and method **      Color 01/17/2018 YELLOW    Final    Appearance 01/17/2018 CLEAR    Final    Specific gravity 01/17/2018 1.015  1.001 - 1.023   Final    pH (UA) 01/17/2018 5.5  5.0 - 9.0   Final    Protein 01/17/2018 NEGATIVE   NEG mg/dL Final    Glucose 01/17/2018 NEGATIVE   mg/dL Final    Ketone 01/17/2018 NEGATIVE   NEG mg/dL Final    Bilirubin 01/17/2018 NEGATIVE   NEG   Final    Blood 01/17/2018 MODERATE* NEG   Final    Urobilinogen 01/17/2018 0.2  0.2 - 1.0 EU/dL Final    Nitrites 01/17/2018 NEGATIVE   NEG   Final    Leukocyte Esterase 01/17/2018 NEGATIVE   NEG   Final    WBC 01/17/2018 0-3  0 /hpf Final    RBC 01/17/2018 3-5  0 /hpf Final    Epithelial cells 01/17/2018 0-3  0 /hpf Final    Bacteria 01/17/2018 0  0 /hpf Final    Casts 01/17/2018 0  0 /lpf Final    Special Requests: 01/17/2018 NASAL    Final    Culture result: 01/17/2018 MRSA target DNA not detected, SA target DNA detected. A MRSA negative, SA positive test result does not preclude MRSA nasal colonization. *   Final   Hospital Outpatient Visit on 01/16/2018   Component Date Value Ref Range Status    WBC 01/16/2018 7.9  4.3 - 11.1 K/uL Final    RBC 01/16/2018 4.73  4.05 - 5.25 M/uL Final    HGB 01/16/2018 13.7  11.7 - 15.4 g/dL Final    HCT 01/16/2018 41.6  35.8 - 46.3 % Final    MCV 01/16/2018 87.9  79.6 - 97.8 FL Final    MCH 01/16/2018 29.0  26.1 - 32.9 PG Final    [facility] 01/16/2018 32.9  31.4 - 35.0 g/dL Final    RDW 01/16/2018 14.2  11.9 - 14.6 % Final    PLATELET 88/11/7790 672  150 - 450 K/uL Final    MPV 01/16/2018 11.8  10.8 - 14.1 FL Final    ABSOLUTE NRBC 01/16/2018 0.00  0.0 - 0.2 K/uL Final    **Note: Absolute NRBC parameter is now reported with Hemogram**    DF 01/16/2018 AUTOMATED    Final    NEUTROPHILS 01/16/2018 68  43 - 78 % Final    LYMPHOCYTES 01/16/2018 17  13 - 44 % Final    MONOCYTES 01/16/2018 13* 4.0 - 12.0 % Final    EOSINOPHILS 01/16/2018 2  0.5 - 7.8 % Final    BASOPHILS 01/16/2018 0  0.0 - 2.0 % Final    ABS. NEUTROPHILS 01/16/2018 5.3  1.7 - 8.2 K/UL Final    ABS. LYMPHOCYTES 01/16/2018 1.4  0.5 - 4.6 K/UL Final    ABS. MONOCYTES 01/16/2018 1.0  0.1 - 1.3 K/UL Final    ABS. EOSINOPHILS 01/16/2018 0.1  0.0 - 0.8 K/UL Final    ABS. BASOPHILS 01/16/2018 0.0  0.0 - 0.2 K/UL Final    Sodium 01/16/2018 138  136 - 145 mmol/L Final    Potassium 01/16/2018 3.8  3.5 - 5.1 mmol/L Final    Chloride 01/16/2018 103  98 - 107 mmol/L Final    CO2 01/16/2018 28  21 - 32 mmol/L Final    Anion gap 01/16/2018 7  7 - 16 mmol/L Final    Glucose 01/16/2018 85  65 - 100 mg/dL Final    Comment: 47 - 60 mg/dl Consistent with, but not fully diagnostic of hypoglycemia. 101 - 125 mg/dl Impaired fasting glucose/consistent with pre-diabetes mellitus  > 126 mg/dl Fasting glucose consistent with overt diabetes mellitus      BUN 01/16/2018 22  8 - 23 MG/DL Final    Creatinine 01/16/2018 0.99  0.6 - 1.0 MG/DL Final    GFR est AA 01/16/2018 >60  >60 ml/min/1.73m2 Final    GFR est non-AA 01/16/2018 59* >60 ml/min/1.73m2 Final    Comment: (NOTE)  Estimated GFR is calculated using the Modification of Diet in Renal   Disease (MDRD) Study equation, reported for both  Americans   (GFRAA) and non- Americans (GFRNA), and normalized to 1.73m2   body surface area. The physician must decide which value applies to   the patient.  The MDRD study equation should only be used in individuals age 25 or older. It has not been validated for the   following: pregnant women, patients with serious comorbid conditions,   or on certain medications, or persons with extremes of body size,   muscle mass, or nutritional status.  Calcium 01/16/2018 9.2  8.3 - 10.4 MG/DL Final    Bilirubin, total 01/16/2018 0.4  0.2 - 1.1 MG/DL Final    ALT (SGPT) 01/16/2018 28  12 - 65 U/L Final    AST (SGOT) 01/16/2018 18  15 - 37 U/L Final    Alk.  phosphatase 01/16/2018 72  50 - 136 U/L Final    Protein, total 01/16/2018 7.8  6.3 - 8.2 g/dL Final    Albumin 01/16/2018 4.1  3.2 - 4.6 g/dL Final    Globulin 01/16/2018 3.7* 2.3 - 3.5 g/dL Final    A-G Ratio 01/16/2018 1.1* 1.2 - 3.5   Final    Cancer antigen 15-3 01/16/2018 14.10  1.0 - 35.0 U/mL Final   Office Visit on 12/13/2017   Component Date Value Ref Range Status    Glucose (UA POC) 12/13/2017 Negative  Negative mg/dL Final    Bilirubin (UA POC) 12/13/2017 Negative  Negative Final    Ketones (UA POC) 12/13/2017 Negative  Negative Final    Specific gravity (UA POC) 12/13/2017 1.020  1.001 - 1.035 Final    Blood (UA POC) 12/13/2017 Moderate  Negative Final    pH (UA POC) 12/13/2017 6.0  4.6 - 8.0 Final    Protein (UA POC) 12/13/2017 Negative  Negative Final    Urobilinogen (POC) 12/13/2017 0.2 mg/dL   Final    Nitrites (UA POC) 12/13/2017 Negative  Negative Final    Leukocyte esterase (UA POC) 12/13/2017 Small  Negative Final   Office Visit on 11/29/2017   Component Date Value Ref Range Status    Glucose (UA POC) 11/29/2017 Negative  Negative mg/dL Final    Bilirubin (UA POC) 11/29/2017 Negative  Negative Final    Ketones (UA POC) 11/29/2017 Negative  Negative Final    Specific gravity (UA POC) 11/29/2017 1.020  1.001 - 1.035 Final    Blood (UA POC) 11/29/2017 Large  Negative Final    pH (UA POC) 11/29/2017 5.5  4.6 - 8.0 Final    Protein (UA POC) 11/29/2017 Negative  Negative Final    Urobilinogen (POC) 11/29/2017 0.2 mg/dL   Final  Nitrites (UA POC) 11/29/2017 Negative  Negative Final    Leukocyte esterase (UA POC) 11/29/2017 Small  Negative Final    Urine Culture,Comprehensive 11/29/2017    Final                    Value:Mixed urogenital malika  10,000-25,000 colony forming units per mL                   Patient Active Problem List   Diagnosis Code    Malignant neoplasm of left breast ([facility] [address] C50.912    Dyspnea R06.00    Anemia D64.9    Pulmonary infiltrates R91.8    GERD (gastroesophageal reflux disease) K21.9    Hypertension I10    Hypomagnesemia E83.42    Status post total left knee replacement Z10.766         Signed By: RADHA Arvizu  January 18, 2018

## 2018-01-18 NOTE — PERIOP NOTES
Prescription for Mupirocin ointment 22g tube, apply intranasally to both nostrils BID x5 days pre-operatively or for a total of 10 doses; called to [employer] at [phone]. Message left for pt to return call to [phone] for swab results. 1/17/2018 12:50 PM - [place], Lab [facility]   Component Results   Component Value Flag Ref Range Units Status   Special Requests: NASAL     Final   Culture result:  (A)    Final   MRSA target DNA not detected, SA target DNA detected.   A MRSA negative, SA positive test result does not preclude MRSA nasal colonization.

## 2018-01-19 NOTE — ADVANCED PRACTICE NURSE
Total Joint Surgery Preoperative Chart Review      Patient ID:  Harman Santos  [id number]  4 y.o.  1945  Surgeon: Dr. Yadira Rider  Date of Surgery: 1/24/2018  Procedure: Total Right Knee Arthroplasty  Primary Care Physician: Rudy Galvez -[phone]  Specialty Physician(s):      Subjective:   Harman Santos is a 81 y.o. WHITE OR  female who presents for preoperative evaluation for Total Right Knee arthroplasty. This is a preoperative chart review note based on data collected by the nurse at the surgical Pre-Assessment visit.     Past Medical History:   Diagnosis Date    Anxiety     Arthritis     back and knee's     Breast cancer ([facility] [address] 1/18/2016    Left Lumpectomy    Cancer ([facility] [address]     melanoma left face,  left breast ca     Chronic pain     back    Former smoker     was a passive smoker for 40 yrs; stopped in 2014    GERD (gastroesophageal reflux disease)     nexium    Hard of hearing     bilateral hearing aids    Headache     hx monthly migraines; rare now    Hypertension     controlled with med    Hypothyroidism     Insomnia     Status post total left knee replacement 5/12/2017      Past Surgical History:   Procedure Laterality Date    BREAST SURGERY PROCEDURE UNLISTED Left 7/2015    biopsy    HX BREAST BIOPSY Left 1/18/2016    BREAST NEEDLE LOCALIZED PARTIAL MASTECTOMY/ LEFT //   performed by Chris Hawk MD at [address] [facility] HX BREAST LUMPECTOMY Left 01/2016    HX COLONOSCOPY      HX KNEE REPLACEMENT Left 05/12/2017    HX ORTHOPAEDIC      HX TONSILLECTOMY  1957    HX VASCULAR ACCESS Right     port right upper chest- removed 2016    HX WISDOM TEETH EXTRACTION      [place] MASTECTOMY, PARTIAL Left 01/18/2016     Family History   Problem Relation Age of Onset    Cancer Mother      leukemia    Hypertension Mother     Stroke Mother     Heart Disease Father     Hypertension Father     Stroke Sister     Hypertension Sister     Asthma Paternal Ana Velazquez Breast Cancer Neg Hx       Social History   Substance Use Topics    Smoking status: Former Smoker     Years: 40.00     Types: Cigarettes     Quit date: 1/13/2014    Smokeless tobacco: Never Used      Comment: was a passive smoker for 40 yrs    Alcohol use 0.0 oz/week     0 Standard drinks or equivalent per week      Comment: occasionally       Prior to Admission medications    Medication Sig Start Date End Date Taking? Authorizing Provider   mupirocin calcium (BACTROBAN) 2 % nasal ointment by Both Nostrils route two (2) times a day. Yes Historical Provider   ascorbic acid, vitamin C, (VITAMIN C) 500 mg tablet Take 1,000 mg by mouth daily. Yes Historical Provider   celecoxib (CELEBREX) 200 mg capsule Take 1 Cap by mouth two (2) times a day. 11/21/17  Yes Jacquie Sanchez MD   levothyroxine (SYNTHROID) 25 mcg tablet TAKE ONE TABLET BY MOUTH ONE TIME DAILY BEFORE BREAKFAST 10/9/17  Yes Jacquie Sanchez MD   SUMAtriptan (IMITREX) 100 mg tablet Take 1 Tab by mouth once as needed for Migraine for up to 1 dose. 10/3/17  Yes Jacquie Sanchez MD   hydroCHLOROthiazide (HYDRODIURIL) 25 mg tablet TAKE ONE TABLET BY MOUTH ONE TIME DAILY 9/11/17  Yes Jacquie Sanchez MD   temazepam (RESTORIL) 15 mg capsule Take 1 Cap by mouth nightly. Max Daily Amount: 15 mg. Patient taking differently: Take 15 mg by mouth nightly as needed for Sleep. 7/17/17  Yes Jacquie Sanchez MD   acetaminophen (TYLENOL) 500 mg tablet Take 1,000 mg by mouth every six (6) hours as needed for Pain. Take / use AM day of surgery  per anesthesia protocols if needed. 5/15/17  Yes Ankita Mckeon MD   Biotin 2,500 mcg cap Take 2 Tabs by mouth daily. Yes Historical Provider   multivitamin (ONE A DAY) tablet Take 1 Tab by mouth daily. Yes Historical Provider   esomeprazole (NEXIUM) 20 mg capsule Take 20 mg by mouth every morning. Take / use AM day of surgery  per anesthesia protocols.   Indications: GASTROESOPHAGEAL REFLUX   Yes Historical Provider No Known Allergies       Objective:     ECG:    EKG Results     None          Data Review:   Labs:     Results for Chaitanya Goins (MRN [id number]) as of 1/19/2018 12:58   Ref. Range 1/16/2018 12:42   Sodium Latest Ref Range: 136 - 145 mmol/L 138   Potassium Latest Ref Range: 3.5 - 5.1 mmol/L 3.8   Chloride Latest Ref Range: 98 - 107 mmol/L 103   CO2 Latest Ref Range: 21 - 32 mmol/L 28   Anion gap Latest Ref Range: 7 - 16 mmol/L 7   Glucose Latest Ref Range: 65 - 100 mg/dL 85   BUN Latest Ref Range: 8 - 23 MG/DL 22   Creatinine Latest Ref Range: 0.6 - 1.0 MG/DL 0.99   Calcium Latest Ref Range: 8.3 - 10.4 MG/DL 9.2   GFR est non-AA Latest Ref Range: >60 ml/min/1.73m2 59 (L)   GFR est AA Latest Ref Range: >60 ml/min/1.73m2 >60   Bilirubin, total Latest Ref Range: 0.2 - 1.1 MG/DL 0.4   Protein, total Latest Ref Range: 6.3 - 8.2 g/dL 7.8   Albumin Latest Ref Range: 3.2 - 4.6 g/dL 4.1   Globulin Latest Ref Range: 2.3 - 3.5 g/dL 3.7 (H)   A-G Ratio Latest Ref Range: 1.2 - 3.5   1.1 (L)   ALT (SGPT) Latest Ref Range: 12 - 65 U/L 28   AST Latest Ref Range: 15 - 37 U/L 18   Alk.  phosphatase Latest Ref Range: 50 - 136 U/L 72       Problem List:  )  Patient Active Problem List   Diagnosis Code    Malignant neoplasm of left breast (HCC) C50.912    Dyspnea R06.00    Anemia D64.9    Pulmonary infiltrates R91.8    GERD (gastroesophageal reflux disease) K21.9    Hypertension I10    Hypomagnesemia E83.42    Status post total left knee replacement Z96.652       Total Joint Surgery Pre-Assessment Recommendations:           None      Signed By: SYLVIA Suggs    January 19, 2018

## 2018-01-23 ENCOUNTER — ANESTHESIA EVENT (OUTPATIENT)
Dept: SURGERY | Age: 73
DRG: 470 | End: 2018-01-23
Payer: MEDICARE

## 2018-01-24 ENCOUNTER — HOSPITAL ENCOUNTER (INPATIENT)
Age: 73
LOS: 2 days | Discharge: HOME HEALTH | DRG: 470 | End: 2018-01-26
Attending: ORTHOPAEDIC SURGERY | Admitting: ORTHOPAEDIC SURGERY
Payer: MEDICARE

## 2018-01-24 ENCOUNTER — HOME HEALTH ADMISSION (OUTPATIENT)
Dept: HOME HEALTH SERVICES | Facility: HOME HEALTH | Age: 73
End: 2018-01-24
Payer: MEDICARE

## 2018-01-24 ENCOUNTER — ANESTHESIA (OUTPATIENT)
Dept: SURGERY | Age: 73
DRG: 470 | End: 2018-01-24
Payer: MEDICARE

## 2018-01-24 DIAGNOSIS — D64.89 ANEMIA DUE TO OTHER CAUSE, NOT CLASSIFIED: ICD-10-CM

## 2018-01-24 DIAGNOSIS — Z96.651 STATUS POST TOTAL RIGHT KNEE REPLACEMENT: Primary | ICD-10-CM

## 2018-01-24 LAB
ABO + RH BLD: NORMAL
BLOOD GROUP ANTIBODIES SERPL: NORMAL
GLUCOSE BLD STRIP.AUTO-MCNC: 88 MG/DL (ref 65–100)
HGB BLD-MCNC: 11.5 G/DL (ref 11.7–15.4)
SPECIMEN EXP DATE BLD: NORMAL

## 2018-01-24 PROCEDURE — 77030006789 HC BLD SAW OSC STRY -C: Performed by: ORTHOPAEDIC SURGERY

## 2018-01-24 PROCEDURE — 85018 HEMOGLOBIN: CPT | Performed by: PHYSICIAN ASSISTANT

## 2018-01-24 PROCEDURE — 76210000016 HC OR PH I REC 1 TO 1.5 HR: Performed by: ORTHOPAEDIC SURGERY

## 2018-01-24 PROCEDURE — 77030037364 HC TIB INST CR  DISP STRY -C: Performed by: ORTHOPAEDIC SURGERY

## 2018-01-24 PROCEDURE — 82962 GLUCOSE BLOOD TEST: CPT

## 2018-01-24 PROCEDURE — 77030003602 HC NDL NRV BLK BBMI -B: Performed by: ANESTHESIOLOGY

## 2018-01-24 PROCEDURE — 86580 TB INTRADERMAL TEST: CPT | Performed by: ORTHOPAEDIC SURGERY

## 2018-01-24 PROCEDURE — 74011250636 HC RX REV CODE- 250/636

## 2018-01-24 PROCEDURE — 77030007880 HC KT SPN EPDRL BBMI -B: Performed by: ANESTHESIOLOGY

## 2018-01-24 PROCEDURE — 74011250636 HC RX REV CODE- 250/636: Performed by: ORTHOPAEDIC SURGERY

## 2018-01-24 PROCEDURE — 77030012935 HC DRSG AQUACEL BMS -B: Performed by: ORTHOPAEDIC SURGERY

## 2018-01-24 PROCEDURE — 77030011208: Performed by: ORTHOPAEDIC SURGERY

## 2018-01-24 PROCEDURE — 77030037363 HC FEM INST CR  DISP STRY -C: Performed by: ORTHOPAEDIC SURGERY

## 2018-01-24 PROCEDURE — 77030002966 HC SUT PDS J&J -A: Performed by: ORTHOPAEDIC SURGERY

## 2018-01-24 PROCEDURE — 74011000250 HC RX REV CODE- 250: Performed by: ORTHOPAEDIC SURGERY

## 2018-01-24 PROCEDURE — 77030027138 HC INCENT SPIROMETER -A

## 2018-01-24 PROCEDURE — 77030008467 HC STPLR SKN COVD -B: Performed by: ORTHOPAEDIC SURGERY

## 2018-01-24 PROCEDURE — 77030011640 HC PAD GRND REM COVD -A: Performed by: ORTHOPAEDIC SURGERY

## 2018-01-24 PROCEDURE — 77030020143 HC AIRWY LARYN INTUB CGAS -A: Performed by: ANESTHESIOLOGY

## 2018-01-24 PROCEDURE — 77030018836 HC SOL IRR NACL ICUM -A: Performed by: ORTHOPAEDIC SURGERY

## 2018-01-24 PROCEDURE — 86901 BLOOD TYPING SEROLOGIC RH(D): CPT | Performed by: PHYSICIAN ASSISTANT

## 2018-01-24 PROCEDURE — 76060000035 HC ANESTHESIA 2 TO 2.5 HR: Performed by: ORTHOPAEDIC SURGERY

## 2018-01-24 PROCEDURE — 77030013727 HC IRR FAN PULSVC ZIMM -B: Performed by: ORTHOPAEDIC SURGERY

## 2018-01-24 PROCEDURE — 74011250637 HC RX REV CODE- 250/637: Performed by: PHYSICIAN ASSISTANT

## 2018-01-24 PROCEDURE — 77030036688 HC BLNKT CLD THER S2SG -B

## 2018-01-24 PROCEDURE — 94760 N-INVAS EAR/PLS OXIMETRY 1: CPT

## 2018-01-24 PROCEDURE — 74011000250 HC RX REV CODE- 250

## 2018-01-24 PROCEDURE — 76010000162 HC OR TIME 1.5 TO 2 HR INTENSV-TIER 1: Performed by: ORTHOPAEDIC SURGERY

## 2018-01-24 PROCEDURE — 77030020782 HC GWN BAIR PAWS FLX 3M -B: Performed by: ANESTHESIOLOGY

## 2018-01-24 PROCEDURE — 74011250636 HC RX REV CODE- 250/636: Performed by: PHYSICIAN ASSISTANT

## 2018-01-24 PROCEDURE — 99221 1ST HOSP IP/OBS SF/LOW 40: CPT | Performed by: PHYSICAL MEDICINE & REHABILITATION

## 2018-01-24 PROCEDURE — 97161 PT EVAL LOW COMPLEX 20 MIN: CPT

## 2018-01-24 PROCEDURE — 76010010054 HC POST OP PAIN BLOCK: Performed by: ORTHOPAEDIC SURGERY

## 2018-01-24 PROCEDURE — 74011000302 HC RX REV CODE- 302: Performed by: ORTHOPAEDIC SURGERY

## 2018-01-24 PROCEDURE — 77030025452 HC KT TIB SZR TRTH DSP STRY -B: Performed by: ORTHOPAEDIC SURGERY

## 2018-01-24 PROCEDURE — 77030002912 HC SUT ETHBND J&J -A: Performed by: ORTHOPAEDIC SURGERY

## 2018-01-24 PROCEDURE — 74011250636 HC RX REV CODE- 250/636: Performed by: ANESTHESIOLOGY

## 2018-01-24 PROCEDURE — 77030035236 HC SUT PDS STRATFX BARB J&J -B: Performed by: ORTHOPAEDIC SURGERY

## 2018-01-24 PROCEDURE — 76942 ECHO GUIDE FOR BIOPSY: CPT | Performed by: ORTHOPAEDIC SURGERY

## 2018-01-24 PROCEDURE — 77030034849: Performed by: ORTHOPAEDIC SURGERY

## 2018-01-24 PROCEDURE — 77030006720 HC BLD PAT RMR ZIMM -B: Performed by: ORTHOPAEDIC SURGERY

## 2018-01-24 PROCEDURE — 77030019557 HC ELECTRD VES SEAL MEDT -F: Performed by: ORTHOPAEDIC SURGERY

## 2018-01-24 PROCEDURE — 0SRC0JA REPLACEMENT OF RIGHT KNEE JOINT WITH SYNTHETIC SUBSTITUTE, UNCEMENTED, OPEN APPROACH: ICD-10-PCS | Performed by: ORTHOPAEDIC SURGERY

## 2018-01-24 PROCEDURE — 97165 OT EVAL LOW COMPLEX 30 MIN: CPT

## 2018-01-24 PROCEDURE — 74011000258 HC RX REV CODE- 258: Performed by: ORTHOPAEDIC SURGERY

## 2018-01-24 PROCEDURE — 36415 COLL VENOUS BLD VENIPUNCTURE: CPT | Performed by: PHYSICIAN ASSISTANT

## 2018-01-24 PROCEDURE — C1776 JOINT DEVICE (IMPLANTABLE): HCPCS | Performed by: ORTHOPAEDIC SURGERY

## 2018-01-24 PROCEDURE — 77030032490 HC SLV COMPR SCD KNE COVD -B

## 2018-01-24 PROCEDURE — 77030003665 HC NDL SPN BBMI -A: Performed by: ANESTHESIOLOGY

## 2018-01-24 PROCEDURE — 65270000029 HC RM PRIVATE

## 2018-01-24 PROCEDURE — 77030031139 HC SUT VCRL2 J&J -A: Performed by: ORTHOPAEDIC SURGERY

## 2018-01-24 DEVICE — TIBIAL COMPONENT
Type: IMPLANTABLE DEVICE | Site: KNEE | Status: FUNCTIONAL
Brand: TRIATHLON

## 2018-01-24 DEVICE — TIBIAL BEARING INSERT
Type: IMPLANTABLE DEVICE | Site: KNEE | Status: FUNCTIONAL
Brand: TRIATHLON

## 2018-01-24 DEVICE — PATELLA
Type: IMPLANTABLE DEVICE | Site: KNEE | Status: FUNCTIONAL
Brand: TRIATHLON

## 2018-01-24 RX ORDER — ACETAMINOPHEN 10 MG/ML
1000 INJECTION, SOLUTION INTRAVENOUS ONCE
Status: COMPLETED | OUTPATIENT
Start: 2018-01-24 | End: 2018-01-24

## 2018-01-24 RX ORDER — AMOXICILLIN 250 MG
2 CAPSULE ORAL DAILY
Status: DISCONTINUED | OUTPATIENT
Start: 2018-01-25 | End: 2018-01-26 | Stop reason: HOSPADM

## 2018-01-24 RX ORDER — LIDOCAINE HYDROCHLORIDE 20 MG/ML
INJECTION, SOLUTION EPIDURAL; INFILTRATION; INTRACAUDAL; PERINEURAL AS NEEDED
Status: DISCONTINUED | OUTPATIENT
Start: 2018-01-24 | End: 2018-01-24 | Stop reason: HOSPADM

## 2018-01-24 RX ORDER — ONDANSETRON 2 MG/ML
4 INJECTION INTRAMUSCULAR; INTRAVENOUS
Status: DISCONTINUED | OUTPATIENT
Start: 2018-01-24 | End: 2018-01-26 | Stop reason: HOSPADM

## 2018-01-24 RX ORDER — SODIUM CHLORIDE 0.9 % (FLUSH) 0.9 %
5-10 SYRINGE (ML) INJECTION EVERY 8 HOURS
Status: DISCONTINUED | OUTPATIENT
Start: 2018-01-24 | End: 2018-01-26 | Stop reason: HOSPADM

## 2018-01-24 RX ORDER — DEXAMETHASONE SODIUM PHOSPHATE 100 MG/10ML
10 INJECTION INTRAMUSCULAR; INTRAVENOUS ONCE
Status: ACTIVE | OUTPATIENT
Start: 2018-01-25 | End: 2018-01-26

## 2018-01-24 RX ORDER — ROPIVACAINE HYDROCHLORIDE 5 MG/ML
INJECTION, SOLUTION EPIDURAL; INFILTRATION; PERINEURAL AS NEEDED
Status: DISCONTINUED | OUTPATIENT
Start: 2018-01-24 | End: 2018-01-24 | Stop reason: HOSPADM

## 2018-01-24 RX ORDER — MIDAZOLAM HYDROCHLORIDE 1 MG/ML
INJECTION, SOLUTION INTRAMUSCULAR; INTRAVENOUS AS NEEDED
Status: DISCONTINUED | OUTPATIENT
Start: 2018-01-24 | End: 2018-01-24 | Stop reason: HOSPADM

## 2018-01-24 RX ORDER — NALOXONE HYDROCHLORIDE 0.4 MG/ML
.2-.4 INJECTION, SOLUTION INTRAMUSCULAR; INTRAVENOUS; SUBCUTANEOUS
Status: DISCONTINUED | OUTPATIENT
Start: 2018-01-24 | End: 2018-01-26 | Stop reason: HOSPADM

## 2018-01-24 RX ORDER — DEXAMETHASONE SODIUM PHOSPHATE 4 MG/ML
INJECTION, SOLUTION INTRA-ARTICULAR; INTRALESIONAL; INTRAMUSCULAR; INTRAVENOUS; SOFT TISSUE AS NEEDED
Status: DISCONTINUED | OUTPATIENT
Start: 2018-01-24 | End: 2018-01-24 | Stop reason: HOSPADM

## 2018-01-24 RX ORDER — OXYCODONE AND ACETAMINOPHEN 10; 325 MG/1; MG/1
1 TABLET ORAL AS NEEDED
Status: DISCONTINUED | OUTPATIENT
Start: 2018-01-24 | End: 2018-01-24 | Stop reason: HOSPADM

## 2018-01-24 RX ORDER — ACETAMINOPHEN 500 MG
1000 TABLET ORAL EVERY 6 HOURS
Status: DISCONTINUED | OUTPATIENT
Start: 2018-01-25 | End: 2018-01-26 | Stop reason: HOSPADM

## 2018-01-24 RX ORDER — ONDANSETRON 2 MG/ML
INJECTION INTRAMUSCULAR; INTRAVENOUS AS NEEDED
Status: DISCONTINUED | OUTPATIENT
Start: 2018-01-24 | End: 2018-01-24 | Stop reason: HOSPADM

## 2018-01-24 RX ORDER — PANTOPRAZOLE SODIUM 40 MG/1
40 TABLET, DELAYED RELEASE ORAL
Status: DISCONTINUED | OUTPATIENT
Start: 2018-01-25 | End: 2018-01-26 | Stop reason: HOSPADM

## 2018-01-24 RX ORDER — DIPHENHYDRAMINE HCL 25 MG
25 CAPSULE ORAL
Status: DISCONTINUED | OUTPATIENT
Start: 2018-01-24 | End: 2018-01-26 | Stop reason: HOSPADM

## 2018-01-24 RX ORDER — ASPIRIN 325 MG
325 TABLET, DELAYED RELEASE (ENTERIC COATED) ORAL EVERY 12 HOURS
Qty: 70 TAB | Refills: 0 | Status: SHIPPED | OUTPATIENT
Start: 2018-01-24 | End: 2018-02-28

## 2018-01-24 RX ORDER — FENTANYL CITRATE 50 UG/ML
100 INJECTION, SOLUTION INTRAMUSCULAR; INTRAVENOUS ONCE
Status: COMPLETED | OUTPATIENT
Start: 2018-01-24 | End: 2018-01-24

## 2018-01-24 RX ORDER — SODIUM CHLORIDE 0.9 % (FLUSH) 0.9 %
5-10 SYRINGE (ML) INJECTION AS NEEDED
Status: DISCONTINUED | OUTPATIENT
Start: 2018-01-24 | End: 2018-01-26 | Stop reason: HOSPADM

## 2018-01-24 RX ORDER — HYDROMORPHONE HYDROCHLORIDE 2 MG/ML
1 INJECTION, SOLUTION INTRAMUSCULAR; INTRAVENOUS; SUBCUTANEOUS
Status: DISCONTINUED | OUTPATIENT
Start: 2018-01-24 | End: 2018-01-26 | Stop reason: HOSPADM

## 2018-01-24 RX ORDER — CELECOXIB 200 MG/1
200 CAPSULE ORAL 2 TIMES DAILY
Status: DISCONTINUED | OUTPATIENT
Start: 2018-01-24 | End: 2018-01-24 | Stop reason: SDUPTHER

## 2018-01-24 RX ORDER — HYDROCHLOROTHIAZIDE 25 MG/1
25 TABLET ORAL DAILY
Status: DISCONTINUED | OUTPATIENT
Start: 2018-01-24 | End: 2018-01-25

## 2018-01-24 RX ORDER — OXYCODONE HYDROCHLORIDE 5 MG/1
5 TABLET ORAL
Status: DISCONTINUED | OUTPATIENT
Start: 2018-01-24 | End: 2018-01-24 | Stop reason: HOSPADM

## 2018-01-24 RX ORDER — HYDROMORPHONE HYDROCHLORIDE 2 MG/1
2 TABLET ORAL
Status: DISCONTINUED | OUTPATIENT
Start: 2018-01-24 | End: 2018-01-25

## 2018-01-24 RX ORDER — CEFAZOLIN SODIUM/WATER 2 G/20 ML
2 SYRINGE (ML) INTRAVENOUS EVERY 8 HOURS
Status: COMPLETED | OUTPATIENT
Start: 2018-01-24 | End: 2018-01-25

## 2018-01-24 RX ORDER — HYDROMORPHONE HYDROCHLORIDE 2 MG/ML
0.5 INJECTION, SOLUTION INTRAMUSCULAR; INTRAVENOUS; SUBCUTANEOUS
Status: DISCONTINUED | OUTPATIENT
Start: 2018-01-24 | End: 2018-01-24 | Stop reason: HOSPADM

## 2018-01-24 RX ORDER — LEVOTHYROXINE SODIUM 50 UG/1
25 TABLET ORAL
Status: DISCONTINUED | OUTPATIENT
Start: 2018-01-25 | End: 2018-01-26 | Stop reason: HOSPADM

## 2018-01-24 RX ORDER — CELECOXIB 200 MG/1
200 CAPSULE ORAL EVERY 12 HOURS
Status: DISCONTINUED | OUTPATIENT
Start: 2018-01-24 | End: 2018-01-26 | Stop reason: HOSPADM

## 2018-01-24 RX ORDER — NEOMYCIN AND POLYMYXIN B SULFATES 40; 200000 MG/ML; [USP'U]/ML
SOLUTION IRRIGATION AS NEEDED
Status: DISCONTINUED | OUTPATIENT
Start: 2018-01-24 | End: 2018-01-24 | Stop reason: HOSPADM

## 2018-01-24 RX ORDER — MORPHINE SULFATE 10 MG/ML
INJECTION, SOLUTION INTRAMUSCULAR; INTRAVENOUS AS NEEDED
Status: DISCONTINUED | OUTPATIENT
Start: 2018-01-24 | End: 2018-01-24 | Stop reason: HOSPADM

## 2018-01-24 RX ORDER — CEFAZOLIN SODIUM/WATER 2 G/20 ML
2 SYRINGE (ML) INTRAVENOUS ONCE
Status: COMPLETED | OUTPATIENT
Start: 2018-01-24 | End: 2018-01-24

## 2018-01-24 RX ORDER — TEMAZEPAM 15 MG/1
15 CAPSULE ORAL
Status: DISCONTINUED | OUTPATIENT
Start: 2018-01-24 | End: 2018-01-26 | Stop reason: HOSPADM

## 2018-01-24 RX ORDER — SUMATRIPTAN 50 MG/1
100 TABLET, FILM COATED ORAL
Status: DISCONTINUED | OUTPATIENT
Start: 2018-01-24 | End: 2018-01-26 | Stop reason: HOSPADM

## 2018-01-24 RX ORDER — SODIUM CHLORIDE 0.9 % (FLUSH) 0.9 %
5-10 SYRINGE (ML) INJECTION AS NEEDED
Status: DISCONTINUED | OUTPATIENT
Start: 2018-01-24 | End: 2018-01-24 | Stop reason: HOSPADM

## 2018-01-24 RX ORDER — ASPIRIN 325 MG
325 TABLET, DELAYED RELEASE (ENTERIC COATED) ORAL EVERY 12 HOURS
Status: DISCONTINUED | OUTPATIENT
Start: 2018-01-24 | End: 2018-01-26 | Stop reason: HOSPADM

## 2018-01-24 RX ORDER — PROPOFOL 10 MG/ML
INJECTION, EMULSION INTRAVENOUS AS NEEDED
Status: DISCONTINUED | OUTPATIENT
Start: 2018-01-24 | End: 2018-01-24 | Stop reason: HOSPADM

## 2018-01-24 RX ORDER — MIDAZOLAM HYDROCHLORIDE 1 MG/ML
2 INJECTION, SOLUTION INTRAMUSCULAR; INTRAVENOUS
Status: DISCONTINUED | OUTPATIENT
Start: 2018-01-24 | End: 2018-01-24 | Stop reason: HOSPADM

## 2018-01-24 RX ORDER — SODIUM CHLORIDE 9 MG/ML
100 INJECTION, SOLUTION INTRAVENOUS CONTINUOUS
Status: DISPENSED | OUTPATIENT
Start: 2018-01-24 | End: 2018-01-25

## 2018-01-24 RX ORDER — KETOROLAC TROMETHAMINE 30 MG/ML
INJECTION, SOLUTION INTRAMUSCULAR; INTRAVENOUS AS NEEDED
Status: DISCONTINUED | OUTPATIENT
Start: 2018-01-24 | End: 2018-01-24 | Stop reason: HOSPADM

## 2018-01-24 RX ORDER — HYDROMORPHONE HYDROCHLORIDE 2 MG/1
2 TABLET ORAL
Qty: 40 TAB | Refills: 0 | Status: SHIPPED | OUTPATIENT
Start: 2018-01-24 | End: 2018-04-26

## 2018-01-24 RX ORDER — PROPOFOL 10 MG/ML
INJECTION, EMULSION INTRAVENOUS
Status: DISCONTINUED | OUTPATIENT
Start: 2018-01-24 | End: 2018-01-24 | Stop reason: HOSPADM

## 2018-01-24 RX ORDER — SODIUM CHLORIDE, SODIUM LACTATE, POTASSIUM CHLORIDE, CALCIUM CHLORIDE 600; 310; 30; 20 MG/100ML; MG/100ML; MG/100ML; MG/100ML
75 INJECTION, SOLUTION INTRAVENOUS CONTINUOUS
Status: DISCONTINUED | OUTPATIENT
Start: 2018-01-24 | End: 2018-01-24 | Stop reason: HOSPADM

## 2018-01-24 RX ADMIN — MIDAZOLAM HYDROCHLORIDE 1 MG: 1 INJECTION, SOLUTION INTRAMUSCULAR; INTRAVENOUS at 08:57

## 2018-01-24 RX ADMIN — MIDAZOLAM HYDROCHLORIDE 1 MG: 1 INJECTION, SOLUTION INTRAMUSCULAR; INTRAVENOUS at 08:36

## 2018-01-24 RX ADMIN — HYDROMORPHONE HYDROCHLORIDE 2 MG: 2 TABLET ORAL at 15:44

## 2018-01-24 RX ADMIN — SODIUM CHLORIDE, SODIUM LACTATE, POTASSIUM CHLORIDE, AND CALCIUM CHLORIDE 75 ML/HR: 600; 310; 30; 20 INJECTION, SOLUTION INTRAVENOUS at 06:46

## 2018-01-24 RX ADMIN — ONDANSETRON 4 MG: 2 INJECTION INTRAMUSCULAR; INTRAVENOUS at 08:30

## 2018-01-24 RX ADMIN — SODIUM CHLORIDE, SODIUM LACTATE, POTASSIUM CHLORIDE, AND CALCIUM CHLORIDE: 600; 310; 30; 20 INJECTION, SOLUTION INTRAVENOUS at 08:18

## 2018-01-24 RX ADMIN — PROPOFOL 20 MG: 10 INJECTION, EMULSION INTRAVENOUS at 08:52

## 2018-01-24 RX ADMIN — ASPIRIN 325 MG: 325 TABLET, DELAYED RELEASE ORAL at 20:59

## 2018-01-24 RX ADMIN — DEXAMETHASONE SODIUM PHOSPHATE 10 MG: 4 INJECTION, SOLUTION INTRA-ARTICULAR; INTRALESIONAL; INTRAMUSCULAR; INTRAVENOUS; SOFT TISSUE at 09:17

## 2018-01-24 RX ADMIN — Medication 2 G: at 16:36

## 2018-01-24 RX ADMIN — SODIUM CHLORIDE, SODIUM LACTATE, POTASSIUM CHLORIDE, AND CALCIUM CHLORIDE: 600; 310; 30; 20 INJECTION, SOLUTION INTRAVENOUS at 08:42

## 2018-01-24 RX ADMIN — Medication 2 G: at 08:38

## 2018-01-24 RX ADMIN — PROPOFOL 50 MCG/KG/MIN: 10 INJECTION, EMULSION INTRAVENOUS at 08:52

## 2018-01-24 RX ADMIN — FENTANYL CITRATE 100 MCG: 50 INJECTION INTRAMUSCULAR; INTRAVENOUS at 08:12

## 2018-01-24 RX ADMIN — MIDAZOLAM HYDROCHLORIDE 1 MG: 1 INJECTION, SOLUTION INTRAMUSCULAR; INTRAVENOUS at 08:12

## 2018-01-24 RX ADMIN — PROPOFOL 20 MG: 10 INJECTION, EMULSION INTRAVENOUS at 09:05

## 2018-01-24 RX ADMIN — CELECOXIB 200 MG: 200 CAPSULE ORAL at 20:59

## 2018-01-24 RX ADMIN — HYDROMORPHONE HYDROCHLORIDE 2 MG: 2 TABLET ORAL at 12:18

## 2018-01-24 RX ADMIN — ACETAMINOPHEN 1000 MG: 10 INJECTION, SOLUTION INTRAVENOUS at 18:39

## 2018-01-24 RX ADMIN — LIDOCAINE HYDROCHLORIDE 40 MG: 20 INJECTION, SOLUTION EPIDURAL; INFILTRATION; INTRACAUDAL; PERINEURAL at 08:52

## 2018-01-24 RX ADMIN — TUBERCULIN PURIFIED PROTEIN DERIVATIVE 5 UNITS: 5 INJECTION, SOLUTION INTRADERMAL at 06:46

## 2018-01-24 RX ADMIN — ACETAMINOPHEN 1000 MG: 500 TABLET, FILM COATED ORAL at 23:57

## 2018-01-24 RX ADMIN — ROPIVACAINE HYDROCHLORIDE 20 ML: 5 INJECTION, SOLUTION EPIDURAL; INFILTRATION; PERINEURAL at 08:14

## 2018-01-24 RX ADMIN — HYDROMORPHONE HYDROCHLORIDE 2 MG: 2 TABLET ORAL at 20:59

## 2018-01-24 RX ADMIN — TEMAZEPAM 15 MG: 15 CAPSULE ORAL at 20:59

## 2018-01-24 NOTE — ANESTHESIA PROCEDURE NOTES
Peripheral Block    Start time: 1/24/2018 8:12 AM  End time: 1/24/2018 8:14 AM  Performed by: Lilian Little  Authorized by: Lilian Little       Pre-procedure: Indications: at surgeon's request and post-op pain management    Preanesthetic Checklist: patient identified, risks and benefits discussed, site marked, timeout performed, anesthesia consent given and patient being monitored    Timeout Time: 08:12          Block Type:   Block Type:   Adductor canal  Laterality:  Right  Monitoring:  Standard ASA monitoring, continuous pulse ox, frequent vital sign checks, heart rate, oxygen and responsive to questions  Injection Technique:  Single shot  Procedures: ultrasound guided    Patient Position: supine  Prep: chlorhexidine    Location:  Mid thigh  Needle Type:  Stimuplex  Needle Gauge:  21 G  Needle Localization:  Anatomical landmarks and ultrasound guidance  Medication Injected:  0.5%  Volume (mL):  20  ropivacaine    Assessment:  Number of attempts:  1  Injection Assessment:  Incremental injection every 5 mL, local visualized surrounding nerve on ultrasound, negative aspiration for blood, no intravascular symptoms, no paresthesia and ultrasound image on chart  Patient tolerance:  Patient tolerated the procedure well with no immediate complications

## 2018-01-24 NOTE — IP AVS SNAPSHOT
[address] 
[phone] Patient: Mecca Hernández MRN: [id number] OIT:2/7/[birth year]43 About your hospitalization You were admitted on:  January 24, 2018 You last received care in the:  [facility] You were discharged on:  January 26, 2018 Why you were hospitalized Your primary diagnosis was:  Status Post Total Right Knee Replacement Follow-up Information Follow up With Details Comments Contact Info Heidi Carrion MD  As needed [address] 
[address] [facility] [address] 
[phone] Kun Blanc MD  As scheduled  by office [address] [address] 
[phone] [address]  Will contact you within 48 hrs [address] [facility] [address] [facility] [address] 
[phone] Discharge Orders Procedure Order Date Status Priority Quantity Spec Type Associated Dx CALL YOUR DOCTOR For: Temperature greater than 100.4., Severe uncontrolled pain. , Persistant nausea and vomiting., Persistant dizziness or light-headedness. , Hives, Difficulty breathing, headache, or visual disturbances. , Redness, tenderness, or s. .. 01/24/18 1354 Normal Routine 1  Status post total right knee replacement [[id number]] Questions: For:  Temperature greater than 100.4. For:  Severe uncontrolled pain. For:  Persistant nausea and vomiting. For:  Persistant dizziness or light-headedness. For:  Will Browner For:  Difficulty breathing, headache, or visual disturbances. For:  Redness, tenderness, or signs of infection. ACTIVITY AFTER DISCHARGE Patient should: Restrict driving, Restrict lifting, Other (specify) 01/24/18 1354 Normal Routine 1  Status post total right knee replacement [[id number]] Questions: Patient should:  Restrict driving Patient should:  Restrict lifting Patient should: Other (specify) DRESSING, CHANGE SPECIFY 01/24/18 1354 Normal Routine 1  Status post total right knee replacement [[id number]] Comments:  Routine dressing changes. Notify if excessive drainage. If staples are present they are to be removed 10 days post surgery and steri strips applied. REFERRAL TO HOME HEALTH 01/24/18 1354 Normal Routine 1  Status post total right knee replacement [[id number]] REFERRAL TO PHYSICAL THERAPY 01/24/18 1354 Normal Routine 1  Status post total right knee replacement [[id number]] Comments:  Referral to Home PT A check jesus indicates which time of day the medication should be taken. My Medications START taking these medications Instructions Each Dose to Equal  
 Morning Noon Evening Bedtime  
 aspirin delayed-release 325 mg tablet Your next dose is: Today Take 1 Tab by mouth every twelve (12) hours every twelve (12) hours for 35 days. 325 mg HYDROmorphone 2 mg tablet Commonly known as:  DILAUDID Your next dose is: Today Take 1 Tab by mouth every four (4) hours as needed. Max Daily Amount: 12 mg.  
 2 mg CHANGE how you take these medications Instructions Each Dose to Equal  
 Morning Noon Evening Bedtime  
 temazepam 15 mg capsule Commonly known as:  RESTORIL What changed:   
- when to take this 
- reasons to take this Your next dose is: Today Take 1 Cap by mouth nightly. Max Daily Amount: 15 mg.  
 15 mg CONTINUE taking these medications Instructions Each Dose to Equal  
 Morning Noon Evening Bedtime  
 acetaminophen 500 mg tablet Commonly known as:  TYLENOL Your next dose is: Today Take 1,000 mg by mouth every six (6) hours as needed for Pain. Take / use AM day of surgery  per anesthesia protocols if needed. 1000 mg  
    
   
   
  
   
  
 ascorbic acid (vitamin C) 500 mg tablet Commonly known as:  VITAMIN C  
 Your next dose is:  Tomorrow Take 1,000 mg by mouth daily. 1000 mg Biotin 2,500 mcg Cap Your next dose is:  Tomorrow Take 2 Tabs by mouth daily. 2 Tab  
    
  
   
   
   
  
 celecoxib 200 mg capsule Commonly known as:  CELEBREX Your next dose is: Today Take 1 Cap by mouth two (2) times a day. 200 mg  
    
   
   
  
   
  
 hydroCHLOROthiazide 25 mg tablet Commonly known as:  HYDRODIURIL Your next dose is:  Tomorrow TAKE ONE TABLET BY MOUTH ONE TIME DAILY  
     
  
   
   
   
  
 levothyroxine 25 mcg tablet Commonly known as:  SYNTHROID Your next dose is:  Tomorrow TAKE ONE TABLET BY MOUTH ONE TIME DAILY BEFORE BREAKFAST  
     
  
   
   
   
  
 multivitamin tablet Commonly known as:  ONE A DAY Your next dose is:  Tomorrow Take 1 Tab by mouth daily. 1 Tab NexIUM 20 mg capsule Generic drug:  esomeprazole Your next dose is:  Tomorrow Take 20 mg by mouth every morning. Take / use AM day of surgery  per anesthesia protocols. Indications: GASTROESOPHAGEAL REFLUX  
 20 mg  
    
  
   
   
   
  
 SUMAtriptan 100 mg tablet Commonly known as:  IMITREX Your next dose is: Today Take 1 Tab by mouth once as needed for Migraine for up to 1 dose. 100 mg  
    
   
   
  
   
  
  
STOP taking these medications   
 mupirocin calcium 2 % nasal ointment Commonly known as:  [facility] Where to Get Your Medications Information on where to get these meds will be given to you by the nurse or doctor. ! Ask your nurse or doctor about these medications  
  aspirin delayed-release 325 mg tablet HYDROmorphone 2 mg tablet Discharge Instructions [address] [place] [address] Patient Discharge Instructions Mickielorrialicia [facility] / [id number] : 1945 Admitted 2018 Discharged: 2018 IF YOU HAVE ANY PROBLEMS ONCE YOU ARE AT HOME CALL THE FOLLOWING NUMBERS:  
Main office number: [phone] Medications · The medications you are to continue on are listed on the medication reconciliation sheet. · Narcotic pain medications as well as supplemental iron can cause constipation. If this occurs try stopping the narcotic pain medication and/or the iron. · It is important that you take the medication exactly as they are prescribed. · Medications which increase your risk of blood clots are listed to stop for 5 weeks after surgery as well as medications or supplements which increase your risk of bleeding complications. · Keep your medication in the bottles provided by the pharmacist and keep a list of the medication names, dosages, and times to be taken in your wallet. · Do not take other medications without consulting your doctor. Important Information Do NOT smoke as this will greatly increase your risk of infection! Resume your prehospital diet. If you have excessive nausea or vomitting call your doctor's office Leg swelling and warmth is normal for 6 months after surgery. If you experience swelling in your leg elevate you leg while laying down with your toes above your heart. If you have sudden onset severe swelling with leg pain call our office. Use Yaron Hose stockings until we see you in the office for your follow up appointment. The stitches deep inside take approximately 6 months to dissolve. There will be sharp shooting, stinging and burning pain. This is normal and will resolve between 3-6 months after surgery. Difficulty sleeping is normal following total Knee and Hip replacement. You may try melatonin, an over-the-counter sleep aid or benadryl to help with sleep. Most patients will resume sleeping through the night 8 weeks after surgery. Home Physical Therapy is arranged.  Home Health will contact you within 48 hrs of discharge that you have chosen. If you have not received a call within this time frame please contact that provider you chose. You should be given this information before you leave the hospital.  
 
You are at a risk for falls. Use the rolling walker when walking. Patients who have had a joint replacement should not drive if they are still taking narcotic pain mediation during the daytime hours. Most patients wean themselves off of pain medication within 2-5 weeks after surgery. When to Call the office - If you have a temperature greater then 101 
- Uncontrolled vomiting - Loose control of your bladder or bowel function - Are unable to bear any weight  
- Need a pain medication refill DISCHARGE SUMMARY from Nurse The following personal items collected during your admission are returned to you:  
Dental Appliance: Dental Appliances: None Vision: Visual Aid: Glasses Hearing Aid:   na 
Jewelry: Jewelry: None Clothing:   self Other Valuables: Other Valuables: Cell Phone (with [employer]) Valuables sent to safe:   na 
 
PATIENT INSTRUCTIONS: 
 
After general anesthesia or intravenous sedation, for 24 hours or while taking prescription Narcotics: · Limit your activities · Do not drive and operate hazardous machinery · Do not make important personal or business decisions · Do  not drink alcoholic beverages · If you have not urinated within 8 hours after discharge, please contact your surgeon on call. Report the following to your surgeon: 
· Excessive pain, swelling, redness or odor of or around the surgical area · Temperature over 101 · Nausea and vomiting lasting longer than 4 hours or if unable to take medications · Any signs of decreased circulation or nerve impairment to extremity: change in color, persistent  numbness, tingling, coldness or increase pain · Any questions, call office @ [phone] Keep scheduled follow up appointment.   If need to change, call office @ [phone]. *  Please give a list of your current medications to your Primary Care Provider. *  Please update this list whenever your medications are discontinued, doses are 
    changed, or new medications (including over-the-counter products) are added. *  Please carry medication information at all times in case of emergency situations. Total Knee Replacement: What to Expect at Home Your Recovery When you leave the hospital, you should be able to move around with a walker or crutches. But you will need someone to help you at home for the next few weeks or until you have more energy and can move around better. If there is no one to help you at home, you may go to a rehabilitation center. You will go home with a bandage and stitches or staples. Change the bandage as your doctor tells you to. Your doctor will remove your stitches or staples 10 to 21 days after your surgery. You may still have some mild pain, and the area may be swollen for 3 to 6 months after surgery. Your knee will continue to improve for 6 to 12 months. You will probably use a walker for 1 to 3 weeks and then use crutches. When you are ready, you can use a cane. You will probably be able to walk on your own in 4 to 8 weeks. You will need to do months of physical rehabilitation (rehab) after a knee replacement. Rehab will help you strengthen the muscles of the knee and help you regain movement. After you recover, your artificial knee will allow you to do normal daily activities with less pain or no pain at all. You may be able to hike, dance, ride a bike, and play golf. Talk to your doctor about whether you can do more strenuous activities. Always tell your caregivers that you have an artificial knee. How long it will take to walk on your own, return to normal activities, and go back to work depends on your health and how well your rehabilitation (rehab) program goes.  The better you do with your rehab exercises, the quicker you will get your strength and movement back. This care sheet gives you a general idea about how long it will take for you to recover. But each person recovers at a different pace. Follow the steps below to get better as quickly as possible. How can you care for yourself at home? Activity ? · Rest when you feel tired. You may take a nap, but do not stay in bed all day. When you sit, use a chair with arms. You can use the arms to help you stand up. ? · Work with your physical therapist to find the best way to exercise. You may be able to take frequent, short walks using crutches or a walker. What you can do as your knee heals will depend on whether your new knee is cemented or uncemented. You may not be able to do certain things for a while if your new knee is uncemented. ? · After your knee has healed enough, you can do more strenuous activities with caution. ¨ You can golf, but use a golf cart, and do not wear shoes with spikes. ¨ You can bike on a flat road or on a stationary bike. Avoid biking up hills. ¨ Your doctor may suggest that you stay away from activities that put stress on your knee. These include tennis or badminton, squash or racquetball, contact sports like football, jumping (such as in basketball), jogging, or running. ¨ Avoid activities where you might fall. These include horseback riding, skiing, and mountain biking. ? · Do not sit for more than 1 hour at a time. Get up and walk around for a while before you sit again. If you must sit for a long time, prop up your leg with a chair or footstool. This will help you avoid swelling. ? · Ask your doctor when you can shower. You may need to take sponge baths until your stitches or staples have been removed. ? · Ask your doctor when you can drive again. It may take up to 8 weeks after knee replacement surgery before it is safe for you to drive. ? · When you get into a car, sit on the edge of the seat. Then pull in your legs, and turn to face the front. ? · You should be able to do many everyday activities 3 to 6 weeks after your surgery. You will probably need to take 4 to 16 weeks off from work. When you can go back to work depends on the type of work you do and how you feel. ? · Ask your doctor when it is okay for you to have sex. ? · Do not lift anything heavier than 10 pounds and do not lift weights for 12 weeks. Diet ? · By the time you leave the hospital, you should be eating your normal diet. If your stomach is upset, try bland, low-fat foods like plain rice, broiled chicken, toast, and yogurt. Your doctor may suggest that you take iron and vitamin supplements. ? · Drink plenty of fluids (unless your doctor tells you not to). ? · Eat healthy foods, and watch your portion sizes. Try to stay at your ideal weight. Too much weight puts more stress on your new knee. ? · You may notice that your bowel movements are not regular right after your surgery. This is common. Try to avoid constipation and straining with bowel movements. You may want to take a fiber supplement every day. If you have not had a bowel movement after a couple of days, ask your doctor about taking a mild laxative. Medicines ? · Your doctor will tell you if and when you can restart your medicines. He or she will also give you instructions about taking any new medicines. ? · If you take blood thinners, such as warfarin (Coumadin), clopidogrel (Plavix), or aspirin, be sure to talk to your doctor. He or she will tell you if and when to start taking those medicines again. Make sure that you understand exactly what your doctor wants you to do.  
? · Your doctor may give you a blood-thinning medicine to prevent blood clots. If you take a blood thinner, be sure you get instructions about how to take your medicine safely.  Blood thinners can cause serious bleeding problems. This medicine could be in pill form or as a shot (injection). If a shot is necessary, your doctor will tell you how to do this. ? · Be safe with medicines. Take pain medicines exactly as directed. ¨ If the doctor gave you a prescription medicine for pain, take it as prescribed. ¨ If you are not taking a prescription pain medicine, ask your doctor if you can take an over-the-counter medicine. ¨ Plan to take your pain medicine 30 minutes before exercises. It is easier to prevent pain before it starts than to stop it once it has started. ? · If you think your pain medicine is making you sick to your stomach: 
¨ Take your medicine after meals (unless your doctor has told you not to). ¨ Ask your doctor for a different pain medicine. ? · If your doctor prescribed antibiotics, take them as directed. Do not stop taking them just because you feel better. You need to take the full course of antibiotics. Incision care ? · You will have a bandage over the cut (incision) and staples or stitches. Take the bandage off when your doctor says it is okay. ? · Your doctor will remove the staples or stitches 10 days to 3 weeks after the surgery and replace them with strips of tape. Leave the tape on for a week or until it falls off. Exercise ? · Your rehab program will give you a number of exercises to do to help you get back your knee's range of motion and strength. Always do them as your therapist tells you. Ice and elevation ? · For pain and swelling, put ice or a cold pack on the area for 10 to 20 minutes at a time. Put a thin cloth between the ice and your skin. Other instructions ? · Continue to wear your support stockings as your doctor says. These help to prevent blood clots. The length of time that you will have to wear them depends on your activity level and the amount of swelling. ? · You have metal pieces in your knee.  These may set off some airport metal detectors. Carry a medical alert card that says you have an artificial joint, just in case. Follow-up care is a key part of your treatment and safety. Be sure to make and go to all appointments, and call your doctor if you are having problems. It's also a good idea to know your test results and keep a list of the medicines you take. When should you call for help? Call 911 anytime you think you may need emergency care. For example, call if: 
? · You passed out (lost consciousness). ? · You have severe trouble breathing. ? · You have sudden chest pain and shortness of breath, or you cough up blood. ?Call your doctor now or seek immediate medical care if: 
? · You have signs of infection, such as: 
¨ Increased pain, swelling, warmth, or redness. ¨ Red streaks leading from the incision. ¨ Pus draining from the incision. ¨ A fever. ? · You have signs of a blood clot, such as: 
¨ Pain in your calf, back of the knee, thigh, or groin. ¨ Redness and swelling in your leg or groin. ? · Your incision comes open and begins to bleed, or the bleeding increases. ? · You have pain that does not get better after you take pain medicine. ? Watch closely for changes in your health, and be sure to contact your doctor if: 
? · You do not have a bowel movement after taking a laxative. Where can you learn more? Go to http://teo-avis.info/. Enter D015 in the search box to learn more about \"Total Knee Replacement: What to Expect at Home. \" Current as of: March 21, 2017 Content Version: 11.4 © 4731-9852 Healthwise[employer]orporated. Care instructions adapted under license by [employer] (which disclaims liability or warranty for this information). If you have questions about a medical condition or this instruction, always ask your healthcare professional. [address] any warranty or liability for your use of this information. These are general instructions for a healthy lifestyle: No smoking/ No tobacco products/ Avoid exposure to second hand smoke Surgeon General's Warning:  Quitting smoking now greatly reduces serious risk to your health. Obesity, smoking, and sedentary lifestyle greatly increases your risk for illness A healthy diet, regular physical exercise & weight monitoring are important for maintaining a healthy lifestyle You may be retaining fluid if you have a history of heart failure or if you experience any of the following symptoms:  Weight gain of 3 pounds or more overnight or 5 pounds in a week, increased swelling in our hands or feet or shortness of breath while lying flat in bed. Please call your doctor as soon as you notice any of these symptoms; do not wait until your next office visit. Recognize signs and symptoms of STROKE: 
 
F-face looks uneven A-arms unable to move or move even S-speech slurred or non-existent T-time-call 911 as soon as signs and symptoms begin-DO NOT go Back to bed or wait to see if you get better-TIME IS BRAIN. The discharge information has been reviewed with the patient. The patient verbalized understanding. Information obtained by : 
I understand that if any problems occur once I am at home I am to contact my physician. I understand and acknowledge receipt of the instructions indicated above. Physician's or R.N.'s Signature                                                                  Date/Time Patient or Representative Signature                                                          Date/Time Introducing [facility]! Kota Wallace introduces [employer] patient portal. Now you can access parts of your medical record, email your doctor's office, and request medication refills online. 1. In your internet browser, go to [email or web address] 2. Click on the First Time User? Click Here link in the Sign In box. You will see the New Member Sign Up page. 3. Enter your Ge[employer] Access Code exactly as it appears below. You will not need to use this code after youve completed the sign-up process. If you do not sign up before the expiration date, you must request a new code. · [employer] Access Code: [email or web address] Expires: 4/16/2018 12:20 PM 
 
4. Enter the last four digits of your Social Security Number (xxxx) and Date of Birth (mm/dd/yyyy) as indicated and click Submit. You will be taken to the next sign-up page. 5. Create a Gez[employer] ID. This will be your Ge[employer] login ID and cannot be changed, so think of one that is secure and easy to remember. 6. Create a Ge[employer] password. You can change your password at any time. 7. Enter your Password Reset Question and Answer. This can be used at a later time if you forget your password. 8. Enter your e-mail address. You will receive e-mail notification when new information is available in [address] 9. Click Sign Up. You can now view and download portions of your medical record. 10. Click the Download Summary menu link to download a portable copy of your medical information. If you have questions, please visit the Frequently Asked Questions section of the Ge[employer] website. Remember, [employer] is NOT to be used for urgent needs. For medical emergencies, dial 911. Now available from your iPhone and Android! Providers Seen During Your Hospitalization Provider Specialty Primary office phone Vicky Lentz MD Orthopedic Surgery [phone] Immunizations Administered for This Admission Name Date TB Skin Test (PPD) Intradermal 1/24/2018 Your Primary Care Physician (PCP) Primary Care Physician Office Phone Office Fax Elis Matthews [phone] You are allergic to the following No active allergies Recent Documentation Height Weight Breastfeeding? BMI OB Status Smoking Status 1.638 m 80.1 kg No 29.83 kg/m2 Postmenopausal Former Smoker Emergency Contacts Name Discharge Info Relation Home Work Mobile Bhavesh Rust DISCHARGE CAREGIVER [3] Spouse [3] [phone] Patient Belongings The following personal items are in your possession at time of discharge: 
  Dental Appliances: None  Visual Aid: Glasses      Home Medications: None   Jewelry: None       Other Valuables: Cell Phone (with [email or web address]) Please provide this summary of care documentation to your next provider. Signatures-by signing, you are acknowledging that this After Visit Summary has been reviewed with you and you have received a copy. Patient Signature:  ____________________________________________________________ Date:  ____________________________________________________________  
  
Rubén Clay Provider Signature:  ____________________________________________________________ Date:  ____________________________________________________________

## 2018-01-24 NOTE — PHYSICIAN ADVISORY
Letter of Determination: Inpatient Status Appropriate    This patient was originally hospitalized as Inpatient Status on 1/24/2018 for scheduled right total knee arthroplasty. This patient is appropriate for Inpatient Admission based on medical necessity. The patient's stay was medically necessary based on age > 72, hypertension with blood pressures to 169/81 mmHg, and recent diagnosis with breast cancer in 2015 with pulmonary complications with resultant hypoxemia, treated with 4 cycles of ddAC and field radiation. It is our recommendation that this patient's hospitalization status should be INPATIENT status.      The final decision regarding the patient's hospitalization status depends on the attending physician's judgement.     Lizett Raymundo MD, KISHOR,   Physician [place].

## 2018-01-24 NOTE — CONSULTS
Physical Medicine & Rehabilitation Note-consult    Patient: Raeann Frankel MRN: [id number]  SSN: [id number]    YOB: 1945  Age: 81 y.o. Sex: female      Admit Date: 1/24/2018  Admitting Physician: Jane Ojeda MD    Medical Decision Making/Plan/Recommend: Gait impairment, s/p right total knee arthroplasty. Post op PT/ OT tolerated well. No major barriers to progress noted. She is expected to make continued functional gains. Continued rehab at home via [facility] PT would be reasonable and necessary. Continue PT, OT for active/assisted/passive right TKA ROM, strengthening, mobility, transfers, and gait training. Chief Complaint : Gait dysfunction secondary to below.   Admit Diagnosis: Primary osteoarthritis of right knee [M17.11]  right total knee arthroplasty  1/24/2018  Pain  DVT risk  Hypertension  Status post total left knee replacement    5/12/2017  Post op hemorrhagic anemia  Acute Rehab Dx:  Gait impairment  Mobility and ambulation deficits  Self Care/ADL deficits    Medical Dx:  Past Medical History:   Diagnosis Date    Anxiety     Arthritis     back and knee's     Breast cancer ([facility] 1/18/2016    Left Lumpectomy    Cancer ([facility]     melanoma left face,  left breast ca     Chronic pain     back    Former smoker     was a passive smoker for 40 yrs; stopped in 2014    GERD (gastroesophageal reflux disease)     nexium    Hard of hearing     bilateral hearing aids    Headache     hx monthly migraines; rare now    Hypertension     controlled with med    Hypothyroidism     Insomnia     Status post total left knee replacement 5/12/2017    Status post total right knee replacement 1/24/2018     Subjective:     Date of Evaluation:  January 24, 2018    HPI: Raeann Frankel is a 81 y.o. female patient at [address] [place] [address] who was admitted on 1/24/2018  by Jane Ojeda MD with below mentioned medical history, is being seen for Physical Medicine and Rehabilitation consult. Raeann Frankel had right knee pain and discomfort with walking and activity due to end stage DJD. She is s/p a right total knee arthroplasty per Dr. Jane Ojeda MD on 1/24/2018. Raeann Frankel is seen and examined today. Medical Records reviewed. Patient's weight bearing status is to be WBAT RLE. Patient is starting to stand, taking steps with acute PT and OT. Patient still shows significant functional deficits due to right knee pain, decreased ROM and decreased strength. She has been independent with ambulation at her baseline. Prior Level of Function/Work/Activity:  functionally  independent with ADls and amb    Current Level of Function:  bed mobility - min A, transfers - min A, decreased balance , ambulation -  5' with RW and min A.        Family History   Problem Relation Age of Onset   Carla Dies Cancer Mother      leukemia    Hypertension Mother     Stroke Mother     Heart Disease Father     Hypertension Father     Stroke Sister     Hypertension Sister     Asthma Paternal Grandfather     Breast Cancer Neg Hx       Social History   Substance Use Topics    Smoking status: Former Smoker     Years: 40.00     Types: Cigarettes     Quit date: 1/13/2014    Smokeless tobacco: Never Used      Comment: was a passive smoker for 40 yrs    Alcohol use 0.0 oz/week     0 Standard drinks or equivalent per week      Comment: occasionally     Past Surgical History:   Procedure Laterality Date    BREAST SURGERY PROCEDURE UNLISTED Left 7/2015    biopsy    HX BREAST BIOPSY Left 1/18/2016    BREAST NEEDLE LOCALIZED PARTIAL MASTECTOMY/ LEFT //   performed by Margarita Ross MD at [address] [place] [address] HX BREAST LUMPECTOMY Left 01/2016    HX COLONOSCOPY      HX KNEE REPLACEMENT Left 05/12/2017    HX ORTHOPAEDIC      HX TONSILLECTOMY  1957    HX VASCULAR ACCESS Right     port right upper chest- removed 2016    HX WISDOM TEETH EXTRACTION      AL MASTECTOMY, PARTIAL Left 01/18/2016 Prior to Admission medications    Medication Sig Start Date End Date Taking? Authorizing Provider   aspirin delayed-release 325 mg tablet Take 1 Tab by mouth every twelve (12) hours every twelve (12) hours for 35 days. 1/24/18 2/28/18 Yes RADHA Lara   HYDROmorphone (DILAUDID) 2 mg tablet Take 1 Tab by mouth every four (4) hours as needed. Max Daily Amount: 12 mg. 1/24/18  Yes RADHA Lara   mupirocin calcium (BACTROBAN) 2 % nasal ointment by Both Nostrils route two (2) times a day. Yes Historical Provider   ascorbic acid, vitamin C, (VITAMIN C) 500 mg tablet Take 1,000 mg by mouth daily. Yes Historical Provider   celecoxib (CELEBREX) 200 mg capsule Take 1 Cap by mouth two (2) times a day. 11/21/17  Yes Autumn Benjamin MD   levothyroxine (SYNTHROID) 25 mcg tablet TAKE ONE TABLET BY MOUTH ONE TIME DAILY BEFORE BREAKFAST 10/9/17  Yes Autumn Benjamin MD   SUMAtriptan (IMITREX) 100 mg tablet Take 1 Tab by mouth once as needed for Migraine for up to 1 dose. 10/3/17  Yes Autumn Benjamin MD   hydroCHLOROthiazide (HYDRODIURIL) 25 mg tablet TAKE ONE TABLET BY MOUTH ONE TIME DAILY 9/11/17  Yes Autumn Benjamin MD   temazepam (RESTORIL) 15 mg capsule Take 1 Cap by mouth nightly. Max Daily Amount: 15 mg. Patient taking differently: Take 15 mg by mouth nightly as needed for Sleep. 7/17/17  Yes Autumn Benjamin MD   acetaminophen (TYLENOL) 500 mg tablet Take 1,000 mg by mouth every six (6) hours as needed for Pain. Take / use AM day of surgery  per anesthesia protocols if needed. 5/15/17  Yes Tasia Crow MD   Biotin 2,500 mcg cap Take 2 Tabs by mouth daily. Yes Historical Provider   multivitamin (ONE A DAY) tablet Take 1 Tab by mouth daily. Yes Historical Provider   esomeprazole (NEXIUM) 20 mg capsule Take 20 mg by mouth every morning. Take / use AM day of surgery  per anesthesia protocols.   Indications: GASTROESOPHAGEAL REFLUX   Yes Historical Provider     No Known Allergies     Review of Systems: +right knee pain, +antalgic gait. Denies chest pain, shortness of breath, cough, headache, visual problems, abdominal pain, dysurea, calf pain. Pertinent positives are as noted in the medical records and unremarkable otherwise. Objective:     Vitals:  Blood pressure 127/74, pulse 100, temperature 97.7 °F (36.5 °C), resp. rate 18, height 5' 4.5\" (1.638 m), weight 176 lb 8 oz (80.1 kg), SpO2 96 %, not currently breastfeeding. Temp (24hrs), Av °F (36.7 °C), Min:97.7 °F (36.5 °C), Max:98.6 °F (37 °C)    BMI (calculated): 29.8 (18 0713)   Intake and Output:       Physical Exam:   General: Alert and age appropriately oriented. No acute cardio respiratory distress. HEENT: Normocephalic, no conjunctival pallor, no scleral icterus  Oral mucosa moist without cyanosis, no JVD   Lungs: Clear to auscultation. No wheezing. Respiration even and unlabored   Heart: Regular rate and rhythm, S1, S2   No  murmurs, clicks, rub or gallops   Abdomen: Soft, non-tender, non-distended. Genitourinary: defered   Neuromuscular:      Grossly no focal motor deficits. Right knee extension 5/5  Right ankle dorsiflexion 5/5  Right ankle plantarflexion 5/5  No sensory deficits distally BLE to soft touch. Skin/extremity: No calf tenderness BLE. No rashes, no marginal erythema.                                                                                          Labs/Studies:  Recent Results (from the past 72 hour(s))   TYPE & SCREEN    Collection Time: 18  6:45 AM   Result Value Ref Range    Crossmatch Expiration 2018     ABO/Rh(D) B NEGATIVE     Antibody screen NEG    GLUCOSE, POC    Collection Time: 18  6:53 AM   Result Value Ref Range    Glucose (POC) 88 65 - 100 mg/dL       Functional Assessment:  Reviewed participation and progress in therapies  Gross Assessment  AROM: Within functional limits (L LE) (18 1326)  Strength: Generally decreased, functional (L LE) (18 1326)  Coordination: Within functional limits (BUE) (01/24/18 1325)  Tone: Normal (01/24/18 1325)  Sensation: Intact (L LE) (01/24/18 1326)   Bed Mobility  Supine to Sit: Minimum assistance (01/24/18 1326)  Sit to Supine: Minimum assistance (01/24/18 1326)   Balance  Sitting: Intact (01/24/18 1326)  Standing: Pull to stand; With support (01/24/18 1326)               Bed/Mat Mobility  Supine to Sit: Minimum assistance (01/24/18 1326)  Sit to Supine: Minimum assistance (01/24/18 1326)  Sit to Stand: Minimum assistance (01/24/18 1325)  Bed to Chair: Minimum assistance (01/24/18 1325)     Ambulation:  Gait  Speed/Jennifer: Pace decreased (<100 feet/min) (01/24/18 1326)  Step Length: Left shortened (01/24/18 1326)  Stance: Right decreased (01/24/18 1326)  Gait Abnormalities: Antalgic (01/24/18 1326)  Ambulation - Level of Assistance: Minimal assistance (01/24/18 1326)  Distance (ft): 5 Feet (ft) (01/24/18 1326)  Assistive Device: Walker, rolling (01/24/18 1326)  Interventions: Safety awareness training; Tactile cues; Verbal cues (01/24/18 1326)    Impression/Plan:     Principal Problem:    Status post total right knee replacement (1/24/2018)        Current Facility-Administered Medications   Medication Dose Route Frequency Provider Last Rate Last Dose    tuberculin injection 5 Units  5 Units IntraDERMal ONCE Jenifer Castro MD   5 Units at 01/24/18 0646    [START ON 1/25/2018] pantoprazole (PROTONIX) tablet 40 mg  40 mg Oral ACB Michelle Wray        hydroCHLOROthiazide (HYDRODIURIL) tablet 25 mg  25 mg Oral DAILY MD Luc Sampson ON 1/25/2018] levothyroxine (SYNTHROID) tablet 25 mcg  25 mcg Oral Hussain Schultz, [address]        SUMAtriptan (IMITREX) tablet 100 mg  100 mg Oral ONCE PRN RADHA Wray        temazepam (RESTORIL) capsule 15 mg  15 mg Oral QHS PRN RADHA Wray        0.9% sodium chloride infusion  100 mL/hr IntraVENous CONTINUOUS RADHA Wray       [address] [place] [address] sodium chloride (NS) flush 5-10 mL  5-10 mL IntraVENous Q8H Candelaria Hogue, [address]        sodium chloride (NS) flush 5-10 mL  5-10 mL IntraVENous PRN RADHA Greenberg        ceFAZolin (ANCEF) 2 g/20 mL in sterile water IV syringe  2 g IntraVENous Q8H RADHA Greenberg   2 g at 01/24/18 1636    acetaminophen (OFIRMEV) infusion 1,000 mg  1,000 mg IntraVENous ONCE Candelaria Hogue [address]        [START ON 1/25/2018] acetaminophen (TYLENOL) tablet 1,000 mg  1,000 mg Oral Q6H Candelaria Hogue, [address]        celecoxib (CELEBREX) capsule 200 mg  200 mg Oral Q12H Candelaria Hogue, [address]        HYDROmorphone (DILAUDID) tablet 2 mg  2 mg Oral Q4H PRN RADHA Greenberg   2 mg at 01/24/18 1544    HYDROmorphone (PF) (DILAUDID) injection 1 mg  1 mg IntraVENous Q3H PRN RADHA Greenberg        naloxone [facility]) injection 0.2-0.4 mg  0.2-0.4 mg IntraVENous Q10MIN PRN RADHA Greenberg        [START ON 1/25/2018] dexamethasone (DECADRON) injection 10 mg  10 mg IntraVENous ONCE Candelaria Hogue [address]        ondansetron [facility]) injection 4 mg  4 mg IntraVENous Q4H PRN RADHA Greenberg        diphenhydrAMINE (BENADRYL) capsule 25 mg  25 mg Oral Q4H PRN RADHA Greenberg        [START ON 1/25/2018] senna-docusate (PERICOLACE) 8.6-50 mg per tablet 2 Tab  2 Tab Oral DAILY Candelaria Hogue [address]        aspirin delayed-release tablet 325 mg  325 mg Oral Q12H Candelaria Mykel, [address]        [START ON 1/25/2018] PPD read reminder  1 Each Other Q24H Quynh Yung MD            Recommendations:  Plan for home discharge with [facility] PT. Continue Acute Rehab Program.  Coordination of rehab/medical care. Counseling of Physical Medicine & Rehab care issues management. Monitoring and management of rehab conditions per the plan of care/orders. Rehabilitation Management/ Medical Management: 1. Devices:Walkers, Type: Rolling Walker  2. Consult:Rehab team including PT, OT,  and .   3. Disposition Rehab-discussed with patient. 4. Thigh-high or knee-high RICH's when out of bed. 5. DVT Prophylaxis - start aspirin 325mg bid x 30days. 6. Incentive spirometer Q1H while awake  7. Post op hemorrhagic anemia-   monitor. 8. Activity: WBAT RLE  9. Planned Labs: CBC,BMP  10. Pain Control: On scheduled tylenol, celebrex and  PRN dilaudid. 11. Wound Care: Keep right TKA wound clean and dry and reinforce dressing PRN. May remove Aquacel 1 week post op ad replace with new one. Remove staples 12-14 post surgery, when incision appears appropriately closed and apply benzoin and 1/2\" steristrips. Follow up with ORTHO per instructions. Thank you for the opportunity to participate in the care of this patient.     Signed By: Skip Barba MD     January 24, 2018

## 2018-01-24 NOTE — PROGRESS NOTES
TRANSFER - IN REPORT:    Verbal report received from Geoffrey Magana RN on Lidia Wilson  being received from PACU for routine post - op      Report consisted of patients Situation, Background, Assessment and   Recommendations(SBAR). Information from the following report(s) SBAR, Intake/Output and MAR was reviewed with the receiving nurse. Opportunity for questions and clarification was provided. Assessment completed upon patients arrival to unit and care assumed. Oriented to room, bed controls, and how to order meals. Moving feet. Using IS. Aquacel dry and intact to knee with iceman. SCDs to LEs.

## 2018-01-24 NOTE — CONSULTS
Home Care Instructions                                                                                                                Xiomy Wilkerson   MRN: [id number]    Department:  [facility], Emergency Dept   Date of Visit:  3/15/2017            [facility], Emergency Dept    [address] [place] [id number]    Phone:  [phone]      You were seen by     Chiara Fischer M.D.      Your Diagnosis Was     Vaginal bleeding     N93.9       Follow-up Information     1. Follow up with [facility]. Go in 1 day.    Why:  For complete recheck    Contact information    [address] [address] [phone]        2. Go to [facility], Emergency Dept.    Specialty:  Emergency Medicine    Why:  If symptoms worsen or do not continue to improve    Contact information    [address] [facility] [address] [phone]      Medication Information     Review all of your home medications and newly ordered medications with your primary doctor and/or pharmacist as soon as possible. Follow medication instructions as directed by your doctor and/or pharmacist.     Please keep your complete medication list with you and share with your physician. Update the information when medications are discontinued, doses are changed, or new medications (including over-the-counter products) are added; and carry medication information at all times in the event of emergency situations.               Medication List      ASK your doctor about these medications        Instructions    Morning Afternoon Evening Bedtime    diphenhydrAMINE 25 MG capsule   Commonly known as:  BENADRYL        Take 1 Cap by mouth every 6 hours as needed for Itching.   Dose:  25 mg                                Procedures and tests performed during your visit     CARDIAC MONITORING    CBC WITH DIFFERENTIAL    COMP METABOLIC PANEL    ESTIMATED GFR    O2 Protocol    PELVIC EXAM    Hospitalist Progress Note    2018  Admit Date: 2018  5:06 AM   NAME: Sherry Dejesus   :  1945   MRN:  [id number]   Attending: Caridad Lerma MD  PCP:  Erna Berger MD    SUBJECTIVE:     Sherry Dejesus is a 81year old female with PMH of HTN, Hypothyroidism, Breast Cancer s/p chemo in  and left knee replacement in May 2017 who presented to the hospital for right knee arthroplasty. Patient is POD#0 today and she denies any pain, nausea, vomiting, SOB, Chest pain, fevers or chills. Review of Systems negative with exception of pertinent positives noted above      PHYSICAL EXAM       Visit Vitals    /68    Pulse 90    Temp 97.9 °F (36.6 °C)    Resp 18    Ht 5' 4.5\" (1.638 m)    Wt 80.1 kg (176 lb 8 oz)    SpO2 99%    Breastfeeding No    BMI 29.83 kg/m2      Temp (24hrs), Av.1 °F (36.7 °C), Min:97.7 °F (36.5 °C), Max:98.6 °F (37 °C)    Oxygen Therapy  O2 Sat (%): 99 % (18 1154)  Pulse via Oximetry: 96 beats per minute (18 1144)  O2 Device: Nasal cannula (18 1144)  O2 Flow Rate (L/min): 2 l/min (weaned to RA.) (18 1144)    Intake/Output Summary (Last 24 hours) at 18 1251  Last data filed at 18 1115   Gross per 24 hour   Intake             2060 ml   Output              750 ml   Net             1310 ml          General: No acute distress. Head:  Atraumatic Normocephalic. Eyes:  PERRLA, EOMI, Anicteric. ENT:  No discharges/lesions. Lungs:  CTA Bilaterally. CVS:  Regular rate and rhythm,  No murmur, rub, or gallop, No JVD, No lower extremity edema. Good LE pulses. Abdomen: Soft, Non distended, Non tender, Positive bowel sounds. MSK:  No deformities, lesions, Right knee in knee pad/brace  Neurologic:  AOx3. No focal deficits  Psychiatry:      No anxiety/Depression  Skin:   No rash/lesions. Good skin turgor  Heme/Lymph/Immune:  No petechiae, ecchymoses, overt signs of bleeding or lymphadenopathy noted.     Recent Results (from the past 24 hour(s))   TYPE & SCREEN    Collection Time: 01/24/18  6:45 AM   Result Value Ref Range    Crossmatch Expiration 01/27/2018     ABO/Rh(D) B NEGATIVE     Antibody screen NEG    GLUCOSE, POC    Collection Time: 01/24/18  6:53 AM   Result Value Ref Range    Glucose (POC) 88 65 - 100 mg/dL         Imaging /Procedures [address] [facility] Problems as of 1/24/2018  Date Reviewed: 1/24/2018          Codes Class Noted - Resolved POA    * (Principal)Status post total right knee replacement ICD-10-CM: E15.055  ICD-9-CM: V43.65  1/24/2018 - Present No                  Plan:  - Right knee arthroplasty- POD #0. Follow up Ortho. Pain management, Physical therapy. - HTN- continue with HCTZ. Currently normotensive.  - Hypothyroidism- continue with Synthroid. DVT Prophylaxis: As per Orthopedic Surgery.     Paty Robb MD POC URINE PREGNANCY    URINALYSIS,CULTURE IF INDICATED    URINE CULTURE(NEW)    URINE MICROSCOPIC (W/UA)        Discharge Instructions       Please follow up with the gynecologist listed, or with your gynecologist for complete recheck. As we discussed, we are not able to evaluate for all causes of bleeding in the emergency department, and additionally we are not able to do cancer screening. It is very important that you follow-up with your gynecologist for complete recheck and further testing as needed. Return to the emergency department if he develops lightheadedness, shortness of breath, bleeding soaking more than 2 pads per hour, or any further concerns.      Abnormal Uterine Bleeding  Abnormal uterine bleeding can affect women at various stages in life, including teenagers, women in their reproductive years, pregnant women, and women who have reached menopause. Several kinds of uterine bleeding are considered abnormal, including:  · Bleeding or spotting between periods.    · Bleeding after sexual intercourse.    · Bleeding that is heavier or more than normal.    · Periods that last longer than usual.  · Bleeding after menopause.    Many cases of abnormal uterine bleeding are minor and simple to treat, while others are more serious. Any type of abnormal bleeding should be evaluated by your health care provider. Treatment will depend on the cause of the bleeding.  HOME CARE INSTRUCTIONS  Monitor your condition for any changes. The following actions may help to alleviate any discomfort you are experiencing:  · Avoid the use of tampons and douches as directed by your health care provider.  · Change your pads frequently.  You should get regular pelvic exams and Pap tests. Keep all follow-up appointments for diagnostic tests as directed by your health care provider.   SEEK MEDICAL CARE IF:   · Your bleeding lasts more than 1 week.    · You feel dizzy at times.    SEEK IMMEDIATE MEDICAL CARE IF:   · You pass out.     · You are changing pads every 15 to 30 minutes.    · You have abdominal pain.  · You have a fever.    · You become sweaty or weak.    · You are passing large blood clots from the vagina.    · You start to feel nauseous and vomit.  MAKE SURE YOU:   · Understand these instructions.  · Will watch your condition.  · Will get help right away if you are not doing well or get worse.     This information is not intended to replace advice given to you by your health care provider. Make sure you discuss any questions you have with your health care provider.     Document Released: 12/18/2006 Document Revised: 12/23/2014 Document Reviewed: 07/17/2014  [employer] Interactive Patient Education ©2016 [employer]    Please follow up with a primary physician for blood pressure management, diabetic screening, and all other preventive health concerns.             Patient Information     Patient Information    Following emergency treatment: all patient requiring follow-up care must return either to a private physician or a clinic if your condition worsens before you are able to obtain further medical attention, please return to the emergency room.     Billing Information    At [facility], we work to make the billing process streamlined for our patients.  Our Representatives are here to answer any questions you may have regarding your hospital bill.  If you have insurance coverage and have supplied your insurance information to us, we will submit a claim to your insurer on your behalf.  Should you have any questions regarding your bill, we can be reached online or by phone as follows:  Online: You are able pay your bills online or live chat with our representatives about any billing questions you may have. We are here to help Monday - Friday from 8:00am to 7:30pm and 9:00am - 12:00pm on Saturdays.  Please visit [email or web address]  for more information.   Phone:  [phone] or  [phone]    Please note that your emergency physician, surgeon, pathologist, radiologist, anesthesiologist, and other specialists are not employed by [facility] and will therefore bill separately for their services.  Please contact them directly for any questions concerning their bills at the numbers below:     Emergency Physician Services:  [phone]  [facility]:  [phone]  [facility]:  [phone]  [facility]:  [phone]    1. Your final bill may vary from the amount quoted upon discharge if all procedures are not complete at that time, or if your doctor has additional procedures of which we are not aware. You will receive an additional bill if you return to the Emergency Department at [facility] for suture removal regardless of the facility of which the sutures were placed.     2. Please arrange for settlement of this account at the emergency registration.    3. All self-pay accounts are due in full at the time of treatment.  If you are unable to meet this obligation then payment is expected within 4-5 days.     4. If you have had radiology studies (CT, X-ray, Ultrasound, MRI), you have received a preliminary result during your emergency department visit. Please contact the radiology department [phone] to receive a copy of your final result. Please discuss the Final result with your primary physician or with the follow up physician provided.     Crisis Hotline:  [place] Crisis Hotline:  4-102-DFNCBGX or 1-458.243.8933  [place] Crisis Hotline:    1-985.131.2485 or 845-327-5331         ED Discharge Follow Up Questions    1. In order to provide you with very good care, we would like to follow up with a phone call in the next few days.  May we have your permission to contact you?     YES /  NO    2. What is the best phone number to call you? (       )_____-__________    3. What is the best time to call you?      Morning  /  Afternoon  /   Evening                   Patient Signature:  ____________________________________________________________    Date:  ____________________________________________________________

## 2018-01-24 NOTE — PROGRESS NOTES
01/24/18 1144   Oxygen Therapy   O2 Sat (%) 99 %   Pulse via Oximetry 96 beats per minute   O2 Device Nasal cannula   O2 Flow Rate (L/min) 2 l/min  (weaned to RA.)   Patient achieved    1500  Ml/sec on IS. Patient encouraged to do 10 breaths every hour while awake-patient agreed and demonstrated. No shortness of breath or distress noted. BS are clear b/l. [facility] notes reviewed- no recommendations noted.

## 2018-01-24 NOTE — ANESTHESIA PROCEDURE NOTES
Spinal Block    Start time: 1/24/2018 8:41 AM  End time: 1/24/2018 8:43 AM  Performed by: Robert Hess by: Shiva Shane     Pre-procedure:    Timeout Time: 08:41          Spinal Block:   Patient Position:  Seated  Prep Region:  Lumbar  Prep: chlorhexidine and patient draped      Location:  L3-4  Technique:  Single shot        Needle:   Needle Type:  Pencan  Needle Gauge:  25 G  Attempts:  1      Events: CSF confirmed, no blood with aspiration and no paresthesia        Assessment:  Insertion:  Uncomplicated  Patient tolerance:  Patient tolerated the procedure well with no immediate complications  10 mgs hyperbaric bupivacaine

## 2018-01-24 NOTE — PERIOP NOTES
TRANSFER - OUT REPORT:    Verbal report given to Kimo Parkinson RN on Mecca Hernández  being transferred to preop [facility] for routine progression of care       Report consisted of patients Situation, Background, Assessment and   Recommendations(SBAR). Information from the following report(s) SBAR, MAR and Recent Results was reviewed with the receiving nurse. Lines:   Peripheral IV 94/86/85 Left Cephalic (Active)   Site Assessment Clean, dry, & intact 1/24/2018  6:37 AM   Phlebitis Assessment 0 1/24/2018  6:37 AM   Dressing Status Clean, dry, & intact 1/24/2018  6:37 AM   Dressing Type Transparent;Tape 1/24/2018  6:37 AM   Hub Color/Line Status Green; Infusing 1/24/2018  6:37 AM   Action Taken Blood drawn 1/24/2018  6:37 AM        Opportunity for questions and clarification was provided.       Patient transported with:   [employer]

## 2018-01-24 NOTE — PERIOP NOTES
TRANSFER - IN REPORT:    Verbal report received from [facility] on Rober Lux  being received from ortho for routine progression of care      Report consisted of patients Situation, Background, Assessment and   Recommendations(SBAR). Information from the following report(s) SBAR was reviewed with the receiving nurse. Opportunity for questions and clarification was provided. Assessment completed upon patients arrival to unit and care assumed.

## 2018-01-24 NOTE — ANESTHESIA PREPROCEDURE EVALUATION
Anesthetic History   No history of anesthetic complications            Review of Systems / Medical History  Patient summary reviewed and pertinent labs reviewed    Pulmonary  Within defined limits                 Neuro/Psych   Within defined limits           Cardiovascular    Hypertension: well controlled              Exercise tolerance: >4 METS     GI/Hepatic/Renal     GERD: well controlled           Endo/Other      Hypothyroidism: well controlled  Arthritis     Other Findings   Comments: Hx of breast ca in past           Physical Exam    Airway  Mallampati: II  TM Distance: > 6 cm  Neck ROM: normal range of motion   Mouth opening: Normal     Cardiovascular    Rhythm: regular           Dental  No notable dental hx       Pulmonary                 Abdominal  GI exam deferred       Other Findings            Anesthetic Plan    ASA: 2  Anesthesia type: spinal - femoral single shot      Post-op pain plan if not by surgeon: peripheral nerve block single    Induction: Intravenous  Anesthetic plan and risks discussed with: Patient

## 2018-01-24 NOTE — PROGRESS NOTES
Problem: Mobility Impaired (Adult and Pediatric)  Goal: *Acute Goals and Plan of Care (Insert Text)  GOALS (1-4 days):  (1.)Ms. Sharmin Olvera will move from supine to sit and sit to supine  in bed with CONTACT GUARD ASSIST.  (2.)Ms. Sharmin Olvera will transfer from bed to chair and chair to bed with CONTACT GUARD ASSIST using the least restrictive device. (3.)Ms. Sharmin Olvera will ambulate with CONTACT GUARD ASSIST for 150 feet with the least restrictive device. (4.)Ms. Sharmin Olvera will ambulate up/down 4 steps with bilateral  railing with CONTACT GUARD ASSIST with no device. (5.)Ms. Sharmin Olvera will increase right knee ROM to 5°-80°.   ________________________________________________________________________________________________    PHYSICAL THERAPY [facility]: Initial Assessment, PM 1/24/2018  INPATIENT: Hospital Day: 1  Payor: Jackie Gonzalez / Plan: [facility] [employer] MEDICARE CHOICE PPO/PFFS / Product Type: Managed Care Medicare /      NAME/AGE/GENDER: Aretha Dotson is a 81 y.o. female   PRIMARY DIAGNOSIS:  Primary osteoarthritis of right knee [M17.11]   Procedure(s) and Anesthesia Type:     * RIGHT KNEE ARTHROPLASTY TOTAL - Spinal (Right)  ICD-10: Treatment Diagnosis:    · Pain in Right Knee (M25.561)  · Stiffness of Right Knee, Not elsewhere classified (M25.661)  · Difficulty in walking, Not elsewhere classified (R26.2)  · Other abnormalities of gait and mobility (R26.89)      ASSESSMENT:     Ms. Sharmin Olvera presents S/P R TKA and present with decreased R LE strength and ROM, standing balance, functional mobility and TKA awareness. She would benefit from further PT while here and plans on going home at [place] with her spouse. He is present during eval    This section established at most recent assessment   PROBLEM LIST (Impairments causing functional limitations):  1. Decreased Strength  2. Decreased Transfer Abilities  3. Decreased Ambulation Ability/Technique  4. Decreased Balance  5. Increased Pain  6.  Decreased Activity Tolerance  7. Increased Fatigue  8. Decreased Flexibility/Joint Mobility  9. Decreased Knowledge of Precautions  10. Decreased [place] with Home Exercise Program  11. decreased TKA awareness   INTERVENTIONS PLANNED: (Benefits and precautions of physical therapy have been discussed with the patient.)  1. Balance Exercise  2. Bed Mobility  3. Cold  4. Gait Training  5. Home Exercise Program (HEP)  6. Therapeutic Exercise/Strengthening  7. Transfer Training  8. TKA education  9. Range of Motion: active/assisted/passive  10. Therapeutic Activities  11. Group Therapy     TREATMENT PLAN: Frequency/Duration: Follow patient BID   to address above goals. Rehabilitation Potential For Stated Goals: Good     RECOMMENDED REHABILITATION/EQUIPMENT: (at time of discharge pending progress): Continue Skilled Therapy and Home Health: Physical Therapy. HISTORY:   History of Present Injury/Illness (Reason for Referral):  S/P R TKA  Past Medical History/Comorbidities:   Ms. Nina Francis  has a past medical history of Anxiety; Arthritis; Breast cancer ([address] (1/18/2016); Cancer ([address] Chronic pain; Former smoker; GERD (gastroesophageal reflux disease); Hard of hearing; Headache; Hypertension; Hypothyroidism; Insomnia; Status post total left knee replacement (5/12/2017); and Status post total right knee replacement (1/24/2018). She also has no past medical history of Adverse effect of anesthesia; Aneurysm ([address] Arrhythmia; Asthma; Autoimmune disease ([address] CAD (coronary artery disease); Chronic kidney disease; Chronic obstructive pulmonary disease ([address] Coagulation disorder ([address] Diabetes ([address] Difficult intubation; Endocarditis; Heart failure ([address] Ill-defined condition; Liver disease; Malignant hyperthermia due to anesthesia; Morbid obesity ([address] Nicotine vapor product user; Non-nicotine vapor product user; Pseudocholinesterase deficiency; Psychiatric disorder; PUD (peptic ulcer disease);  Rheumatic fever; Seizures ([facility] Sleep apnea; Stroke [facility]); Thromboembolus ([facility] or Unspecified adverse effect of anesthesia. Ms. Lary Owen  has a past surgical history that includes hx colonoscopy; hx wisdom teeth extraction; hx tonsillectomy (1957); hx vascular access (Right); pr breast surgery procedure unlisted (Left, 7/2015); hx breast biopsy (Left, 1/18/2016); pr mastectomy, partial (Left, 01/18/2016); hx breast lumpectomy (Left, 01/2016); hx orthopaedic; and hx knee replacement (Left, 05/12/2017). Social History/Living Environment:   Home Environment: Private residence  # Steps to Enter: 0  One/Two Story Residence: One story  Living Alone: No  Support Systems: Spouse/Significant Other/Partner  Patient Expects to be Discharged to[de-identified] Private residence  Current DME Used/Available at Home: Cane, straight, Commode, bedside, Walker, rolling  Tub or Shower Type: Shower  Prior Level of Function/Work/Activity:  functionally  independent with ADls and amb   Number of Personal Factors/Comorbidities that affect the Plan of Care: 1-2: MODERATE COMPLEXITY   EXAMINATION:   Most Recent Physical Functioning:   Gross Assessment: Yes  Gross Assessment  AROM: Within functional limits (L LE)  Strength: Generally decreased, functional (L LE)  Coordination: Within functional limits (BUE)  Tone: Normal  Sensation: Intact (L LE)        RLE AROM  R Knee Flexion: 70  R Knee Extension: 15       RLE Strength  R Hip Flexion: 2+  R Knee Extension: 2+  R Ankle Dorsiflexion: 2+    Bed Mobility  Supine to Sit: Minimum assistance  Sit to Supine: Minimum assistance    Transfers  Sit to Stand: Minimum assistance  Stand to Sit: Minimum assistance  Bed to Chair: Minimum assistance    Balance  Sitting: Intact  Standing: Pull to stand; With support              Weight Bearing Status  Right Side Weight Bearing: As tolerated  Distance (ft): 5 Feet (ft)  Ambulation - Level of Assistance: Minimal assistance  Assistive Device: Walker, rolling  Speed/Jennifer: Pace decreased (<100 feet/min)  Step Length: Left shortened  Stance: Right decreased  Gait Abnormalities: Antalgic  Interventions: Safety awareness training; Tactile cues; Verbal cues     Braces/Orthotics:     Right Knee Cold  Type: Cryocuff      Body Structures Involved:  1. Bones  2. Joints  3. Muscles Body Functions Affected:  1. Neuromusculoskeletal  2. Movement Related Activities and Participation Affected:  1. Mobility  2. Self Care   Number of elements that affect the Plan of Care: 4+: HIGH COMPLEXITY   CLINICAL PRESENTATION:   Presentation: Stable and uncomplicated: LOW COMPLEXITY   CLINICAL DECISION MAKIN[address] [place] [address] AM-PAC 6 Clicks   Basic Mobility Inpatient Short Form  How much difficulty does the patient currently have. .. Unable A Lot A Little None   1. Turning over in bed (including adjusting bedclothes, sheets and blankets)? [] 1   [] 2   [x] 3   [] 4   2. Sitting down on and standing up from a chair with arms ( e.g., wheelchair, bedside commode, etc.)   [] 1   [] 2   [x] 3   [] 4   3. Moving from lying on back to sitting on the side of the bed? [] 1   [] 2   [x] 3   [] 4   How much help from another person does the patient currently need. .. Total A Lot A Little None   4. Moving to and from a bed to a chair (including a wheelchair)? [] 1   [] 2   [x] 3   [] 4   5. Need to walk in hospital room? [] 1   [] 2   [x] 3   [] 4   6. Climbing 3-5 steps with a railing? [] 1   [x] 2   [] 3   [] 4   © , Trustees of [address] [place] [address], under license to [employer]. All rights reserved        Score:  Initial: 17 Most Recent: X (Date: -- )    Interpretation of Tool:  Represents activities that are increasingly more difficult (i.e. Bed mobility, Transfers, Gait). Score 24 23 22-20 19-15 14-10 9-7 6     Modifier CH CI CJ CK CL CM CN      ?  Mobility - Walking and Moving Around:     - CURRENT STATUS: CK - 40%-59% impaired, limited or restricted    - GOAL STATUS: CJ - 20%-39% impaired, limited or restricted    - D/C STATUS:  ---------------To be determined---------------  Payor: HU[employer]NA MEDICARE / Plan: [facility] [employer] MEDICARE CHOICE PPO/PFFS / Product Type: Managed Care Medicare /      Medical Necessity:     · Patient demonstrates good rehab potential due to higher previous functional level. Reason for Services/Other Comments:  · Patient continues to require present interventions due to patient's inability to perform functional mobility independently. Use of outcome tool(s) and clinical judgement create a POC that gives a: Clear prediction of patient's progress: LOW COMPLEXITY            TREATMENT:   (In addition to Assessment/Re-Assessment sessions the following treatments were rendered)     Pre-treatment Symptoms/Complaints:  No knee pain  Pain: Initial:   Pain Intensity 1: 0  Pain Location 1: Knee  Pain Orientation 1: Right  Pain Intervention(s) 1: Ambulation/Increased Activity, Cold pack, Exercise, Elevation, Repositioned  Post Session:  0, repositioned with legs elevated and cryocuff applied to R knee     Assessment/Reassessment only, no treatment provided today    Date:   Date:   Date:     ACTIVITY/EXERCISE AM PM AM PM AM PM   GROUP THERAPY  []  []  []  []  []  []   Ankle Pumps         Quad Sets         Gluteal Sets         Hip ABd/ADduction         Straight Leg Raises         Knee Slides         Short Arc Quads         Long Arc Quads         Chair Slides                  B = bilateral; AA = active assistive; A = active; P = passive      Treatment/Session Assessment:     Response to Treatment:  tolerated well.     Education:  [] Home Exercises  [x] Fall Precautions  [] Hip Precautions [] D/C Instruction Review  [] Knee/Hip Prosthesis Review  [x] Walker Management/Safety [] Adaptive Equipment as Needed       Interdisciplinary Collaboration:   o Physical Therapist  o Occupational Therapist  o Registered Nurse    After treatment position/precautions:   o Supine in bed  o Bed alarm/tab alert on  o Bed/Chair-wheels locked  o Bed in low position  o Call light within reach  o RN notified  o Family at bedside  o Side rails x 3    Compliance with Program/Exercises: Will assess as treatment progresses. Recommendations/Intent for next treatment session:  Treatment next visit will focus on increasing Ms. Rust's independence with bed mobility, transfers, gait training, strength/ROM exercises, modalities for pain, and patient education.       Total Treatment Duration:  PT Patient Time In/Time Out  Time In: 1300  Time Out: [address] [facility]

## 2018-01-24 NOTE — PERIOP NOTES
Betadine lavage:  17.5cc of betadine lot # [id number] , exp. Date 07/2019 ,  in 500cc of . 9NS Lot # [id number] , exp.  Date : 10/2020

## 2018-01-24 NOTE — OP NOTES
[facility]  Total Knee Arthroplasty  Patient:Le Anne   : 1945  Medical Record [id number]  Pre-operative Diagnosis:  Primary osteoarthritis of right knee [M17.11]  Post-operative Diagnosis: Status post total right knee replacement  Location: [address] [place] [address]  Surgeon: Yessica Marcano MD  Assistant: Elvia Bernheim, PA-C    Anesthesia: Spinal and FNB    Procedure: Procedure(s) (LRB):  RIGHT KNEE ARTHROPLASTY TOTAL (Right)    The complexity of the total joint surgery requires the use of a first assistant for positioning, retraction and expertise in closure. Tourniquet Time: 0 minutes  EBL: 250cc  Findings: severe degenerative arthritis, patellar osteophytes, posterior femoral osteophytes   BMI: Body mass index is 29.83 kg/(m^2). Casimiro Aranda was brought to the operating room and positioned on the operating table. She was anethestized with anesthesia. A gilliam catheter was placed preoperatively and IV antibiotics was administered. Prior to the incision being made a timeout was called identifying the patient, procedure ,operative side and surgeon. .The operative leg was prepped and draped in the usual sterile manner. An anterior longitudinal incision was accomplished just medial to the tibial tubercle and extending approximal 6 centimeters proximal to the superior pole of the patella. A medial parapatellar capsular incision was performed. The medial capsular flap was elevated around to the insertion of the semimembranous tendon. The patella was everted and the knee flexed and externally rotated. The medial and external menisci were excised. The lateral half of the fat pad excised and the patella femoral ligament was released. The anterior cruciate ligament was resected and the posterior cruciate ligament was substituted. Using extramedullary instrumentation, the tibial cut was accomplished with appropriate posterior slope.   Approxiamately 9mm of bone was removed from the high side of the tibia. The distal femur was next addressed. A drill hole was made above the intracondolar notch. Using appropriate intramedullary instrumentation,a five degree valgus distal cut was accomplished. The femur was sized. The anterior and posterior cuts were then made about the distal femur. The osteophytes were removed from the tibial and femoral surfaces. The flexion and extension gaps were assessed with the appropriate spacer blocks. Additional surgical procedures included: none. The flexion and extension gaps were deemed appropriately balanced. The appropriate cutting blocks were then utilized to perform the anterior chamfer, posterior chamfer and notch cuts, with appropriate lateral tranlation accomplished for the patellofemoral groove. The tibia baseplate was sized. The tibial base plate was pinned into place with the appropriate external rotation and stem site prepared. A preliminary range of motion was accomplished with  trial components. The patient was able to obtain full extension as well as appropriate flexion. The patient's ligaments were stable in flexion and extension to medial and lateral stressing and the alignment was through the appropriate mechanical axis. The patella was then everted. The bone was resected to accomodate the three peg  patella button. A trial reduction revealed appropriate tracking through the patellofemoral groove with no lateral retinacular release being accomplished. All trial components were removed. The knee was irrigated. There were no femoral deficiencies. There were no tibial deficiencies. No augmentation was utilized. The permanent Tibial and Femoral components were impacted into place. The patella component was then pressed in place. Teddy Villa knee was placed through range of motion and noted to be stable as mentioned above with the trail components.   The wound was dry, therefore no drain was used. The operative knee was injected with 60cc of Naropin, 10 cc's of morphine and 1 cc of 30mg of Toradol. The knee was then soaked with a diluted betadine solution for approximately 3 min. This was then thoroughly irrigated. The capsular layer was closed using a #1 PDS suture. The knee joint was then injected with transexamic acid. The subcutaneous layers were closed using 2-0 Stratafix suture. Finally the skin was closed using 3-0 Vicryl and skin staples, which were applied in occlusive fashion and sterile bandage applied. An Iceman cryo pad was applied on the operative leg. Sponge count and needle counts were correct. Adria Cordova left the operating room     Implants:   Implant Name Type Inv.  Item Serial No.  Lot No. LRB No. Used   size 1 femoral   [id number] [employer] [id number] Right 1   BASEPLT TIB PC TRITNM SZ 2 -- TRIATHLON - [id number]  BASEPLT TIB PC TRITNM SZ 2 -- TRIATHLON [id number] [employer] HOW [id number] Right 1   PAT ASYM MTL-BK 9MM SZ A29 -- TRIATHLON -   PAT ASYM MTL-BK 9MM SZ A29 -- TRIATHLON [id number] [employer] HOW [id number] Right 1   INSERT TIB PS TRIATHLN 2 13MM --  - [id number]   INSERT TIB PS TRIATHLN 2 13MM --  [id number] [employer] HOW [id number] Right 1           Signed By: Mercy Mejía MD  1/24/2018,  10:31 AM

## 2018-01-24 NOTE — PROGRESS NOTES
Problem: Self Care Deficits Care Plan (Adult)  Goal: *Acute Goals and Plan of Care (Insert Text)  GOALS:   DISCHARGE GOALS (in preparation for going home/rehab):  3 days  1. Ms. Carmina Villatoro will perform one lower body dressing activity with minimal assistance required to demonstrate improved functional mobility and safety. 2.  Ms. Carmina Villatoro will perform one lower body bathing activity with minimal assistance required to demonstrate improved functional mobility and safety. 3.  Ms. Carmina Villatoro will perform toileting/toilet transfer with contact guard assistance to demonstrate improved functional mobility and safety. 4.  Ms. Carmina Villatoro will perform shower transfer with contact guard assistance to demonstrate improved functional mobility and safety. JOINT CAMP OCCUPATIONAL THERAPY TKA: Initial Assessment 1/24/2018  INPATIENT: Hospital Day: 1  Payor: Sherlyn Reardon / Plan: [facility] HUMANA MEDICARE CHOICE PPO/PFFS / Product Type: [employer] Medicare /      NAME/AGE/GENDER: Marylen Horner is a 81 y.o. female   PRIMARY DIAGNOSIS:  Primary osteoarthritis of right knee [M17.11]   Procedure(s) and Anesthesia Type:     * RIGHT KNEE ARTHROPLASTY TOTAL - Spinal (Right)  ICD-10: Treatment Diagnosis:    · Pain in Right Knee (M25.561)  · Stiffness of Right Knee, Not elsewhere classified (W85.895)      ASSESSMENT:     Ms. Carmina Villatoro is s/p R TKA and presents with decreased weight bearing on R LE and decreased independence with functional mobility and activities of daily living. Patient would benefit from skilled Occupational Therapy to maximize independence and safety with self-care task and functional mobility. Pt would also benefit from education on adaptive equipment and safety precautions in preparation for going home or for recommendations for post-hospital rehab program.  Patient plans for further rehab at home with home health services and good family support.   OT reviewed therapy schedule and plan of care with patient. Patient was able to transfer and preform self care skills as charted below. Patient instructed to call for assistance when needing to get up from the bed and all needs in reach. Patient verbalized understanding of call light. This section established at most recent assessment   PROBLEM LIST (Impairments causing functional limitations):  1. Decreased Strength  2. Decreased ADL/Functional Activities  3. Decreased Transfer Abilities  4. Increased Pain  5. Increased Fatigue  6. Decreased Flexibility/Joint Mobility  7. Decreased Knowledge of Precautions   INTERVENTIONS PLANNED: (Benefits and precautions of occupational therapy have been discussed with the patient.)  1. Activities of daily living training  2. Adaptive equipment training  3. Balance training  4. Clothing management  5. Donning&doffing training  6. Theraputic activity     TREATMENT PLAN: Frequency/Duration: Follow patient qd to address above goals. Rehabilitation Potential For Stated Goals: Good     RECOMMENDED REHABILITATION/EQUIPMENT: (at time of discharge pending progress): Continue Skilled Therapy and Home Health: Physical Therapy. OCCUPATIONAL PROFILE AND HISTORY:   History of Present Injury/Illness (Reason for Referral): Pt presents this date s/p (R) TKA. Past Medical History/Comorbidities:   Ms. Dirk Arriaga  has a past medical history of Anxiety; Arthritis; Breast cancer ([address] (1/18/2016); Cancer ([address] Chronic pain; Former smoker; GERD (gastroesophageal reflux disease); Hard of hearing; Headache; Hypertension; Hypothyroidism; Insomnia; Status post total left knee replacement (5/12/2017); and Status post total right knee replacement (1/24/2018). She also has no past medical history of Adverse effect of anesthesia; Aneurysm ([address] Arrhythmia; Asthma; Autoimmune disease ([address] CAD (coronary artery disease); Chronic kidney disease; Chronic obstructive pulmonary disease ([address] Coagulation disorder ([address]  Diabetes ([address] Difficult intubation; Endocarditis; Heart failure ([facility] Ill-defined condition; Liver disease; Malignant hyperthermia due to anesthesia; Morbid obesity ([facility] Nicotine vapor product user; Non-nicotine vapor product user; Pseudocholinesterase deficiency; Psychiatric disorder; PUD (peptic ulcer disease); Rheumatic fever; Seizures ([facility] Sleep apnea; Stroke [facility]); Thromboembolus ([facility] or Unspecified adverse effect of anesthesia. Ms. Ben Patton  has a past surgical history that includes hx colonoscopy; hx wisdom teeth extraction; hx tonsillectomy (1957); hx vascular access (Right); pr breast surgery procedure unlisted (Left, 7/2015); hx breast biopsy (Left, 1/18/2016); pr mastectomy, partial (Left, 01/18/2016); hx breast lumpectomy (Left, 01/2016); hx orthopaedic; and hx knee replacement (Left, 05/12/2017). Social History/Living Environment:   Home Environment: Private residence  # Steps to Enter: 0  One/Two Story Residence: One story  Living Alone: No  Support Systems: Spouse/Significant Other/Partner  Patient Expects to be Discharged to[de-identified] Private residence  Current DME Used/Available at Home: Teetee Radish, straight, Commode, bedside, Walker, rolling  Tub or Shower Type: Shower  Prior Level of Function/Work/Activity:  independent   Number of Personal Factors/Comorbidities that affect the Plan of Care: Brief history (0):  LOW COMPLEXITY   ASSESSMENT OF OCCUPATIONAL PERFORMANCE[de-identified]   Most Recent Physical Functioning:   Balance  Sitting: Intact  Standing: Pull to stand; With support       Patient Vitals for the past 6 hrs:   BP BP Patient Position SpO2 O2 Flow Rate (L/min) Pulse   01/24/18 0803 149/79 At rest 100 % 2 l/min 94   01/24/18 0812 169/81 At rest 100 % 2 l/min 96   01/24/18 0814 154/76 At rest 100 % 2 l/min 95   01/24/18 0819 149/65 - 100 % 2 l/min 88   01/24/18 1026 141/71 At rest 98 % 3 l/min 95   01/24/18 1031 136/66 - 100 % 2 l/min 94   01/24/18 1036 144/82 - 100 % 2 l/min 99   01/24/18 1041 135/72 - 100 % 2 l/min 90 01/24/18 1046 132/83 - 100 % 2 l/min 98   01/24/18 1111 - - 100 % 2 l/min 91   01/24/18 1144 - - 99 % 2 l/min -   01/24/18 1154 134/68 - 99 % - 90       Gross Assessment: Yes  Gross Assessment  AROM: Within functional limits (BUE)  Strength: Within functional limits (BUE)  Coordination: Within functional limits (BUE)  Tone: Normal  Sensation: Intact                 Vision  Acuity: Within Defined Limits    Mental Status  Neurologic State: Alert (Simultaneous filing. User may not have seen previous data.)  Orientation Level: Oriented X4 (Simultaneous filing. User may not have seen previous data.)  Cognition: Follows commands (Simultaneous filing. User may not have seen previous data.)  Perception: Appears intact (Simultaneous filing. User may not have seen previous data.)  Perseveration: No perseveration noted (Simultaneous filing. User may not have seen previous data.)  Safety/Judgement: Awareness of environment; Fall prevention (Simultaneous filing. User may not have seen previous data.)                Basic ADLs (From Assessment) Complex ADLs (From Assessment)   Basic ADL  Feeding: Setup  Oral Facial Hygiene/Grooming: Setup  Bathing: Moderate assistance  Upper Body Dressing: Setup  Lower Body Dressing: Moderate assistance  Toileting: Maximum assistance     Grooming/Bathing/Dressing Activities of Daily Living     Cognitive Retraining  Safety/Judgement: Awareness of environment; Fall prevention (Simultaneous filing. User may not have seen previous data.)                 Functional Transfers  Toilet Transfer : Minimum assistance  Shower Transfer: Minimum assistance     Bed/Mat Mobility  Supine to Sit: Minimum assistance  Sit to Supine: Minimum assistance  Sit to Stand: Minimum assistance  Bed to Chair: Minimum assistance         Physical Skills Involved:  1. Balance  2. Strength  3. Activity Tolerance Cognitive Skills Affected (resulting in the inability to perform in a timely and safe manner):   1. none Psychosocial Skills Affected:  1. none   Number of elements that affect the Plan of Care: 1-3:  LOW COMPLEXITY   CLINICAL DECISION MAKING:   [employer] AM-PAC 6 Clicks   Daily Activity Inpatient Short Form  How much help from another person does the patient currently need. .. Total A Lot A Little None   1. Putting on and taking off regular lower body clothing? [] 1   [x] 2   [] 3   [] 4   2. Bathing (including washing, rinsing, drying)? [] 1   [x] 2   [] 3   [] 4   3. Toileting, which includes using toilet, bedpan or urinal?   [] 1   [x] 2   [] 3   [] 4   4. Putting on and taking off regular upper body clothing? [] 1   [] 2   [x] 3   [] 4   5. Taking care of personal grooming such as brushing teeth? [] 1   [] 2   [x] 3   [] 4   6. Eating meals? [] 1   [] 2   [x] 3   [] 4   © 2007, Trustees of [facility], under license to [employer]. All rights reserved     Score:  Initial: 15 Most Recent: X (Date: -- )    Interpretation of Tool:  Represents activities that are increasingly more difficult (i.e. Bed mobility, Transfers, Gait). Score 24 23 22-20 19-15 14-10 9-7 6     Modifier CH CI CJ CK CL CM CN      ? Self Care:     - CURRENT STATUS: CK - 40%-59% impaired, limited or restricted    - GOAL STATUS: CJ - 20%-39% impaired, limited or restricted    - D/C STATUS:  ---------------To be determined---------------  Payor: HUMANA MEDICARE / Plan: [facility] HUMANA MEDICARE CHOICE PPO/PFFS / Product Type: Managed Care Medicare /      Medical Necessity:     · Patient is expected to demonstrate progress in strength, balance, coordination and functional technique to increase independence with self care and functional mobility. Reason for Services/Other Comments:  · Patient continues to require skilled intervention due to decrease self care and functional mobility.    Use of outcome tool(s) and clinical judgement create a POC that gives a: LOW COMPLEXITY            TREATMENT:   (In addition to Assessment/Re-Assessment sessions the following treatments were rendered)     Pre-treatment Symptoms/Complaints:  No complaints  Pain: Initial:   Pain Intensity 1: 0  Post Session:  0     Assessment/Reassessment only, no treatment provided today    Treatment/Session Assessment:     Response to Treatment:  Tolerated well. Education:  [] Home Exercises  [x] Fall Precautions  [] Hip Precautions [] Going Home Video  [x] Knee/Hip Prosthesis Review  [x] Walker Management/Safety [x] Adaptive Equipment as Needed       Interdisciplinary Collaboration:   o Physical Therapist  o Occupational Therapist  o Registered Nurse    After treatment position/precautions:   o Supine in bed  o Bed alarm/tab alert on  o Bed/Chair-wheels locked  o Bed in low position  o Caregiver at bedside  o Call light within reach  o RN notified  o Side rails x 3     Compliance with Program/Exercises: compliant all of the time. Recommendations/Intent for next treatment session:  Treatment next visit will focus on increasing Ms. Rust's independence with bed mobility, transfers, self care, functional mobility, modalities for pain, and patient education.       Total Treatment Duration:  OT Patient Time In/Time Out  Time In: 1310  Time Out: 1612 Mikael Stanley

## 2018-01-25 LAB
ANION GAP SERPL CALC-SCNC: 9 MMOL/L (ref 7–16)
BUN SERPL-MCNC: 18 MG/DL (ref 8–23)
CALCIUM SERPL-MCNC: 8.2 MG/DL (ref 8.3–10.4)
CHLORIDE SERPL-SCNC: 105 MMOL/L (ref 98–107)
CO2 SERPL-SCNC: 26 MMOL/L (ref 21–32)
CREAT SERPL-MCNC: 0.95 MG/DL (ref 0.6–1)
GLUCOSE SERPL-MCNC: 139 MG/DL (ref 65–100)
HGB BLD-MCNC: 11.1 G/DL (ref 11.7–15.4)
MM INDURATION POC: 0 MM (ref 0–5)
POTASSIUM SERPL-SCNC: 4.1 MMOL/L (ref 3.5–5.1)
PPD POC: NORMAL NEGATIVE
SODIUM SERPL-SCNC: 140 MMOL/L (ref 136–145)

## 2018-01-25 PROCEDURE — 65270000029 HC RM PRIVATE

## 2018-01-25 PROCEDURE — 74011250637 HC RX REV CODE- 250/637: Performed by: ORTHOPAEDIC SURGERY

## 2018-01-25 PROCEDURE — 80048 BASIC METABOLIC PNL TOTAL CA: CPT | Performed by: PHYSICIAN ASSISTANT

## 2018-01-25 PROCEDURE — 97150 GROUP THERAPEUTIC PROCEDURES: CPT

## 2018-01-25 PROCEDURE — 74011250637 HC RX REV CODE- 250/637: Performed by: PHYSICIAN ASSISTANT

## 2018-01-25 PROCEDURE — 85018 HEMOGLOBIN: CPT | Performed by: PHYSICIAN ASSISTANT

## 2018-01-25 PROCEDURE — 97535 SELF CARE MNGMENT TRAINING: CPT

## 2018-01-25 PROCEDURE — 97110 THERAPEUTIC EXERCISES: CPT

## 2018-01-25 PROCEDURE — 74011250636 HC RX REV CODE- 250/636: Performed by: PHYSICIAN ASSISTANT

## 2018-01-25 PROCEDURE — 36415 COLL VENOUS BLD VENIPUNCTURE: CPT | Performed by: PHYSICIAN ASSISTANT

## 2018-01-25 PROCEDURE — 97116 GAIT TRAINING THERAPY: CPT

## 2018-01-25 RX ORDER — HYDROMORPHONE HYDROCHLORIDE 2 MG/1
2 TABLET ORAL
Status: DISCONTINUED | OUTPATIENT
Start: 2018-01-25 | End: 2018-01-26 | Stop reason: HOSPADM

## 2018-01-25 RX ADMIN — HYDROMORPHONE HYDROCHLORIDE 2 MG: 2 TABLET ORAL at 04:58

## 2018-01-25 RX ADMIN — HYDROMORPHONE HYDROCHLORIDE 2 MG: 2 TABLET ORAL at 11:47

## 2018-01-25 RX ADMIN — ACETAMINOPHEN 1000 MG: 500 TABLET, FILM COATED ORAL at 06:16

## 2018-01-25 RX ADMIN — HYDROMORPHONE HYDROCHLORIDE 2 MG: 2 TABLET ORAL at 15:08

## 2018-01-25 RX ADMIN — LEVOTHYROXINE SODIUM 25 MCG: 50 TABLET ORAL at 07:53

## 2018-01-25 RX ADMIN — CELECOXIB 200 MG: 200 CAPSULE ORAL at 07:52

## 2018-01-25 RX ADMIN — SENNOSIDES AND DOCUSATE SODIUM 2 TABLET: 8.6; 5 TABLET ORAL at 07:53

## 2018-01-25 RX ADMIN — TEMAZEPAM 15 MG: 15 CAPSULE ORAL at 21:04

## 2018-01-25 RX ADMIN — PANTOPRAZOLE SODIUM 40 MG: 40 TABLET, DELAYED RELEASE ORAL at 06:16

## 2018-01-25 RX ADMIN — Medication 2 G: at 01:40

## 2018-01-25 RX ADMIN — ACETAMINOPHEN 1000 MG: 500 TABLET, FILM COATED ORAL at 17:53

## 2018-01-25 RX ADMIN — HYDROMORPHONE HYDROCHLORIDE 2 MG: 2 TABLET ORAL at 21:04

## 2018-01-25 RX ADMIN — ACETAMINOPHEN 1000 MG: 500 TABLET, FILM COATED ORAL at 11:47

## 2018-01-25 RX ADMIN — ASPIRIN 325 MG: 325 TABLET, DELAYED RELEASE ORAL at 21:04

## 2018-01-25 RX ADMIN — CELECOXIB 200 MG: 200 CAPSULE ORAL at 21:04

## 2018-01-25 RX ADMIN — HYDROMORPHONE HYDROCHLORIDE 1 MG: 2 INJECTION, SOLUTION INTRAMUSCULAR; INTRAVENOUS; SUBCUTANEOUS at 22:27

## 2018-01-25 RX ADMIN — HYDROMORPHONE HYDROCHLORIDE 2 MG: 2 TABLET ORAL at 07:53

## 2018-01-25 RX ADMIN — HYDROMORPHONE HYDROCHLORIDE 2 MG: 2 TABLET ORAL at 01:39

## 2018-01-25 RX ADMIN — HYDROMORPHONE HYDROCHLORIDE 2 MG: 2 TABLET ORAL at 17:54

## 2018-01-25 RX ADMIN — ASPIRIN 325 MG: 325 TABLET, DELAYED RELEASE ORAL at 07:52

## 2018-01-25 NOTE — PROGRESS NOTES
Back from group therapy. Medicated for pain again with dilaudid po. NV checks remain WDL. No further changes.

## 2018-01-25 NOTE — PROGRESS NOTES
2018         Post Op day: 1 Day Post-OpProcedure(s) (LRB):  RIGHT KNEE ARTHROPLASTY TOTAL (Right)      Admit Date: 2018  Admit Diagnosis: Primary osteoarthritis of right knee [M17.11]    LAB:    Recent Results (from the past 24 hour(s))   HEMOGLOBIN    Collection Time: 18  7:18 PM   Result Value Ref Range    HGB 11.5 (L) 11.7 - 15.4 g/dL   HEMOGLOBIN    Collection Time: 18  5:00 AM   Result Value Ref Range    HGB 11.1 (L) 11.7 - 76.4 g/dL   METABOLIC PANEL, BASIC    Collection Time: 18  5:00 AM   Result Value Ref Range    Sodium 140 136 - 145 mmol/L    Potassium 4.1 3.5 - 5.1 mmol/L    Chloride 105 98 - 107 mmol/L    CO2 26 21 - 32 mmol/L    Anion gap 9 7 - 16 mmol/L    Glucose 139 (H) 65 - 100 mg/dL    BUN 18 8 - 23 MG/DL    Creatinine 0.95 0.6 - 1.0 MG/DL    GFR est AA >60 >60 ml/min/1.73m2    GFR est non-AA >60 >60 ml/min/1.73m2    Calcium 8.2 (L) 8.3 - 10.4 MG/DL     Vital Signs:    Patient Vitals for the past 8 hrs:   BP Temp Pulse Resp SpO2   18 0340 99/57 98 °F (36.7 °C) 78 17 96 %   18 0000 102/60 98.2 °F (36.8 °C) 82 17 97 %     Temp (24hrs), Av.1 °F (36.7 °C), Min:97.7 °F (36.5 °C), Max:98.6 °F (37 °C)    Body mass index is 29.83 kg/(m^2).   Pain Control:   Pain Assessment  Pain Scale 1: Numeric (0 - 10)  Pain Intensity 1: 3  Pain Onset 1: years ago  Pain Location 1: Knee  Pain Orientation 1: Right, Posterior  Pain Description 1: Aching  Pain Intervention(s) 1: Medication (see MAR), Cold pack    Subjective: Doing well, No complaints, No SOB, No Chest Pain, No Nausea or Vomitting     Objective: Vital Signs are Stable, No Acute Distress, Alert and Oriented, Dressing is Dry,  Neurovascular exam is normal.       PT/OT:            Assistive Device: Walker (comment)  RLE AROM  R Knee Flexion: 70  R Knee Extension: 15             Weight Bearing Status: WBAT    Meds:  [unfilled]  [unfilled]  [unfilled]    Assessment:   Patient Active Problem List   Diagnosis Code  Malignant neoplasm of left breast (HCC) C50.912    Dyspnea R06.00    Anemia D64.9    Pulmonary infiltrates R91.8    GERD (gastroesophageal reflux disease) K21.9    Hypertension I10    Hypomagnesemia E83.42    Status post total left knee replacement Z96.652    Status post total right knee replacement Z96.651             Plan: Continue Physical Therapy, Monitor  Hbg, home tomorrow.         Signed By: Freddy Li MD

## 2018-01-25 NOTE — PROGRESS NOTES
[address]  Face to Face Encounter    Patients Name: Rober Marrero    YOB: 1945    Ordering Physician: Teja Quijano    Primary Diagnosis: Primary osteoarthritis of right knee [M17.11]  S/p right TKA    Date of Face to Face:   1/24/2018                                  Face to Face Encounter findings are related to primary reason for home care:   yes. 1. I certify that the patient needs intermittent care as follows: physical therapy: gait/stair training    2. I certify that this patient is homebound, that is: 1) patient requires the use of a walker device, special transportation, or assistance of another to leave the home; or 2) patient's condition makes leaving the home medically contraindicated; and 3) patient has a normal inability to leave the home and leaving the home requires considerable and taxing effort. Patient may leave the home for infrequent and short duration for medical reasons, and occasional absences for non-medical reasons. Homebound status is due to the following functional limitations: Patient's ambulation limited secondary to severe pain and requires the use of an assistive device and the assistance of a caregiver for safe completion. Patient with strength and ROM deficits limiting ambulation endurance requiring the use of an assistive device and the assistance of a caregiver. Patient deemed temporarily homebound secondary to increased risk for infection when leaving home and going out into the community. 3. I certify that this patient is under my care and that I, or a nurse practitioner or [facility], or clinical nurse specialist, or certified nurse midwife, working with me, had a Face-to-Face Encounter that meets the physician Face-to-Face Encounter requirements.   The following are the clinical findings from the [address] [place] [address] encounter that support the need for skilled services and is a summary of the encounter: see hospital chart        Gera Jorje Juan Arevalo, [address]  1/24/2018      THE FOLLOWING TO BE COMPLETED BY THE COMMUNITY PHYSICIAN:    I concur with the findings described above from the F2F encounter that this patient is homebound and in need of a skilled service.     Certifying Physician: _____________________________________      Printed Certifying Physician Name: _____________________________________    Date: _________________

## 2018-01-25 NOTE — PROGRESS NOTES
Sitting up in bed. Aquacel dry and intact to R knee with iceman. SCDs to LEs. Good movement and sensation to LEs. Medicated for pain again with dilaudid po.

## 2018-01-25 NOTE — PROGRESS NOTES
Problem: Mobility Impaired (Adult and Pediatric)  Goal: *Acute Goals and Plan of Care (Insert Text)  GOALS (1-4 days):  (1.)Ms. Crystal Dexter will move from supine to sit and sit to supine  in bed with CONTACT GUARD ASSIST. Goal met 1/25/18. (2.)Ms. Crystal Dexter will transfer from bed to chair and chair to bed with CONTACT GUARD ASSIST using the least restrictive device. Goal met 1/25/18. (3.)Ms. Crystal Dexter will ambulate with CONTACT GUARD ASSIST for 150 feet with the least restrictive device. Goal met 1/25/18. (4.)Ms. Crystal Dexter will ambulate up/down 4 steps with bilateral  railing with CONTACT GUARD ASSIST with no device. (5.)Ms. Crystal Dexter will increase right knee ROM to 5°-80°. New Goal 1/25/18:  1. Patient will ambulate with SBA for 250' with rolling walker in 1-2 days. ________________________________________________________________________________________________    PHYSICAL THERAPY [facility] camp tKa: Daily Note, Treatment Day: 1st, PM 1/25/2018  INPATIENT: Hospital Day: 2  Payor: Delon Kilgore / Plan: [id number] PPO/PFFS / Product Type: Managed Care Medicare /      NAME/AGE/GENDER: Hillary Mera is a 81 y.o. female   PRIMARY DIAGNOSIS:  Primary osteoarthritis of right knee [M17.11]   Procedure(s) and Anesthesia Type:     * RIGHT KNEE ARTHROPLASTY TOTAL - Spinal (Right)  ICD-10: Treatment Diagnosis:    · Pain in Right Knee (M25.561)  · Stiffness of Right Knee, Not elsewhere classified (M25.661)  · Difficulty in walking, Not elsewhere classified (R26.2)  · Other abnormalities of gait and mobility (R26.89)      ASSESSMENT:     Ms. Crystal Dexter presents S/P R TKA and presents with decreased R LE strength and ROM, standing balance, functional mobility and TKA awareness. Patient demonstrated very good improvement in gait distance and functional mobility this afternoon. Achieved 3 out of 5 goals this afternoon.      This section established at most recent assessment   PROBLEM LIST (Impairments causing functional limitations):  1. Decreased Strength  2. Decreased Transfer Abilities  3. Decreased Ambulation Ability/Technique  4. Decreased Balance  5. Increased Pain  6. Decreased Activity Tolerance  7. Increased Fatigue  8. Decreased Flexibility/Joint Mobility  9. Decreased Knowledge of Precautions  10. Decreased [place] with Home Exercise Program  11. decreased TKA awareness   INTERVENTIONS PLANNED: (Benefits and precautions of physical therapy have been discussed with the patient.)  1. Balance Exercise  2. Bed Mobility  3. Cold  4. Gait Training  5. Home Exercise Program (HEP)  6. Therapeutic Exercise/Strengthening  7. Transfer Training  8. TKA education  9. Range of Motion: active/assisted/passive  10. Therapeutic Activities  11. Group Therapy     TREATMENT PLAN: Frequency/Duration: Follow patient BID   to address above goals. Rehabilitation Potential For Stated Goals: Good     RECOMMENDED REHABILITATION/EQUIPMENT: (at time of discharge pending progress): Continue Skilled Therapy and Home Health: Physical Therapy. HISTORY:   History of Present Injury/Illness (Reason for Referral):  S/P R TKA  Past Medical History/Comorbidities:   Ms. Sharmin Olvera  has a past medical history of Anxiety; Arthritis; Breast cancer ([address] (1/18/2016); Cancer ([address] Chronic pain; Former smoker; GERD (gastroesophageal reflux disease); Hard of hearing; Headache; Hypertension; Hypothyroidism; Insomnia; Status post total left knee replacement (5/12/2017); and Status post total right knee replacement (1/24/2018). She also has no past medical history of Adverse effect of anesthesia; Aneurysm ([address] Arrhythmia; Asthma; Autoimmune disease ([address] CAD (coronary artery disease); Chronic kidney disease; Chronic obstructive pulmonary disease ([address] Coagulation disorder ([address] Diabetes ([address] Difficult intubation; Endocarditis; Heart failure ([address]  Ill-defined condition; Liver disease; Malignant hyperthermia due to anesthesia; Morbid obesity ([facility] Nicotine vapor product user; Non-nicotine vapor product user; Pseudocholinesterase deficiency; Psychiatric disorder; PUD (peptic ulcer disease); Rheumatic fever; Seizures ([facility] Sleep apnea; Stroke [facility]); Thromboembolus ([facility] [address] [facility] or Unspecified adverse effect of anesthesia. Ms. Crystal Dexter  has a past surgical history that includes hx colonoscopy; hx wisdom teeth extraction; hx tonsillectomy (1957); hx vascular access (Right); pr breast surgery procedure unlisted (Left, 7/2015); hx breast biopsy (Left, 1/18/2016); pr mastectomy, partial (Left, 01/18/2016); hx breast lumpectomy (Left, 01/2016); hx orthopaedic; and hx knee replacement (Left, 05/12/2017). Social History/Living Environment:   Home Environment: Private residence  # Steps to Enter: 0  One/Two Story Residence: One story  Living Alone: No  Support Systems: Spouse/Significant Other/Partner  Patient Expects to be Discharged to[de-identified] Private residence  Current DME Used/Available at Home: Cane, straight, Commode, bedside, Walker, rolling  Tub or Shower Type: Shower  Prior Level of Function/Work/Activity:  functionally  independent with ADls and amb   Number of Personal Factors/Comorbidities that affect the Plan of Care: 1-2: MODERATE COMPLEXITY   EXAMINATION:   Most Recent Physical Functioning:               RLE AROM  R Knee Flexion: 100  R Knee Extension: -10            Bed Mobility  Supine to Sit: Contact guard assistance  Sit to Supine: Stand-by asssistance    Transfers  Sit to Stand: Stand-by asssistance  Stand to Sit: Stand-by asssistance  Bed to Chair: Stand-by asssistance    Balance  Sitting: Intact  Standing: Pull to stand; With support              Weight Bearing Status  Right Side Weight Bearing: As tolerated  Distance (ft): 175 Feet (ft)  Ambulation - Level of Assistance: Stand-by asssistance  Assistive Device: Walker, rolling  Speed/Jennifer: Slow  Step Length: Left shortened;Right shortened  Stance: Right decreased  Gait Abnormalities: Antalgic  Interventions: Manual cues; Safety awareness training;Verbal cues     Braces/Orthotics: none    Right Knee Cold  Type: Cryocuff      Body Structures Involved:  1. Bones  2. Joints  3. Muscles Body Functions Affected:  1. Neuromusculoskeletal  2. Movement Related Activities and Participation Affected:  1. Mobility  2. Self Care   Number of elements that affect the Plan of Care: 4+: HIGH COMPLEXITY   CLINICAL PRESENTATION:   Presentation: Stable and uncomplicated: LOW COMPLEXITY   CLINICAL DECISION MAKIN[address] [place] [address] AM-PAC 6 Clicks   Basic Mobility Inpatient Short Form  How much difficulty does the patient currently have. .. Unable A Lot A Little None   1. Turning over in bed (including adjusting bedclothes, sheets and blankets)? [] 1   [] 2   [x] 3   [] 4   2. Sitting down on and standing up from a chair with arms ( e.g., wheelchair, bedside commode, etc.)   [] 1   [] 2   [x] 3   [] 4   3. Moving from lying on back to sitting on the side of the bed? [] 1   [] 2   [x] 3   [] 4   How much help from another person does the patient currently need. .. Total A Lot A Little None   4. Moving to and from a bed to a chair (including a wheelchair)? [] 1   [] 2   [x] 3   [] 4   5. Need to walk in hospital room? [] 1   [] 2   [x] 3   [] 4   6. Climbing 3-5 steps with a railing? [] 1   [x] 2   [] 3   [] 4   © , Trustees of [address] [place] [address], under license to [employer]. All rights reserved        Score:  Initial: 17 Most Recent: X (Date: -- )    Interpretation of Tool:  Represents activities that are increasingly more difficult (i.e. Bed mobility, Transfers, Gait). Score 24 23 22-20 19-15 14-10 9-7 6     Modifier CH CI CJ CK CL CM CN      ?  Mobility - Walking and Moving Around:     - CURRENT STATUS: CK - 40%-59% impaired, limited or restricted    - GOAL STATUS: CJ - 20%-39% impaired, limited or restricted    - D/C STATUS:  ---------------To be determined---------------  Payor: Sherlyn Reardon / Plan: [facility] [employer] MEDICARE CHOICE PPO/PFFS / Product Type: Managed Care Medicare /      Medical Necessity:     · Patient demonstrates good rehab potential due to higher previous functional level. Reason for Services/Other Comments:  · Patient continues to require present interventions due to patient's inability to perform functional mobility independently. Use of outcome tool(s) and clinical judgement create a POC that gives a: Clear prediction of patient's progress: LOW COMPLEXITY            TREATMENT:   (In addition to Assessment/Re-Assessment sessions the following treatments were rendered)     Pre-treatment Symptoms/Complaints:  Minimal pain  Pain: Initial:   Pain Intensity 1: 2  Pain Location 1: Knee  Pain Orientation 1: Right  Pain Intervention(s) 1: Cold pack, Repositioned  Post Session:  2     Gait Training (15 Minutes):  Gait training to improve and/or restore physical functioning as related to dynamic movement of knee - right to improve functional gait pattern. Ambulated 175 Feet (ft) with Stand-by asssistance using a Walker, rolling and minimal Manual cues; Safety awareness training;Verbal cues related to their knee position and motion to promote proper body mechanics. Therapeutic Exercise: (45 Minutes (group)):  Exercises per grid below to improve mobility and strength. Required minimal verbal and manual cues to perform exercises correctly. .  Progressed range and repetitions as indicated.        Date:  1/25/18 Date:   Date:     ACTIVITY/EXERCISE AM PM AM PM AM PM   GROUP THERAPY  []  [x]  []  []  []  []   Ankle Pumps 10 15       Quad Sets 10 15       Gluteal Sets 10 15       Hip ABd/ADduction 10 15       Straight Leg Raises 10 15       Knee Slides 10 15       Short Arc Quads  15       Long Arc Quads         Chair Slides  15                B = bilateral; AA = active assistive; A = active; P = passive      Treatment/Session Assessment:     Response to Treatment:  tolerated well. Education:  [x] Home Exercises  [x] Fall Precautions  [] Hip Precautions [x] D/C Instruction Review  [x] Knee/Hip Prosthesis Review  [x] Walker Management/Safety [] Adaptive Equipment as Needed       Interdisciplinary Collaboration:   o Physical Therapist  o Registered Nurse  o Rehabilitation Attendant    After treatment position/precautions:   o Supine in bed  o Bed/Chair-wheels locked  o Bed in low position  o Call light within reach  o Side rails x 3    Compliance with Program/Exercises: compliant    Recommendations/Intent for next treatment session:  Treatment next visit will focus on increasing Ms. Rust's independence with bed mobility, transfers, gait training, strength/ROM exercises, modalities for pain, and patient education.       Total Treatment Duration:  PT Patient Time In/Time Out  Time In: 1300  Time Out: [address]

## 2018-01-25 NOTE — PROGRESS NOTES
Problem: Self Care Deficits Care Plan (Adult)  Goal: *Acute Goals and Plan of Care (Insert Text)  GOALS:   DISCHARGE GOALS (in preparation for going home/rehab):  3 days  1. Ms. Lara Byrd will perform one lower body dressing activity with minimal assistance required to demonstrate improved functional mobility and safety. GOAL MET 1/25/2018     2. Ms. Lara Byrd will perform one lower body bathing activity with minimal assistance required to demonstrate improved functional mobility and safety. GOAL MET 1/25/2018     3. Ms. Lara Byrd will perform toileting/toilet transfer with contact guard assistance to demonstrate improved functional mobility and safety. GOAL MET 1/25/2018     4. Ms. Lara Byrd will perform shower transfer with contact guard assistance to demonstrate improved functional mobility and safety. GOAL MET 1/25/2018       JOINT CAMP OCCUPATIONAL THERAPY TKA: Daily Note and AM 1/25/2018  INPATIENT: Hospital Day: 2  Payor: Yareli Enrique / Plan: [facility] [employer] MEDICARE CHOICE PPO/PFFS / Product Type: Managed Care Medicare /      NAME/AGE/GENDER: Rodo Angulo is a 81 y.o. female   PRIMARY DIAGNOSIS:  Primary osteoarthritis of right knee [M17.11]   Procedure(s) and Anesthesia Type:     * RIGHT KNEE ARTHROPLASTY TOTAL - Spinal (Right)  ICD-10: Treatment Diagnosis:    · Pain in Right Knee (M25.561)  · Stiffness of Right Knee, Not elsewhere classified (D94.635)      ASSESSMENT:      Ms. Lara Byrd is s/p right TKA and presents with decreased weight bearing on right LE and decreased independence with functional mobility and activities of daily living. Patient completed shower and dressing as charter below in ADL grid and is ambulating with rolling walker and contact guard assist.  Patient has met 4/4 goals and plans to return home with good family support. Family able to provide patient with appropriate level of assistance at this time.   OT reviewed safety precautions throughout session and therapy schedule for the remainder of today. Patient instructed to call for assistance when needing to get up from recliner and all needs in reach. Patient verbalized understanding of call light. This section established at most recent assessment   PROBLEM LIST (Impairments causing functional limitations):  1. Decreased Strength  2. Decreased ADL/Functional Activities  3. Decreased Transfer Abilities  4. Increased Pain  5. Increased Fatigue  6. Decreased Flexibility/Joint Mobility  7. Decreased Knowledge of Precautions   INTERVENTIONS PLANNED: (Benefits and precautions of occupational therapy have been discussed with the patient.)  1. Activities of daily living training  2. Adaptive equipment training  3. Balance training  4. Clothing management  5. Donning&doffing training  6. Theraputic activity     TREATMENT PLAN: Frequency/Duration: Follow patient qd to address above goals. Rehabilitation Potential For Stated Goals: Good     RECOMMENDED REHABILITATION/EQUIPMENT: (at time of discharge pending progress): Continue Skilled Therapy and Home Health: Physical Therapy. OCCUPATIONAL PROFILE AND HISTORY:   History of Present Injury/Illness (Reason for Referral): Pt presents this date s/p (R) TKA. Past Medical History/Comorbidities:   Ms. Ashutosh Parnell  has a past medical history of Anxiety; Arthritis; Breast cancer ([address] (1/18/2016); Cancer ([address] Chronic pain; Former smoker; GERD (gastroesophageal reflux disease); Hard of hearing; Headache; Hypertension; Hypothyroidism; Insomnia; Status post total left knee replacement (5/12/2017); and Status post total right knee replacement (1/24/2018). She also has no past medical history of Adverse effect of anesthesia; Aneurysm ([address] Arrhythmia; Asthma; Autoimmune disease ([address] CAD (coronary artery disease); Chronic kidney disease; Chronic obstructive pulmonary disease ([address] Coagulation disorder ([address] Diabetes ([address] Difficult intubation; Endocarditis;  Heart failure ([address] Ill-defined condition; Liver disease; Malignant hyperthermia due to anesthesia; Morbid obesity ([facility] Nicotine vapor product user; Non-nicotine vapor product user; Pseudocholinesterase deficiency; Psychiatric disorder; PUD (peptic ulcer disease); Rheumatic fever; Seizures ([facility] Sleep apnea; Stroke [facility]); Thromboembolus ([facility] or Unspecified adverse effect of anesthesia. Ms. Ashutosh Parnell  has a past surgical history that includes hx colonoscopy; hx wisdom teeth extraction; hx tonsillectomy (1957); hx vascular access (Right); pr breast surgery procedure unlisted (Left, 7/2015); hx breast biopsy (Left, 1/18/2016); pr mastectomy, partial (Left, 01/18/2016); hx breast lumpectomy (Left, 01/2016); hx orthopaedic; and hx knee replacement (Left, 05/12/2017). Social History/Living Environment:   Home Environment: Private residence  # Steps to Enter: 0  One/Two Story Residence: One story  Living Alone: No  Support Systems: Spouse/Significant Other/Partner  Patient Expects to be Discharged to[de-identified] Private residence  Current DME Used/Available at Home: [address], [address], straight, Commode, bedside, Walker, rolling  Tub or Shower Type: Shower  Prior Level of Function/Work/Activity:  independent   Number of Personal Factors/Comorbidities that affect the Plan of Care: Brief history (0):  LOW COMPLEXITY   ASSESSMENT OF OCCUPATIONAL PERFORMANCE[de-identified]   Most Recent Physical Functioning:           Patient Vitals for the past 6 hrs:   BP BP Patient Position SpO2 Pulse   01/25/18 0747 153/76 Sitting 95 % 100   01/25/18 1056 110/72 Sitting 97 % 82                              Mental Status  Neurologic State: Alert  Orientation Level: Oriented X4  Cognition: Appropriate decision making; Appropriate for age attention/concentration; Appropriate safety awareness; Follows commands  Perception: Appears intact  Perseveration: No perseveration noted  Safety/Judgement: Awareness of environment; Fall prevention                Basic ADLs (From Assessment) Complex ADLs (From Assessment)   Basic ADL  Feeding: Setup  Oral Facial Hygiene/Grooming: Setup  Bathing: Moderate assistance  Upper Body Dressing: Setup  Lower Body Dressing: Moderate assistance  Toileting: Maximum assistance     Grooming/Bathing/Dressing Activities of Daily Living   Grooming  Grooming Assistance: Supervision/set up  Washing Face: Supervision/set-up  Washing Hands: Supervision/set-up Cognitive Retraining  Safety/Judgement: Awareness of environment; Fall prevention   Upper Body Bathing  Bathing Assistance: Supervision/set-up  Position Performed: Seated in chair     Lower Body Bathing  Bathing Assistance: Minimum assistance  Perineal  : Supervision/set-up  Position Performed: Seated in chair  Adaptive Equipment: Grab bar  Lower Body : Minimum assistance  Position Performed: Seated in chair;Standing  Adaptive Equipment: Grab bar Toileting  Toileting Assistance: Supervision/set up  Bladder Hygiene: Supervision/set-up   Upper Body Dressing Assistance  Dressing Assistance: Supervision/set-up  Bra: Supervision/set-up  Pullover Shirt: Supervision/set-up Functional Transfers  Bathroom Mobility: Contact guard assistance  Toilet Transfer : Contact guard assistance  Shower Transfer: Contact guard assistance   Lower Body Dressing Assistance  Dressing Assistance: Minimum assistance  Underpants: Minimum assistance  Pants With Elastic Waist: Minimum assistance  Socks: Minimum assistance  Position Performed: Seated in chair;Standing  Adaptive Equipment Used: Walker           Physical Skills Involved:  1. Balance  2. Strength  3. Activity Tolerance Cognitive Skills Affected (resulting in the inability to perform in a timely and safe manner):   1. none Psychosocial Skills Affected:  1. none   Number of elements that affect the Plan of Care: 1-3:  LOW COMPLEXITY   CLINICAL DECISION MAKIN [facility] [address] [address] AM-PAC 6 Clicks   Daily Activity Inpatient Short Form  How much help from another person does the patient currently need... Total A Lot A Little None   1. Putting on and taking off regular lower body clothing? [] 1   [x] 2   [] 3   [] 4   2. Bathing (including washing, rinsing, drying)? [] 1   [x] 2   [] 3   [] 4   3. Toileting, which includes using toilet, bedpan or urinal?   [] 1   [x] 2   [] 3   [] 4   4. Putting on and taking off regular upper body clothing? [] 1   [] 2   [x] 3   [] 4   5. Taking care of personal grooming such as brushing teeth? [] 1   [] 2   [x] 3   [] 4   6. Eating meals? [] 1   [] 2   [x] 3   [] 4   © 2007, Trustees of [address] [place] [address] [address] [address], under license to [employer]. All rights reserved     Score:  Initial: 15 Most Recent: X (Date: -- )    Interpretation of Tool:  Represents activities that are increasingly more difficult (i.e. Bed mobility, Transfers, Gait). Score 24 23 22-20 19-15 14-10 9-7 6     Modifier CH CI CJ CK CL CM CN      ? Self Care:     - CURRENT STATUS: CK - 40%-59% impaired, limited or restricted    - GOAL STATUS: CJ - 20%-39% impaired, limited or restricted    - D/C STATUS:  ---------------To be determined---------------  Payor: HU[employer]NA MEDICARE / Plan: [facility] [employer] MEDICARE CHOICE PPO/PFFS / Product Type: Managed Care Medicare /      Medical Necessity:     · Patient is expected to demonstrate progress in strength, balance, coordination and functional technique to increase independence with self care and functional mobility. Reason for Services/Other Comments:  · Patient continues to require skilled intervention due to decrease self care and functional mobility.    Use of outcome tool(s) and clinical judgement create a POC that gives a: LOW COMPLEXITY            TREATMENT:   (In addition to Assessment/Re-Assessment sessions the following treatments were rendered)     Pre-treatment Symptoms/Complaints:  No complaints  Pain: Initial:   Pain Intensity 1: 2  Pain Location 1: Knee  Pain Orientation 1: Right  Pain Intervention(s) 1: Shower  Post Session:  0 Self Care: (30): Procedure(s) (per grid) utilized to improve and/or restore self-care/home management as related to dressing, bathing, toileting and grooming. Required minimal verbal, tactile and   cueing to facilitate activities of daily living skills. Treatment/Session Assessment:     Response to Treatment:  Tolerated well. Education:  [] Home Exercises  [x] Fall Precautions  [] Hip Precautions [] Going Home Video  [x] Knee/Hip Prosthesis Review  [x] Walker Management/Safety [x] Adaptive Equipment as Needed       Interdisciplinary Collaboration:   o Physical Therapist  o Occupational Therapist  o Registered Nurse    After treatment position/precautions:   o Up in chair  o Bed/Chair-wheels locked  o Caregiver at bedside  o Call light within reach  o RN notified     Compliance with Program/Exercises: compliant all of the time. Recommendations/Intent for next treatment session:  Treatment next visit will focus on increasing Ms. Rust's independence with bed mobility, transfers, self care, functional mobility, modalities for pain, and patient education.       Total Treatment Duration:  OT Patient Time In/Time Out  Time In: 0930  Time Out: 1000   30 mins     Dalia Cuellar OT

## 2018-01-25 NOTE — PROGRESS NOTES
Dilaudid 2 mg po for c/o pain. Slept at intervals during shift. No further c/o voiced. No change in NV status noted. Call light within reach.

## 2018-01-25 NOTE — PROGRESS NOTES
Care Management Interventions  Mode of Transport at Discharge: Self  Transition of Care Consult (CM Consult): [address]: Yes  Discharge Durable Medical Equipment: No  Physical Therapy Consult: Yes  Occupational Therapy Consult: Yes  Current Support Network: Lives with Spouse  Confirm Follow Up Transport: Family  Plan discussed with Pt/Family/Caregiver: Yes  Freedom of Choice Offered: Yes  Discharge Location  Discharge Placement: Home with home health    Patient is a 81y.o. year old female admitted for Right TKA . Patient lives with Her spouse and plans to return home on discharge. Order received to arrange home health. Patient without preference towards agency. Referral sent to [facility]. Patient denies any equipment needs as she has a walker. Will follow until discharge.   Michelle Brown

## 2018-01-25 NOTE — PROGRESS NOTES
Progress Note    2018  Admit Date: 2018  5:06 AM   NAME: Ashley Wilson   :  1945   MRN:  [id number]   Attending: Tex Bateman MD  PCP:  Kath Red MD  Treatment Team: Attending Provider: Tex Bateman MD; Consulting Provider: Jose Migeul Burnham MD; Utilization Review: Jihan Mast RN; Consulting Provider: Annika Ramirez MD; Consulting Provider: Sony Lyman MD; Care Manager: CHELSEA Levine    Full Code   SUBJECTIVE:   Ms. Denise Stinson is a 82 yo female with PMH of HTN, hypothroid, breast cancer s/p chemo  who underwent a right TKA OPD #1 today. Hospitalist consulted for medical management. Pt takes HCTZ for BP control at home. BP a little low this AM.  TSH 2017 1.850. Denies CP, SOB, n/v/d, dizziness, syncope.           Past Medical History:   Diagnosis Date    Anxiety     Arthritis     back and knee's     Breast cancer [address] [address] 2016    Left Lumpectomy    Cancer ([facility] [address]     melanoma left face,  left breast ca     Chronic pain     back    Former smoker     was a passive smoker for 40 yrs; stopped in     GERD (gastroesophageal reflux disease)     nexium    Hard of hearing     bilateral hearing aids    Headache     hx monthly migraines; rare now    Hypertension     controlled with med    Hypothyroidism     Insomnia     Status post total left knee replacement 2017    Status post total right knee replacement 2018     Recent Results (from the past 24 hour(s))   HEMOGLOBIN    Collection Time: 18  7:18 PM   Result Value Ref Range    HGB 11.5 (L) 11.7 - 15.4 g/dL   HEMOGLOBIN    Collection Time: 18  5:00 AM   Result Value Ref Range    HGB 11.1 (L) 11.7 - 76.7 g/dL   METABOLIC PANEL, BASIC    Collection Time: 18  5:00 AM   Result Value Ref Range    Sodium 140 136 - 145 mmol/L    Potassium 4.1 3.5 - 5.1 mmol/L    Chloride 105 98 - 107 mmol/L    CO2 26 21 - 32 mmol/L    Anion gap 9 7 - 16 mmol/L    Glucose 139 (H) 65 - 100 mg/dL    BUN 18 8 - 23 MG/DL    Creatinine 0.95 0.6 - 1.0 MG/DL    GFR est AA >60 >60 ml/min/1.73m2    GFR est non-AA >60 >60 ml/min/1.73m2    Calcium 8.2 (L) 8.3 - 10.4 MG/DL   PLEASE READ & DOCUMENT PPD TEST IN 24 HRS    Collection Time: 01/25/18  7:50 AM   Result Value Ref Range    PPD  Negative    mm Induration 0 mm     No Known Allergies  Current Facility-Administered Medications   Medication Dose Route Frequency Provider Last Rate Last Dose    HYDROmorphone (DILAUDID) tablet 2 mg  2 mg Oral Q3H PRN Iveth Gotti MD   2 mg at 01/25/18 0753    pantoprazole (PROTONIX) tablet 40 mg  40 mg Oral ACB [facility]   40 mg at 01/25/18 0157    levothyroxine (SYNTHROID) tablet 25 mcg  25 mcg Oral Bud Sandifer [place]   25 mcg at 01/25/18 0753    SUMAtriptan (IMITREX) tablet 100 mg  100 mg Oral ONCE PRN ReneeRADHA Crowell        temazepam (RESTORIL) capsule 15 mg  15 mg Oral QHS PRN [facility], [facility]   15 mg at 01/24/18 2059    0.9% sodium chloride infusion  100 mL/hr IntraVENous CONTINUOUS Reneeponcho Flynn Alalylema        sodium chloride (NS) flush 5-10 mL  5-10 mL IntraVENous Q8H [facility], [place]        sodium chloride (NS) flush 5-10 mL  5-10 mL IntraVENous PRN Renee Gee PA        acetaminophen (TYLENOL) tablet 1,000 mg  1,000 mg Oral Q6H [facility]   1,000 mg at 01/25/18 5698    celecoxib (CELEBREX) capsule 200 mg  200 mg Oral Q12H Renee Gee, PA   200 mg at 01/25/18 0752    HYDROmorphone (PF) (DILAUDID) injection 1 mg  1 mg IntraVENous Q3H PRN RADHA Celestin        naloxone [facility]) injection 0.2-0.4 mg  0.2-0.4 mg IntraVENous Q10MIN PRN ReneeRADHA Crowell        dexamethasone (DECADRON) injection 10 mg  10 mg IntraVENous ONCE ReneeAmira Crowell        ondansetron [facility]) injection 4 mg  4 mg IntraVENous Q4H PRN RADHA Celestin        diphenhydrAMINE (BENADRYL) capsule 25 mg  25 mg Oral Q4H PRN RADHA Celestin (PERICOLACE) 8.6-50 mg per tablet 2 Tab  2 Tab Oral DAILY RADHA Mccauley   2 Tab at 18 9014    aspirin delayed-release tablet 325 mg  325 mg Oral Q12H Amira Mccauley   325 mg at 18 5409    PPD read reminder  1 Each Other Q24H Logan Mercado MD           Review of Systems negative with exception of pertinent positives noted above  PHYSICAL EXAM     Visit Vitals    /76 (BP 1 Location: Left arm, BP Patient Position: Sitting)    Pulse 100    Temp 98 °F (36.7 °C)    Resp 16    Ht 5' 4.5\" (1.638 m)    Wt 80.1 kg (176 lb 8 oz)    SpO2 95%    Breastfeeding No    BMI 29.83 kg/m2      Temp (24hrs), Av °F (36.7 °C), Min:97.7 °F (36.5 °C), Max:98.6 °F (37 °C)    Oxygen Therapy  O2 Sat (%): 95 % (18 0747)  Pulse via Oximetry: 89 beats per minute (18 1435)  O2 Device: Room air (18 1435)  O2 Flow Rate (L/min): 0 l/min (18 1435)    Intake/Output Summary (Last 24 hours) at 18 0959  Last data filed at 18 8375   Gross per 24 hour   Intake             2450 ml   Output             1850 ml   Net              600 ml      General: No acute distress    Lungs: CTA bilaterally. Resp even and nonlabored  Heart:  S1S2 present without murmurs rubs gallops. RRR. No edema  Abdomen: Soft, non tender, non distended. BS present  Extremities: RLE limited ROM. Surgical site dressing dry/intact  Neurologic:  A/O X4. No focal deficits     Results summary of Diagnostic Studies/Procedures copied from within [facility] EMR:        Jere Griffin    Diagnosis Date Noted    Status post total right knee replacement 2018     Plan:    HTN:  Stop HCTZ, BP actually low this AM    Hypothyroid: continue TSH    Time spent with pt 15 min  Notes, labs, VS, diagnostic testing reviewed  Thank you for the consult. We will sign off. Call with questions.        Plan of Care Discussed with: Supervising MD  Dr. Edgardo Toure, care team, pt    Yesy Stahl NP

## 2018-01-26 VITALS
SYSTOLIC BLOOD PRESSURE: 142 MMHG | BODY MASS INDEX: 29.41 KG/M2 | HEIGHT: 65 IN | WEIGHT: 176.5 LBS | OXYGEN SATURATION: 97 % | TEMPERATURE: 98.7 F | DIASTOLIC BLOOD PRESSURE: 78 MMHG | HEART RATE: 88 BPM | RESPIRATION RATE: 16 BRPM

## 2018-01-26 LAB
HGB BLD-MCNC: 10.2 G/DL (ref 11.7–15.4)
MM INDURATION POC: 0 MM (ref 0–5)
PPD POC: NORMAL NEGATIVE

## 2018-01-26 PROCEDURE — 97110 THERAPEUTIC EXERCISES: CPT

## 2018-01-26 PROCEDURE — 97150 GROUP THERAPEUTIC PROCEDURES: CPT

## 2018-01-26 PROCEDURE — 36415 COLL VENOUS BLD VENIPUNCTURE: CPT | Performed by: PHYSICIAN ASSISTANT

## 2018-01-26 PROCEDURE — 85018 HEMOGLOBIN: CPT | Performed by: PHYSICIAN ASSISTANT

## 2018-01-26 PROCEDURE — 74011250636 HC RX REV CODE- 250/636: Performed by: PHYSICIAN ASSISTANT

## 2018-01-26 PROCEDURE — 74011250637 HC RX REV CODE- 250/637: Performed by: ORTHOPAEDIC SURGERY

## 2018-01-26 PROCEDURE — 74011250637 HC RX REV CODE- 250/637: Performed by: PHYSICIAN ASSISTANT

## 2018-01-26 PROCEDURE — 97116 GAIT TRAINING THERAPY: CPT

## 2018-01-26 RX ORDER — OXYCODONE HYDROCHLORIDE 5 MG/1
5 TABLET ORAL
Qty: 40 TAB | Refills: 0 | Status: SHIPPED | OUTPATIENT
Start: 2018-01-26 | End: 2018-04-26

## 2018-01-26 RX ORDER — OXYCODONE HYDROCHLORIDE 5 MG/1
10 TABLET ORAL
Status: DISCONTINUED | OUTPATIENT
Start: 2018-01-26 | End: 2018-01-26 | Stop reason: HOSPADM

## 2018-01-26 RX ADMIN — OXYCODONE HYDROCHLORIDE 10 MG: 5 TABLET ORAL at 15:31

## 2018-01-26 RX ADMIN — PANTOPRAZOLE SODIUM 40 MG: 40 TABLET, DELAYED RELEASE ORAL at 05:53

## 2018-01-26 RX ADMIN — CELECOXIB 200 MG: 200 CAPSULE ORAL at 08:57

## 2018-01-26 RX ADMIN — OXYCODONE HYDROCHLORIDE 10 MG: 5 TABLET ORAL at 12:24

## 2018-01-26 RX ADMIN — HYDROMORPHONE HYDROCHLORIDE 1 MG: 2 INJECTION, SOLUTION INTRAMUSCULAR; INTRAVENOUS; SUBCUTANEOUS at 08:57

## 2018-01-26 RX ADMIN — ACETAMINOPHEN 1000 MG: 500 TABLET, FILM COATED ORAL at 02:35

## 2018-01-26 RX ADMIN — SENNOSIDES AND DOCUSATE SODIUM 2 TABLET: 8.6; 5 TABLET ORAL at 08:57

## 2018-01-26 RX ADMIN — LEVOTHYROXINE SODIUM 25 MCG: 50 TABLET ORAL at 08:56

## 2018-01-26 RX ADMIN — HYDROMORPHONE HYDROCHLORIDE 2 MG: 2 TABLET ORAL at 05:53

## 2018-01-26 RX ADMIN — HYDROMORPHONE HYDROCHLORIDE 2 MG: 2 TABLET ORAL at 02:32

## 2018-01-26 RX ADMIN — ONDANSETRON 4 MG: 2 INJECTION INTRAMUSCULAR; INTRAVENOUS at 08:57

## 2018-01-26 RX ADMIN — ACETAMINOPHEN 1000 MG: 500 TABLET, FILM COATED ORAL at 08:56

## 2018-01-26 RX ADMIN — ASPIRIN 325 MG: 325 TABLET, DELAYED RELEASE ORAL at 08:57

## 2018-01-26 NOTE — PROGRESS NOTES
Medicated for pain with dilaudid IV and for nausea with zofran IV. Resting in bed. Aquacel dry and intact to R knee with iceman. SCDs to LEs. NV checks WDL.

## 2018-01-26 NOTE — PROGRESS NOTES
Problem: Mobility Impaired (Adult and Pediatric)  Goal: *Acute Goals and Plan of Care (Insert Text)  GOALS (1-4 days):  (1.)Ms. Lary Owen will move from supine to sit and sit to supine  in bed with CONTACT GUARD ASSIST. Goal met 1/25/18. (2.)Ms. Lary Owen will transfer from bed to chair and chair to bed with CONTACT GUARD ASSIST using the least restrictive device. Goal met 1/25/18. (3.)Ms. Lary Owen will ambulate with CONTACT GUARD ASSIST for 150 feet with the least restrictive device. Goal met 1/25/18. (4.)Ms. Lary Owen will ambulate up/down 4 steps with bilateral  railing with CONTACT GUARD ASSIST with no device. (5.)Ms. Lary Owen will increase right knee ROM to 5°-80°. New Goal 1/25/18:  1. Patient will ambulate with SBA for 250' with rolling walker in 1-2 days. ________________________________________________________________________________________________    PHYSICAL THERAPY [facility] tKa: Daily Note, Treatment Day: 2nd, PM 1/26/2018  INPATIENT: Hospital Day: 3  Payor: Kal Velázquez / Plan: [address] PPO/PFFS / Product Type: [facility] Care Medicare /      NAME/AGE/GENDER: Taina Masterson is a 81 y.o. female   PRIMARY DIAGNOSIS:  Primary osteoarthritis of right knee [M17.11]   Procedure(s) and Anesthesia Type:     * RIGHT KNEE ARTHROPLASTY TOTAL - Spinal (Right)  ICD-10: Treatment Diagnosis:    · Pain in Right Knee (M25.561)  · Stiffness of Right Knee, Not elsewhere classified (M25.661)  · Difficulty in walking, Not elsewhere classified (R26.2)  · Other abnormalities of gait and mobility (R26.89)      ASSESSMENT:     Ms. Lary Owen presents supine in bed on contact with spouse at bedside. Pt agreeable to afternoon therapy. She is eager to go home but RN and patient want to make sure that the new pain medicine she was switched to is effective for her pain management. Worked on bed mobility, sit to stand and gait training in the [place].  Then worked on TKA exercises staying at 21 repetitions with minimal verbal cues. Then worked on gait training a second time and walked back to room with a good reciprocal gait pattern. Pt did not state any pain throughout treatment. Returned to room supine with ice in place and needs in reach. Let RN know that pt did well with therapy without reports of pain. This section established at most recent assessment   PROBLEM LIST (Impairments causing functional limitations):  1. Decreased Strength  2. Decreased Transfer Abilities  3. Decreased Ambulation Ability/Technique  4. Decreased Balance  5. Increased Pain  6. Decreased Activity Tolerance  7. Increased Fatigue  8. Decreased Flexibility/Joint Mobility  9. Decreased Knowledge of Precautions  10. Decreased [place] with Home Exercise Program  11. decreased TKA awareness   INTERVENTIONS PLANNED: (Benefits and precautions of physical therapy have been discussed with the patient.)  1. Balance Exercise  2. Bed Mobility  3. Cold  4. Gait Training  5. Home Exercise Program (HEP)  6. Therapeutic Exercise/Strengthening  7. Transfer Training  8. TKA education  9. Range of Motion: active/assisted/passive  10. Therapeutic Activities  11. Group Therapy     TREATMENT PLAN: Frequency/Duration: Follow patient BID   to address above goals. Rehabilitation Potential For Stated Goals: Good     RECOMMENDED REHABILITATION/EQUIPMENT: (at time of discharge pending progress): Continue Skilled Therapy and Home Health: Physical Therapy. HISTORY:   History of Present Injury/Illness (Reason for Referral):  S/P R TKA  Past Medical History/Comorbidities:   Ms. RECINOS [facility]  has a past medical history of Anxiety; Arthritis; Breast cancer ([facility] [address] (1/18/2016); Cancer ([facility] [address] Chronic pain; Former smoker; GERD (gastroesophageal reflux disease); Hard of hearing; Headache; Hypertension; Hypothyroidism; Insomnia; Status post total left knee replacement (5/12/2017); and Status post total right knee replacement (1/24/2018).  She also has no past medical history of Adverse effect of anesthesia; Aneurysm ([facility] [address] Arrhythmia; Asthma; Autoimmune disease ([facility] [address] CAD (coronary artery disease); Chronic kidney disease; Chronic obstructive pulmonary disease ([facility] [address] Coagulation disorder ([facility] [address] Diabetes ([facility] [address] Difficult intubation; Endocarditis; Heart failure ([facility] [address] Ill-defined condition; Liver disease; Malignant hyperthermia due to anesthesia; Morbid obesity ([facility] [address] Nicotine vapor product user; Non-nicotine vapor product user; Pseudocholinesterase deficiency; Psychiatric disorder; PUD (peptic ulcer disease); Rheumatic fever; Seizures ([facility] [address] Sleep apnea; Stroke [facility]); Thromboembolus ([facility] [address] or Unspecified adverse effect of anesthesia. Ms. Sharmin Olvera  has a past surgical history that includes hx colonoscopy; hx wisdom teeth extraction; hx tonsillectomy (1957); hx vascular access (Right); pr breast surgery procedure unlisted (Left, 7/2015); hx breast biopsy (Left, 1/18/2016); pr mastectomy, partial (Left, 01/18/2016); hx breast lumpectomy (Left, 01/2016); hx orthopaedic; and hx knee replacement (Left, 05/12/2017). Social History/Living Environment:   Home Environment: Private residence  # Steps to Enter: 0  One/Two Story Residence: One story  Living Alone: No  Support Systems: Spouse/Significant Other/Partner  Patient Expects to be Discharged to[de-identified] Private residence  Current DME Used/Available at Home: Petr Flores, straight, Commode, bedside, Walker, rolling  Tub or Shower Type: Shower  Prior Level of Function/Work/Activity:  functionally  independent with ADls and amb   Number of Personal Factors/Comorbidities that affect the Plan of Care: 1-2: MODERATE COMPLEXITY   EXAMINATION:   Most Recent Physical Functioning:               RLE AROM  R Knee Flexion: 95  R Knee Extension: 10            Bed Mobility  Supine to Sit: Stand-by asssistance; Additional time  Sit to Supine: Stand-by asssistance;Contact guard assistance; Additional time    Transfers  Sit to Stand: Stand-by asssistance  Stand to Sit: Stand-by asssistance    Balance  Sitting: Intact  Standing: Intact; With support         Gait Training: Yes    Weight Bearing Status  Right Side Weight Bearing: As tolerated  Distance (ft): 200 Feet (ft) (and another 200 feet)  Ambulation - Level of Assistance: Stand-by asssistance  Assistive Device: Walker, rolling  Speed/Jennifer: Pace decreased (<100 feet/min)  Step Length: Left shortened;Right shortened  Stance: Right decreased  Gait Abnormalities: Antalgic  Interventions: Safety awareness training;Verbal cues     Braces/Orthotics: none    Right Knee Cold  Type: Cryocuff      Body Structures Involved:  1. Bones  2. Joints  3. Muscles Body Functions Affected:  1. Neuromusculoskeletal  2. Movement Related Activities and Participation Affected:  1. Mobility  2. Self Care   Number of elements that affect the Plan of Care: 4+: HIGH COMPLEXITY   CLINICAL PRESENTATION:   Presentation: Stable and uncomplicated: LOW COMPLEXITY   CLINICAL DECISION MAKIN[address] [place] [id number] AM-PAC 6 Clicks   Basic Mobility Inpatient Short Form  How much difficulty does the patient currently have. .. Unable A Lot A Little None   1. Turning over in bed (including adjusting bedclothes, sheets and blankets)? [] 1   [] 2   [x] 3   [] 4   2. Sitting down on and standing up from a chair with arms ( e.g., wheelchair, bedside commode, etc.)   [] 1   [] 2   [x] 3   [] 4   3. Moving from lying on back to sitting on the side of the bed? [] 1   [] 2   [x] 3   [] 4   How much help from another person does the patient currently need. .. Total A Lot A Little None   4. Moving to and from a bed to a chair (including a wheelchair)? [] 1   [] 2   [x] 3   [] 4   5. Need to walk in hospital room? [] 1   [] 2   [x] 3   [] 4   6. Climbing 3-5 steps with a railing? [] 1   [x] 2   [] 3   [] 4   © , Trustees of [address] [place] [id number], under license to [employer].  All rights reserved        Score:  Initial: 17 Most Recent: X (Date: -- )    Interpretation of Tool:  Represents activities that are increasingly more difficult (i.e. Bed mobility, Transfers, Gait). Score 24 23 22-20 19-15 14-10 9-7 6     Modifier CH CI CJ CK CL CM CN      ? Mobility - Walking and Moving Around:     - CURRENT STATUS: CK - 40%-59% impaired, limited or restricted    - GOAL STATUS: CJ - 20%-39% impaired, limited or restricted    - D/C STATUS:  ---------------To be determined---------------  Payor: [employer] MEDICARE / Plan: [employer] MEDICARE CHOICE PPO/PFFS / Product Type: [employer] Care Medicare /      Medical Necessity:     · Patient demonstrates good rehab potential due to higher previous functional level. Reason for Services/Other Comments:  · Patient continues to require present interventions due to patient's inability to perform functional mobility independently. Use of outcome tool(s) and clinical judgement create a POC that gives a: Clear prediction of patient's progress: LOW COMPLEXITY            TREATMENT:   (In addition to Assessment/Re-Assessment sessions the following treatments were rendered)     Pre-treatment Symptoms/Complaints:  none  Pain Initial: no reports of pain     Post Session:  No reports of pain     Gait Training (15 Minutes):  Gait training to improve and/or restore physical functioning as related to dynamic movement of knee - right to improve functional gait pattern. Ambulated 200 Feet (ft) (and another 200 feet) with Stand-by asssistance using a Walker, rolling and minimal Safety awareness training;Verbal cues related to their knee position and motion to promote proper body mechanics. Therapeutic Exercise: (15 Minutes):  Exercises per grid below to improve mobility and strength. Required minimal verbal and manual cues to perform exercises correctly. .  Progressed range and repetitions as indicated.        Date:  1/25/18 1/26/18 Date:     ACTIVITY/EXERCISE AM PM AM PM AM PM   GROUP THERAPY  []  [x]  [x]  [x]  [] []   Ankle Pumps 10 15 20 20     Quad Sets 10 15 20 20     Gluteal Sets 10 15 20 20     Hip ABd/ADduction 10 15 20 20     Straight Leg Raises 10 15 20 20     Knee Slides 10 15 20 20     Short Arc Quads  15 20 20     Long Arc Quads         Chair Slides  15 20 20              B = bilateral; AA = active assistive; A = active; P = passive      Treatment/Session Assessment:     Response to Treatment:  Pt progressing nicely    Education:  [x] Home Exercises  [x] Fall Precautions  [] Hip Precautions [x] D/C Instruction Review  [x] Knee/Hip Prosthesis Review  [x] Walker Management/Safety [] Adaptive Equipment as Needed       Interdisciplinary Collaboration:   o Registered Nurse    After treatment position/precautions:   o Supine in bed  o Bed/Chair-wheels locked  o Bed in low position  o Call light within reach  o Family at bedside    Compliance with Program/Exercises: compliant all of the time    Recommendations/Intent for next treatment session:  Treatment next visit will focus on increasing Ms. Rust's independence with bed mobility, transfers, gait training, strength/ROM exercises, modalities for pain, and patient education.       Total Treatment Duration:  PT Patient Time In/Time Out  Time In: 1406  Time Out: [address] [place], PT

## 2018-01-26 NOTE — PROGRESS NOTES
Problem: Mobility Impaired (Adult and Pediatric)  Goal: *Acute Goals and Plan of Care (Insert Text)  GOALS (1-4 days):  (1.)Ms. Crystal Dexter will move from supine to sit and sit to supine  in bed with CONTACT GUARD ASSIST. Goal met 1/25/18. (2.)Ms. rCystal Dexter will transfer from bed to chair and chair to bed with CONTACT GUARD ASSIST using the least restrictive device. Goal met 1/25/18. (3.)Ms. Crystal Dexter will ambulate with CONTACT GUARD ASSIST for 150 feet with the least restrictive device. Goal met 1/25/18. (4.)Ms. Crystal Dexter will ambulate up/down 4 steps with bilateral  railing with CONTACT GUARD ASSIST with no device. (5.)Ms. Crystal Dexter will increase right knee ROM to 5°-80°. New Goal 1/25/18:  1. Patient will ambulate with SBA for 250' with rolling walker in 1-2 days. ________________________________________________________________________________________________    PHYSICAL THERAPY [facility]: Daily Note, Treatment Day: 2nd, AM 1/26/2018  INPATIENT: Hospital Day: 3  Payor: Delon Kilgore / Plan: [id number] PPO/PFFS / Product Type: Managed Care Medicare /      NAME/AGE/GENDER: Hillary Mera is a 81 y.o. female   PRIMARY DIAGNOSIS:  Primary osteoarthritis of right knee [M17.11]   Procedure(s) and Anesthesia Type:     * RIGHT KNEE ARTHROPLASTY TOTAL - Spinal (Right)  ICD-10: Treatment Diagnosis:    · Pain in Right Knee (M25.561)  · Stiffness of Right Knee, Not elsewhere classified (M25.661)  · Difficulty in walking, Not elsewhere classified (R26.2)  · Other abnormalities of gait and mobility (R26.89)      ASSESSMENT:     Ms. Crystal Dexter presents up in chair on contact and ready for therapy. Pt doing well with gait in the [place] and progressing with her reciprocal gait pattern. In gym worked on TKA exercises and progressing with repetitions. HEP and frequency reviewed. Pt plans to go home today with home health PT to follow up.      This section established at most recent assessment PROBLEM LIST (Impairments causing functional limitations):  1. Decreased Strength  2. Decreased Transfer Abilities  3. Decreased Ambulation Ability/Technique  4. Decreased Balance  5. Increased Pain  6. Decreased Activity Tolerance  7. Increased Fatigue  8. Decreased Flexibility/Joint Mobility  9. Decreased Knowledge of Precautions  10. Decreased [employer] with Home Exercise Program  11. decreased TKA awareness   INTERVENTIONS PLANNED: (Benefits and precautions of physical therapy have been discussed with the patient.)  1. Balance Exercise  2. Bed Mobility  3. Cold  4. Gait Training  5. Home Exercise Program (HEP)  6. Therapeutic Exercise/Strengthening  7. Transfer Training  8. TKA education  9. Range of Motion: active/assisted/passive  10. Therapeutic Activities  11. Group Therapy     TREATMENT PLAN: Frequency/Duration: Follow patient BID   to address above goals. Rehabilitation Potential For Stated Goals: Good     RECOMMENDED REHABILITATION/EQUIPMENT: (at time of discharge pending progress): Continue Skilled Therapy and Home Health: Physical Therapy. HISTORY:   History of Present Injury/Illness (Reason for Referral):  S/P R TKA  Past Medical History/Comorbidities:   Ms. Dillon Balderas  has a past medical history of Anxiety; Arthritis; Breast cancer ([address] (1/18/2016); Cancer ([address] Chronic pain; Former smoker; GERD (gastroesophageal reflux disease); Hard of hearing; Headache; Hypertension; Hypothyroidism; Insomnia; Status post total left knee replacement (5/12/2017); and Status post total right knee replacement (1/24/2018). She also has no past medical history of Adverse effect of anesthesia; Aneurysm ([address] Arrhythmia; Asthma; Autoimmune disease ([address] CAD (coronary artery disease); Chronic kidney disease; Chronic obstructive pulmonary disease ([address] Coagulation disorder ([address] Diabetes ([address] Difficult intubation; Endocarditis; Heart failure ([address]  Ill-defined condition; Liver disease; Malignant hyperthermia due to anesthesia; Morbid obesity ([facility] Nicotine vapor product user; Non-nicotine vapor product user; Pseudocholinesterase deficiency; Psychiatric disorder; PUD (peptic ulcer disease); Rheumatic fever; Seizures ([facility] Sleep apnea; Stroke [facility]); Thromboembolus ([facility] or Unspecified adverse effect of anesthesia. Ms. Crystal Dexter  has a past surgical history that includes hx colonoscopy; hx wisdom teeth extraction; hx tonsillectomy (1957); hx vascular access (Right); pr breast surgery procedure unlisted (Left, 7/2015); hx breast biopsy (Left, 1/18/2016); pr mastectomy, partial (Left, 01/18/2016); hx breast lumpectomy (Left, 01/2016); hx orthopaedic; and hx knee replacement (Left, 05/12/2017). Social History/Living Environment:   Home Environment: Private residence  # Steps to Enter: 0  One/Two Story Residence: One story  Living Alone: No  Support Systems: Spouse/Significant Other/Partner  Patient Expects to be Discharged to[de-identified] Private residence  Current DME Used/Available at Home: Citlaly Conway, straight, Commode, bedside, Walker, rolling  Tub or Shower Type: Shower  Prior Level of Function/Work/Activity:  functionally  independent with ADls and amb   Number of Personal Factors/Comorbidities that affect the Plan of Care: 1-2: MODERATE COMPLEXITY   EXAMINATION:   Most Recent Physical Functioning:               RLE AROM  R Knee Flexion: 95  R Knee Extension: 10                 Transfers  Sit to Stand: Supervision;Stand-by asssistance  Stand to Sit: Supervision;Stand-by asssistance    Balance  Sitting: Intact  Standing: Intact; With support         Gait Training: Yes    Weight Bearing Status  Right Side Weight Bearing: As tolerated  Distance (ft): 175 Feet (ft)  Ambulation - Level of Assistance: Stand-by asssistance  Assistive Device: Walker, rolling  Speed/Jennifer: Pace decreased (<100 feet/min)  Step Length: Left shortened;Right shortened  Stance: Right decreased  Gait Abnormalities: Antalgic  Interventions: Safety awareness training;Verbal cues     Braces/Orthotics: none    Right Knee Cold  Type: Cryocuff      Body Structures Involved:  1. Bones  2. Joints  3. Muscles Body Functions Affected:  1. Neuromusculoskeletal  2. Movement Related Activities and Participation Affected:  1. Mobility  2. Self Care   Number of elements that affect the Plan of Care: 4+: HIGH COMPLEXITY   CLINICAL PRESENTATION:   Presentation: Stable and uncomplicated: LOW COMPLEXITY   CLINICAL DECISION MAKIN[address] [place] [id number] AM-PAC 6 Clicks   Basic Mobility Inpatient Short Form  How much difficulty does the patient currently have. .. Unable A Lot A Little None   1. Turning over in bed (including adjusting bedclothes, sheets and blankets)? [] 1   [] 2   [x] 3   [] 4   2. Sitting down on and standing up from a chair with arms ( e.g., wheelchair, bedside commode, etc.)   [] 1   [] 2   [x] 3   [] 4   3. Moving from lying on back to sitting on the side of the bed? [] 1   [] 2   [x] 3   [] 4   How much help from another person does the patient currently need. .. Total A Lot A Little None   4. Moving to and from a bed to a chair (including a wheelchair)? [] 1   [] 2   [x] 3   [] 4   5. Need to walk in hospital room? [] 1   [] 2   [x] 3   [] 4   6. Climbing 3-5 steps with a railing? [] 1   [x] 2   [] 3   [] 4   © , Trustees of [address] [place] [address], under license to [employer]. All rights reserved        Score:  Initial: 17 Most Recent: X (Date: -- )    Interpretation of Tool:  Represents activities that are increasingly more difficult (i.e. Bed mobility, Transfers, Gait). Score 24 23 22-20 19-15 14-10 9-7 6     Modifier CH CI CJ CK CL CM CN      ?  Mobility - Walking and Moving Around:     - CURRENT STATUS: CK - 40%-59% impaired, limited or restricted    - GOAL STATUS: CJ - 20%-39% impaired, limited or restricted    - D/C STATUS:  ---------------To be determined---------------  Payor: Deepa Cárdenas / Plan: Dodie Fournier MEDICARE CHOICE PPO/PFFS / Product Type: Managed Care Medicare /      Medical Necessity:     · Patient demonstrates good rehab potential due to higher previous functional level. Reason for Services/Other Comments:  · Patient continues to require present interventions due to patient's inability to perform functional mobility independently. Use of outcome tool(s) and clinical judgement create a POC that gives a: Clear prediction of patient's progress: LOW COMPLEXITY            TREATMENT:   (In addition to Assessment/Re-Assessment sessions the following treatments were rendered)     Pre-treatment Symptoms/Complaints:  Minimal pain  Pain: Initial:      Post Session:  2     Gait Training (10 Minutes):  Gait training to improve and/or restore physical functioning as related to dynamic movement of knee - right to improve functional gait pattern. Ambulated 175 Feet (ft) with Stand-by asssistance using a Walker, rolling and minimal Safety awareness training;Verbal cues related to their knee position and motion to promote proper body mechanics. Therapeutic Exercise: (30 Minutes (group)):  Exercises per grid below to improve mobility and strength. Required minimal verbal and manual cues to perform exercises correctly. .  Progressed range and repetitions as indicated.        Date:  1/25/18 1/26/18 Date:     ACTIVITY/EXERCISE AM PM AM PM AM PM   GROUP THERAPY  []  [x]  [x]  []  []  []   Ankle Pumps 10 15 20      Quad Sets 10 15 20      Gluteal Sets 10 15 20      Hip ABd/ADduction 10 15 20      Straight Leg Raises 10 15 20      Knee Slides 10 15 20      Short Arc Quads  15 20      Long Arc Quads         Chair Slides  15 20               B = bilateral; AA = active assistive; A = active; P = passive      Treatment/Session Assessment:     Response to Treatment:  Pt progressing nicely    Education:  [x] Home Exercises  [x] Fall Precautions  [] Hip Precautions [x] D/C Instruction Review  [x] Knee/Hip Prosthesis Review  [x] Ashely Avila Management/Safety [] Adaptive Equipment as Needed       Interdisciplinary Collaboration:   o Registered Nurse  o Rehabilitation Attendant    After treatment position/precautions:   o Up in chair  o Bed/Chair-wheels locked  o Call light within reach    Compliance with Program/Exercises: compliant all of the time    Recommendations/Intent for next treatment session:  Treatment next visit will focus on increasing Ms. Rust's independence with bed mobility, transfers, gait training, strength/ROM exercises, modalities for pain, and patient education.       Total Treatment Duration:  PT Patient Time In/Time Out  Time In: 1030  Time Out: [address] [place], [place]

## 2018-01-26 NOTE — PROGRESS NOTES
2018         Post Op day: 2 Days Post-OpProcedure(s) (LRB):  RIGHT KNEE ARTHROPLASTY TOTAL (Right)      Admit Date: 2018  Admit Diagnosis: Primary osteoarthritis of right knee [M17.11]    LAB:    Recent Results (from the past 24 hour(s))   PLEASE READ & DOCUMENT PPD TEST IN 24 HRS    Collection Time: 18  7:50 AM   Result Value Ref Range    PPD  Negative    mm Induration 0 mm   HEMOGLOBIN    Collection Time: 18  4:44 AM   Result Value Ref Range    HGB 10.2 (L) 11.7 - 15.4 g/dL     Vital Signs:    Patient Vitals for the past 8 hrs:   BP Temp Pulse Resp SpO2   18 0232 124/68 97.8 °F (36.6 °C) 74 16 95 %     Temp (24hrs), Av.9 °F (36.6 °C), Min:97.4 °F (36.3 °C), Max:98.1 °F (36.7 °C)    Body mass index is 29.83 kg/(m^2). Pain Control:   Pain Assessment  Pain Scale 1: Numeric (0 - 10)  Pain Intensity 1: 3  Pain Onset 1: years ago  Pain Location 1: Knee  Pain Orientation 1: Right  Pain Description 1: Aching  Pain Intervention(s) 1: Medication (see MAR)    Subjective: Doing well, No complaints, No SOB, No Chest Pain, No Nausea or Vomitting     Objective: Vital Signs are Stable, No Acute Distress, Alert and Oriented, Dressing is Dry,  Neurovascular exam is normal.       PT/OT:            Assistive Device: Walker (comment)  RLE AROM  R Knee Flexion: 100  R Knee Extension: -10             Weight Bearing Status: WBAT    Meds:  [unfilled]  [unfilled]  [unfilled]    Assessment:   Patient Active Problem List   Diagnosis Code    Malignant neoplasm of left breast (HCC) C50.912    Dyspnea R06.00    Anemia D64.9    Pulmonary infiltrates R91.8    GERD (gastroesophageal reflux disease) K21.9    Hypertension I10    Hypomagnesemia E83.42    Status post total left knee replacement Z96.652    Status post total right knee replacement Z96.651             Plan: Continue Physical Therapy, Monitor  Hbg, home today.         Signed By: Moira Gallegos MD

## 2018-01-26 NOTE — PROGRESS NOTES
Medicated for pain again with oxycodone 10 mg. Given prescription for aspirin and instructed how to take. Waiting for prescription for oxycodone. Pain medication was changed due to pt saying dilaudid po was not working well. Home therapy to see pt. Already has follow up appt scheduled with Dr Christiano Baig. Instructed to call doctor if having fever, excessive drainage, numbness or other problems. I have reviewed discharge instructions with the patient and spouse. The patient and spouse verbalized understanding. Pt to be discharged home with .

## 2018-01-26 NOTE — DISCHARGE INSTRUCTIONS
[place] [facility]   Patient Discharge Instructions    Laura Pang / [id number] : 1945    Admitted 2018 Discharged: 2018     IF YOU HAVE ANY PROBLEMS ONCE YOU ARE AT HOME CALL THE FOLLOWING NUMBERS:   Main office number: [phone]      Medications    · The medications you are to continue on are listed on the medication reconciliation sheet. · Narcotic pain medications as well as supplemental iron can cause constipation. If this occurs try stopping the narcotic pain medication and/or the iron. · It is important that you take the medication exactly as they are prescribed. · Medications which increase your risk of blood clots are listed to stop for 5 weeks after surgery as well as medications or supplements which increase your risk of bleeding complications. · Keep your medication in the bottles provided by the pharmacist and keep a list of the medication names, dosages, and times to be taken in your wallet. · Do not take other medications without consulting your doctor. Important Information    Do NOT smoke as this will greatly increase your risk of infection! Resume your prehospital diet. If you have excessive nausea or vomitting call your doctor's office     Leg swelling and warmth is normal for 6 months after surgery. If you experience swelling in your leg elevate you leg while laying down with your toes above your heart. If you have sudden onset severe swelling with leg pain call our office. Use Yaron Hose stockings until we see you in the office for your follow up appointment. The stitches deep inside take approximately 6 months to dissolve. There will be sharp shooting, stinging and burning pain. This is normal and will resolve between 3-6 months after surgery. Difficulty sleeping is normal following total Knee and Hip replacement. You may try melatonin, an over-the-counter sleep aid or benadryl to help with sleep.  Most patients will resume sleeping through the night 8 weeks after surgery. Home Physical Therapy is arranged. Home Health will contact you within 48 hrs of discharge that you have chosen. If you have not received a call within this time frame please contact that provider you chose. You should be given this information before you leave the hospital.     You are at a risk for falls. Use the rolling walker when walking. Patients who have had a joint replacement should not drive if they are still taking narcotic pain mediation during the daytime hours. Most patients wean themselves off of pain medication within 2-5 weeks after surgery. When to Call the office    - If you have a temperature greater then 101  - Uncontrolled vomiting   - Loose control of your bladder or bowel function  - Are unable to bear any weight   - Need a pain medication refill       DISCHARGE SUMMARY from Nurse    The following personal items collected during your admission are returned to you:   Dental Appliance: Dental Appliances: None  Vision: Visual Aid: Glasses  Hearing Aid:   na  Jewelry: Jewelry: None  Clothing:   self  Other Valuables: Other Valuables: Cell Phone (with Abebe Hoops)  Valuables sent to safe:   na    PATIENT INSTRUCTIONS:    After general anesthesia or intravenous sedation, for 24 hours or while taking prescription Narcotics:  · Limit your activities  · Do not drive and operate hazardous machinery  · Do not make important personal or business decisions  · Do  not drink alcoholic beverages  · If you have not urinated within 8 hours after discharge, please contact your surgeon on call.     Report the following to your surgeon:  · Excessive pain, swelling, redness or odor of or around the surgical area  · Temperature over 101  · Nausea and vomiting lasting longer than 4 hours or if unable to take medications  · Any signs of decreased circulation or nerve impairment to extremity: change in color, persistent  numbness, tingling, coldness or increase pain  · Any questions, call office @ [phone]      Keep scheduled follow up appointment. If need to change, call office @ [phone]. *  Please give a list of your current medications to your Primary Care Provider. *  Please update this list whenever your medications are discontinued, doses are      changed, or new medications (including over-the-counter products) are added. *  Please carry medication information at all times in case of emergency situations. Total Knee Replacement: What to Expect at [address] [place] [address]    When you leave the hospital, you should be able to move around with a walker or crutches. But you will need someone to help you at home for the next few weeks or until you have more energy and can move around better. If there is no one to help you at home, you may go to a rehabilitation center. You will go home with a bandage and stitches or staples. Change the bandage as your doctor tells you to. Your doctor will remove your stitches or staples 10 to 21 days after your surgery. You may still have some mild pain, and the area may be swollen for 3 to 6 months after surgery. Your knee will continue to improve for 6 to 12 months. You will probably use a walker for 1 to 3 weeks and then use crutches. When you are ready, you can use a cane. You will probably be able to walk on your own in 4 to 8 weeks. You will need to do months of physical rehabilitation (rehab) after a knee replacement. Rehab will help you strengthen the muscles of the knee and help you regain movement. After you recover, your artificial knee will allow you to do normal daily activities with less pain or no pain at all. You may be able to hike, dance, ride a bike, and play golf. Talk to your doctor about whether you can do more strenuous activities. Always tell your caregivers that you have an artificial knee.   How long it will take to walk on your own, return to normal activities, and go back to work depends on your health and how well your rehabilitation (rehab) program goes. The better you do with your rehab exercises, the quicker you will get your strength and movement back. This care sheet gives you a general idea about how long it will take for you to recover. But each person recovers at a different pace. Follow the steps below to get better as quickly as possible. How can you care for yourself at home? Activity  ? · Rest when you feel tired. You may take a nap, but do not stay in bed all day. When you sit, use a chair with arms. You can use the arms to help you stand up. ? · Work with your physical therapist to find the best way to exercise. You may be able to take frequent, short walks using crutches or a walker. What you can do as your knee heals will depend on whether your new knee is cemented or uncemented. You may not be able to do certain things for a while if your new knee is uncemented. ? · After your knee has healed enough, you can do more strenuous activities with caution. ¨ You can golf, but use a golf cart, and do not wear shoes with spikes. ¨ You can bike on a flat road or on a stationary bike. Avoid biking up hills. ¨ Your doctor may suggest that you stay away from activities that put stress on your knee. These include tennis or badminton, squash or racquetball, contact sports like football, jumping (such as in basketball), jogging, or running. ¨ Avoid activities where you might fall. These include horseback riding, skiing, and mountain biking. ? · Do not sit for more than 1 hour at a time. Get up and walk around for a while before you sit again. If you must sit for a long time, prop up your leg with a chair or footstool. This will help you avoid swelling. ? · Ask your doctor when you can shower. You may need to take sponge baths until your stitches or staples have been removed. ? · Ask your doctor when you can drive again.  It may take up to 8 weeks after knee replacement surgery before it is safe for you to drive. ? · When you get into a car, sit on the edge of the seat. Then pull in your legs, and turn to face the front. ? · You should be able to do many everyday activities 3 to 6 weeks after your surgery. You will probably need to take 4 to 16 weeks off from work. When you can go back to work depends on the type of work you do and how you feel. ? · Ask your doctor when it is okay for you to have sex. ? · Do not lift anything heavier than 10 pounds and do not lift weights for 12 weeks. Diet  ? · By the time you leave the hospital, you should be eating your normal diet. If your stomach is upset, try bland, low-fat foods like plain rice, broiled chicken, toast, and yogurt. Your doctor may suggest that you take iron and vitamin supplements. ? · Drink plenty of fluids (unless your doctor tells you not to). ? · Eat healthy foods, and watch your portion sizes. Try to stay at your ideal weight. Too much weight puts more stress on your new knee. ? · You may notice that your bowel movements are not regular right after your surgery. This is common. Try to avoid constipation and straining with bowel movements. You may want to take a fiber supplement every day. If you have not had a bowel movement after a couple of days, ask your doctor about taking a mild laxative. Medicines  ? · Your doctor will tell you if and when you can restart your medicines. He or she will also give you instructions about taking any new medicines. ? · If you take blood thinners, such as warfarin (Coumadin), clopidogrel (Plavix), or aspirin, be sure to talk to your doctor. He or she will tell you if and when to start taking those medicines again. Make sure that you understand exactly what your doctor wants you to do.   ? · Your doctor may give you a blood-thinning medicine to prevent blood clots. If you take a blood thinner, be sure you get instructions about how to take your medicine safely.  Blood thinners can cause serious bleeding problems. This medicine could be in pill form or as a shot (injection). If a shot is necessary, your doctor will tell you how to do this. ? · Be safe with medicines. Take pain medicines exactly as directed. ¨ If the doctor gave you a prescription medicine for pain, take it as prescribed. ¨ If you are not taking a prescription pain medicine, ask your doctor if you can take an over-the-counter medicine. ¨ Plan to take your pain medicine 30 minutes before exercises. It is easier to prevent pain before it starts than to stop it once it has started. ? · If you think your pain medicine is making you sick to your stomach:  ¨ Take your medicine after meals (unless your doctor has told you not to). ¨ Ask your doctor for a different pain medicine. ? · If your doctor prescribed antibiotics, take them as directed. Do not stop taking them just because you feel better. You need to take the full course of antibiotics. Incision care  ? · You will have a bandage over the cut (incision) and staples or stitches. Take the bandage off when your doctor says it is okay. ? · Your doctor will remove the staples or stitches 10 days to 3 weeks after the surgery and replace them with strips of tape. Leave the tape on for a week or until it falls off. Exercise  ? · Your rehab program will give you a number of exercises to do to help you get back your knee's range of motion and strength. Always do them as your therapist tells you. Ice and elevation  ? · For pain and swelling, put ice or a cold pack on the area for 10 to 20 minutes at a time. Put a thin cloth between the ice and your skin. Other instructions  ? · Continue to wear your support stockings as your doctor says. These help to prevent blood clots. The length of time that you will have to wear them depends on your activity level and the amount of swelling. ? · You have metal pieces in your knee. These may set off some airport metal detectors. Carry a medical alert card that says you have an artificial joint, just in case. Follow-up care is a key part of your treatment and safety. Be sure to make and go to all appointments, and call your doctor if you are having problems. It's also a good idea to know your test results and keep a list of the medicines you take. When should you call for help? Call 911 anytime you think you may need emergency care. For example, call if:  ? · You passed out (lost consciousness). ? · You have severe trouble breathing. ? · You have sudden chest pain and shortness of breath, or you cough up blood. ?Call your doctor now or seek immediate medical care if:  ? · You have signs of infection, such as:  ¨ Increased pain, swelling, warmth, or redness. ¨ Red streaks leading from the incision. ¨ Pus draining from the incision. ¨ A fever. ? · You have signs of a blood clot, such as:  ¨ Pain in your calf, back of the knee, thigh, or groin. ¨ Redness and swelling in your leg or groin. ? · Your incision comes open and begins to bleed, or the bleeding increases. ? · You have pain that does not get better after you take pain medicine. ? Watch closely for changes in your health, and be sure to contact your doctor if:  ? · You do not have a bowel movement after taking a laxative. Where can you learn more? Go to http://teo-avis.info/. Enter E580 in the search box to learn more about \"Total Knee Replacement: What to Expect at Home. \"  Current as of: March 21, 2017  Content Version: 11.4  © 5185-5545 [employer]. Care instructions adapted under license by [employer] (which disclaims liability or warranty for this information). If you have questions about a medical condition or this instruction, always ask your healthcare professional. [address] any warranty or liability for your use of this information.         These are general instructions for a healthy lifestyle:    No smoking/ No tobacco products/ Avoid exposure to second hand smoke    Surgeon General's Warning:  Quitting smoking now greatly reduces serious risk to your health. Obesity, smoking, and sedentary lifestyle greatly increases your risk for illness    A healthy diet, regular physical exercise & weight monitoring are important for maintaining a healthy lifestyle    You may be retaining fluid if you have a history of heart failure or if you experience any of the following symptoms:  Weight gain of 3 pounds or more overnight or 5 pounds in a week, increased swelling in our hands or feet or shortness of breath while lying flat in bed. Please call your doctor as soon as you notice any of these symptoms; do not wait until your next office visit. Recognize signs and symptoms of STROKE:    F-face looks uneven    A-arms unable to move or move even    S-speech slurred or non-existent    T-time-call 911 as soon as signs and symptoms begin-DO NOT go       Back to bed or wait to see if you get better-TIME IS BRAIN. The discharge information has been reviewed with the patient. The patient verbalized understanding. Information obtained by :  I understand that if any problems occur once I am at home I am to contact my physician. I understand and acknowledge receipt of the instructions indicated above.                                                                                                                                            Physician's or R.N.'s Signature                                                                  Date/Time                                                                                                                                              Patient or Representative Signature                                                          Date/Time

## 2018-01-26 NOTE — PROGRESS NOTES
Dilaudid 2 mg po for c/o pain. Slept at intervals during shift. No further c/o voiced. No change in NV status noted. Family member at bedside. Call light within reach.

## 2018-01-27 ENCOUNTER — HOME CARE VISIT (OUTPATIENT)
Dept: SCHEDULING | Facility: HOME HEALTH | Age: 73
End: 2018-01-27
Payer: MEDICARE

## 2018-01-27 PROCEDURE — 3331090001 HH PPS REVENUE CREDIT

## 2018-01-27 PROCEDURE — 3331090002 HH PPS REVENUE DEBIT

## 2018-01-27 PROCEDURE — G0151 HHCP-SERV OF PT,EA 15 MIN: HCPCS

## 2018-01-27 PROCEDURE — 400013 HH SOC

## 2018-01-28 PROCEDURE — 3331090002 HH PPS REVENUE DEBIT

## 2018-01-28 PROCEDURE — 3331090001 HH PPS REVENUE CREDIT

## 2018-01-29 ENCOUNTER — HOME CARE VISIT (OUTPATIENT)
Dept: SCHEDULING | Facility: HOME HEALTH | Age: 73
End: 2018-01-29
Payer: MEDICARE

## 2018-01-29 VITALS
RESPIRATION RATE: 16 BRPM | SYSTOLIC BLOOD PRESSURE: 126 MMHG | HEART RATE: 88 BPM | TEMPERATURE: 96.3 F | DIASTOLIC BLOOD PRESSURE: 76 MMHG

## 2018-01-29 PROCEDURE — G0151 HHCP-SERV OF PT,EA 15 MIN: HCPCS

## 2018-01-29 PROCEDURE — 3331090002 HH PPS REVENUE DEBIT

## 2018-01-29 PROCEDURE — 3331090001 HH PPS REVENUE CREDIT

## 2018-01-29 NOTE — ANESTHESIA POSTPROCEDURE EVALUATION
Post-Anesthesia Evaluation and Assessment    Patient: Sherri Sood MRN: [id number]  SSN: [id number]    YOB: 1945  Age: 81 y.o. Sex: female       Cardiovascular Function/Vital Signs  Visit Vitals    /78 (BP 1 Location: Right arm, BP Patient Position: At rest)    Pulse 88    Temp 37.1 °C (98.7 °F)    Resp 16    Ht 5' 4.5\" (1.638 m)    Wt 80.1 kg (176 lb 8 oz)    SpO2 97%    Breastfeeding No    BMI 29.83 kg/m2       Patient is status post spinal anesthesia for Procedure(s):  RIGHT KNEE ARTHROPLASTY TOTAL. Nausea/Vomiting: None    Postoperative hydration reviewed and adequate. Pain:  Pain Scale 1:  (no complaints of pain) (01/26/18 1436)  Pain Intensity 1: 3 (01/26/18 0553)   Managed    Neurological Status:   Neuro (WDL): Within Defined Limits (01/24/18 1111)  Neuro  Neurologic State: Alert (01/26/18 0992)  Orientation Level: Oriented X4 (01/26/18 8480)  Cognition: Appropriate decision making; Appropriate for age attention/concentration; Appropriate safety awareness; Follows commands (01/25/18 1000)  LUE Motor Response: Purposeful (01/24/18 1111)  LLE Motor Response: Purposeful (01/24/18 1145)  RUE Motor Response: Purposeful (01/24/18 1111)  RLE Motor Response: Purposeful (01/24/18 1145)   At baseline    Mental Status and Level of Consciousness: Arousable    Pulmonary Status:   O2 Device: Room air (01/24/18 1435)   Adequate oxygenation and airway patent    Complications related to anesthesia: None    Post-anesthesia assessment completed.  No concerns    Signed By: Hue Beauchamp MD     January 29, 2018

## 2018-01-30 PROCEDURE — 3331090001 HH PPS REVENUE CREDIT

## 2018-01-30 PROCEDURE — 3331090002 HH PPS REVENUE DEBIT

## 2018-01-31 ENCOUNTER — HOME CARE VISIT (OUTPATIENT)
Dept: SCHEDULING | Facility: HOME HEALTH | Age: 73
End: 2018-01-31
Payer: MEDICARE

## 2018-01-31 PROCEDURE — 3331090002 HH PPS REVENUE DEBIT

## 2018-01-31 PROCEDURE — 3331090001 HH PPS REVENUE CREDIT

## 2018-01-31 PROCEDURE — G0157 HHC PT ASSISTANT EA 15: HCPCS

## 2018-02-01 VITALS
DIASTOLIC BLOOD PRESSURE: 76 MMHG | RESPIRATION RATE: 18 BRPM | HEART RATE: 72 BPM | TEMPERATURE: 97.8 F | SYSTOLIC BLOOD PRESSURE: 138 MMHG

## 2018-02-01 PROCEDURE — 3331090002 HH PPS REVENUE DEBIT

## 2018-02-01 PROCEDURE — 3331090001 HH PPS REVENUE CREDIT

## 2018-02-02 ENCOUNTER — HOME CARE VISIT (OUTPATIENT)
Dept: SCHEDULING | Facility: HOME HEALTH | Age: 73
End: 2018-02-02
Payer: MEDICARE

## 2018-02-02 PROCEDURE — G0157 HHC PT ASSISTANT EA 15: HCPCS

## 2018-02-02 PROCEDURE — 3331090001 HH PPS REVENUE CREDIT

## 2018-02-02 PROCEDURE — 3331090002 HH PPS REVENUE DEBIT

## 2018-02-03 VITALS
HEART RATE: 76 BPM | TEMPERATURE: 98.1 F | SYSTOLIC BLOOD PRESSURE: 138 MMHG | RESPIRATION RATE: 16 BRPM | DIASTOLIC BLOOD PRESSURE: 74 MMHG

## 2018-02-03 PROCEDURE — 3331090001 HH PPS REVENUE CREDIT

## 2018-02-03 PROCEDURE — 3331090002 HH PPS REVENUE DEBIT

## 2018-02-04 PROCEDURE — 3331090002 HH PPS REVENUE DEBIT

## 2018-02-04 PROCEDURE — 3331090001 HH PPS REVENUE CREDIT

## 2018-02-05 ENCOUNTER — HOME CARE VISIT (OUTPATIENT)
Dept: SCHEDULING | Facility: HOME HEALTH | Age: 73
End: 2018-02-05
Payer: MEDICARE

## 2018-02-05 VITALS
DIASTOLIC BLOOD PRESSURE: 70 MMHG | SYSTOLIC BLOOD PRESSURE: 130 MMHG | RESPIRATION RATE: 17 BRPM | HEART RATE: 74 BPM | TEMPERATURE: 97.6 F

## 2018-02-05 PROCEDURE — 3331090002 HH PPS REVENUE DEBIT

## 2018-02-05 PROCEDURE — G0157 HHC PT ASSISTANT EA 15: HCPCS

## 2018-02-05 PROCEDURE — 3331090001 HH PPS REVENUE CREDIT

## 2018-02-06 PROCEDURE — 3331090001 HH PPS REVENUE CREDIT

## 2018-02-06 PROCEDURE — 3331090002 HH PPS REVENUE DEBIT

## 2018-02-07 ENCOUNTER — HOME CARE VISIT (OUTPATIENT)
Dept: SCHEDULING | Facility: HOME HEALTH | Age: 73
End: 2018-02-07
Payer: MEDICARE

## 2018-02-07 VITALS
SYSTOLIC BLOOD PRESSURE: 126 MMHG | HEART RATE: 80 BPM | TEMPERATURE: 98 F | DIASTOLIC BLOOD PRESSURE: 78 MMHG | RESPIRATION RATE: 16 BRPM

## 2018-02-07 PROCEDURE — 3331090001 HH PPS REVENUE CREDIT

## 2018-02-07 PROCEDURE — 3331090002 HH PPS REVENUE DEBIT

## 2018-02-07 PROCEDURE — G0157 HHC PT ASSISTANT EA 15: HCPCS

## 2018-02-08 ENCOUNTER — HOME CARE VISIT (OUTPATIENT)
Dept: HOME HEALTH SERVICES | Facility: HOME HEALTH | Age: 73
End: 2018-02-08
Payer: MEDICARE

## 2018-02-08 PROCEDURE — 3331090001 HH PPS REVENUE CREDIT

## 2018-02-08 PROCEDURE — 3331090002 HH PPS REVENUE DEBIT

## 2018-02-09 ENCOUNTER — HOME CARE VISIT (OUTPATIENT)
Dept: SCHEDULING | Facility: HOME HEALTH | Age: 73
End: 2018-02-09
Payer: MEDICARE

## 2018-02-09 PROCEDURE — A4649 SURGICAL SUPPLIES: HCPCS

## 2018-02-09 PROCEDURE — G0157 HHC PT ASSISTANT EA 15: HCPCS

## 2018-02-09 PROCEDURE — 3331090001 HH PPS REVENUE CREDIT

## 2018-02-09 PROCEDURE — 3331090002 HH PPS REVENUE DEBIT

## 2018-02-10 VITALS
RESPIRATION RATE: 16 BRPM | TEMPERATURE: 97.6 F | HEART RATE: 76 BPM | SYSTOLIC BLOOD PRESSURE: 124 MMHG | DIASTOLIC BLOOD PRESSURE: 76 MMHG

## 2018-02-10 PROCEDURE — 3331090002 HH PPS REVENUE DEBIT

## 2018-02-10 PROCEDURE — 3331090001 HH PPS REVENUE CREDIT

## 2018-02-11 PROCEDURE — 3331090002 HH PPS REVENUE DEBIT

## 2018-02-11 PROCEDURE — 3331090001 HH PPS REVENUE CREDIT

## 2018-02-12 ENCOUNTER — HOME CARE VISIT (OUTPATIENT)
Dept: SCHEDULING | Facility: HOME HEALTH | Age: 73
End: 2018-02-12
Payer: MEDICARE

## 2018-02-12 PROCEDURE — 3331090001 HH PPS REVENUE CREDIT

## 2018-02-12 PROCEDURE — 3331090002 HH PPS REVENUE DEBIT

## 2018-02-12 PROCEDURE — G0157 HHC PT ASSISTANT EA 15: HCPCS

## 2018-02-13 VITALS
SYSTOLIC BLOOD PRESSURE: 124 MMHG | HEART RATE: 80 BPM | RESPIRATION RATE: 16 BRPM | TEMPERATURE: 98 F | DIASTOLIC BLOOD PRESSURE: 64 MMHG

## 2018-02-13 PROCEDURE — 3331090001 HH PPS REVENUE CREDIT

## 2018-02-13 PROCEDURE — 3331090002 HH PPS REVENUE DEBIT

## 2018-02-14 PROCEDURE — 3331090001 HH PPS REVENUE CREDIT

## 2018-02-14 PROCEDURE — 3331090002 HH PPS REVENUE DEBIT

## 2018-02-15 ENCOUNTER — HOME CARE VISIT (OUTPATIENT)
Dept: SCHEDULING | Facility: HOME HEALTH | Age: 73
End: 2018-02-15
Payer: MEDICARE

## 2018-02-15 VITALS
RESPIRATION RATE: 18 BRPM | HEART RATE: 80 BPM | DIASTOLIC BLOOD PRESSURE: 68 MMHG | TEMPERATURE: 96.4 F | SYSTOLIC BLOOD PRESSURE: 124 MMHG

## 2018-02-15 PROCEDURE — 3331090002 HH PPS REVENUE DEBIT

## 2018-02-15 PROCEDURE — 3331090001 HH PPS REVENUE CREDIT

## 2018-02-15 PROCEDURE — G0151 HHCP-SERV OF PT,EA 15 MIN: HCPCS

## 2018-02-16 PROCEDURE — 3331090002 HH PPS REVENUE DEBIT

## 2018-02-16 PROCEDURE — 3331090001 HH PPS REVENUE CREDIT

## 2018-02-17 PROCEDURE — 3331090002 HH PPS REVENUE DEBIT

## 2018-02-17 PROCEDURE — 3331090001 HH PPS REVENUE CREDIT

## 2018-02-19 ENCOUNTER — HOSPITAL ENCOUNTER (OUTPATIENT)
Dept: PHYSICAL THERAPY | Age: 73
Discharge: HOME | End: 2018-02-19
Payer: MEDICARE

## 2018-02-19 PROCEDURE — 97110 THERAPEUTIC EXERCISES: CPT

## 2018-02-19 PROCEDURE — G8978 MOBILITY CURRENT STATUS: HCPCS

## 2018-02-19 PROCEDURE — G8979 MOBILITY GOAL STATUS: HCPCS

## 2018-02-19 PROCEDURE — 97162 PT EVAL MOD COMPLEX 30 MIN: CPT

## 2018-02-19 NOTE — THERAPY EVALUATION
Ashley Wilson  : 1945  Primary: Lukas Lares Medicare Choice *  Secondary:  [address]  [facility] [address] [address], [facility] [address].  [phone]   [phone]           OUTPATIENT PHYSICAL THERAPY:Initial Assessment 2018    ICD-10: Treatment Diagnosis: Pain in right knee (M25.561); Stiffness of right knee, not elsewhere classified (M25.661)  Precautions/Allergies:   Review of patient's allergies indicates no known allergies. Fall Risk Score: 1 (? 5 = High Risk)  MD Orders: Evaluate and Treat MEDICAL/REFERRING DIAGNOSIS:  Presence of right artificial knee joint [Z96.651]   DATE OF ONSET: Surgery 18  REFERRING PHYSICIAN: Valente Kawasaki., *  RETURN PHYSICIAN APPOINTMENT: Pt unaware of follow up appt at this time     INITIAL ASSESSMENT:  Ms. Denise Stinson presents with decreased ROM/strength R knee with increased pain leading to decreased functional status s/p R TKA 18. Pt would benefit from skilled physical therapy services to address the above deficits and help patient return to prior level of function. PROBLEM LIST (Impacting functional limitations):  1. Decreased Strength  2. Decreased ADL/Functional Activities  3. Increased Pain  4. Decreased Flexibility/Joint Mobility  5. Decreased [place] with Home Exercise Program INTERVENTIONS PLANNED:  1. Balance Exercise  2. Cold  3. Cryotherapy  4. Electrical Stimulation  5. Gait Training  6. Home Exercise Program (HEP)  7. Manual Therapy  8. Range of Motion (ROM)  9. Therapeutic Exercise/Strengthening   TREATMENT PLAN:  Effective Dates: 18 TO 18. Frequency/Duration: 2 times a week for 2 months  GOALS: (Goals have been discussed and agreed upon with patient.)  Short-Term Functional Goals: Time Frame: 4 weeks  1. Pt will increase ROM R knee 0-120 degrees to assist with ascending/descending stairs  2.  Pt will increase strength R knee 4/5 to assist with ascending/descending stairs  Discharge Goals: Time Frame: 8 weeks  1. Pt will increase strength R knee 5/5 to assist with ascending/descending stairs  2. Pt will be independent with HEP  3. Pt will ascend/descend 10 six-inch steps independently with min to no c/o R knee pain  4. Pt will perform 20 minutes household cleaning activities independently with min to no c/o R knee pain  Rehabilitation Potential For Stated Goals: Good  Regarding Cindy Rust's therapy, I certify that the treatment plan above will be carried out by a therapist or under their direction. Thank you for this referral,  Last Story, PT     Referring Physician Signature: Jolie Lamas., *              Date                    The information in this section was collected on 02-19-18 (except where otherwise noted). HISTORY:   History of Present Injury/Illness (Reason for Referral):  Pt reports insidious onset R knee pain of 5-6 years duration. She had several injections with continued pain so she opted for TKA which was performed 01-24-18. She spent 2 nights in hospital followed by home health PT until last Friday. Pt rates her current R knee pain 7-8/10 sitting at rest.  She is taking oxycodone for pain. She is retired. Pt lives in a one story house. She states she has difficulties ascending/descending stairs due to R knee pain, weakness and decreased ROM. Pt states she is having difficulties with all household cooking and cleaning activities due to her R knee surgery. Past Medical History/Comorbidities:   Ms. Abbie Tesfaye  has a past medical history of Anxiety; Arthritis; Breast cancer ([facility] [address] (1/18/2016); Cancer ([address] Chronic pain; Former smoker; GERD (gastroesophageal reflux disease); Hard of hearing; Headache; Hypertension; Hypothyroidism; Insomnia; Status post total left knee replacement (5/12/2017); and Status post total right knee replacement (1/24/2018). She also has no past medical history of Adverse effect of anesthesia;  Aneurysm ([address] Arrhythmia; Asthma; Autoimmune disease ([facility] [address] CAD (coronary artery disease); Chronic kidney disease; Chronic obstructive pulmonary disease ([facility] [address] Coagulation disorder ([facility] [address] Diabetes ([facility] [address] Difficult intubation; Endocarditis; Heart failure ([facility] [address] Ill-defined condition; Liver disease; Malignant hyperthermia due to anesthesia; Morbid obesity ([facility] [address] Nicotine vapor product user; Non-nicotine vapor product user; Pseudocholinesterase deficiency; Psychiatric disorder; PUD (peptic ulcer disease); Rheumatic fever; Seizures ([facility] [address] Sleep apnea; Stroke [facility]); Thromboembolus ([facility] [address] or Unspecified adverse effect of anesthesia. Ms. Arti Myers  has a past surgical history that includes hx colonoscopy; hx wisdom teeth extraction; hx tonsillectomy (1957); hx vascular access (Right); pr breast surgery procedure unlisted (Left, 7/2015); hx breast biopsy (Left, 1/18/2016); pr mastectomy, partial (Left, 01/18/2016); hx breast lumpectomy (Left, 01/2016); hx orthopaedic; and hx knee replacement (Left, 05/12/2017). Social History/Living Environment:     Pt lives with spouse in a one story house  Prior Level of Function/Work/Activity:  Pt is retired  Dominant Side:         RIGHT  Other Clinical Tests:          X-rays R knee  Personal Factors:          Sex:  female        Age:  72 y.o. Profession:  Pt is retired  Current Medications:       Current Outpatient Prescriptions:     polyethylene glycol (MIRALAX) 17 gram/dose powder, Take 17 g by mouth as needed (constipation). as needed daily, Disp: , Rfl:     vitamin E topical cream, Apply 1 Dose to affected area two (2) times daily as needed for Itching., Disp: , Rfl:     temazepam (RESTORIL) 15 mg capsule, Take 1 Cap by mouth nightly. Max Daily Amount: 15 mg., Disp: 30 Cap, Rfl: 5    oxyCODONE IR (ROXICODONE) 5 mg immediate release tablet, Take 1 Tab by mouth every three (3) hours as needed. Max Daily Amount: 40 mg.  (Patient taking differently: Take 1-2 Tabs by mouth every four (4) hours as needed for Pain. every 4-6 hours as needed), Disp: 40 Tab, Rfl: 0    aspirin delayed-release 325 mg tablet, Take 1 Tab by mouth every twelve (12) hours every twelve (12) hours for 35 days. , Disp: 70 Tab, Rfl: 0    HYDROmorphone (DILAUDID) 2 mg tablet, Take 1 Tab by mouth every four (4) hours as needed. Max Daily Amount: 12 mg., Disp: 40 Tab, Rfl: 0    ascorbic acid, vitamin C, (VITAMIN C) 500 mg tablet, Take 1,000 mg by mouth daily. , Disp: , Rfl:     celecoxib (CELEBREX) 200 mg capsule, Take 1 Cap by mouth two (2) times a day., Disp: 60 Cap, Rfl: 5    levothyroxine (SYNTHROID) 25 mcg tablet, TAKE ONE TABLET BY MOUTH ONE TIME DAILY BEFORE BREAKFAST, Disp: 30 Tab, Rfl: 6    SUMAtriptan (IMITREX) 100 mg tablet, Take 1 Tab by mouth once as needed for Migraine for up to 1 dose., Disp: 12 Tab, Rfl: 3    hydroCHLOROthiazide (HYDRODIURIL) 25 mg tablet, TAKE ONE TABLET BY MOUTH ONE TIME DAILY, Disp: 30 Tab, Rfl: 5    acetaminophen (TYLENOL) 500 mg tablet, Take 1,000 mg by mouth every six (6) hours as needed for Pain. Take / use AM day of surgery  per anesthesia protocols if needed. , Disp: , Rfl:     Biotin 2,500 mcg cap, Take 2 Tabs by mouth daily. , Disp: , Rfl:     multivitamin (ONE A DAY) tablet, Take 1 Tab by mouth daily. , Disp: , Rfl:     esomeprazole (NEXIUM) 20 mg capsule, Take 20 mg by mouth every morning. Take / use AM day of surgery  per anesthesia protocols.   Indications: GASTROESOPHAGEAL REFLUX, Disp: , Rfl:    Date Last Reviewed:  02-19-18   Number of Personal Factors/Comorbidities that affect the Plan of Care: 1-2: MODERATE COMPLEXITY   EXAMINATION:   Observation/Orthostatic Postural Assessment:          Pt walks with straight cane and moderate antalgic gait  Palpation:          Generalized tenderness throughout R knee  ROM:    R knee extension = -19 degrees  R knee flexion = 100 degrees    L knee extension = 0 degrees  L knee flexion = 120 degrees    Strength:    R knee extension = 4-/5  R knee flexion = 4-/5    L knee extension = 5/5  L knee flexion = 5/5    Special Tests:          N/A  Neurological Screen:        Sensation: Intact to light touch R knee  Functional Mobility:         Gait/Ambulation:  Pt walks with straight cane and moderate antalgic gait        Transfers:  Pt able to transition sit to supine and supine to sit independently    Body Structures Involved:  1. Bones  2. Joints  3. Muscles Body Functions Affected:  1. Sensory/Pain  2. Neuromusculoskeletal Activities and Participation Affected:  1. Mobility  2. Domestic Life   Number of elements (examined above) that affect the Plan of Care: 3: MODERATE COMPLEXITY   CLINICAL PRESENTATION:   Presentation: Evolving clinical presentation with changing clinical characteristics: MODERATE COMPLEXITY   CLINICAL DECISION MAKING:   Outcome Measure: Tool Used: Lower Extremity Functional Scale (LEFS)  Score:  Initial: 19/80 Most Recent: X/80 (Date: -- )   Interpretation of Score: 20 questions each scored on a 5 point scale with 0 representing \"extreme difficulty or unable to perform\" and 4 representing \"no difficulty\". The lower the score, the greater the functional disability. 80/80 represents no disability. Minimal detectable change is 9 points. Score 80 79-63 62-48 47-32 31-16 15-1 0   Modifier CH CI CJ CK CL CM CN     ? Mobility - Walking and Moving Around:     - CURRENT STATUS: CL - 60%-79% impaired, limited or restricted    - GOAL STATUS: CK - 40%-59% impaired, limited or restricted    - D/C STATUS:  ---------------To be determined---------------    Medical Necessity:   · Patient is expected to demonstrate progress in strength, range of motion and functional technique to increase independence with household ADL's. · Patient demonstrates good rehab potential due to higher previous functional level.   Reason for Services/Other Comments:  · Patient continues to require skilled intervention due to decreased ROM/strength R knee with increased pain leading to decreased functional status. Use of outcome tool(s) and clinical judgement create a POC that gives a: Questionable prediction of patient's progress: MODERATE COMPLEXITY            TREATMENT:   (In addition to Assessment/Re-Assessment sessions the following treatments were rendered)  Pre-treatment Symptoms/Complaints:  R knee pain, weakness and decreased ROM  Pain: Initial:     7-8/10 Post Session:  6/10     THERAPEUTIC EXERCISE: (20 minutes):  Exercises per grid below to improve mobility and strength. Required minimal verbal cues to promote proper body alignment and promote proper body posture. Progressed resistance, range and repetitions as indicated. MODALITIES: (10 minutes):      Game Ready cold pack to R knee x10 minutes to decrease pain and swelling. Skin clear afterwards. Date:  02-19-18 Date:   Date:     Activity/Exercise Parameters Parameters Parameters   Quad Sets with Towel Under Heel 15 reps  5 sec holds     SLR Flexion 20 reps     SAQ's 15 reps  5 sec holds     Heel Slides with Strap for OP 15 reps  5 sec holds     LAQ's 15 reps  5 sec holds     NuStep Level 2  6 minutes               [employer] Portal  Treatment/Session Assessment:    · Response to Treatment:  Pt tolerated evaluation and treatments well today. · Compliance with Program/Exercises: Will assess as treatment progresses. · Recommendations/Intent for next treatment session: \"Next visit will focus on advancements to more challenging activities\".   Total Treatment Duration:  30 minutes  PT Patient Time In/Time Out  Time In: 1055  Time Out: Mikael Cotton, PT

## 2018-02-19 NOTE — PROGRESS NOTES
Ambulatory/Rehab Services H2 Model Falls Risk Assessment    Risk Factor Pts. ·   Confusion/Disorientation/Impulsivity  []    4 ·   Symptomatic Depression  []   2 ·   Altered Elimination  []   1 ·   Dizziness/Vertigo  []   1 ·   Gender (Male)  []   1 ·   Any administered antiepileptics (anticonvulsants):  []   2 ·   Any administered benzodiazepines:  []   1 ·   Visual Impairment (specify):  []   1 ·   Portable Oxygen Use  []   1 ·   Orthostatic ? BP  []   1 ·   History of Recent Falls (within 3 mos.)  []   5     Ability to Rise from Chair (choose one) Pts. ·   Ability to rise in a single movement  []   0 ·   Pushes up, successful in one attempt  [x]   1 ·   Multiple attempts, but successful  []   3 ·   Unable to rise without assistance  []   4   Total: (5 or greater = High Risk) 1     Falls Prevention Plan:   []                Physical Limitations to Exercise (specify):   []                Mobility Assistance Device (type):   []                Exercise/Equipment Adaptation (specify):    ©2010 [facility] [address] Patent #[id number].  Federal Law prohibits the replication, distribution or use without written permission from [facility] [employer]

## 2018-02-27 ENCOUNTER — HOSPITAL ENCOUNTER (OUTPATIENT)
Dept: PHYSICAL THERAPY | Age: 73
Discharge: HOME | End: 2018-02-27
Payer: MEDICARE

## 2018-02-27 PROCEDURE — 97110 THERAPEUTIC EXERCISES: CPT

## 2018-02-27 PROCEDURE — 97140 MANUAL THERAPY 1/> REGIONS: CPT

## 2018-02-27 NOTE — PROGRESS NOTES
Rober Marrero  : 1945  Primary: Malik Lares Medicare Choice *  Secondary:  [address] at [place] [id number] [facility], [address] [place] [address],[address], [place] [address].  [phone]   [phone]           OUTPATIENT PHYSICAL THERAPY:Daily Note 2018    ICD-10: Treatment Diagnosis: Pain in right knee (M25.561); Stiffness of right knee, not elsewhere classified (M25.661)  Precautions/Allergies:   Review of patient's allergies indicates no known allergies. Fall Risk Score: 1 (? 5 = High Risk)  MD Orders: Evaluate and Treat MEDICAL/REFERRING DIAGNOSIS:  Presence of right artificial knee joint [Z96.651]   DATE OF ONSET: Surgery 18  REFERRING PHYSICIAN: Tatyana Guzman., *  RETURN PHYSICIAN APPOINTMENT: Pt unaware of follow up appt at this time     INITIAL ASSESSMENT:  Ms. Laura Haro presents with decreased ROM/strength R knee with increased pain leading to decreased functional status s/p R TKA 18. Pt would benefit from skilled physical therapy services to address the above deficits and help patient return to prior level of function. PROBLEM LIST (Impacting functional limitations):  1. Decreased Strength  2. Decreased ADL/Functional Activities  3. Increased Pain  4. Decreased Flexibility/Joint Mobility  5. Decreased [place] with Home Exercise Program INTERVENTIONS PLANNED:  1. Balance Exercise  2. Cold  3. Cryotherapy  4. Electrical Stimulation  5. Gait Training  6. Home Exercise Program (HEP)  7. Manual Therapy  8. Range of Motion (ROM)  9. Therapeutic Exercise/Strengthening   TREATMENT PLAN:  Effective Dates: 18 TO 18. Frequency/Duration: 2 times a week for 2 months  GOALS: (Goals have been discussed and agreed upon with patient.)  Short-Term Functional Goals: Time Frame: 4 weeks  1. Pt will increase ROM R knee 0-120 degrees to assist with ascending/descending stairs  2.  Pt will increase strength R knee 4/5 to assist with ascending/descending stairs  Discharge Goals: Time Frame: 8 weeks  1. Pt will increase strength R knee 5/5 to assist with ascending/descending stairs  2. Pt will be independent with HEP  3. Pt will ascend/descend 10 six-inch steps independently with min to no c/o R knee pain  4. Pt will perform 20 minutes household cleaning activities independently with min to no c/o R knee pain  Rehabilitation Potential For Stated Goals: Good  Regarding Lisa Rust's therapy, I certify that the treatment plan above will be carried out by a therapist or under their direction. Thank you for this referral,  Kayden Pham, PT     Referring Physician Signature: Edsel Mcardle., *              Date                    The information in this section was collected on 02-19-18 (except where otherwise noted). HISTORY:   History of Present Injury/Illness (Reason for Referral):  Pt reports insidious onset R knee pain of 5-6 years duration. She had several injections with continued pain so she opted for TKA which was performed 01-24-18. She spent 2 nights in hospital followed by home health PT until last Friday. Pt rates her current R knee pain 7-8/10 sitting at rest.  She is taking oxycodone for pain. She is retired. Pt lives in a one story house. She states she has difficulties ascending/descending stairs due to R knee pain, weakness and decreased ROM. Pt states she is having difficulties with all household cooking and cleaning activities due to her R knee surgery. Past Medical History/Comorbidities:   Ms. Kelsey  has a past medical history of Anxiety; Arthritis; Breast cancer ([address] (1/18/2016); Cancer ([address] Chronic pain; Former smoker; GERD (gastroesophageal reflux disease); Hard of hearing; Headache; Hypertension; Hypothyroidism; Insomnia; Status post total left knee replacement (5/12/2017); and Status post total right knee replacement (1/24/2018). She also has no past medical history of Adverse effect of anesthesia;  Aneurysm ([address] Arrhythmia; Asthma; Autoimmune disease ([facility] CAD (coronary artery disease); Chronic kidney disease; Chronic obstructive pulmonary disease ([facility] Coagulation disorder ([facility] Diabetes ([facility] Difficult intubation; Endocarditis; Heart failure ([facility] Ill-defined condition; Liver disease; Malignant hyperthermia due to anesthesia; Morbid obesity ([facility] Nicotine vapor product user; Non-nicotine vapor product user; Pseudocholinesterase deficiency; Psychiatric disorder; PUD (peptic ulcer disease); Rheumatic fever; Seizures ([facility] Sleep apnea; Stroke [facility]); Thromboembolus ([facility] or Unspecified adverse effect of anesthesia. Ms. Yasmine Hall  has a past surgical history that includes hx colonoscopy; hx wisdom teeth extraction; hx tonsillectomy (1957); hx vascular access (Right); pr breast surgery procedure unlisted (Left, 7/2015); hx breast biopsy (Left, 1/18/2016); pr mastectomy, partial (Left, 01/18/2016); hx breast lumpectomy (Left, 01/2016); hx orthopaedic; and hx knee replacement (Left, 05/12/2017). Social History/Living Environment:     Pt lives with spouse in a one story house  Prior Level of Function/Work/Activity:  Pt is retired  Dominant Side:         RIGHT  Other Clinical Tests:          X-rays R knee  Personal Factors:          Sex:  female        Age:  72 y.o. Profession:  Pt is retired  Current Medications:       Current Outpatient Prescriptions:     polyethylene glycol (MIRALAX) 17 gram/dose powder, Take 17 g by mouth as needed (constipation). as needed daily, Disp: , Rfl:     vitamin E topical cream, Apply 1 Dose to affected area two (2) times daily as needed for Itching., Disp: , Rfl:     temazepam (RESTORIL) 15 mg capsule, Take 1 Cap by mouth nightly. Max Daily Amount: 15 mg., Disp: 30 Cap, Rfl: 5    oxyCODONE IR (ROXICODONE) 5 mg immediate release tablet, Take 1 Tab by mouth every three (3) hours as needed. Max Daily Amount: 40 mg.  (Patient taking differently: Take 1-2 Tabs by mouth every four (4) hours as needed for Pain. every 4-6 hours as needed), Disp: 40 Tab, Rfl: 0    aspirin delayed-release 325 mg tablet, Take 1 Tab by mouth every twelve (12) hours every twelve (12) hours for 35 days. , Disp: 70 Tab, Rfl: 0    HYDROmorphone (DILAUDID) 2 mg tablet, Take 1 Tab by mouth every four (4) hours as needed. Max Daily Amount: 12 mg., Disp: 40 Tab, Rfl: 0    ascorbic acid, vitamin C, (VITAMIN C) 500 mg tablet, Take 1,000 mg by mouth daily. , Disp: , Rfl:     celecoxib (CELEBREX) 200 mg capsule, Take 1 Cap by mouth two (2) times a day., Disp: 60 Cap, Rfl: 5    levothyroxine (SYNTHROID) 25 mcg tablet, TAKE ONE TABLET BY MOUTH ONE TIME DAILY BEFORE BREAKFAST, Disp: 30 Tab, Rfl: 6    SUMAtriptan (IMITREX) 100 mg tablet, Take 1 Tab by mouth once as needed for Migraine for up to 1 dose., Disp: 12 Tab, Rfl: 3    hydroCHLOROthiazide (HYDRODIURIL) 25 mg tablet, TAKE ONE TABLET BY MOUTH ONE TIME DAILY, Disp: 30 Tab, Rfl: 5    acetaminophen (TYLENOL) 500 mg tablet, Take 1,000 mg by mouth every six (6) hours as needed for Pain. Take / use AM day of surgery  per anesthesia protocols if needed. , Disp: , Rfl:     Biotin 2,500 mcg cap, Take 2 Tabs by mouth daily. , Disp: , Rfl:     multivitamin (ONE A DAY) tablet, Take 1 Tab by mouth daily. , Disp: , Rfl:     esomeprazole (NEXIUM) 20 mg capsule, Take 20 mg by mouth every morning. Take / use AM day of surgery  per anesthesia protocols.   Indications: GASTROESOPHAGEAL REFLUX, Disp: , Rfl:    Date Last Reviewed:  02-19-18   Number of Personal Factors/Comorbidities that affect the Plan of Care: 1-2: MODERATE COMPLEXITY   EXAMINATION:   Observation/Orthostatic Postural Assessment:          Pt walks with straight cane and moderate antalgic gait  Palpation:          Generalized tenderness throughout R knee  ROM:    R knee extension = -10 degrees  R knee flexion = 100 degrees    L knee extension = 0 degrees  L knee flexion = 120 degrees    Strength:    R knee extension = 4-/5  R knee flexion = 4-/5    L knee extension = 5/5  L knee flexion = 5/5    Special Tests:          N/A  Neurological Screen:        Sensation: Intact to light touch R knee  Functional Mobility:         Gait/Ambulation:  Pt walks with straight cane and moderate antalgic gait        Transfers:  Pt able to transition sit to supine and supine to sit independently    Body Structures Involved:  1. Bones  2. Joints  3. Muscles Body Functions Affected:  1. Sensory/Pain  2. Neuromusculoskeletal Activities and Participation Affected:  1. Mobility  2. Domestic Life   Number of elements (examined above) that affect the Plan of Care: 3: MODERATE COMPLEXITY   CLINICAL PRESENTATION:   Presentation: Evolving clinical presentation with changing clinical characteristics: MODERATE COMPLEXITY   CLINICAL DECISION MAKING:   Outcome Measure: Tool Used: Lower Extremity Functional Scale (LEFS)  Score:  Initial: 19/80 Most Recent: X/80 (Date: -- )   Interpretation of Score: 20 questions each scored on a 5 point scale with 0 representing \"extreme difficulty or unable to perform\" and 4 representing \"no difficulty\". The lower the score, the greater the functional disability. 80/80 represents no disability. Minimal detectable change is 9 points. Score 80 79-63 62-48 47-32 31-16 15-1 0   Modifier CH CI CJ CK CL CM CN     ? Mobility - Walking and Moving Around:     - CURRENT STATUS: CL - 60%-79% impaired, limited or restricted    - GOAL STATUS: CK - 40%-59% impaired, limited or restricted    - D/C STATUS:  ---------------To be determined---------------    Medical Necessity:   · Patient is expected to demonstrate progress in strength, range of motion and functional technique to increase independence with household ADL's. · Patient demonstrates good rehab potential due to higher previous functional level.   Reason for Services/Other Comments:  · Patient continues to require skilled intervention due to decreased ROM/strength R knee with increased pain leading to decreased functional status. Use of outcome tool(s) and clinical judgement create a POC that gives a: Questionable prediction of patient's progress: MODERATE COMPLEXITY            TREATMENT:   (In addition to Assessment/Re-Assessment sessions the following treatments were rendered)  Pre-treatment Symptoms/Complaints:  Pt states she has had a virus and thrush and is very fatigued today. She states she has not been eating/drinking much due to not feeling well. Pain: Initial:     5/10 Post Session:  4/10     THERAPEUTIC EXERCISE: (30 minutes):  Exercises per grid below to improve mobility and strength. Required minimal verbal cues to promote proper body alignment and promote proper body posture. Progressed resistance, range and repetitions as indicated. MANUAL THERAPY: (10 minutes): Manual ROM R knee into flexion and extension with OP was utilized and necessary because of the patient's restricted joint motion. MODALITIES: (10 minutes):      Game Ready cold pack to R knee x10 minutes to decrease pain and swelling. Skin clear afterwards. Date:  02-19-18 Date:  02-27-18 Date:     Activity/Exercise Parameters Parameters Parameters   Quad Sets with Towel Under Heel 15 reps  5 sec holds 20 reps  5 sec holds    SLR Flexion 20 reps 20 reps    SAQ's 15 reps  5 sec holds 20 reps  5 sec holds    Heel Slides with Strap for OP 15 reps  5 sec holds 20 reps  5 sec holds    LAQ's 15 reps  5 sec holds 20 reps  5 sec holds    NuStep Level 2  6 minutes Level 2  6 minutes    Standing Heel Raises  20 reps    Step-Ups  4 inch step  20 reps        [employer] [facility]  Treatment/Session Assessment:    · Response to Treatment:  Pt tolerated treatments well today. BP = 86/64 mm/Hg. Pt encouraged to increase fluid intake (water and maybe some gatorade) due to possible dehydration and to try and increase food intake also. · Compliance with Program/Exercises:  Will assess as treatment progresses. · Recommendations/Intent for next treatment session: \"Next visit will focus on advancements to more challenging activities\".   Total Treatment Duration:  50 minutes  PT Patient Time In/Time Out  Time In: 1330  Time Out: 450 JOSE RAUL Thomason PT

## 2018-03-01 ENCOUNTER — HOSPITAL ENCOUNTER (OUTPATIENT)
Dept: PHYSICAL THERAPY | Age: 73
Discharge: HOME | End: 2018-03-01
Payer: MEDICARE

## 2018-03-01 PROCEDURE — 97110 THERAPEUTIC EXERCISES: CPT

## 2018-03-01 PROCEDURE — 97140 MANUAL THERAPY 1/> REGIONS: CPT

## 2018-03-01 NOTE — PROGRESS NOTES
Lazarus Eric  : 1945  Primary: Gerhard Lares Medicare Choice *  Secondary:  [address] at [place] [address], [place] [address].  [phone]   [phone]           OUTPATIENT PHYSICAL THERAPY:Daily Note 3/1/2018    ICD-10: Treatment Diagnosis: Pain in right knee (M25.561); Stiffness of right knee, not elsewhere classified (M25.661)  Precautions/Allergies:   Review of patient's allergies indicates no known allergies. Fall Risk Score: 1 (? 5 = High Risk)  MD Orders: Evaluate and Treat MEDICAL/REFERRING DIAGNOSIS:  Presence of right artificial knee joint [Z96.651]   DATE OF ONSET: Surgery 18  REFERRING PHYSICIAN: Niels Valdes., *  RETURN PHYSICIAN APPOINTMENT: Pt unaware of follow up appt at this time     INITIAL ASSESSMENT:  Ms. Carlos Barry presents with decreased ROM/strength R knee with increased pain leading to decreased functional status s/p R TKA 18. Pt would benefit from skilled physical therapy services to address the above deficits and help patient return to prior level of function. PROBLEM LIST (Impacting functional limitations):  1. Decreased Strength  2. Decreased ADL/Functional Activities  3. Increased Pain  4. Decreased Flexibility/Joint Mobility  5. Decreased [place] with Home Exercise Program INTERVENTIONS PLANNED:  1. Balance Exercise  2. Cold  3. Cryotherapy  4. Electrical Stimulation  5. Gait Training  6. Home Exercise Program (HEP)  7. Manual Therapy  8. Range of Motion (ROM)  9. Therapeutic Exercise/Strengthening   TREATMENT PLAN:  Effective Dates: 18 TO 18. Frequency/Duration: 2 times a week for 2 months  GOALS: (Goals have been discussed and agreed upon with patient.)  Short-Term Functional Goals: Time Frame: 4 weeks  1. Pt will increase ROM R knee 0-120 degrees to assist with ascending/descending stairs  2.  Pt will increase strength R knee 4/5 to assist with ascending/descending stairs  Discharge Goals: Time Frame: 8 weeks  1. Pt will increase strength R knee 5/5 to assist with ascending/descending stairs  2. Pt will be independent with HEP  3. Pt will ascend/descend 10 six-inch steps independently with min to no c/o R knee pain  4. Pt will perform 20 minutes household cleaning activities independently with min to no c/o R knee pain  Rehabilitation Potential For Stated Goals: Good  Regarding Jerman Rust's therapy, I certify that the treatment plan above will be carried out by a therapist or under their direction. Thank you for this referral,  Melva Bailon, PT     Referring Physician Signature: Jacquie Esteban., *              Date                    The information in this section was collected on 02-19-18 (except where otherwise noted). HISTORY:   History of Present Injury/Illness (Reason for Referral):  Pt reports insidious onset R knee pain of 5-6 years duration. She had several injections with continued pain so she opted for TKA which was performed 01-24-18. She spent 2 nights in hospital followed by home health PT until last Friday. Pt rates her current R knee pain 7-8/10 sitting at rest.  She is taking oxycodone for pain. She is retired. Pt lives in a one story house. She states she has difficulties ascending/descending stairs due to R knee pain, weakness and decreased ROM. Pt states she is having difficulties with all household cooking and cleaning activities due to her R knee surgery. Past Medical History/Comorbidities:   Ms. César Salguero  has a past medical history of Anxiety; Arthritis; Breast cancer ([address] (1/18/2016); Cancer ([address] Chronic pain; Former smoker; GERD (gastroesophageal reflux disease); Hard of hearing; Headache; Hypertension; Hypothyroidism; Insomnia; Status post total left knee replacement (5/12/2017); and Status post total right knee replacement (1/24/2018). She also has no past medical history of Adverse effect of anesthesia;  Aneurysm ([address] Arrhythmia; Asthma; Autoimmune disease ([facility] CAD (coronary artery disease); Chronic kidney disease; Chronic obstructive pulmonary disease ([facility] Coagulation disorder ([facility] Diabetes ([facility] Difficult intubation; Endocarditis; Heart failure ([facility] Ill-defined condition; Liver disease; Malignant hyperthermia due to anesthesia; Morbid obesity ([facility] Nicotine vapor product user; Non-nicotine vapor product user; Pseudocholinesterase deficiency; Psychiatric disorder; PUD (peptic ulcer disease); Rheumatic fever; Seizures ([facility] Sleep apnea; Stroke [facility]); Thromboembolus ([facility] or Unspecified adverse effect of anesthesia. Ms. Ees Platt  has a past surgical history that includes hx colonoscopy; hx wisdom teeth extraction; hx tonsillectomy (1957); hx vascular access (Right); pr breast surgery procedure unlisted (Left, 7/2015); hx breast biopsy (Left, 1/18/2016); pr mastectomy, partial (Left, 01/18/2016); hx breast lumpectomy (Left, 01/2016); hx orthopaedic; and hx knee replacement (Left, 05/12/2017). Social History/Living Environment:     Pt lives with spouse in a one story house  Prior Level of Function/Work/Activity:  Pt is retired  Dominant Side:         RIGHT  Other Clinical Tests:          X-rays R knee  Personal Factors:          Sex:  female        Age:  72 y.o. Profession:  Pt is retired  Current Medications:       Current Outpatient Prescriptions:     polyethylene glycol (MIRALAX) 17 gram/dose powder, Take 17 g by mouth as needed (constipation). as needed daily, Disp: , Rfl:     vitamin E topical cream, Apply 1 Dose to affected area two (2) times daily as needed for Itching., Disp: , Rfl:     temazepam (RESTORIL) 15 mg capsule, Take 1 Cap by mouth nightly. Max Daily Amount: 15 mg., Disp: 30 Cap, Rfl: 5    oxyCODONE IR (ROXICODONE) 5 mg immediate release tablet, Take 1 Tab by mouth every three (3) hours as needed. Max Daily Amount: 40 mg.  (Patient taking differently: Take 1-2 Tabs by mouth every four (4) hours as needed for Pain. every 4-6 hours as needed), Disp: 40 Tab, Rfl: 0    HYDROmorphone (DILAUDID) 2 mg tablet, Take 1 Tab by mouth every four (4) hours as needed. Max Daily Amount: 12 mg., Disp: 40 Tab, Rfl: 0    ascorbic acid, vitamin C, (VITAMIN C) 500 mg tablet, Take 1,000 mg by mouth daily. , Disp: , Rfl:     celecoxib (CELEBREX) 200 mg capsule, Take 1 Cap by mouth two (2) times a day., Disp: 60 Cap, Rfl: 5    levothyroxine (SYNTHROID) 25 mcg tablet, TAKE ONE TABLET BY MOUTH ONE TIME DAILY BEFORE BREAKFAST, Disp: 30 Tab, Rfl: 6    SUMAtriptan (IMITREX) 100 mg tablet, Take 1 Tab by mouth once as needed for Migraine for up to 1 dose., Disp: 12 Tab, Rfl: 3    hydroCHLOROthiazide (HYDRODIURIL) 25 mg tablet, TAKE ONE TABLET BY MOUTH ONE TIME DAILY, Disp: 30 Tab, Rfl: 5    acetaminophen (TYLENOL) 500 mg tablet, Take 1,000 mg by mouth every six (6) hours as needed for Pain. Take / use AM day of surgery  per anesthesia protocols if needed. , Disp: , Rfl:     Biotin 2,500 mcg cap, Take 2 Tabs by mouth daily. , Disp: , Rfl:     multivitamin (ONE A DAY) tablet, Take 1 Tab by mouth daily. , Disp: , Rfl:     esomeprazole (NEXIUM) 20 mg capsule, Take 20 mg by mouth every morning. Take / use AM day of surgery  per anesthesia protocols.   Indications: GASTROESOPHAGEAL REFLUX, Disp: , Rfl:    Date Last Reviewed:  02-19-18   Number of Personal Factors/Comorbidities that affect the Plan of Care: 1-2: MODERATE COMPLEXITY   EXAMINATION:   Observation/Orthostatic Postural Assessment:          Pt walks with straight cane and moderate antalgic gait  Palpation:          Generalized tenderness throughout R knee  ROM:    R knee extension = -5 degrees  R knee flexion = 117 degrees    L knee extension = 0 degrees  L knee flexion = 120 degrees    Strength:    R knee extension = 4-/5  R knee flexion = 4-/5    L knee extension = 5/5  L knee flexion = 5/5    Special Tests:          N/A  Neurological Screen:        Sensation: Intact to light touch R knee  Functional Mobility:         Gait/Ambulation:  Pt walks with straight cane and moderate antalgic gait        Transfers:  Pt able to transition sit to supine and supine to sit independently    Body Structures Involved:  1. Bones  2. Joints  3. Muscles Body Functions Affected:  1. Sensory/Pain  2. Neuromusculoskeletal Activities and Participation Affected:  1. Mobility  2. Domestic Life   Number of elements (examined above) that affect the Plan of Care: 3: MODERATE COMPLEXITY   CLINICAL PRESENTATION:   Presentation: Evolving clinical presentation with changing clinical characteristics: MODERATE COMPLEXITY   CLINICAL DECISION MAKING:   Outcome Measure: Tool Used: Lower Extremity Functional Scale (LEFS)  Score:  Initial: 19/80 Most Recent: X/80 (Date: -- )   Interpretation of Score: 20 questions each scored on a 5 point scale with 0 representing \"extreme difficulty or unable to perform\" and 4 representing \"no difficulty\". The lower the score, the greater the functional disability. 80/80 represents no disability. Minimal detectable change is 9 points. Score 80 79-63 62-48 47-32 31-16 15-1 0   Modifier CH CI CJ CK CL CM CN     ? Mobility - Walking and Moving Around:     - CURRENT STATUS: CL - 60%-79% impaired, limited or restricted    - GOAL STATUS: CK - 40%-59% impaired, limited or restricted    - D/C STATUS:  ---------------To be determined---------------    Medical Necessity:   · Patient is expected to demonstrate progress in strength, range of motion and functional technique to increase independence with household ADL's. · Patient demonstrates good rehab potential due to higher previous functional level. Reason for Services/Other Comments:  · Patient continues to require skilled intervention due to decreased ROM/strength R knee with increased pain leading to decreased functional status.    Use of outcome tool(s) and clinical judgement create a POC that gives a: Questionable prediction of patient's progress: MODERATE COMPLEXITY            TREATMENT:   (In addition to Assessment/Re-Assessment sessions the following treatments were rendered)  Pre-treatment Symptoms/Complaints:  Pt states she is feeling better than her last session, bus still not 100% better from her virus. Pain: Initial:     5/10 Post Session:  4/10     THERAPEUTIC EXERCISE: (30 minutes):  Exercises per grid below to improve mobility and strength. Required minimal verbal cues to promote proper body alignment and promote proper body posture. Progressed resistance, range and repetitions as indicated. MANUAL THERAPY: (10 minutes): Manual ROM R knee into flexion and extension with OP and scar massage R knee was utilized and necessary because of the patient's restricted joint motion and restricted motion of soft tissue. MODALITIES: (10 minutes):      Game Ready cold pack to R knee x10 minutes to decrease pain and swelling. Skin clear afterwards. Date:  02-19-18 Date:  02-27-18 Date:  03-01-18   Activity/Exercise Parameters Parameters Parameters   Quad Sets with Towel Under Heel 15 reps  5 sec holds 20 reps  5 sec holds 20 reps  5 sec holds   SLR Flexion 20 reps 20 reps 20 reps   SAQ's 15 reps  5 sec holds 20 reps  5 sec holds 2 Lbs  20 reps   Heel Slides with Strap for OP 15 reps  5 sec holds 20 reps  5 sec holds 20 reps  5 sec holds   LAQ's 15 reps  5 sec holds 20 reps  5 sec holds 2 Lbs  20 reps   NuStep Level 2  6 minutes Level 2  6 minutes Level 3  7 minutes   Standing Heel Raises  20 reps 20 reps each   Step-Ups  4 inch step  20 reps 4 inch  20 reps   Knee Flexion Stretch on Step   10 reps  10 sec holds   Gastroc Slantboard   3 reps  20 sec holds       [place] [facility]  Treatment/Session Assessment:    · Response to Treatment:  Pt tolerated treatments well today. ROM improving R knee. Pt to see MD for follow up this afternoon. · Compliance with Program/Exercises:  Will assess as treatment progresses. · Recommendations/Intent for next treatment session: \"Next visit will focus on advancements to more challenging activities\".   Total Treatment Duration:  50 minutes  PT Patient Time In/Time Out  Time In: 1345  Time Out: 143 [address],

## 2018-03-02 ENCOUNTER — HOSPITAL ENCOUNTER (OUTPATIENT)
Dept: LAB | Age: 73
Discharge: HOME | End: 2018-03-02
Attending: ORTHOPAEDIC SURGERY
Payer: MEDICARE

## 2018-03-02 DIAGNOSIS — E78.00 PURE HYPERCHOLESTEROLEMIA: ICD-10-CM

## 2018-03-02 DIAGNOSIS — I10 ESSENTIAL HYPERTENSION: ICD-10-CM

## 2018-03-02 DIAGNOSIS — E03.4 HYPOTHYROIDISM DUE TO ACQUIRED ATROPHY OF THYROID: ICD-10-CM

## 2018-03-02 LAB
ALBUMIN SERPL-MCNC: 3.6 G/DL (ref 3.2–4.6)
ALBUMIN/GLOB SERPL: 0.9 {RATIO} (ref 1.2–3.5)
ALP SERPL-CCNC: 70 U/L (ref 50–136)
ALT SERPL-CCNC: 13 U/L (ref 12–65)
ANION GAP SERPL CALC-SCNC: 14 MMOL/L (ref 7–16)
AST SERPL-CCNC: 19 U/L (ref 15–37)
BASOPHILS # BLD: 0 K/UL (ref 0–0.2)
BASOPHILS NFR BLD: 0 % (ref 0–2)
BILIRUB SERPL-MCNC: 0.3 MG/DL (ref 0.2–1.1)
BUN SERPL-MCNC: 12 MG/DL (ref 8–23)
CALCIUM SERPL-MCNC: 9.6 MG/DL (ref 8.3–10.4)
CHLORIDE SERPL-SCNC: 98 MMOL/L (ref 98–107)
CHOLEST SERPL-MCNC: 200 MG/DL
CO2 SERPL-SCNC: 28 MMOL/L (ref 21–32)
CREAT SERPL-MCNC: 0.84 MG/DL (ref 0.6–1)
DIFFERENTIAL METHOD BLD: NORMAL
EOSINOPHIL # BLD: 0.3 K/UL (ref 0–0.8)
EOSINOPHIL NFR BLD: 4 % (ref 0.5–7.8)
ERYTHROCYTE [DISTWIDTH] IN BLOOD BY AUTOMATED COUNT: 14 % (ref 11.9–14.6)
GLOBULIN SER CALC-MCNC: 4 G/DL (ref 2.3–3.5)
GLUCOSE SERPL-MCNC: 97 MG/DL (ref 65–100)
HCT VFR BLD AUTO: 38.1 % (ref 35.8–46.3)
HDLC SERPL-MCNC: 52 MG/DL (ref 40–60)
HDLC SERPL: 3.8 {RATIO}
HGB BLD-MCNC: 12 G/DL (ref 11.7–15.4)
IMM GRANULOCYTES # BLD: 0 K/UL (ref 0–0.5)
IMM GRANULOCYTES NFR BLD AUTO: 0 % (ref 0–5)
LDLC SERPL CALC-MCNC: 125 MG/DL
LIPID PROFILE,FLP: ABNORMAL
LYMPHOCYTES # BLD: 1.3 K/UL (ref 0.5–4.6)
LYMPHOCYTES NFR BLD: 16 % (ref 13–44)
MCH RBC QN AUTO: 26.9 PG (ref 26.1–32.9)
MCHC RBC AUTO-ENTMCNC: 31.5 G/DL (ref 31.4–35)
MCV RBC AUTO: 85.4 FL (ref 79.6–97.8)
MONOCYTES # BLD: 0.9 K/UL (ref 0.1–1.3)
MONOCYTES NFR BLD: 12 % (ref 4–12)
NEUTS SEG # BLD: 5.2 K/UL (ref 1.7–8.2)
NEUTS SEG NFR BLD: 68 % (ref 43–78)
PLATELET # BLD AUTO: 330 K/UL (ref 150–450)
PMV BLD AUTO: 10.9 FL (ref 10.8–14.1)
POTASSIUM SERPL-SCNC: 3.1 MMOL/L (ref 3.5–5.1)
PROT SERPL-MCNC: 7.6 G/DL (ref 6.3–8.2)
RBC # BLD AUTO: 4.46 M/UL (ref 4.05–5.25)
SODIUM SERPL-SCNC: 140 MMOL/L (ref 136–145)
TRIGL SERPL-MCNC: 115 MG/DL (ref 35–150)
TSH SERPL DL<=0.005 MIU/L-ACNC: 0.62 UIU/ML (ref 0.36–3.74)
VLDLC SERPL CALC-MCNC: 23 MG/DL (ref 6–23)
WBC # BLD AUTO: 7.7 K/UL (ref 4.3–11.1)

## 2018-03-02 PROCEDURE — 84443 ASSAY THYROID STIM HORMONE: CPT | Performed by: FAMILY MEDICINE

## 2018-03-02 PROCEDURE — 80061 LIPID PANEL: CPT | Performed by: FAMILY MEDICINE

## 2018-03-02 PROCEDURE — 85025 COMPLETE CBC W/AUTO DIFF WBC: CPT | Performed by: FAMILY MEDICINE

## 2018-03-02 PROCEDURE — 80053 COMPREHEN METABOLIC PANEL: CPT | Performed by: FAMILY MEDICINE

## 2018-03-02 PROCEDURE — 36415 COLL VENOUS BLD VENIPUNCTURE: CPT | Performed by: FAMILY MEDICINE

## 2018-03-06 ENCOUNTER — HOSPITAL ENCOUNTER (OUTPATIENT)
Dept: PHYSICAL THERAPY | Age: 73
Discharge: HOME | End: 2018-03-06
Payer: MEDICARE

## 2018-03-06 PROCEDURE — 97110 THERAPEUTIC EXERCISES: CPT

## 2018-03-06 PROCEDURE — 97140 MANUAL THERAPY 1/> REGIONS: CPT

## 2018-03-06 NOTE — PROGRESS NOTES
Sobia Justine  : 1945  Primary: Erik Lares Medicare Choice *  Secondary:  [address] at [address], [address] [place] [address],[address], [place] [address].  [phone]   [phone]           OUTPATIENT PHYSICAL THERAPY:Daily Note 3/6/2018    ICD-10: Treatment Diagnosis: Pain in right knee (M25.561); Stiffness of right knee, not elsewhere classified (M25.661)  Precautions/Allergies:   Review of patient's allergies indicates no known allergies. Fall Risk Score: 1 (? 5 = High Risk)  MD Orders: Evaluate and Treat MEDICAL/REFERRING DIAGNOSIS:  Presence of right artificial knee joint [Z96.651]   DATE OF ONSET: Surgery 18  REFERRING PHYSICIAN: Doroteo Marquez., *  RETURN PHYSICIAN APPOINTMENT: Pt unaware of follow up appt at this time     INITIAL ASSESSMENT:  Ms. Brandi Cannon presents with decreased ROM/strength R knee with increased pain leading to decreased functional status s/p R TKA 18. Pt would benefit from skilled physical therapy services to address the above deficits and help patient return to prior level of function. PROBLEM LIST (Impacting functional limitations):  1. Decreased Strength  2. Decreased ADL/Functional Activities  3. Increased Pain  4. Decreased Flexibility/Joint Mobility  5. Decreased Delta with Home Exercise Program INTERVENTIONS PLANNED:  1. Balance Exercise  2. Cold  3. Cryotherapy  4. Electrical Stimulation  5. Gait Training  6. Home Exercise Program (HEP)  7. Manual Therapy  8. Range of Motion (ROM)  9. Therapeutic Exercise/Strengthening   TREATMENT PLAN:  Effective Dates: 18 TO 18. Frequency/Duration: 2 times a week for 2 months  GOALS: (Goals have been discussed and agreed upon with patient.)  Short-Term Functional Goals: Time Frame: 4 weeks  1. Pt will increase ROM R knee 0-120 degrees to assist with ascending/descending stairs  2.  Pt will increase strength R knee 4/5 to assist with ascending/descending stairs  Discharge Goals: Time Frame: 8 weeks  1. Pt will increase strength R knee 5/5 to assist with ascending/descending stairs  2. Pt will be independent with HEP  3. Pt will ascend/descend 10 six-inch steps independently with min to no c/o R knee pain  4. Pt will perform 20 minutes household cleaning activities independently with min to no c/o R knee pain  Rehabilitation Potential For Stated Goals: Good  Regarding Lisa Rust's therapy, I certify that the treatment plan above will be carried out by a therapist or under their direction. Thank you for this referral,  Kayden Pham, PT     Referring Physician Signature: Edsel Mcardle., *              Date                    The information in this section was collected on 02-19-18 (except where otherwise noted). HISTORY:   History of Present Injury/Illness (Reason for Referral):  Pt reports insidious onset R knee pain of 5-6 years duration. She had several injections with continued pain so she opted for TKA which was performed 01-24-18. She spent 2 nights in hospital followed by home health PT until last Friday. Pt rates her current R knee pain 7-8/10 sitting at rest.  She is taking oxycodone for pain. She is retired. Pt lives in a one story house. She states she has difficulties ascending/descending stairs due to R knee pain, weakness and decreased ROM. Pt states she is having difficulties with all household cooking and cleaning activities due to her R knee surgery. Past Medical History/Comorbidities:   Ms. Lary Owen  has a past medical history of Anxiety; Arthritis; Breast cancer ([address] (1/18/2016); Cancer ([address] Chronic pain; Former smoker; GERD (gastroesophageal reflux disease); Hard of hearing; Headache; Hypertension; Hypothyroidism; Insomnia; Status post total left knee replacement (5/12/2017); and Status post total right knee replacement (1/24/2018). She also has no past medical history of Adverse effect of anesthesia;  Aneurysm ([address] Arrhythmia; Asthma; Autoimmune disease ([facility] CAD (coronary artery disease); Chronic kidney disease; Chronic obstructive pulmonary disease ([facility] Coagulation disorder ([facility] Diabetes ([facility] Difficult intubation; Endocarditis; Heart failure ([facility] Ill-defined condition; Liver disease; Malignant hyperthermia due to anesthesia; Morbid obesity ([facility] Nicotine vapor product user; Non-nicotine vapor product user; Pseudocholinesterase deficiency; Psychiatric disorder; PUD (peptic ulcer disease); Rheumatic fever; Seizures ([facility] Sleep apnea; Stroke [facility]); Thromboembolus ([facility] or Unspecified adverse effect of anesthesia. Ms. [facility]  has a past surgical history that includes hx colonoscopy; hx wisdom teeth extraction; hx tonsillectomy (1957); hx vascular access (Right); pr breast surgery procedure unlisted (Left, 7/2015); hx breast biopsy (Left, 1/18/2016); pr mastectomy, partial (Left, 01/18/2016); hx breast lumpectomy (Left, 01/2016); hx orthopaedic; and hx knee replacement (Left, 05/12/2017). Social History/Living Environment:     Pt lives with spouse in a one story house  Prior Level of Function/Work/Activity:  Pt is retired  Dominant Side:         RIGHT  Other Clinical Tests:          X-rays R knee  Personal Factors:          Sex:  female        Age:  72 y.o. Profession:  Pt is retired  Current Medications:       Current Outpatient Prescriptions:     polyethylene glycol (MIRALAX) 17 gram/dose powder, Take 17 g by mouth as needed (constipation). as needed daily, Disp: , Rfl:     vitamin E topical cream, Apply 1 Dose to affected area two (2) times daily as needed for Itching., Disp: , Rfl:     temazepam (RESTORIL) 15 mg capsule, Take 1 Cap by mouth nightly. Max Daily Amount: 15 mg., Disp: 30 Cap, Rfl: 5    oxyCODONE IR (ROXICODONE) 5 mg immediate release tablet, Take 1 Tab by mouth every three (3) hours as needed. Max Daily Amount: 40 mg.  (Patient taking differently: Take 1-2 Tabs by mouth every four (4) hours as needed for Pain. every 4-6 hours as needed), Disp: 40 Tab, Rfl: 0    HYDROmorphone (DILAUDID) 2 mg tablet, Take 1 Tab by mouth every four (4) hours as needed. Max Daily Amount: 12 mg., Disp: 40 Tab, Rfl: 0    ascorbic acid, vitamin C, (VITAMIN C) 500 mg tablet, Take 1,000 mg by mouth daily. , Disp: , Rfl:     celecoxib (CELEBREX) 200 mg capsule, Take 1 Cap by mouth two (2) times a day., Disp: 60 Cap, Rfl: 5    levothyroxine (SYNTHROID) 25 mcg tablet, TAKE ONE TABLET BY MOUTH ONE TIME DAILY BEFORE BREAKFAST, Disp: 30 Tab, Rfl: 6    SUMAtriptan (IMITREX) 100 mg tablet, Take 1 Tab by mouth once as needed for Migraine for up to 1 dose., Disp: 12 Tab, Rfl: 3    hydroCHLOROthiazide (HYDRODIURIL) 25 mg tablet, TAKE ONE TABLET BY MOUTH ONE TIME DAILY, Disp: 30 Tab, Rfl: 5    acetaminophen (TYLENOL) 500 mg tablet, Take 1,000 mg by mouth every six (6) hours as needed for Pain. Take / use AM day of surgery  per anesthesia protocols if needed. , Disp: , Rfl:     Biotin 2,500 mcg cap, Take 2 Tabs by mouth daily. , Disp: , Rfl:     multivitamin (ONE A DAY) tablet, Take 1 Tab by mouth daily. , Disp: , Rfl:     esomeprazole (NEXIUM) 20 mg capsule, Take 20 mg by mouth every morning. Take / use AM day of surgery  per anesthesia protocols.   Indications: GASTROESOPHAGEAL REFLUX, Disp: , Rfl:    Date Last Reviewed:  02-19-18   Number of Personal Factors/Comorbidities that affect the Plan of Care: 1-2: MODERATE COMPLEXITY   EXAMINATION:   Observation/Orthostatic Postural Assessment:          Pt walks with straight cane and moderate antalgic gait  Palpation:          Generalized tenderness throughout R knee  ROM:    R knee extension = -3 degrees  R knee flexion = 120 degrees    L knee extension = 0 degrees  L knee flexion = 120 degrees    Strength:    R knee extension = 4-/5  R knee flexion = 4-/5    L knee extension = 5/5  L knee flexion = 5/5    Special Tests:          N/A  Neurological Screen:        Sensation: Intact to light touch R knee  Functional Mobility:         Gait/Ambulation:  Pt walks with straight cane and moderate antalgic gait        Transfers:  Pt able to transition sit to supine and supine to sit independently    Body Structures Involved:  1. Bones  2. Joints  3. Muscles Body Functions Affected:  1. Sensory/Pain  2. Neuromusculoskeletal Activities and Participation Affected:  1. Mobility  2. Domestic Life   Number of elements (examined above) that affect the Plan of Care: 3: MODERATE COMPLEXITY   CLINICAL PRESENTATION:   Presentation: Evolving clinical presentation with changing clinical characteristics: MODERATE COMPLEXITY   CLINICAL DECISION MAKING:   Outcome Measure: Tool Used: Lower Extremity Functional Scale (LEFS)  Score:  Initial: 19/80 Most Recent: X/80 (Date: -- )   Interpretation of Score: 20 questions each scored on a 5 point scale with 0 representing \"extreme difficulty or unable to perform\" and 4 representing \"no difficulty\". The lower the score, the greater the functional disability. 80/80 represents no disability. Minimal detectable change is 9 points. Score 80 79-63 62-48 47-32 31-16 15-1 0   Modifier CH CI CJ CK CL CM CN     ? Mobility - Walking and Moving Around:     - CURRENT STATUS: CL - 60%-79% impaired, limited or restricted    - GOAL STATUS: CK - 40%-59% impaired, limited or restricted    - D/C STATUS:  ---------------To be determined---------------    Medical Necessity:   · Patient is expected to demonstrate progress in strength, range of motion and functional technique to increase independence with household ADL's. · Patient demonstrates good rehab potential due to higher previous functional level. Reason for Services/Other Comments:  · Patient continues to require skilled intervention due to decreased ROM/strength R knee with increased pain leading to decreased functional status.    Use of outcome tool(s) and clinical judgement create a POC that gives a: Questionable prediction of patient's progress: MODERATE COMPLEXITY            TREATMENT:   (In addition to Assessment/Re-Assessment sessions the following treatments were rendered)  Pre-treatment Symptoms/Complaints:  Pt states she is very sore today from the cold, rainy weather. She saw MD for follow up last week and states MD is pleased with her progress. Pain: Initial:     5/10 Post Session:  4/10     THERAPEUTIC EXERCISE: (30 minutes):  Exercises per grid below to improve mobility and strength. Required minimal verbal cues to promote proper body alignment and promote proper body posture. Progressed resistance, range and repetitions as indicated. MANUAL THERAPY: (10 minutes): Manual ROM R knee into flexion and extension with OP and scar massage R knee was utilized and necessary because of the patient's restricted joint motion and restricted motion of soft tissue. MODALITIES: (10 minutes):      Game Ready cold pack to R knee x10 minutes to decrease pain and swelling. Skin clear afterwards.     Date:  02-19-18 Date:  02-27-18 Date:  03-01-18 Date  03-06-18   Activity/Exercise Parameters Parameters Parameters    Quad Sets with Towel Under Heel 15 reps  5 sec holds 20 reps  5 sec holds 20 reps  5 sec holds 20 reps  5 sec holds   SLR Flexion 20 reps 20 reps 20 reps 20 reps   SAQ's 15 reps  5 sec holds 20 reps  5 sec holds 2 Lbs  20 reps 2 Lbs  20 reps   Heel Slides with Strap for OP 15 reps  5 sec holds 20 reps  5 sec holds 20 reps  5 sec holds 20 reps  5 sec holds   LAQ's 15 reps  5 sec holds 20 reps  5 sec holds 2 Lbs  20 reps 2 Lbs  20 reps   NuStep Level 2  6 minutes Level 2  6 minutes Level 3  7 minutes Level 3  8 minutes   Standing Heel Raises  20 reps 20 reps each And Toe Raises  20 reps each   Step-Ups  4 inch step  20 reps 4 inch  20 reps 6 inch  15 reps   Knee Flexion Stretch on Step   10 reps  10 sec holds 10 reps  10 sec holds   Gastroc Slantboard   3 reps  20 sec holds 3 reps  20 sec holds   Seated Knee Flexion Stretch     10 reps  10 sec holds       [employer]  Treatment/Session Assessment:    · Response to Treatment:  Pt tolerated treatments well today. ROM/strength doing well despite increased pain from the weather this afternoon. · Compliance with Program/Exercises: Will assess as treatment progresses. · Recommendations/Intent for next treatment session: \"Next visit will focus on advancements to more challenging activities\".   Total Treatment Duration:  50 minutes  PT Patient Time In/Time Out  Time In: 0105  Time Out: Mikael Boland, ELADIO

## 2018-03-08 ENCOUNTER — HOSPITAL ENCOUNTER (OUTPATIENT)
Dept: PHYSICAL THERAPY | Age: 73
Discharge: HOME | End: 2018-03-08
Payer: MEDICARE

## 2018-03-08 PROCEDURE — 97140 MANUAL THERAPY 1/> REGIONS: CPT

## 2018-03-08 PROCEDURE — 97110 THERAPEUTIC EXERCISES: CPT

## 2018-03-08 NOTE — PROGRESS NOTES
Vidhya Aleksandar  : 1945  Primary: Jeremias Lares Medicare Choice *  Secondary:  [address] [facility], [address] [address], [address] [facility]  [phone]   [phone]           OUTPATIENT PHYSICAL THERAPY:Daily Note 3/8/2018    ICD-10: Treatment Diagnosis: Pain in right knee (M25.561); Stiffness of right knee, not elsewhere classified (M25.661)  Precautions/Allergies:   Review of patient's allergies indicates no known allergies. Fall Risk Score: 1 (? 5 = High Risk)  MD Orders: Evaluate and Treat MEDICAL/REFERRING DIAGNOSIS:  Presence of right artificial knee joint [Z96.651]   DATE OF ONSET: Surgery 18  REFERRING PHYSICIAN: Fabiana Cesar., *  RETURN PHYSICIAN APPOINTMENT: Pt unaware of follow up appt at this time     INITIAL ASSESSMENT:  Ms. Mariel Quigley presents with decreased ROM/strength R knee with increased pain leading to decreased functional status s/p R TKA 18. Pt would benefit from skilled physical therapy services to address the above deficits and help patient return to prior level of function. PROBLEM LIST (Impacting functional limitations):  1. Decreased Strength  2. Decreased ADL/Functional Activities  3. Increased Pain  4. Decreased Flexibility/Joint Mobility  5. Decreased [place] with Home Exercise Program INTERVENTIONS PLANNED:  1. Balance Exercise  2. Cold  3. Cryotherapy  4. Electrical Stimulation  5. Gait Training  6. Home Exercise Program (HEP)  7. Manual Therapy  8. Range of Motion (ROM)  9. Therapeutic Exercise/Strengthening   TREATMENT PLAN:  Effective Dates: 18 TO 18. Frequency/Duration: 2 times a week for 2 months  GOALS: (Goals have been discussed and agreed upon with patient.)  Short-Term Functional Goals: Time Frame: 4 weeks  1. Pt will increase ROM R knee 0-120 degrees to assist with ascending/descending stairs  2.  Pt will increase strength R knee 4/5 to assist with ascending/descending stairs  Discharge Goals: Time Frame: 8 weeks  1. Pt will increase strength R knee 5/5 to assist with ascending/descending stairs  2. Pt will be independent with HEP  3. Pt will ascend/descend 10 six-inch steps independently with min to no c/o R knee pain  4. Pt will perform 20 minutes household cleaning activities independently with min to no c/o R knee pain  Rehabilitation Potential For Stated Goals: Good  Regarding Sebastien Rust's therapy, I certify that the treatment plan above will be carried out by a therapist or under their direction. Thank you for this referral,  Juan Miguel Michel, PT     Referring Physician Signature: Linda Chaidez., *              Date                    The information in this section was collected on 02-19-18 (except where otherwise noted). HISTORY:   History of Present Injury/Illness (Reason for Referral):  Pt reports insidious onset R knee pain of 5-6 years duration. She had several injections with continued pain so she opted for TKA which was performed 01-24-18. She spent 2 nights in hospital followed by home health PT until last Friday. Pt rates her current R knee pain 7-8/10 sitting at rest.  She is taking oxycodone for pain. She is retired. Pt lives in a one story house. She states she has difficulties ascending/descending stairs due to R knee pain, weakness and decreased ROM. Pt states she is having difficulties with all household cooking and cleaning activities due to her R knee surgery. Past Medical History/Comorbidities:   Ms. Nina Francis  has a past medical history of Anxiety; Arthritis; Breast cancer ([facility] [address] (1/18/2016); Cancer ([address] Chronic pain; Former smoker; GERD (gastroesophageal reflux disease); Hard of hearing; Headache; Hypertension; Hypothyroidism; Insomnia; Status post total left knee replacement (5/12/2017); and Status post total right knee replacement (1/24/2018). She also has no past medical history of Adverse effect of anesthesia;  Aneurysm ([address] Arrhythmia; Asthma; Autoimmune disease ([facility] [address] CAD (coronary artery disease); Chronic kidney disease; Chronic obstructive pulmonary disease ([facility] [address] Coagulation disorder ([facility] [address] Diabetes ([facility] [address] Difficult intubation; Endocarditis; Heart failure ([facility] [address] Ill-defined condition; Liver disease; Malignant hyperthermia due to anesthesia; Morbid obesity ([facility] [address] Nicotine vapor product user; Non-nicotine vapor product user; Pseudocholinesterase deficiency; Psychiatric disorder; PUD (peptic ulcer disease); Rheumatic fever; Seizures ([facility] [address] Sleep apnea; Stroke [facility]); Thromboembolus ([facility] [address] or Unspecified adverse effect of anesthesia. Ms. Lary Owen  has a past surgical history that includes hx colonoscopy; hx wisdom teeth extraction; hx tonsillectomy (1957); hx vascular access (Right); pr breast surgery procedure unlisted (Left, 7/2015); hx breast biopsy (Left, 1/18/2016); pr mastectomy, partial (Left, 01/18/2016); hx breast lumpectomy (Left, 01/2016); hx orthopaedic; and hx knee replacement (Left, 05/12/2017). Social History/Living Environment:     Pt lives with spouse in a one story house  Prior Level of Function/Work/Activity:  Pt is retired  Dominant Side:         RIGHT  Other Clinical Tests:          X-rays R knee  Personal Factors:          Sex:  female        Age:  72 y.o. Profession:  Pt is retired  Current Medications:       Current Outpatient Prescriptions:     polyethylene glycol (MIRALAX) 17 gram/dose powder, Take 17 g by mouth as needed (constipation). as needed daily, Disp: , Rfl:     vitamin E topical cream, Apply 1 Dose to affected area two (2) times daily as needed for Itching., Disp: , Rfl:     temazepam (RESTORIL) 15 mg capsule, Take 1 Cap by mouth nightly. Max Daily Amount: 15 mg., Disp: 30 Cap, Rfl: 5    oxyCODONE IR (ROXICODONE) 5 mg immediate release tablet, Take 1 Tab by mouth every three (3) hours as needed. Max Daily Amount: 40 mg.  (Patient taking differently: Take 1-2 Tabs by mouth every four (4) hours as needed for Pain. every 4-6 hours as needed), Disp: 40 Tab, Rfl: 0    HYDROmorphone (DILAUDID) 2 mg tablet, Take 1 Tab by mouth every four (4) hours as needed. Max Daily Amount: 12 mg., Disp: 40 Tab, Rfl: 0    ascorbic acid, vitamin C, (VITAMIN C) 500 mg tablet, Take 1,000 mg by mouth daily. , Disp: , Rfl:     celecoxib (CELEBREX) 200 mg capsule, Take 1 Cap by mouth two (2) times a day., Disp: 60 Cap, Rfl: 5    levothyroxine (SYNTHROID) 25 mcg tablet, TAKE ONE TABLET BY MOUTH ONE TIME DAILY BEFORE BREAKFAST, Disp: 30 Tab, Rfl: 6    SUMAtriptan (IMITREX) 100 mg tablet, Take 1 Tab by mouth once as needed for Migraine for up to 1 dose., Disp: 12 Tab, Rfl: 3    hydroCHLOROthiazide (HYDRODIURIL) 25 mg tablet, TAKE ONE TABLET BY MOUTH ONE TIME DAILY, Disp: 30 Tab, Rfl: 5    acetaminophen (TYLENOL) 500 mg tablet, Take 1,000 mg by mouth every six (6) hours as needed for Pain. Take / use AM day of surgery  per anesthesia protocols if needed. , Disp: , Rfl:     Biotin 2,500 mcg cap, Take 2 Tabs by mouth daily. , Disp: , Rfl:     multivitamin (ONE A DAY) tablet, Take 1 Tab by mouth daily. , Disp: , Rfl:     esomeprazole (NEXIUM) 20 mg capsule, Take 20 mg by mouth every morning. Take / use AM day of surgery  per anesthesia protocols.   Indications: GASTROESOPHAGEAL REFLUX, Disp: , Rfl:    Date Last Reviewed:  02-19-18   Number of Personal Factors/Comorbidities that affect the Plan of Care: 1-2: MODERATE COMPLEXITY   EXAMINATION:   Observation/Orthostatic Postural Assessment:          Pt walks with straight cane and moderate antalgic gait  Palpation:          Generalized tenderness throughout R knee  ROM:    R knee extension = -3 degrees  R knee flexion = 120 degrees    L knee extension = 0 degrees  L knee flexion = 120 degrees    Strength:    R knee extension = 4-/5  R knee flexion = 4-/5    L knee extension = 5/5  L knee flexion = 5/5    Special Tests:          N/A  Neurological Screen:        Sensation: Intact to light touch R knee  Functional Mobility:         Gait/Ambulation:  Pt walks with straight cane and moderate antalgic gait        Transfers:  Pt able to transition sit to supine and supine to sit independently    Body Structures Involved:  1. Bones  2. Joints  3. Muscles Body Functions Affected:  1. Sensory/Pain  2. Neuromusculoskeletal Activities and Participation Affected:  1. Mobility  2. Domestic Life   Number of elements (examined above) that affect the Plan of Care: 3: MODERATE COMPLEXITY   CLINICAL PRESENTATION:   Presentation: Evolving clinical presentation with changing clinical characteristics: MODERATE COMPLEXITY   CLINICAL DECISION MAKING:   Outcome Measure: Tool Used: Lower Extremity Functional Scale (LEFS)  Score:  Initial: 19/80 Most Recent: X/80 (Date: -- )   Interpretation of Score: 20 questions each scored on a 5 point scale with 0 representing \"extreme difficulty or unable to perform\" and 4 representing \"no difficulty\". The lower the score, the greater the functional disability. 80/80 represents no disability. Minimal detectable change is 9 points. Score 80 79-63 62-48 47-32 31-16 15-1 0   Modifier CH CI CJ CK CL CM CN     ? Mobility - Walking and Moving Around:     - CURRENT STATUS: CL - 60%-79% impaired, limited or restricted    - GOAL STATUS: CK - 40%-59% impaired, limited or restricted    - D/C STATUS:  ---------------To be determined---------------    Medical Necessity:   · Patient is expected to demonstrate progress in strength, range of motion and functional technique to increase independence with household ADL's. · Patient demonstrates good rehab potential due to higher previous functional level. Reason for Services/Other Comments:  · Patient continues to require skilled intervention due to decreased ROM/strength R knee with increased pain leading to decreased functional status.    Use of outcome tool(s) and clinical judgement create a POC that gives a: Questionable prediction of patient's progress: MODERATE COMPLEXITY            TREATMENT:   (In addition to Assessment/Re-Assessment sessions the following treatments were rendered)  Pre-treatment Symptoms/Complaints:  Pt states she is feeling better today. She feels her energy is slowly improving. Pain: Initial:     5/10 Post Session:  4/10     THERAPEUTIC EXERCISE: (30 minutes):  Exercises per grid below to improve mobility and strength. Required minimal verbal cues to promote proper body alignment and promote proper body posture. Progressed resistance, range and repetitions as indicated. MANUAL THERAPY: (10 minutes): Manual ROM R knee into flexion and extension with OP and scar massage R knee was utilized and necessary because of the patient's restricted joint motion and restricted motion of soft tissue. MODALITIES: (10 minutes):      Game Ready cold pack to R knee x10 minutes to decrease pain and swelling. Skin clear afterwards.     Date:  03-01-18 Date  03-06-18 Date  03-08-18   Activity/Exercise Parameters     Quad Sets with Towel Under Heel 20 reps  5 sec holds 20 reps  5 sec holds 20 reps  5 sec holds   SLR Flexion 20 reps 20 reps 20 reps   SAQ's 2 Lbs  20 reps 2 Lbs  20 reps 2 Lbs  20 reps   Heel Slides with Strap for OP 20 reps  5 sec holds 20 reps  5 sec holds 20 reps  5 sec holds   LAQ's 2 Lbs  20 reps 2 Lbs  20 reps 2 Lbs  20 reps   NuStep Level 3  7 minutes Level 3  8 minutes Level 3  8 minutes   Standing Heel Raises 20 reps each And Toe Raises  20 reps each And Toe raises  20 reps each   Step-Ups 4 inch  20 reps 6 inch  15 reps 6 inch  20 reps   Knee Flexion Stretch on Step 10 reps  10 sec holds 10 reps  10 sec holds 10 reps  10 sec holds   Gastroc Slantboard 3 reps  20 sec holds 3 reps  20 sec holds 3 reps  20 sec holds   Seated Knee Flexion Stretch   10 reps  10 sec holds 10 reps  10 sec holds   Nautilus Leg Press   60 Lbs  20 reps       Med[employer] Portal  Treatment/Session Assessment:    · Response to Treatment:  Pt tolerated treatments well today. She continues to slowly improve. Pt walking with no assistive device now, but she continue to walk with stiff knee gait. · Compliance with Program/Exercises: Will assess as treatment progresses. · Recommendations/Intent for next treatment session: \"Next visit will focus on advancements to more challenging activities\".   Total Treatment Duration:  50 minutes  PT Patient Time In/Time Out  Time In: 1345  Time Out: 143 [address],

## 2018-03-13 ENCOUNTER — HOSPITAL ENCOUNTER (OUTPATIENT)
Dept: PHYSICAL THERAPY | Age: 73
Discharge: HOME | End: 2018-03-13
Payer: MEDICARE

## 2018-03-13 PROCEDURE — 97110 THERAPEUTIC EXERCISES: CPT

## 2018-03-13 PROCEDURE — 97140 MANUAL THERAPY 1/> REGIONS: CPT

## 2018-03-13 NOTE — PROGRESS NOTES
Rober Marrero  : 1945  Primary: Malik Lares Medicare Choice *  Secondary:  [address]  [facility], [address] [address],[address], [address] [facility]  [phone]   [phone]           OUTPATIENT PHYSICAL THERAPY:Daily Note 3/13/2018    ICD-10: Treatment Diagnosis: Pain in right knee (M25.561); Stiffness of right knee, not elsewhere classified (M25.661)  Precautions/Allergies:   Review of patient's allergies indicates no known allergies. Fall Risk Score: 1 (? 5 = High Risk)  MD Orders: Evaluate and Treat MEDICAL/REFERRING DIAGNOSIS:  Presence of right artificial knee joint [Z96.651]   DATE OF ONSET: Surgery 18  REFERRING PHYSICIAN: Tatyana Guzman., *  RETURN PHYSICIAN APPOINTMENT: Pt unaware of follow up appt at this time     INITIAL ASSESSMENT:  Ms. Barraza presents with decreased ROM/strength R knee with increased pain leading to decreased functional status s/p R TKA 18. Pt would benefit from skilled physical therapy services to address the above deficits and help patient return to prior level of function. PROBLEM LIST (Impacting functional limitations):  1. Decreased Strength  2. Decreased ADL/Functional Activities  3. Increased Pain  4. Decreased Flexibility/Joint Mobility  5. Decreased [place] with Home Exercise Program INTERVENTIONS PLANNED:  1. Balance Exercise  2. Cold  3. Cryotherapy  4. Electrical Stimulation  5. Gait Training  6. Home Exercise Program (HEP)  7. Manual Therapy  8. Range of Motion (ROM)  9. Therapeutic Exercise/Strengthening   TREATMENT PLAN:  Effective Dates: 18 TO 18. Frequency/Duration: 2 times a week for 2 months  GOALS: (Goals have been discussed and agreed upon with patient.)  Short-Term Functional Goals: Time Frame: 4 weeks  1. Pt will increase ROM R knee 0-120 degrees to assist with ascending/descending stairs  2.  Pt will increase strength R knee 4/5 to assist with ascending/descending stairs  Discharge Goals: Time Frame: 8 weeks  1. Pt will increase strength R knee 5/5 to assist with ascending/descending stairs  2. Pt will be independent with HEP  3. Pt will ascend/descend 10 six-inch steps independently with min to no c/o R knee pain  4. Pt will perform 20 minutes household cleaning activities independently with min to no c/o R knee pain  Rehabilitation Potential For Stated Goals: Good  Regarding Andie Rust's therapy, I certify that the treatment plan above will be carried out by a therapist or under their direction. Thank you for this referral,  Brent Bravo, PT     Referring Physician Signature: Radha Phillips., *              Date                    The information in this section was collected on 02-19-18 (except where otherwise noted). HISTORY:   History of Present Injury/Illness (Reason for Referral):  Pt reports insidious onset R knee pain of 5-6 years duration. She had several injections with continued pain so she opted for TKA which was performed 01-24-18. She spent 2 nights in hospital followed by home health PT until last Friday. Pt rates her current R knee pain 7-8/10 sitting at rest.  She is taking oxycodone for pain. She is retired. Pt lives in a one story house. She states she has difficulties ascending/descending stairs due to R knee pain, weakness and decreased ROM. Pt states she is having difficulties with all household cooking and cleaning activities due to her R knee surgery. Past Medical History/Comorbidities:   Ms. Dirk Arriaga  has a past medical history of Anxiety; Arthritis; Breast cancer ([address] (1/18/2016); Cancer ([address] Chronic pain; Former smoker; GERD (gastroesophageal reflux disease); Hard of hearing; Headache; Hypertension; Hypothyroidism; Insomnia; Status post total left knee replacement (5/12/2017); and Status post total right knee replacement (1/24/2018). She also has no past medical history of Adverse effect of anesthesia;  Aneurysm ([address] Arrhythmia; Asthma; Autoimmune disease ([facility] CAD (coronary artery disease); Chronic kidney disease; Chronic obstructive pulmonary disease ([facility] Coagulation disorder ([facility] Diabetes ([facility] Difficult intubation; Endocarditis; Heart failure ([facility] Ill-defined condition; Liver disease; Malignant hyperthermia due to anesthesia; Morbid obesity ([facility] Nicotine vapor product user; Non-nicotine vapor product user; Pseudocholinesterase deficiency; Psychiatric disorder; PUD (peptic ulcer disease); Rheumatic fever; Seizures ([facility] Sleep apnea; Stroke [facility]); Thromboembolus ([facility] or Unspecified adverse effect of anesthesia. Ms. Nina Francis  has a past surgical history that includes hx colonoscopy; hx wisdom teeth extraction; hx tonsillectomy (1957); hx vascular access (Right); pr breast surgery procedure unlisted (Left, 7/2015); hx breast biopsy (Left, 1/18/2016); pr mastectomy, partial (Left, 01/18/2016); hx breast lumpectomy (Left, 01/2016); hx orthopaedic; and hx knee replacement (Left, 05/12/2017). Social History/Living Environment:     Pt lives with spouse in a one story house  Prior Level of Function/Work/Activity:  Pt is retired  Dominant Side:         RIGHT  Other Clinical Tests:          X-rays R knee  Personal Factors:          Sex:  female        Age:  72 y.o. Profession:  Pt is retired  Current Medications:       Current Outpatient Prescriptions:     hydroCHLOROthiazide (HYDRODIURIL) 25 mg tablet, TAKE ONE TABLET BY MOUTH ONE TIME DAILY, Disp: 30 Tab, Rfl: 5    polyethylene glycol (MIRALAX) 17 gram/dose powder, Take 17 g by mouth as needed (constipation). as needed daily, Disp: , Rfl:     vitamin E topical cream, Apply 1 Dose to affected area two (2) times daily as needed for Itching., Disp: , Rfl:     temazepam (RESTORIL) 15 mg capsule, Take 1 Cap by mouth nightly.  Max Daily Amount: 15 mg., Disp: 30 Cap, Rfl: 5    oxyCODONE IR (ROXICODONE) 5 mg immediate release tablet, Take 1 Tab by mouth every three (3) hours as needed. Max Daily Amount: 40 mg. (Patient taking differently: Take 1-2 Tabs by mouth every four (4) hours as needed for Pain. every 4-6 hours as needed), Disp: 40 Tab, Rfl: 0    HYDROmorphone (DILAUDID) 2 mg tablet, Take 1 Tab by mouth every four (4) hours as needed. Max Daily Amount: 12 mg., Disp: 40 Tab, Rfl: 0    ascorbic acid, vitamin C, (VITAMIN C) 500 mg tablet, Take 1,000 mg by mouth daily. , Disp: , Rfl:     celecoxib (CELEBREX) 200 mg capsule, Take 1 Cap by mouth two (2) times a day., Disp: 60 Cap, Rfl: 5    levothyroxine (SYNTHROID) 25 mcg tablet, TAKE ONE TABLET BY MOUTH ONE TIME DAILY BEFORE BREAKFAST, Disp: 30 Tab, Rfl: 6    SUMAtriptan (IMITREX) 100 mg tablet, Take 1 Tab by mouth once as needed for Migraine for up to 1 dose., Disp: 12 Tab, Rfl: 3    acetaminophen (TYLENOL) 500 mg tablet, Take 1,000 mg by mouth every six (6) hours as needed for Pain. Take / use AM day of surgery  per anesthesia protocols if needed. , Disp: , Rfl:     Biotin 2,500 mcg cap, Take 2 Tabs by mouth daily. , Disp: , Rfl:     multivitamin (ONE A DAY) tablet, Take 1 Tab by mouth daily. , Disp: , Rfl:     esomeprazole (NEXIUM) 20 mg capsule, Take 20 mg by mouth every morning. Take / use AM day of surgery  per anesthesia protocols.   Indications: GASTROESOPHAGEAL REFLUX, Disp: , Rfl:    Date Last Reviewed:  02-19-18   Number of Personal Factors/Comorbidities that affect the Plan of Care: 1-2: MODERATE COMPLEXITY   EXAMINATION:   Observation/Orthostatic Postural Assessment:          Pt walks with straight cane and moderate antalgic gait  Palpation:          Generalized tenderness throughout R knee  ROM:    R knee extension = -3 degrees  R knee flexion = 120 degrees    L knee extension = 0 degrees  L knee flexion = 120 degrees    Strength:    R knee extension = 4-/5  R knee flexion = 4-/5    L knee extension = 5/5  L knee flexion = 5/5    Special Tests:          N/A  Neurological Screen:        Sensation: Intact to light touch R knee  Functional Mobility:         Gait/Ambulation:  Pt walks with straight cane and moderate antalgic gait        Transfers:  Pt able to transition sit to supine and supine to sit independently    Body Structures Involved:  1. Bones  2. Joints  3. Muscles Body Functions Affected:  1. Sensory/Pain  2. Neuromusculoskeletal Activities and Participation Affected:  1. Mobility  2. Domestic Life   Number of elements (examined above) that affect the Plan of Care: 3: MODERATE COMPLEXITY   CLINICAL PRESENTATION:   Presentation: Evolving clinical presentation with changing clinical characteristics: MODERATE COMPLEXITY   CLINICAL DECISION MAKING:   Outcome Measure: Tool Used: Lower Extremity Functional Scale (LEFS)  Score:  Initial: 19/80 Most Recent: X/80 (Date: -- )   Interpretation of Score: 20 questions each scored on a 5 point scale with 0 representing \"extreme difficulty or unable to perform\" and 4 representing \"no difficulty\". The lower the score, the greater the functional disability. 80/80 represents no disability. Minimal detectable change is 9 points. Score 80 79-63 62-48 47-32 31-16 15-1 0   Modifier CH CI CJ CK CL CM CN     ? Mobility - Walking and Moving Around:     - CURRENT STATUS: CL - 60%-79% impaired, limited or restricted    - GOAL STATUS: CK - 40%-59% impaired, limited or restricted    - D/C STATUS:  ---------------To be determined---------------    Medical Necessity:   · Patient is expected to demonstrate progress in strength, range of motion and functional technique to increase independence with household ADL's. · Patient demonstrates good rehab potential due to higher previous functional level. Reason for Services/Other Comments:  · Patient continues to require skilled intervention due to decreased ROM/strength R knee with increased pain leading to decreased functional status.    Use of outcome tool(s) and clinical judgement create a POC that gives a: Questionable prediction of patient's progress: MODERATE COMPLEXITY            TREATMENT:   (In addition to Assessment/Re-Assessment sessions the following treatments were rendered)  Pre-treatment Symptoms/Complaints:  Pt states her knee is feeling pretty good today. Pain: Initial:     5/10 Post Session:  4/10     THERAPEUTIC EXERCISE: (30 minutes):  Exercises per grid below to improve mobility and strength. Required minimal verbal cues to promote proper body alignment and promote proper body posture. Progressed resistance, range and repetitions as indicated. MANUAL THERAPY: (10 minutes): Manual ROM R knee into extension with OP and scar massage R knee was utilized and necessary because of the patient's restricted joint motion and restricted motion of soft tissue. MODALITIES: (10 minutes):      Game Ready cold pack to R knee x10 minutes to decrease pain and swelling. Skin clear afterwards.     Date:  03-01-18 Date  03-06-18 Date  03-08-18 Date  03-13-18   Activity/Exercise Parameters      Quad Sets with Towel Under Heel 20 reps  5 sec holds 20 reps  5 sec holds 20 reps  5 sec holds    SLR Flexion 20 reps 20 reps 20 reps 20 reps   SAQ's 2 Lbs  20 reps 2 Lbs  20 reps 2 Lbs  20 reps 3 Lbs  20 reps   Heel Slides with Strap for OP 20 reps  5 sec holds 20 reps  5 sec holds 20 reps  5 sec holds    LAQ's 2 Lbs  20 reps 2 Lbs  20 reps 2 Lbs  20 reps 3 Lbs  20 reps   NuStep Level 3  7 minutes Level 3  8 minutes Level 3  8 minutes Level 3  8 minutes   Standing Heel Raises 20 reps each And Toe Raises  20 reps each And Toe raises  20 reps each And Toe raises  20 reps each   Step-Ups 4 inch  20 reps 6 inch  15 reps 6 inch  20 reps 6 inch  20 reps   Knee Flexion Stretch on Step 10 reps  10 sec holds 10 reps  10 sec holds 10 reps  10 sec holds 10 reps  10 sec holds   Gastroc Slantboard 3 reps  20 sec holds 3 reps  20 sec holds 3 reps  20 sec holds 3 reps  20 sec holds   Seated Knee Flexion Stretch   10 reps  10 sec holds 10 reps  10 sec holds    Nautilus Leg Press   60 Lbs  20 reps 60 Lbs  20 reps       Assignment Editor Portal  Treatment/Session Assessment:    · Response to Treatment:  Pt tolerated treatments well today. ROM and strength improving. · Compliance with Program/Exercises: Will assess as treatment progresses. · Recommendations/Intent for next treatment session: \"Next visit will focus on advancements to more challenging activities\".   Total Treatment Duration:  50 minutes  PT Patient Time In/Time Out  Time In: 1345  Time Out: 143 [address],

## 2018-03-15 ENCOUNTER — HOSPITAL ENCOUNTER (OUTPATIENT)
Dept: PHYSICAL THERAPY | Age: 73
Discharge: HOME | End: 2018-03-15
Payer: MEDICARE

## 2018-03-20 ENCOUNTER — HOSPITAL ENCOUNTER (OUTPATIENT)
Dept: PHYSICAL THERAPY | Age: 73
Discharge: HOME | End: 2018-03-20
Payer: MEDICARE

## 2018-03-20 PROCEDURE — 97110 THERAPEUTIC EXERCISES: CPT

## 2018-03-20 PROCEDURE — 97140 MANUAL THERAPY 1/> REGIONS: CPT

## 2018-03-20 NOTE — PROGRESS NOTES
Aretha Dotson  : 1945  Primary: Jaxson Lares Medicare Choice *  Secondary:  [address] at [place] [id number] [facility], [address] [place] [address],[address], [facility] [address] [facility].  [phone]   [phone]           OUTPATIENT PHYSICAL THERAPY:Daily Note 3/20/2018    ICD-10: Treatment Diagnosis: Pain in right knee (M25.561); Stiffness of right knee, not elsewhere classified (M25.661)  Precautions/Allergies:   Review of patient's allergies indicates no known allergies. Fall Risk Score: 1 (? 5 = High Risk)  MD Orders: Evaluate and Treat MEDICAL/REFERRING DIAGNOSIS:  Presence of right artificial knee joint [Z96.651]   DATE OF ONSET: Surgery 18  REFERRING PHYSICIAN: Dl Oquendo., *  RETURN PHYSICIAN APPOINTMENT: Pt unaware of follow up appt at this time     INITIAL ASSESSMENT:  Ms. Sharmin Olvera presents with decreased ROM/strength R knee with increased pain leading to decreased functional status s/p R TKA 18. Pt would benefit from skilled physical therapy services to address the above deficits and help patient return to prior level of function. PROBLEM LIST (Impacting functional limitations):  1. Decreased Strength  2. Decreased ADL/Functional Activities  3. Increased Pain  4. Decreased Flexibility/Joint Mobility  5. Decreased [place] with Home Exercise Program INTERVENTIONS PLANNED:  1. Balance Exercise  2. Cold  3. Cryotherapy  4. Electrical Stimulation  5. Gait Training  6. Home Exercise Program (HEP)  7. Manual Therapy  8. Range of Motion (ROM)  9. Therapeutic Exercise/Strengthening   TREATMENT PLAN:  Effective Dates: 18 TO 18. Frequency/Duration: 2 times a week for 2 months  GOALS: (Goals have been discussed and agreed upon with patient.)  Short-Term Functional Goals: Time Frame: 4 weeks  1. Pt will increase ROM R knee 0-120 degrees to assist with ascending/descending stairs  2.  Pt will increase strength R knee 4/5 to assist with ascending/descending stairs  Discharge Goals: Time Frame: 8 weeks  1. Pt will increase strength R knee 5/5 to assist with ascending/descending stairs  2. Pt will be independent with HEP  3. Pt will ascend/descend 10 six-inch steps independently with min to no c/o R knee pain  4. Pt will perform 20 minutes household cleaning activities independently with min to no c/o R knee pain  Rehabilitation Potential For Stated Goals: Good  Regarding Natalie Rust's therapy, I certify that the treatment plan above will be carried out by a therapist or under their direction. Thank you for this referral,  Paige Mojica, PT     Referring Physician Signature: Valente Kawasaki., *              Date                    The information in this section was collected on 02-19-18 (except where otherwise noted). HISTORY:   History of Present Injury/Illness (Reason for Referral):  Pt reports insidious onset R knee pain of 5-6 years duration. She had several injections with continued pain so she opted for TKA which was performed 01-24-18. She spent 2 nights in hospital followed by home health PT until last Friday. Pt rates her current R knee pain 7-8/10 sitting at rest.  She is taking oxycodone for pain. She is retired. Pt lives in a one story house. She states she has difficulties ascending/descending stairs due to R knee pain, weakness and decreased ROM. Pt states she is having difficulties with all household cooking and cleaning activities due to her R knee surgery. Past Medical History/Comorbidities:   Ms. Denise Stinson  has a past medical history of Anxiety; Arthritis; Breast cancer ([address] (1/18/2016); Cancer ([address] Chronic pain; Former smoker; GERD (gastroesophageal reflux disease); Hard of hearing; Headache; Hypertension; Hypothyroidism; Insomnia; Status post total left knee replacement (5/12/2017); and Status post total right knee replacement (1/24/2018). She also has no past medical history of Adverse effect of anesthesia;  Aneurysm ([address] Arrhythmia; Asthma; Autoimmune disease ([facility] CAD (coronary artery disease); Chronic kidney disease; Chronic obstructive pulmonary disease ([facility] Coagulation disorder ([facility] Diabetes ([facility] Difficult intubation; Endocarditis; Heart failure ([facility] Ill-defined condition; Liver disease; Malignant hyperthermia due to anesthesia; Morbid obesity ([facility] Nicotine vapor product user; Non-nicotine vapor product user; Pseudocholinesterase deficiency; Psychiatric disorder; PUD (peptic ulcer disease); Rheumatic fever; Seizures ([facility] Sleep apnea; Stroke [facility]); Thromboembolus ([facility] or Unspecified adverse effect of anesthesia. Ms. Abeba Hummel  has a past surgical history that includes hx colonoscopy; hx wisdom teeth extraction; hx tonsillectomy (1957); hx vascular access (Right); pr breast surgery procedure unlisted (Left, 7/2015); hx breast biopsy (Left, 1/18/2016); pr mastectomy, partial (Left, 01/18/2016); hx breast lumpectomy (Left, 01/2016); hx orthopaedic; and hx knee replacement (Left, 05/12/2017). Social History/Living Environment:     Pt lives with spouse in a one story house  Prior Level of Function/Work/Activity:  Pt is retired  Dominant Side:         RIGHT  Other Clinical Tests:          X-rays R knee  Personal Factors:          Sex:  female        Age:  72 y.o. Profession:  Pt is retired  Current Medications:       Current Outpatient Prescriptions:     hydroCHLOROthiazide (HYDRODIURIL) 25 mg tablet, TAKE ONE TABLET BY MOUTH ONE TIME DAILY, Disp: 30 Tab, Rfl: 5    polyethylene glycol (MIRALAX) 17 gram/dose powder, Take 17 g by mouth as needed (constipation). as needed daily, Disp: , Rfl:     vitamin E topical cream, Apply 1 Dose to affected area two (2) times daily as needed for Itching., Disp: , Rfl:     temazepam (RESTORIL) 15 mg capsule, Take 1 Cap by mouth nightly.  Max Daily Amount: 15 mg., Disp: 30 Cap, Rfl: 5    oxyCODONE IR (ROXICODONE) 5 mg immediate release tablet, Take 1 Tab by mouth every three (3) hours as needed. Max Daily Amount: 40 mg. (Patient taking differently: Take 1-2 Tabs by mouth every four (4) hours as needed for Pain. every 4-6 hours as needed), Disp: 40 Tab, Rfl: 0    HYDROmorphone (DILAUDID) 2 mg tablet, Take 1 Tab by mouth every four (4) hours as needed. Max Daily Amount: 12 mg., Disp: 40 Tab, Rfl: 0    ascorbic acid, vitamin C, (VITAMIN C) 500 mg tablet, Take 1,000 mg by mouth daily. , Disp: , Rfl:     celecoxib (CELEBREX) 200 mg capsule, Take 1 Cap by mouth two (2) times a day., Disp: 60 Cap, Rfl: 5    levothyroxine (SYNTHROID) 25 mcg tablet, TAKE ONE TABLET BY MOUTH ONE TIME DAILY BEFORE BREAKFAST, Disp: 30 Tab, Rfl: 6    SUMAtriptan (IMITREX) 100 mg tablet, Take 1 Tab by mouth once as needed for Migraine for up to 1 dose., Disp: 12 Tab, Rfl: 3    acetaminophen (TYLENOL) 500 mg tablet, Take 1,000 mg by mouth every six (6) hours as needed for Pain. Take / use AM day of surgery  per anesthesia protocols if needed. , Disp: , Rfl:     Biotin 2,500 mcg cap, Take 2 Tabs by mouth daily. , Disp: , Rfl:     multivitamin (ONE A DAY) tablet, Take 1 Tab by mouth daily. , Disp: , Rfl:     esomeprazole (NEXIUM) 20 mg capsule, Take 20 mg by mouth every morning. Take / use AM day of surgery  per anesthesia protocols.   Indications: GASTROESOPHAGEAL REFLUX, Disp: , Rfl:    Date Last Reviewed:  02-19-18   Number of Personal Factors/Comorbidities that affect the Plan of Care: 1-2: MODERATE COMPLEXITY   EXAMINATION:   Observation/Orthostatic Postural Assessment:          Pt walks with straight cane and moderate antalgic gait  Palpation:          Generalized tenderness throughout R knee  ROM:    R knee extension = -3 degrees  R knee flexion = 120 degrees    L knee extension = 0 degrees  L knee flexion = 120 degrees    Strength:    R knee extension = 4-/5  R knee flexion = 4-/5    L knee extension = 5/5  L knee flexion = 5/5    Special Tests:          N/A  Neurological Screen:        Sensation: Intact to light touch R knee  Functional Mobility:         Gait/Ambulation:  Pt walks with straight cane and moderate antalgic gait        Transfers:  Pt able to transition sit to supine and supine to sit independently    Body Structures Involved:  1. Bones  2. Joints  3. Muscles Body Functions Affected:  1. Sensory/Pain  2. Neuromusculoskeletal Activities and Participation Affected:  1. Mobility  2. Domestic Life   Number of elements (examined above) that affect the Plan of Care: 3: MODERATE COMPLEXITY   CLINICAL PRESENTATION:   Presentation: Evolving clinical presentation with changing clinical characteristics: MODERATE COMPLEXITY   CLINICAL DECISION MAKING:   Outcome Measure: Tool Used: Lower Extremity Functional Scale (LEFS)  Score:  Initial: 19/80 Most Recent: X/80 (Date: -- )   Interpretation of Score: 20 questions each scored on a 5 point scale with 0 representing \"extreme difficulty or unable to perform\" and 4 representing \"no difficulty\". The lower the score, the greater the functional disability. 80/80 represents no disability. Minimal detectable change is 9 points. Score 80 79-63 62-48 47-32 31-16 15-1 0   Modifier CH CI CJ CK CL CM CN     ? Mobility - Walking and Moving Around:     - CURRENT STATUS: CL - 60%-79% impaired, limited or restricted    - GOAL STATUS: CK - 40%-59% impaired, limited or restricted    - D/C STATUS:  ---------------To be determined---------------    Medical Necessity:   · Patient is expected to demonstrate progress in strength, range of motion and functional technique to increase independence with household ADL's. · Patient demonstrates good rehab potential due to higher previous functional level. Reason for Services/Other Comments:  · Patient continues to require skilled intervention due to decreased ROM/strength R knee with increased pain leading to decreased functional status.    Use of outcome tool(s) and clinical judgement create a POC that gives a: Questionable prediction of patient's progress: MODERATE COMPLEXITY            TREATMENT:   (In addition to Assessment/Re-Assessment sessions the following treatments were rendered)  Pre-treatment Symptoms/Complaints:  Pt states her knee is feeling good this afternoon. \"I drove today. \"  Pain: Initial:     0/10 Post Session:  0/10     THERAPEUTIC EXERCISE: (30 minutes):  Exercises per grid below to improve mobility and strength. Required minimal verbal cues to promote proper body alignment and promote proper body posture. Progressed resistance, range and repetitions as indicated. MANUAL THERAPY: (10 minutes): Manual ROM R knee into extension with OP and scar massage R knee was utilized and necessary because of the patient's restricted joint motion and restricted motion of soft tissue. MODALITIES: (10 minutes):      Game Ready cold pack to R knee x10 minutes to decrease pain and swelling. Skin clear afterwards. Date  03-13-18 Date  03-20-18   Activity/Exercise     Quad Sets with Towel Under Heel     SLR Flexion 20 reps 20 reps   SAQ's 3 Lbs  20 reps 3 Lbs  20 reps   Heel Slides with Strap for OP  20 reps   LAQ's 3 Lbs  20 reps 3 Lbs  20 reps   NuStep Level 3  8 minutes Level 3  8 minutes   Standing Heel Raises And Toe raises  20 reps each And Toe Raises  20 reps each   Step-Ups 6 inch  20 reps 6 inch  20 reps   Knee Flexion Stretch on Step 10 reps  10 sec holds 10 reps  10 sec holds   Gastroc Slantboard 3 reps  20 sec holds 3 reps  20 sec holds   Seated Knee Flexion Stretch      Nautilus Leg Press 60 Lbs  20 reps 60 Lbs  20 reps       [employer] Portal  Treatment/Session Assessment:    · Response to Treatment:  Pt tolerated treatments well today. Improved gait today with no assistive device. · Compliance with Program/Exercises: Will assess as treatment progresses. · Recommendations/Intent for next treatment session: \"Next visit will focus on advancements to more challenging activities\".   Total Treatment Duration:  50 minutes  PT Patient Time In/Time Out  Time In: 0005  Time Out: 143 [address],

## 2018-03-22 ENCOUNTER — HOSPITAL ENCOUNTER (OUTPATIENT)
Dept: PHYSICAL THERAPY | Age: 73
Discharge: HOME | End: 2018-03-22
Payer: MEDICARE

## 2018-03-22 PROCEDURE — 97110 THERAPEUTIC EXERCISES: CPT

## 2018-03-22 NOTE — PROGRESS NOTES
Arjun Burns  : 1945  Primary: Lucas Lares Medicare Choice *  Secondary:  [address] at [place] [address] [facility], [address] [place] [address],[address], [place] [address].  [phone]   [phone]           OUTPATIENT PHYSICAL THERAPY:Daily Note 3/22/2018    ICD-10: Treatment Diagnosis: Pain in right knee (M25.561); Stiffness of right knee, not elsewhere classified (M25.661)  Precautions/Allergies:   Review of patient's allergies indicates no known allergies. Fall Risk Score: 1 (? 5 = High Risk)  MD Orders: Evaluate and Treat MEDICAL/REFERRING DIAGNOSIS:  Presence of right artificial knee joint [Z96.651]   DATE OF ONSET: Surgery 18  REFERRING PHYSICIAN: Jacquie Esteban., *  RETURN PHYSICIAN APPOINTMENT: Pt unaware of follow up appt at this time     INITIAL ASSESSMENT:  Ms. César Salguero presents with decreased ROM/strength R knee with increased pain leading to decreased functional status s/p R TKA 18. Pt would benefit from skilled physical therapy services to address the above deficits and help patient return to prior level of function. PROBLEM LIST (Impacting functional limitations):  1. Decreased Strength  2. Decreased ADL/Functional Activities  3. Increased Pain  4. Decreased Flexibility/Joint Mobility  5. Decreased [place] with Home Exercise Program INTERVENTIONS PLANNED:  1. Balance Exercise  2. Cold  3. Cryotherapy  4. Electrical Stimulation  5. Gait Training  6. Home Exercise Program (HEP)  7. Manual Therapy  8. Range of Motion (ROM)  9. Therapeutic Exercise/Strengthening   TREATMENT PLAN:  Effective Dates: 18 TO 18. Frequency/Duration: 2 times a week for 2 months  GOALS: (Goals have been discussed and agreed upon with patient.)  Short-Term Functional Goals: Time Frame: 4 weeks  1. Pt will increase ROM R knee 0-120 degrees to assist with ascending/descending stairs  2.  Pt will increase strength R knee 4/5 to assist with ascending/descending stairs  Discharge Goals: Time Frame: 8 weeks  1. Pt will increase strength R knee 5/5 to assist with ascending/descending stairs  2. Pt will be independent with HEP  3. Pt will ascend/descend 10 six-inch steps independently with min to no c/o R knee pain  4. Pt will perform 20 minutes household cleaning activities independently with min to no c/o R knee pain  Rehabilitation Potential For Stated Goals: Good  Regarding Ca Rust's therapy, I certify that the treatment plan above will be carried out by a therapist or under their direction. Thank you for this referral,  Emily Ribera, PT     Referring Physician Signature: Kilo Macias., *              Date                    The information in this section was collected on 02-19-18 (except where otherwise noted). HISTORY:   History of Present Injury/Illness (Reason for Referral):  Pt reports insidious onset R knee pain of 5-6 years duration. She had several injections with continued pain so she opted for TKA which was performed 01-24-18. She spent 2 nights in hospital followed by home health PT until last Friday. Pt rates her current R knee pain 7-8/10 sitting at rest.  She is taking oxycodone for pain. She is retired. Pt lives in a one story house. She states she has difficulties ascending/descending stairs due to R knee pain, weakness and decreased ROM. Pt states she is having difficulties with all household cooking and cleaning activities due to her R knee surgery. Past Medical History/Comorbidities:   Ms. Ese Platt  has a past medical history of Anxiety; Arthritis; Breast cancer ([address] (1/18/2016); Cancer ([address] Chronic pain; Former smoker; GERD (gastroesophageal reflux disease); Hard of hearing; Headache; Hypertension; Hypothyroidism; Insomnia; Status post total left knee replacement (5/12/2017); and Status post total right knee replacement (1/24/2018). She also has no past medical history of Adverse effect of anesthesia;  Aneurysm ([address] Arrhythmia; Asthma; Autoimmune disease ([facility] CAD (coronary artery disease); Chronic kidney disease; Chronic obstructive pulmonary disease ([facility] Coagulation disorder ([facility] Diabetes ([facility] Difficult intubation; Endocarditis; Heart failure ([facility] Ill-defined condition; Liver disease; Malignant hyperthermia due to anesthesia; Morbid obesity ([facility] Nicotine vapor product user; Non-nicotine vapor product user; Pseudocholinesterase deficiency; Psychiatric disorder; PUD (peptic ulcer disease); Rheumatic fever; Seizures ([facility] Sleep apnea; Stroke [facility]); Thromboembolus ([facility] or Unspecified adverse effect of anesthesia. Ms. Arti Myers  has a past surgical history that includes hx colonoscopy; hx wisdom teeth extraction; hx tonsillectomy (1957); hx vascular access (Right); pr breast surgery procedure unlisted (Left, 7/2015); hx breast biopsy (Left, 1/18/2016); pr mastectomy, partial (Left, 01/18/2016); hx breast lumpectomy (Left, 01/2016); hx orthopaedic; and hx knee replacement (Left, 05/12/2017). Social History/Living Environment:     Pt lives with spouse in a one story house  Prior Level of Function/Work/Activity:  Pt is retired  Dominant Side:         RIGHT  Other Clinical Tests:          X-rays R knee  Personal Factors:          Sex:  female        Age:  72 y.o. Profession:  Pt is retired  Current Medications:       Current Outpatient Prescriptions:     hydroCHLOROthiazide (HYDRODIURIL) 25 mg tablet, TAKE ONE TABLET BY MOUTH ONE TIME DAILY, Disp: 30 Tab, Rfl: 5    polyethylene glycol (MIRALAX) 17 gram/dose powder, Take 17 g by mouth as needed (constipation). as needed daily, Disp: , Rfl:     vitamin E topical cream, Apply 1 Dose to affected area two (2) times daily as needed for Itching., Disp: , Rfl:     temazepam (RESTORIL) 15 mg capsule, Take 1 Cap by mouth nightly.  Max Daily Amount: 15 mg., Disp: 30 Cap, Rfl: 5    oxyCODONE IR (ROXICODONE) 5 mg immediate release tablet, Take 1 Tab by mouth every three (3) hours as needed. Max Daily Amount: 40 mg. (Patient taking differently: Take 1-2 Tabs by mouth every four (4) hours as needed for Pain. every 4-6 hours as needed), Disp: 40 Tab, Rfl: 0    HYDROmorphone (DILAUDID) 2 mg tablet, Take 1 Tab by mouth every four (4) hours as needed. Max Daily Amount: 12 mg., Disp: 40 Tab, Rfl: 0    ascorbic acid, vitamin C, (VITAMIN C) 500 mg tablet, Take 1,000 mg by mouth daily. , Disp: , Rfl:     celecoxib (CELEBREX) 200 mg capsule, Take 1 Cap by mouth two (2) times a day., Disp: 60 Cap, Rfl: 5    levothyroxine (SYNTHROID) 25 mcg tablet, TAKE ONE TABLET BY MOUTH ONE TIME DAILY BEFORE BREAKFAST, Disp: 30 Tab, Rfl: 6    SUMAtriptan (IMITREX) 100 mg tablet, Take 1 Tab by mouth once as needed for Migraine for up to 1 dose., Disp: 12 Tab, Rfl: 3    acetaminophen (TYLENOL) 500 mg tablet, Take 1,000 mg by mouth every six (6) hours as needed for Pain. Take / use AM day of surgery  per anesthesia protocols if needed. , Disp: , Rfl:     Biotin 2,500 mcg cap, Take 2 Tabs by mouth daily. , Disp: , Rfl:     multivitamin (ONE A DAY) tablet, Take 1 Tab by mouth daily. , Disp: , Rfl:     esomeprazole (NEXIUM) 20 mg capsule, Take 20 mg by mouth every morning. Take / use AM day of surgery  per anesthesia protocols.   Indications: GASTROESOPHAGEAL REFLUX, Disp: , Rfl:    Date Last Reviewed:  02-19-18   Number of Personal Factors/Comorbidities that affect the Plan of Care: 1-2: MODERATE COMPLEXITY   EXAMINATION:   Observation/Orthostatic Postural Assessment:          Pt walks with straight cane and moderate antalgic gait  Palpation:          Generalized tenderness throughout R knee  ROM:    R knee extension = -3 degrees  R knee flexion = 120 degrees    L knee extension = 0 degrees  L knee flexion = 120 degrees    Strength:    R knee extension = 4-/5  R knee flexion = 4-/5    L knee extension = 5/5  L knee flexion = 5/5    Special Tests:          N/A  Neurological Screen:        Sensation: Intact to light touch R knee  Functional Mobility:         Gait/Ambulation:  Pt walks with straight cane and moderate antalgic gait        Transfers:  Pt able to transition sit to supine and supine to sit independently    Body Structures Involved:  1. Bones  2. Joints  3. Muscles Body Functions Affected:  1. Sensory/Pain  2. Neuromusculoskeletal Activities and Participation Affected:  1. Mobility  2. Domestic Life   Number of elements (examined above) that affect the Plan of Care: 3: MODERATE COMPLEXITY   CLINICAL PRESENTATION:   Presentation: Evolving clinical presentation with changing clinical characteristics: MODERATE COMPLEXITY   CLINICAL DECISION MAKING:   Outcome Measure: Tool Used: Lower Extremity Functional Scale (LEFS)  Score:  Initial: 19/80 Most Recent: X/80 (Date: -- )   Interpretation of Score: 20 questions each scored on a 5 point scale with 0 representing \"extreme difficulty or unable to perform\" and 4 representing \"no difficulty\". The lower the score, the greater the functional disability. 80/80 represents no disability. Minimal detectable change is 9 points. Score 80 79-63 62-48 47-32 31-16 15-1 0   Modifier CH CI CJ CK CL CM CN     ? Mobility - Walking and Moving Around:     - CURRENT STATUS: CL - 60%-79% impaired, limited or restricted    - GOAL STATUS: CK - 40%-59% impaired, limited or restricted    - D/C STATUS:  ---------------To be determined---------------    Medical Necessity:   · Patient is expected to demonstrate progress in strength, range of motion and functional technique to increase independence with household ADL's. · Patient demonstrates good rehab potential due to higher previous functional level. Reason for Services/Other Comments:  · Patient continues to require skilled intervention due to decreased ROM/strength R knee with increased pain leading to decreased functional status.    Use of outcome tool(s) and clinical judgement create a POC that gives a: Questionable prediction of patient's progress: MODERATE COMPLEXITY            TREATMENT:   (In addition to Assessment/Re-Assessment sessions the following treatments were rendered)  Pre-treatment Symptoms/Complaints:  Pt states her knee is a little sore today. Pain: Initial:     0/10 Post Session:  0/10     THERAPEUTIC EXERCISE: (40 minutes):  Exercises per grid below to improve mobility and strength. Required minimal verbal cues to promote proper body alignment and promote proper body posture. Progressed resistance, range and repetitions as indicated. MANUAL THERAPY: (0 minutes): Manual ROM R knee into extension with OP and scar massage R knee was utilized and necessary because of the patient's restricted joint motion and restricted motion of soft tissue. MODALITIES: (10 minutes):      Game Ready cold pack to R knee x10 minutes to decrease pain and swelling. Skin clear afterwards. Date  03-13-18 Date  03-20-18 Date  03-22-18   Activity/Exercise      Quad Sets with Towel Under Heel      SLR Flexion 20 reps 20 reps 20 reps   SAQ's 3 Lbs  20 reps 3 Lbs  20 reps    Heel Slides with Strap for OP  20 reps 20 reps   LAQ's 3 Lbs  20 reps 3 Lbs  20 reps    NuStep Level 3  8 minutes Level 3  8 minutes Level 3  8 minutes   Standing Heel Raises And Toe raises  20 reps each And Toe Raises  20 reps each And Toe Raises  20 reps each   Step-Ups 6 inch  20 reps 6 inch  20 reps 6 inch  20 reps   Knee Flexion Stretch on Step 10 reps  10 sec holds 10 reps  10 sec holds 10 reps  10 sec holds   Gastroc Slantboard 3 reps  20 sec holds 3 reps  20 sec holds 3 reps  20 sec holds   Seated Knee Flexion Stretch       Nautilus Leg Press 60 Lbs  20 reps 60 Lbs  20 reps 60 Lbs  20 reps   Nautilus Leg Ext   30 Lbs  20 reps   Nautilus Leg Curls   35 Lbs  20 reps   Stair Training   Reciprocal Pattern  4 steps  3 sets       [employer] Portal  Treatment/Session Assessment:    · Response to Treatment:  Pt tolerated treatments well today.   She was able to ascend/descend stairs with reciprocal pattern this afternoon. She was very encouraged by this stating she hasn't been able to do this in 6 years. Pt has 3 more PT appts scheduled. Possible discharge after these 3 appts if pt still doing well. · Compliance with Program/Exercises: Will assess as treatment progresses. · Recommendations/Intent for next treatment session: \"Next visit will focus on advancements to more challenging activities\".   Total Treatment Duration:  50 minutes  PT Patient Time In/Time Out  Time In: 1345  Time Out: [address],

## 2018-03-27 ENCOUNTER — HOSPITAL ENCOUNTER (OUTPATIENT)
Dept: PHYSICAL THERAPY | Age: 73
Discharge: HOME | End: 2018-03-27
Payer: MEDICARE

## 2018-03-27 PROCEDURE — 97110 THERAPEUTIC EXERCISES: CPT

## 2018-03-27 NOTE — PROGRESS NOTES
Hillary Meals  : 1945  Primary: Renetta Lares Medicare Choice *  Secondary:  [address] at [place] [address] [facility], [address], [place] [address].  [phone]   [phone]           OUTPATIENT PHYSICAL THERAPY:Daily Note and Progress Report 3/27/2018    ICD-10: Treatment Diagnosis: Pain in right knee (M25.561); Stiffness of right knee, not elsewhere classified (M25.661)  Precautions/Allergies:   Review of patient's allergies indicates no known allergies. Fall Risk Score: 1 (? 5 = High Risk)  MD Orders: Evaluate and Treat MEDICAL/REFERRING DIAGNOSIS:  Presence of right artificial knee joint [Z96.651]   DATE OF ONSET: Surgery 18  REFERRING PHYSICIAN: Katie Felix., *  RETURN PHYSICIAN APPOINTMENT: Pt unaware of follow up appt at this time     INITIAL ASSESSMENT:  Ms. Crystal Dexter presents with decreased ROM/strength R knee with increased pain leading to decreased functional status s/p R TKA 18. Pt would benefit from skilled physical therapy services to address the above deficits and help patient return to prior level of function. PROBLEM LIST (Impacting functional limitations):  1. Decreased Strength  2. Decreased ADL/Functional Activities  3. Increased Pain  4. Decreased Flexibility/Joint Mobility  5. Decreased [place] with Home Exercise Program INTERVENTIONS PLANNED:  1. Balance Exercise  2. Cold  3. Cryotherapy  4. Electrical Stimulation  5. Gait Training  6. Home Exercise Program (HEP)  7. Manual Therapy  8. Range of Motion (ROM)  9. Therapeutic Exercise/Strengthening   TREATMENT PLAN:  Effective Dates: 18 TO 18. Frequency/Duration: 2 times a week for 2 months  GOALS: (Goals have been discussed and agreed upon with patient.)  Short-Term Functional Goals: Time Frame: 4 weeks  1. Pt will increase ROM R knee 0-120 degrees to assist with ascending/descending stairs  2.  Pt will increase strength R knee 4/5 to assist with ascending/descending stairs  Discharge Goals: Time Frame: 8 weeks  1. Pt will increase strength R knee 5/5 to assist with ascending/descending stairs  2. Pt will be independent with HEP  3. Pt will ascend/descend 10 six-inch steps independently with min to no c/o R knee pain  4. Pt will perform 20 minutes household cleaning activities independently with min to no c/o R knee pain  Rehabilitation Potential For Stated Goals: Good  Regarding Buster Rust's therapy, I certify that the treatment plan above will be carried out by a therapist or under their direction. Thank you for this referral,  Marguerite Recinos, PT     Referring Physician Signature: Yoseph Macario., *              Date                    The information in this section was collected on 02-19-18 (except where otherwise noted). HISTORY:   History of Present Injury/Illness (Reason for Referral):  Pt reports insidious onset R knee pain of 5-6 years duration. She had several injections with continued pain so she opted for TKA which was performed 01-24-18. She spent 2 nights in hospital followed by home health PT until last Friday. Pt rates her current R knee pain 7-8/10 sitting at rest.  She is taking oxycodone for pain. She is retired. Pt lives in a one story house. She states she has difficulties ascending/descending stairs due to R knee pain, weakness and decreased ROM. Pt states she is having difficulties with all household cooking and cleaning activities due to her R knee surgery. Past Medical History/Comorbidities:   Ms. Arti Myers  has a past medical history of Anxiety; Arthritis; Breast cancer ([facility] [address] (1/18/2016); Cancer ([facility] [address] Chronic pain; Former smoker; GERD (gastroesophageal reflux disease); Hard of hearing; Headache; Hypertension; Hypothyroidism; Insomnia; Status post total left knee replacement (5/12/2017); and Status post total right knee replacement (1/24/2018).  She also has no past medical history of Adverse effect of anesthesia; Aneurysm ([facility] [address] Arrhythmia; Asthma; Autoimmune disease ([facility] [address] CAD (coronary artery disease); Chronic kidney disease; Chronic obstructive pulmonary disease ([facility] [address] Coagulation disorder ([facility] [address] Diabetes ([facility] [address] Difficult intubation; Endocarditis; Heart failure ([facility] [address] Ill-defined condition; Liver disease; Malignant hyperthermia due to anesthesia; Morbid obesity ([facility] [address] Nicotine vapor product user; Non-nicotine vapor product user; Pseudocholinesterase deficiency; Psychiatric disorder; PUD (peptic ulcer disease); Rheumatic fever; Seizures ([facility] [address] Sleep apnea; Stroke [facility]); Thromboembolus ([facility] [address] or Unspecified adverse effect of anesthesia. Ms. Javi Mccarthy  has a past surgical history that includes hx colonoscopy; hx wisdom teeth extraction; hx tonsillectomy (1957); hx vascular access (Right); pr breast surgery procedure unlisted (Left, 7/2015); hx breast biopsy (Left, 1/18/2016); pr mastectomy, partial (Left, 01/18/2016); hx breast lumpectomy (Left, 01/2016); hx orthopaedic; and hx knee replacement (Left, 05/12/2017). Social History/Living Environment:     Pt lives with spouse in a one story house  Prior Level of Function/Work/Activity:  Pt is retired  Dominant Side:         RIGHT  Other Clinical Tests:          X-rays R knee  Personal Factors:          Sex:  female        Age:  72 y.o. Profession:  Pt is retired  Current Medications:       Current Outpatient Prescriptions:     hydroCHLOROthiazide (HYDRODIURIL) 25 mg tablet, TAKE ONE TABLET BY MOUTH ONE TIME DAILY, Disp: 30 Tab, Rfl: 5    polyethylene glycol (MIRALAX) 17 gram/dose powder, Take 17 g by mouth as needed (constipation). as needed daily, Disp: , Rfl:     vitamin E topical cream, Apply 1 Dose to affected area two (2) times daily as needed for Itching., Disp: , Rfl:     temazepam (RESTORIL) 15 mg capsule, Take 1 Cap by mouth nightly.  Max Daily Amount: 15 mg., Disp: 30 Cap, Rfl: 5    oxyCODONE IR (ROXICODONE) 5 mg immediate release tablet, Take 1 Tab by mouth every three (3) hours as needed. Max Daily Amount: 40 mg. (Patient taking differently: Take 1-2 Tabs by mouth every four (4) hours as needed for Pain. every 4-6 hours as needed), Disp: 40 Tab, Rfl: 0    HYDROmorphone (DILAUDID) 2 mg tablet, Take 1 Tab by mouth every four (4) hours as needed. Max Daily Amount: 12 mg., Disp: 40 Tab, Rfl: 0    ascorbic acid, vitamin C, (VITAMIN C) 500 mg tablet, Take 1,000 mg by mouth daily. , Disp: , Rfl:     celecoxib (CELEBREX) 200 mg capsule, Take 1 Cap by mouth two (2) times a day., Disp: 60 Cap, Rfl: 5    levothyroxine (SYNTHROID) 25 mcg tablet, TAKE ONE TABLET BY MOUTH ONE TIME DAILY BEFORE BREAKFAST, Disp: 30 Tab, Rfl: 6    SUMAtriptan (IMITREX) 100 mg tablet, Take 1 Tab by mouth once as needed for Migraine for up to 1 dose., Disp: 12 Tab, Rfl: 3    acetaminophen (TYLENOL) 500 mg tablet, Take 1,000 mg by mouth every six (6) hours as needed for Pain. Take / use AM day of surgery  per anesthesia protocols if needed. , Disp: , Rfl:     Biotin 2,500 mcg cap, Take 2 Tabs by mouth daily. , Disp: , Rfl:     multivitamin (ONE A DAY) tablet, Take 1 Tab by mouth daily. , Disp: , Rfl:     esomeprazole (NEXIUM) 20 mg capsule, Take 20 mg by mouth every morning. Take / use AM day of surgery  per anesthesia protocols.   Indications: GASTROESOPHAGEAL REFLUX, Disp: , Rfl:    Date Last Reviewed:  02-19-18   Number of Personal Factors/Comorbidities that affect the Plan of Care: 1-2: MODERATE COMPLEXITY   EXAMINATION:   Observation/Orthostatic Postural Assessment:          Pt walks with straight cane and moderate antalgic gait  Palpation:          Generalized tenderness throughout R knee  ROM:    R knee extension = -3 degrees  R knee flexion = 120 degrees    L knee extension = 0 degrees  L knee flexion = 120 degrees    Strength:    R knee extension = 4-/5  R knee flexion = 4-/5    L knee extension = 5/5  L knee flexion = 5/5    Special Tests:          N/A  Neurological Screen: Sensation: Intact to light touch R knee  Functional Mobility:         Gait/Ambulation:  Pt walks with straight cane and moderate antalgic gait        Transfers:  Pt able to transition sit to supine and supine to sit independently    Body Structures Involved:  1. Bones  2. Joints  3. Muscles Body Functions Affected:  1. Sensory/Pain  2. Neuromusculoskeletal Activities and Participation Affected:  1. Mobility  2. Domestic Life   Number of elements (examined above) that affect the Plan of Care: 3: MODERATE COMPLEXITY   CLINICAL PRESENTATION:   Presentation: Evolving clinical presentation with changing clinical characteristics: MODERATE COMPLEXITY   CLINICAL DECISION MAKING:   Outcome Measure: Tool Used: Lower Extremity Functional Scale (LEFS)  Score:  Initial: 19/80 Most Recent: X/80 (Date: -- )   Interpretation of Score: 20 questions each scored on a 5 point scale with 0 representing \"extreme difficulty or unable to perform\" and 4 representing \"no difficulty\". The lower the score, the greater the functional disability. 80/80 represents no disability. Minimal detectable change is 9 points. Score 80 79-63 62-48 47-32 31-16 15-1 0   Modifier CH CI CJ CK CL CM CN     ? Mobility - Walking and Moving Around:     - CURRENT STATUS: CL - 60%-79% impaired, limited or restricted    - GOAL STATUS: CK - 40%-59% impaired, limited or restricted    - D/C STATUS:  ---------------To be determined---------------    Medical Necessity:   · Patient is expected to demonstrate progress in strength, range of motion and functional technique to increase independence with household ADL's. · Patient demonstrates good rehab potential due to higher previous functional level. Reason for Services/Other Comments:  · Patient continues to require skilled intervention due to decreased ROM/strength R knee with increased pain leading to decreased functional status.    Use of outcome tool(s) and clinical judgement create a POC that gives a: Questionable prediction of patient's progress: MODERATE COMPLEXITY            TREATMENT:   (In addition to Assessment/Re-Assessment sessions the following treatments were rendered)  Pre-treatment Symptoms/Complaints:  Pt states her knee is sore from the cold weather this afternoon. Pain: Initial:     0/10 Post Session:  0/10     THERAPEUTIC EXERCISE: (40 minutes):  Exercises per grid below to improve mobility and strength. Required minimal verbal cues to promote proper body alignment and promote proper body posture. Progressed resistance, range and repetitions as indicated. MANUAL THERAPY: (0 minutes): Manual ROM R knee into extension with OP and scar massage R knee was utilized and necessary because of the patient's restricted joint motion and restricted motion of soft tissue. MODALITIES: (10 minutes):      Game Ready cold pack to R knee x10 minutes to decrease pain and swelling. Skin clear afterwards.     Date  03-13-18 Date  03-20-18 Date  03-22-18 Date  03-27-18   Activity/Exercise       Quad Sets with Towel Under Heel       SLR Flexion 20 reps 20 reps 20 reps 20 reps   SAQ's 3 Lbs  20 reps 3 Lbs  20 reps     Heel Slides with Strap for OP  20 reps 20 reps 20 reps   LAQ's 3 Lbs  20 reps 3 Lbs  20 reps     NuStep Level 3  8 minutes Level 3  8 minutes Level 3  8 minutes Level 4  8 minutes   Standing Heel Raises And Toe raises  20 reps each And Toe Raises  20 reps each And Toe Raises  20 reps each And Toe Raises  20 reps each   Step-Ups 6 inch  20 reps 6 inch  20 reps 6 inch  20 reps 6 inch  20 reps   Knee Flexion Stretch on Step 10 reps  10 sec holds 10 reps  10 sec holds 10 reps  10 sec holds 10 reps  10 sec holds   Gastroc Slantboard 3 reps  20 sec holds 3 reps  20 sec holds 3 reps  20 sec holds 3 reps  20 sec holds   Seated Knee Flexion Stretch        Nautilus Leg Press 60 Lbs  20 reps 60 Lbs  20 reps 60 Lbs  20 reps 60 Lbs  20 reps   Nautilus Leg Ext   30 Lbs  20 reps 30 Lbs  20 reps   Nautilus Leg Curls   35 Lbs  20 reps 35 Lbs  reps   Stair Training   Reciprocal Pattern  4 steps  3 sets Reciprocal Pattern  4 steps  3 sets       [employer] [facility]  Treatment/Session Assessment:    · Response to Treatment:  Pt tolerated treatments well today. ROM and strength continuing to improve. Continue x2 more visits, then pt is going to transition to working out at [facility]. .  · Compliance with Program/Exercises: Will assess as treatment progresses. · Recommendations/Intent for next treatment session: \"Next visit will focus on advancements to more challenging activities\".   Total Treatment Duration:  50 minutes  PT Patient Time In/Time Out  Time In: 1345  Time Out: [address], PT

## 2018-03-29 ENCOUNTER — HOSPITAL ENCOUNTER (OUTPATIENT)
Dept: PHYSICAL THERAPY | Age: 73
Discharge: HOME | End: 2018-03-29
Payer: MEDICARE

## 2018-03-29 NOTE — PROGRESS NOTES
Pt cancelled her physical therapy appt for this afternoon due to being out of town.     Lexx Alvarado, PT

## 2018-04-03 ENCOUNTER — HOSPITAL ENCOUNTER (OUTPATIENT)
Dept: PHYSICAL THERAPY | Age: 73
Discharge: HOME | End: 2018-04-03
Payer: MEDICARE

## 2018-04-03 PROCEDURE — 97110 THERAPEUTIC EXERCISES: CPT

## 2018-04-03 PROCEDURE — G8979 MOBILITY GOAL STATUS: HCPCS

## 2018-04-03 PROCEDURE — G8978 MOBILITY CURRENT STATUS: HCPCS

## 2018-04-03 NOTE — PROGRESS NOTES
Michael Benz  : 1945  Primary: Freya Luevano [employer] *  Secondary:  [address] at [address], [address] [place] [address],[address], [place] [address].  [phone]   [phone]           OUTPATIENT PHYSICAL THERAPY:Daily Note and Progress Report 4/3/2018    ICD-10: Treatment Diagnosis: Pain in right knee (M25.561); Stiffness of right knee, not elsewhere classified (M25.661)  Precautions/Allergies:   Review of patient's allergies indicates no known allergies. Fall Risk Score: 1 (? 5 = High Risk)  MD Orders: Evaluate and Treat MEDICAL/REFERRING DIAGNOSIS:  Presence of right artificial knee joint [Z96.651]   DATE OF ONSET: Surgery 18  REFERRING PHYSICIAN: Alesha Calzada., *  RETURN PHYSICIAN APPOINTMENT: Pt unaware of follow up appt at this time     INITIAL ASSESSMENT:  Ms. Izabel Hood presents with decreased ROM/strength R knee with increased pain leading to decreased functional status s/p R TKA 18. Pt would benefit from skilled physical therapy services to address the above deficits and help patient return to prior level of function. PROBLEM LIST (Impacting functional limitations):  1. Decreased Strength  2. Decreased ADL/Functional Activities  3. Increased Pain  4. Decreased Flexibility/Joint Mobility  5. Decreased [place] with Home Exercise Program INTERVENTIONS PLANNED:  1. Balance Exercise  2. Cold  3. Cryotherapy  4. Electrical Stimulation  5. Gait Training  6. Home Exercise Program (HEP)  7. Manual Therapy  8. Range of Motion (ROM)  9. Therapeutic Exercise/Strengthening   TREATMENT PLAN:  Effective Dates: 18 TO 18. Frequency/Duration: 2 times a week for 2 months  GOALS: (Goals have been discussed and agreed upon with patient.)  Short-Term Functional Goals: Time Frame: 4 weeks  1. Pt will increase ROM R knee 0-120 degrees to assist with ascending/descending stairs  2.  Pt will increase strength R knee 4/5 to assist with ascending/descending stairs  Discharge Goals: Time Frame: 8 weeks  1. Pt will increase strength R knee 5/5 to assist with ascending/descending stairs  2. Pt will be independent with HEP  3. Pt will ascend/descend 10 six-inch steps independently with min to no c/o R knee pain  4. Pt will perform 20 minutes household cleaning activities independently with min to no c/o R knee pain  Rehabilitation Potential For Stated Goals: Good  Regarding Damaris Rust's therapy, I certify that the treatment plan above will be carried out by a therapist or under their direction. Thank you for this referral,  Adryan Clement, PT     Referring Physician Signature: Azalea Hubbard., *              Date                    The information in this section was collected on 02-19-18 (except where otherwise noted). HISTORY:   History of Present Injury/Illness (Reason for Referral):  Pt reports insidious onset R knee pain of 5-6 years duration. She had several injections with continued pain so she opted for TKA which was performed 01-24-18. She spent 2 nights in hospital followed by home health PT until last Friday. Pt rates her current R knee pain 7-8/10 sitting at rest.  She is taking oxycodone for pain. She is retired. Pt lives in a one story house. She states she has difficulties ascending/descending stairs due to R knee pain, weakness and decreased ROM. Pt states she is having difficulties with all household cooking and cleaning activities due to her R knee surgery. Past Medical History/Comorbidities:   Ms. [facility]  has a past medical history of Anxiety; Arthritis; Breast cancer ([facility] [address] (1/18/2016); Cancer ([facility] [address] Chronic pain; Former smoker; GERD (gastroesophageal reflux disease); Hard of hearing; Headache; Hypertension; Hypothyroidism; Insomnia; Status post total left knee replacement (5/12/2017); and Status post total right knee replacement (1/24/2018).  She also has no past medical history of Adverse effect of anesthesia; Aneurysm ([facility] [address] Arrhythmia; Asthma; Autoimmune disease ([facility] [address] CAD (coronary artery disease); Chronic kidney disease; Chronic obstructive pulmonary disease ([facility] [address] Coagulation disorder ([facility] [address] Diabetes ([facility] [address] Difficult intubation; Endocarditis; Heart failure ([facility] [address] Ill-defined condition; Liver disease; Malignant hyperthermia due to anesthesia; Morbid obesity ([facility] [address] Nicotine vapor product user; Non-nicotine vapor product user; Pseudocholinesterase deficiency; Psychiatric disorder; PUD (peptic ulcer disease); Rheumatic fever; Seizures ([facility] [address] Sleep apnea; Stroke [facility]); Thromboembolus ([facility] [address] or Unspecified adverse effect of anesthesia. Ms. Candis Velarde  has a past surgical history that includes hx colonoscopy; hx wisdom teeth extraction; hx tonsillectomy (1957); hx vascular access (Right); pr breast surgery procedure unlisted (Left, 7/2015); hx breast biopsy (Left, 1/18/2016); pr mastectomy, partial (Left, 01/18/2016); hx breast lumpectomy (Left, 01/2016); hx orthopaedic; and hx knee replacement (Left, 05/12/2017). Social History/Living Environment:     Pt lives with spouse in a one story house  Prior Level of Function/Work/Activity:  Pt is retired  Dominant Side:         RIGHT  Other Clinical Tests:          X-rays R knee  Personal Factors:          Sex:  female        Age:  72 y.o. Profession:  Pt is retired  Current Medications:       Current Outpatient Prescriptions:     hydroCHLOROthiazide (HYDRODIURIL) 25 mg tablet, TAKE ONE TABLET BY MOUTH ONE TIME DAILY, Disp: 30 Tab, Rfl: 5    polyethylene glycol (MIRALAX) 17 gram/dose powder, Take 17 g by mouth as needed (constipation). as needed daily, Disp: , Rfl:     vitamin E topical cream, Apply 1 Dose to affected area two (2) times daily as needed for Itching., Disp: , Rfl:     temazepam (RESTORIL) 15 mg capsule, Take 1 Cap by mouth nightly.  Max Daily Amount: 15 mg., Disp: 30 Cap, Rfl: 5    oxyCODONE IR (ROXICODONE) 5 mg immediate release tablet, Take 1 Tab by mouth every three (3) hours as needed. Max Daily Amount: 40 mg. (Patient taking differently: Take 1-2 Tabs by mouth every four (4) hours as needed for Pain. every 4-6 hours as needed), Disp: 40 Tab, Rfl: 0    HYDROmorphone (DILAUDID) 2 mg tablet, Take 1 Tab by mouth every four (4) hours as needed. Max Daily Amount: 12 mg., Disp: 40 Tab, Rfl: 0    ascorbic acid, vitamin C, (VITAMIN C) 500 mg tablet, Take 1,000 mg by mouth daily. , Disp: , Rfl:     celecoxib (CELEBREX) 200 mg capsule, Take 1 Cap by mouth two (2) times a day., Disp: 60 Cap, Rfl: 5    levothyroxine (SYNTHROID) 25 mcg tablet, TAKE ONE TABLET BY MOUTH ONE TIME DAILY BEFORE BREAKFAST, Disp: 30 Tab, Rfl: 6    SUMAtriptan (IMITREX) 100 mg tablet, Take 1 Tab by mouth once as needed for Migraine for up to 1 dose., Disp: 12 Tab, Rfl: 3    acetaminophen (TYLENOL) 500 mg tablet, Take 1,000 mg by mouth every six (6) hours as needed for Pain. Take / use AM day of surgery  per anesthesia protocols if needed. , Disp: , Rfl:     Biotin 2,500 mcg cap, Take 2 Tabs by mouth daily. , Disp: , Rfl:     multivitamin (ONE A DAY) tablet, Take 1 Tab by mouth daily. , Disp: , Rfl:     esomeprazole (NEXIUM) 20 mg capsule, Take 20 mg by mouth every morning. Take / use AM day of surgery  per anesthesia protocols.   Indications: GASTROESOPHAGEAL REFLUX, Disp: , Rfl:    Date Last Reviewed:  02-19-18   Number of Personal Factors/Comorbidities that affect the Plan of Care: 1-2: MODERATE COMPLEXITY   EXAMINATION:   Observation/Orthostatic Postural Assessment:          Pt walks with straight cane and moderate antalgic gait  Palpation:          Generalized tenderness throughout R knee  ROM:    R knee extension = -3 degrees  R knee flexion = 120 degrees    L knee extension = 0 degrees  L knee flexion = 120 degrees    Strength:    R knee extension = 4-/5  R knee flexion = 4-/5    L knee extension = 5/5  L knee flexion = 5/5    Special Tests:          N/A  Neurological Screen: Sensation: Intact to light touch R knee  Functional Mobility:         Gait/Ambulation:  Pt walks with straight cane and moderate antalgic gait        Transfers:  Pt able to transition sit to supine and supine to sit independently    Body Structures Involved:  1. Bones  2. Joints  3. Muscles Body Functions Affected:  1. Sensory/Pain  2. Neuromusculoskeletal Activities and Participation Affected:  1. Mobility  2. Domestic Life   Number of elements (examined above) that affect the Plan of Care: 3: MODERATE COMPLEXITY   CLINICAL PRESENTATION:   Presentation: Evolving clinical presentation with changing clinical characteristics: MODERATE COMPLEXITY   CLINICAL DECISION MAKING:   Outcome Measure: Tool Used: Lower Extremity Functional Scale (LEFS)  Score:  Initial: 19/80 Most Recent: 32/80 (Date: 04-03-18)   Interpretation of Score: 20 questions each scored on a 5 point scale with 0 representing \"extreme difficulty or unable to perform\" and 4 representing \"no difficulty\". The lower the score, the greater the functional disability. 80/80 represents no disability. Minimal detectable change is 9 points. Score 80 79-63 62-48 47-32 31-16 15-1 0   Modifier CH CI CJ CK CL CM CN     ? Mobility - Walking and Moving Around:     - CURRENT STATUS: CK - 40%-59% impaired, limited or restricted    - GOAL STATUS: CK - 40%-59% impaired, limited or restricted    - D/C STATUS:  ---------------To be determined---------------    Medical Necessity:   · Patient is expected to demonstrate progress in strength, range of motion and functional technique to increase independence with household ADL's. · Patient demonstrates good rehab potential due to higher previous functional level. Reason for Services/Other Comments:  · Patient continues to require skilled intervention due to decreased ROM/strength R knee with increased pain leading to decreased functional status.    Use of outcome tool(s) and clinical judgement create a POC that gives a: Questionable prediction of patient's progress: MODERATE COMPLEXITY            TREATMENT:   (In addition to Assessment/Re-Assessment sessions the following treatments were rendered)  Pre-treatment Symptoms/Complaints:  Pt states her knee is a little swollen from doing a lot of walking at the [facility] this past weekend. Pain: Initial:     0/10 Post Session:  0/10     THERAPEUTIC EXERCISE: (40 minutes):  Exercises per grid below to improve mobility and strength. Required minimal verbal cues to promote proper body alignment and promote proper body posture. Progressed resistance, range and repetitions as indicated. MANUAL THERAPY: (0 minutes): Manual ROM R knee into extension with OP and scar massage R knee was utilized and necessary because of the patient's restricted joint motion and restricted motion of soft tissue. MODALITIES: (10 minutes):      Game Ready cold pack to R knee x10 minutes to decrease pain and swelling. Skin clear afterwards. Date  03-27-18 Date  04-03-18   Activity/Exercise     Quad Sets with Towel Under Heel     SLR Flexion 20 reps 20 reps   SAQ's     Heel Slides with Strap for OP 20 reps 20 reps   LAQ's     NuStep Level 4  8 minutes Level 4  8 minutes   Standing Heel Raises And Toe Raises  20 reps each And Toe raises  20 reps each   Step-Ups 6 inch  20 reps 6 inch  20 reps   Knee Flexion Stretch on Step 10 reps  10 sec holds 10 reps  10 sec holds   Gastroc Slantboard 3 reps  20 sec holds 3 reps  20 sec holds   Seated Knee Flexion Stretch      Nautilus Leg Press 60 Lbs  20 reps 60 Lbs  20 reps   Nautilus Leg Ext 30 Lbs  20 reps 30 Lbs  20 reps   Nautilus Leg Curls 35 Lbs  20 reps 35 Lbs  20 reps   Stair Training Reciprocal Pattern  4 steps  3 sets Reciprocal pattern  4 steps  4 sets       [employer]  Treatment/Session Assessment:    · Response to Treatment:  Pt tolerated treatments well today. ROM and strength continuing to improve.   Continue x1 more visit next week, then discharge to independent [facility]health club. · Compliance with Program/Exercises: Will assess as treatment progresses. · Recommendations/Intent for next treatment session: \"Next visit will focus on advancements to more challenging activities\".   Total Treatment Duration:  50 minutes  PT Patient Time In/Time Out  Time In: 1430  Time Out: [address], PT

## 2018-04-12 ENCOUNTER — HOSPITAL ENCOUNTER (OUTPATIENT)
Dept: PHYSICAL THERAPY | Age: 73
Discharge: HOME | End: 2018-04-12
Payer: MEDICARE

## 2018-04-12 PROCEDURE — 97110 THERAPEUTIC EXERCISES: CPT

## 2018-04-12 NOTE — PROGRESS NOTES
Pj Wright  : 1945  Primary: Renate Lares Medicare Choice *  Secondary:  [address]  at [facility]  [address], [address] [facility] [address], [address] [facility]  [phone]   [phone]           OUTPATIENT PHYSICAL [address] [facility] 2018    ICD-10: Treatment Diagnosis: Pain in right knee (M25.561); Stiffness of right knee, not elsewhere classified (M25.661)  Precautions/Allergies:   Review of patient's allergies indicates no known allergies. Fall Risk Score: 1 (? 5 = High Risk)  MD Orders: Evaluate and Treat MEDICAL/REFERRING DIAGNOSIS:  Presence of right artificial knee joint [Z96.651]   DATE OF ONSET: Surgery 18  REFERRING PHYSICIAN: Laura Briceño, *  RETURN PHYSICIAN APPOINTMENT: Pt unaware of follow up appt at this time     INITIAL ASSESSMENT:  Ms. Gisela Cochran presents with decreased ROM/strength R knee with increased pain leading to decreased functional status s/p R TKA 18. Pt would benefit from skilled physical therapy services to address the above deficits and help patient return to prior level of function. PROBLEM LIST (Impacting functional limitations):  1. Decreased Strength  2. Decreased ADL/Functional Activities  3. Increased Pain  4. Decreased Flexibility/Joint Mobility  5. Decreased [place] with Home Exercise Program INTERVENTIONS PLANNED:  1. Balance Exercise  2. Cold  3. Cryotherapy  4. Electrical Stimulation  5. Gait Training  6. Home Exercise Program (HEP)  7. Manual Therapy  8. Range of Motion (ROM)  9. Therapeutic Exercise/Strengthening   TREATMENT PLAN:  Effective Dates: 18 TO 18. Frequency/Duration: 2 times a week for 2 months  GOALS: (Goals have been discussed and agreed upon with patient.)  Short-Term Functional Goals: Time Frame: 4 weeks  1. Pt will increase ROM R knee 0-120 degrees to assist with ascending/descending stairs (Goal Met)  2.  Pt will increase strength R knee 4/5 to assist with ascending/descending stairs (Goal Met)  Discharge Goals: Time Frame: 8 weeks  1. Pt will increase strength R knee 5/5 to assist with ascending/descending stairs (Goal Met)  2. Pt will be independent with HEP (Goal Met)  3. Pt will ascend/descend 10 six-inch steps independently with min to no c/o R knee pain (Goal Met)  4. Pt will perform 20 minutes household cleaning activities independently with min to no c/o R knee pain (Goal Met)  Rehabilitation Potential For Stated Goals: Good  Regarding Jennifer Rust's therapy, I certify that the treatment plan above will be carried out by a therapist or under their direction. Thank you for this referral,  Paras Ly, PT     Referring Physician Signature: Eben Michelle, *              Date                    The information in this section was collected on 02-19-18 (except where otherwise noted). HISTORY:   History of Present Injury/Illness (Reason for Referral):  Pt reports insidious onset R knee pain of 5-6 years duration. She had several injections with continued pain so she opted for TKA which was performed 01-24-18. She spent 2 nights in hospital followed by home health PT until last Friday. Pt rates her current R knee pain 7-8/10 sitting at rest.  She is taking oxycodone for pain. She is retired. Pt lives in a one story house. She states she has difficulties ascending/descending stairs due to R knee pain, weakness and decreased ROM. Pt states she is having difficulties with all household cooking and cleaning activities due to her R knee surgery. Past Medical History/Comorbidities:   Ms. William Chawla  has a past medical history of Anxiety; Arthritis; Breast cancer ([facility] (1/18/2016); Cancer ([facility] Chronic pain; Former smoker; GERD (gastroesophageal reflux disease); Hard of hearing; Headache; Hypertension; Hypothyroidism; Insomnia; Status post total left knee replacement (5/12/2017); and Status post total right knee replacement (1/24/2018).  She also has no past medical history of Adverse effect of anesthesia; Aneurysm ([facility] Arrhythmia; Asthma; Autoimmune disease ([facility] CAD (coronary artery disease); Chronic kidney disease; Chronic obstructive pulmonary disease ([facility] Coagulation disorder ([facility] Diabetes ([facility] Difficult intubation; Endocarditis; Heart failure ([facility] Ill-defined condition; Liver disease; Malignant hyperthermia due to anesthesia; Morbid obesity ([facility] Nicotine vapor product user; Non-nicotine vapor product user; Pseudocholinesterase deficiency; Psychiatric disorder; PUD (peptic ulcer disease); Rheumatic fever; Seizures ([facility] [facility] Sleep apnea; Stroke [facility]); Thromboembolus ([facility] [address] [facility] or Unspecified adverse effect of anesthesia. [facility]  has a past surgical history that includes hx colonoscopy; hx wisdom teeth extraction; hx tonsillectomy (1957); hx vascular access (Right); pr breast surgery procedure unlisted (Left, 7/2015); hx breast biopsy (Left, 1/18/2016); pr mastectomy, partial (Left, 01/18/2016); hx breast lumpectomy (Left, 01/2016); hx orthopaedic; and hx knee replacement (Left, 05/12/2017). Social History/Living Environment:     Pt lives with spouse in a one story house  Prior Level of Function/Work/Activity:  Pt is retired  Dominant Side:         RIGHT  Other Clinical Tests:          X-rays R knee  Personal Factors:          Sex:  female        Age:  72 y.o. Profession:  Pt is retired  Current Medications:       Current Outpatient Prescriptions:     hydroCHLOROthiazide (HYDRODIURIL) 25 mg tablet, TAKE ONE TABLET BY MOUTH ONE TIME DAILY, Disp: 30 Tab, Rfl: 5    polyethylene glycol (MIRALAX) 17 gram/dose powder, Take 17 g by mouth as needed (constipation). as needed daily, Disp: , Rfl:     vitamin E topical cream, Apply 1 Dose to affected area two (2) times daily as needed for Itching., Disp: , Rfl:     temazepam (RESTORIL) 15 mg capsule, Take 1 Cap by mouth nightly.  Max Daily Amount: 15 mg., Disp: 30 Cap, Rfl: 5    oxyCODONE IR (ROXICODONE) 5 mg immediate release tablet, Take 1 Tab by mouth every three (3) hours as needed. Max Daily Amount: 40 mg. (Patient taking differently: Take 1-2 Tabs by mouth every four (4) hours as needed for Pain. every 4-6 hours as needed), Disp: 40 Tab, Rfl: 0    HYDROmorphone (DILAUDID) 2 mg tablet, Take 1 Tab by mouth every four (4) hours as needed. Max Daily Amount: 12 mg., Disp: 40 Tab, Rfl: 0    ascorbic acid, vitamin C, (VITAMIN C) 500 mg tablet, Take 1,000 mg by mouth daily. , Disp: , Rfl:     celecoxib (CELEBREX) 200 mg capsule, Take 1 Cap by mouth two (2) times a day., Disp: 60 Cap, Rfl: 5    levothyroxine (SYNTHROID) 25 mcg tablet, TAKE ONE TABLET BY MOUTH ONE TIME DAILY BEFORE BREAKFAST, Disp: 30 Tab, Rfl: 6    SUMAtriptan (IMITREX) 100 mg tablet, Take 1 Tab by mouth once as needed for Migraine for up to 1 dose., Disp: 12 Tab, Rfl: 3    acetaminophen (TYLENOL) 500 mg tablet, Take 1,000 mg by mouth every six (6) hours as needed for Pain. Take / use AM day of surgery  per anesthesia protocols if needed. , Disp: , Rfl:     Biotin 2,500 mcg cap, Take 2 Tabs by mouth daily. , Disp: , Rfl:     multivitamin (ONE A DAY) tablet, Take 1 Tab by mouth daily. , Disp: , Rfl:     esomeprazole (NEXIUM) 20 mg capsule, Take 20 mg by mouth every morning. Take / use AM day of surgery  per anesthesia protocols.   Indications: GASTROESOPHAGEAL REFLUX, Disp: , Rfl:    Date Last Reviewed:  02-19-18   Number of Personal Factors/Comorbidities that affect the Plan of Care: 1-2: MODERATE COMPLEXITY   EXAMINATION:   Observation/Orthostatic Postural Assessment:          Pt walks with straight cane and moderate antalgic gait  Palpation:          Generalized tenderness throughout R knee  ROM:    R knee extension = 0 degrees  R knee flexion = 120 degrees    L knee extension = 0 degrees  L knee flexion = 120 degrees    Strength:    R knee extension = 5/5  R knee flexion = 5/5    L knee extension = 5/5  L knee flexion = 5/5    Special Tests:          N/A  Neurological Screen:        Sensation: Intact to light touch R knee  Functional Mobility:         Gait/Ambulation:  Pt walks with straight cane and moderate antalgic gait        Transfers:  Pt able to transition sit to supine and supine to sit independently    Body Structures Involved:  1. Bones  2. Joints  3. Muscles Body Functions Affected:  1. Sensory/Pain  2. Neuromusculoskeletal Activities and Participation Affected:  1. Mobility  2. Domestic Life   Number of elements (examined above) that affect the Plan of Care: 3: MODERATE COMPLEXITY   CLINICAL PRESENTATION:   Presentation: Evolving clinical presentation with changing clinical characteristics: MODERATE COMPLEXITY   CLINICAL DECISION MAKING:   Outcome Measure: Tool Used: Lower Extremity Functional Scale (LEFS)  Score:  Initial: 19/80 Most Recent: 32/80 (Date: 04-03-18)   Interpretation of Score: 20 questions each scored on a 5 point scale with 0 representing \"extreme difficulty or unable to perform\" and 4 representing \"no difficulty\". The lower the score, the greater the functional disability. 80/80 represents no disability. Minimal detectable change is 9 points. Score 80 79-63 62-48 47-32 31-16 15-1 0   Modifier CH CI CJ CK CL CM CN     ? Mobility - Walking and Moving Around:     - CURRENT STATUS: CK - 40%-59% impaired, limited or restricted    - GOAL STATUS: CK - 40%-59% impaired, limited or restricted    - D/C STATUS:  ---------------To be determined---------------    Medical Necessity:   · Patient is expected to demonstrate progress in strength, range of motion and functional technique to increase independence with household ADL's. · Patient demonstrates good rehab potential due to higher previous functional level. Reason for Services/Other Comments:  · Patient continues to require skilled intervention due to decreased ROM/strength R knee with increased pain leading to decreased functional status. Use of outcome tool(s) and clinical judgement create a POC that gives a: Questionable prediction of patient's progress: MODERATE COMPLEXITY            TREATMENT:   (In addition to Assessment/Re-Assessment sessions the following treatments were rendered)  Pre-treatment Symptoms/Complaints:  Pt states her knee is a little swollen from doing a lot of walking at the [facility] this past weekend. Pain: Initial:     0/10 Post Session:  0/10     THERAPEUTIC EXERCISE: (40 minutes):  Exercises per grid below to improve mobility and strength. Required minimal verbal cues to promote proper body alignment and promote proper body posture. Progressed resistance, range and repetitions as indicated. MANUAL THERAPY: (0 minutes): Manual ROM R knee into extension with OP and scar massage R knee was utilized and necessary because of the patient's restricted joint motion and restricted motion of soft tissue. MODALITIES: (10 minutes):      Game Ready cold pack to R knee x10 minutes to decrease pain and swelling. Skin clear afterwards.     Date  04-03-18 Date  04-12-18   Activity/Exercise     Quad Sets with Towel Under Heel     SLR Flexion 20 reps 20 reps   SAQ's     Heel Slides with Strap for OP 20 reps 20 reps   LAQ's     NuStep Level 4  8 minutes Level 4  8 minutes   Standing Heel Raises And Toe raises  20 reps each And Toe Raises  20 reps each   Step-Ups 6 inch  20 reps 6 inch  20 reps   Knee Flexion Stretch on Step 10 reps  10 sec holds 10 reps  10 sec holds   Gastroc Slantboard 3 reps  20 sec holds 3 reps  20 sec holds   Seated Knee Flexion Stretch      Nautilus Leg Press 60 Lbs  20 reps 60 Lbs  20 reps   Nautilus Leg Ext 30 Lbs  20 reps 30 reps  20 reps   Nautilus Leg Curls 35 Lbs  20 reps 35 Lbs  20 reps   Stair Training Reciprocal pattern  4 steps  4 sets Reciprocal Pattern  4 steps  4 sets       [employer] Portal  Treatment/Session Assessment:    · Response to Treatment:  Pt tolerated treatments well today with no c/o.  She is going to join [employer] next week. Discharge from PT to independent [facility]. · Compliance with Program/Exercises: Will assess as treatment progresses. · Recommendations/Intent for next treatment session: \"Next visit will focus on advancements to more challenging activities\".   Total Treatment Duration:  50 minutes  PT Patient Time In/Time Out  Time In: 1255  Time Out: [address] Titus Nolen, PT

## 2018-06-25 ENCOUNTER — HOSPITAL ENCOUNTER (OUTPATIENT)
Dept: MAMMOGRAPHY | Age: 73
Discharge: HOME | End: 2018-06-25
Attending: INTERNAL MEDICINE
Payer: MEDICARE

## 2018-06-25 DIAGNOSIS — Z12.39 BREAST CANCER SCREENING, HIGH RISK PATIENT: ICD-10-CM

## 2018-06-25 PROCEDURE — 77066 DX MAMMO INCL CAD BI: CPT

## 2018-07-16 ENCOUNTER — HOSPITAL ENCOUNTER (OUTPATIENT)
Dept: LAB | Age: 73
Discharge: HOME | End: 2018-07-16
Payer: MEDICARE

## 2018-07-16 DIAGNOSIS — C50.912 MALIGNANT NEOPLASM OF LEFT FEMALE BREAST, UNSPECIFIED ESTROGEN RECEPTOR STATUS, UNSPECIFIED SITE OF BREAST (HCC): Chronic | ICD-10-CM

## 2018-07-16 LAB
ALBUMIN SERPL-MCNC: 4.1 G/DL (ref 3.2–4.6)
ALBUMIN/GLOB SERPL: 1.2 {RATIO} (ref 1.2–3.5)
ALP SERPL-CCNC: 74 U/L (ref 50–136)
ALT SERPL-CCNC: 26 U/L (ref 12–65)
ANION GAP SERPL CALC-SCNC: 8 MMOL/L (ref 7–16)
AST SERPL-CCNC: 22 U/L (ref 15–37)
BASOPHILS # BLD: 0 K/UL (ref 0–0.2)
BASOPHILS NFR BLD: 0 % (ref 0–2)
BILIRUB SERPL-MCNC: 0.4 MG/DL (ref 0.2–1.1)
BUN SERPL-MCNC: 23 MG/DL (ref 8–23)
CALCIUM SERPL-MCNC: 9.4 MG/DL (ref 8.3–10.4)
CHLORIDE SERPL-SCNC: 105 MMOL/L (ref 98–107)
CO2 SERPL-SCNC: 26 MMOL/L (ref 21–32)
CREAT SERPL-MCNC: 0.93 MG/DL (ref 0.6–1)
DIFFERENTIAL METHOD BLD: ABNORMAL
EOSINOPHIL # BLD: 0.1 K/UL (ref 0–0.8)
EOSINOPHIL NFR BLD: 2 % (ref 0.5–7.8)
ERYTHROCYTE [DISTWIDTH] IN BLOOD BY AUTOMATED COUNT: 14.6 % (ref 11.9–14.6)
GLOBULIN SER CALC-MCNC: 3.4 G/DL (ref 2.3–3.5)
GLUCOSE SERPL-MCNC: 94 MG/DL (ref 65–100)
HCT VFR BLD AUTO: 37.6 % (ref 35.8–46.3)
HGB BLD-MCNC: 12.5 G/DL (ref 11.7–15.4)
LYMPHOCYTES # BLD: 1.3 K/UL (ref 0.5–4.6)
LYMPHOCYTES NFR BLD: 19 % (ref 13–44)
MCH RBC QN AUTO: 28.4 PG (ref 26.1–32.9)
MCHC RBC AUTO-ENTMCNC: 33.2 G/DL (ref 31.4–35)
MCV RBC AUTO: 85.5 FL (ref 79.6–97.8)
MONOCYTES # BLD: 1 K/UL (ref 0.1–1.3)
MONOCYTES NFR BLD: 14 % (ref 4–12)
NEUTS SEG # BLD: 4.5 K/UL (ref 1.7–8.2)
NEUTS SEG NFR BLD: 66 % (ref 43–78)
NRBC # BLD: 0.01 K/UL (ref 0–0.2)
PLATELET # BLD AUTO: 194 K/UL (ref 150–450)
PMV BLD AUTO: 11.4 FL (ref 10.8–14.1)
POTASSIUM SERPL-SCNC: 3.7 MMOL/L (ref 3.5–5.1)
PROT SERPL-MCNC: 7.5 G/DL (ref 6.3–8.2)
RBC # BLD AUTO: 4.4 M/UL (ref 4.05–5.25)
SODIUM SERPL-SCNC: 139 MMOL/L (ref 136–145)
WBC # BLD AUTO: 6.8 K/UL (ref 4.3–11.1)

## 2018-07-16 PROCEDURE — 85025 COMPLETE CBC W/AUTO DIFF WBC: CPT | Performed by: INTERNAL MEDICINE

## 2018-07-16 PROCEDURE — 36415 COLL VENOUS BLD VENIPUNCTURE: CPT | Performed by: INTERNAL MEDICINE

## 2018-07-16 PROCEDURE — 80053 COMPREHEN METABOLIC PANEL: CPT | Performed by: INTERNAL MEDICINE

## 2018-08-22 ENCOUNTER — HOSPITAL ENCOUNTER (OUTPATIENT)
Dept: SURGERY | Age: 73
Discharge: HOME | End: 2018-08-22

## 2018-08-24 VITALS — BODY MASS INDEX: 27.82 KG/M2 | WEIGHT: 167 LBS | HEIGHT: 65 IN

## 2018-08-24 RX ORDER — IBUPROFEN 200 MG
TABLET ORAL AS NEEDED
COMMUNITY

## 2018-08-24 NOTE — PERIOP NOTES
Patient verified name, , and procedure. Type: 1a; abbreviated assessment per anesthesia guidelines  Labs per surgeon: none  Labs per anesthesia: none    Instructed pt that they will be notified via preop for time of arrival to GI lab. Follow diet and prep instructions per office. NPO after midnight. Bath or shower the night before and the am of surgery with antibacterial soap. No lotions, oils, powders, cologne on skin. No make up, eye make up or jewelry. Wear loose fitting comfortable, clean clothing. Must have adult present in building the entire time . Medications for the day of procedure- nexium, synthroid, tylenol PRN; patient to hold- vitamins, advil, celebrex, hctz DOS. The following discharge instructions reviewed with patient: medication given during procedure may cause drowsiness for several hours, therefore, do not drive or operate machinery for remainder of the day. You may not drink alcohol on the day of your procedure, please resume regular diet and activity unless otherwise directed. You may experience abdominal distention for several hours that is relieved by the passage of gas. Contact your physician if you have any of the following: fever or chills, severe abdominal pain or excessive amount of bleeding or a large amount when having a bowel movement.  Occasional specks of blood with bowel movement would not be unusual.

## 2018-08-28 ENCOUNTER — ANESTHESIA EVENT (OUTPATIENT)
Dept: ENDOSCOPY | Age: 73
End: 2018-08-28
Payer: MEDICARE

## 2018-08-28 RX ORDER — SODIUM CHLORIDE 0.9 % (FLUSH) 0.9 %
5-10 SYRINGE (ML) INJECTION AS NEEDED
Status: CANCELLED | OUTPATIENT
Start: 2018-08-28

## 2018-08-28 RX ORDER — SODIUM CHLORIDE, SODIUM LACTATE, POTASSIUM CHLORIDE, CALCIUM CHLORIDE 600; 310; 30; 20 MG/100ML; MG/100ML; MG/100ML; MG/100ML
100 INJECTION, SOLUTION INTRAVENOUS CONTINUOUS
Status: CANCELLED | OUTPATIENT
Start: 2018-08-28

## 2018-08-29 ENCOUNTER — ANESTHESIA (OUTPATIENT)
Dept: ENDOSCOPY | Age: 73
End: 2018-08-29
Payer: MEDICARE

## 2018-08-29 ENCOUNTER — HOSPITAL ENCOUNTER (OUTPATIENT)
Age: 73
Setting detail: OUTPATIENT SURGERY
Discharge: HOME | End: 2018-08-29
Attending: SURGERY | Admitting: SURGERY
Payer: MEDICARE

## 2018-08-29 VITALS
RESPIRATION RATE: 16 BRPM | OXYGEN SATURATION: 100 % | HEIGHT: 65 IN | SYSTOLIC BLOOD PRESSURE: 138 MMHG | HEART RATE: 81 BPM | WEIGHT: 172.56 LBS | TEMPERATURE: 98.6 F | BODY MASS INDEX: 28.75 KG/M2 | DIASTOLIC BLOOD PRESSURE: 80 MMHG

## 2018-08-29 PROCEDURE — 88305 TISSUE EXAM BY PATHOLOGIST: CPT

## 2018-08-29 PROCEDURE — 74011250636 HC RX REV CODE- 250/636

## 2018-08-29 PROCEDURE — 77030009426 HC FCPS BIOP ENDOSC BSC -B: Performed by: SURGERY

## 2018-08-29 PROCEDURE — 76040000026: Performed by: SURGERY

## 2018-08-29 PROCEDURE — 76060000032 HC ANESTHESIA 0.5 TO 1 HR: Performed by: SURGERY

## 2018-08-29 PROCEDURE — 74011250636 HC RX REV CODE- 250/636: Performed by: ANESTHESIOLOGY

## 2018-08-29 RX ORDER — LIDOCAINE HYDROCHLORIDE 20 MG/ML
INJECTION, SOLUTION EPIDURAL; INFILTRATION; INTRACAUDAL; PERINEURAL AS NEEDED
Status: DISCONTINUED | OUTPATIENT
Start: 2018-08-29 | End: 2018-08-29 | Stop reason: HOSPADM

## 2018-08-29 RX ORDER — SUMATRIPTAN 100 MG/1
100 TABLET, FILM COATED ORAL
COMMUNITY
End: 2020-01-14 | Stop reason: ALTCHOICE

## 2018-08-29 RX ORDER — SODIUM CHLORIDE, SODIUM LACTATE, POTASSIUM CHLORIDE, CALCIUM CHLORIDE 600; 310; 30; 20 MG/100ML; MG/100ML; MG/100ML; MG/100ML
100 INJECTION, SOLUTION INTRAVENOUS CONTINUOUS
Status: DISCONTINUED | OUTPATIENT
Start: 2018-08-29 | End: 2018-08-29 | Stop reason: HOSPADM

## 2018-08-29 RX ORDER — PROPOFOL 10 MG/ML
INJECTION, EMULSION INTRAVENOUS
Status: DISCONTINUED | OUTPATIENT
Start: 2018-08-29 | End: 2018-08-29 | Stop reason: HOSPADM

## 2018-08-29 RX ORDER — PROPOFOL 10 MG/ML
INJECTION, EMULSION INTRAVENOUS AS NEEDED
Status: DISCONTINUED | OUTPATIENT
Start: 2018-08-29 | End: 2018-08-29 | Stop reason: HOSPADM

## 2018-08-29 RX ADMIN — PROPOFOL 180 MCG/KG/MIN: 10 INJECTION, EMULSION INTRAVENOUS at 09:47

## 2018-08-29 RX ADMIN — SODIUM CHLORIDE, SODIUM LACTATE, POTASSIUM CHLORIDE, AND CALCIUM CHLORIDE 100 ML/HR: 600; 310; 30; 20 INJECTION, SOLUTION INTRAVENOUS at 08:48

## 2018-08-29 RX ADMIN — SODIUM CHLORIDE, SODIUM LACTATE, POTASSIUM CHLORIDE, AND CALCIUM CHLORIDE: 600; 310; 30; 20 INJECTION, SOLUTION INTRAVENOUS at 09:42

## 2018-08-29 RX ADMIN — PROPOFOL 80 MG: 10 INJECTION, EMULSION INTRAVENOUS at 09:47

## 2018-08-29 RX ADMIN — LIDOCAINE HYDROCHLORIDE 60 MG: 20 INJECTION, SOLUTION EPIDURAL; INFILTRATION; INTRACAUDAL; PERINEURAL at 09:47

## 2018-08-29 NOTE — PROCEDURES
Procedure in Detail:  Informed consent was obtained for the procedure. The patient was placed in the left lateral decubitus position and sedation was induced by anesthesia. The [id number] was inserted into the rectum and advanced under direct vision to the cecum, which was identified by the ileocecal valve and appendiceal orifice. The quality of the colonic preparation was good. A careful inspection was made as the colonoscope was withdrawn, including a retroflexed view of the rectum; findings and interventions are described below. Appropriate photodocumentation was obtained. Findings:   Rectum:   Normal  Sigmoid:     - Excavated lesions:     - Diverticulosis  Descending Colon:     - Excavated lesions:     - Diverticulosis  Transverse Colon:     - Excavated lesions:     - Diverticulum  Ascending Colon:     - Protruding lesions:     -Sessile Polyp(s) size 4 mm removed by polypectomy (hot biopsy)  Cecum:   Normal          Specimens: Specimens were collected and sent to pathology. Complications: None; patient tolerated the procedure well. \    EBL - none    Recommendations:   - Await pathology.      Signed By: Ben Toney MD                        August 29, 2018

## 2018-08-29 NOTE — ANESTHESIA PREPROCEDURE EVALUATION
Anesthetic History No history of anesthetic complications Review of Systems / Medical History Patient summary reviewed and pertinent labs reviewed Pulmonary Within defined limits Neuro/Psych Psychiatric history (Anxiety) Cardiovascular Hypertension: well controlled Exercise tolerance: >4 METS 
  
GI/Hepatic/Renal 
  
GERD: well controlled Endo/Other Hypothyroidism: well controlled Arthritis Other Findings Comments: Chronic LBP Physical Exam 
 
Airway Mallampati: II 
TM Distance: > 6 cm Neck ROM: normal range of motion Mouth opening: Normal 
 
 Cardiovascular Rhythm: regular Dental 
No notable dental hx Pulmonary Abdominal 
GI exam deferred Other Findings Anesthetic Plan ASA: 2 Anesthesia type: total IV anesthesia Induction: Intravenous Anesthetic plan and risks discussed with: Patient

## 2018-08-29 NOTE — IP AVS SNAPSHOT
[address] [facility] 
 
 
 [address] 
[phone] Patient: Shavon Chavez MRN: [id number] DLF:[id number] About your hospitalization You were admitted on:  August 29, 2018 You last received care in the:  [facility] 1 PREOP You were discharged on:  August 29, 2018 Why you were hospitalized Your primary diagnosis was:  Not on File Follow-up Information Follow up With Details Comments Contact Info Britni Art MD   [address] [address] 
[phone] Your Scheduled Appointments Wednesday August 29, 2018 COLONOSCOPY with Britni Art MD  
[facility] ENDOSCOPY ([address]) [address]  
[phone] Wednesday October 17, 2018  2:50 PM EDT MEDICARE WELLNESS with Ge Walters MD  
[address] [place] [facility]") [address] 
[address] [facility] [address]  
[phone] Discharge Orders None A check jesus indicates which time of day the medication should be taken. My Medications ASK your doctor about these medications Instructions Each Dose to Equal  
 Morning Noon Evening Bedtime  
 acetaminophen 500 mg tablet Commonly known as:  TYLENOL Your last dose was: Your next dose is: Take 1,000 mg by mouth every six (6) hours as needed for Pain. Take / use AM day of surgery  per anesthesia protocols if needed. 1000 mg ADVIL 200 mg tablet Generic drug:  ibuprofen Your last dose was: Your next dose is: Take  by mouth as needed for Pain. celecoxib 200 mg capsule Commonly known as:  CELEBREX Your last dose was: Your next dose is:  TAKE ONE CAPSULE BY MOUTH TWICE A DAY  
     
   
   
   
  
 HAIR, SKIN, NAILS WITH BIOTIN PO  
   
 Your last dose was: Your next dose is: Take  by mouth daily. hydroCHLOROthiazide 25 mg tablet Commonly known as:  HYDRODIURIL Your last dose was: Your next dose is: TAKE ONE TABLET BY MOUTH ONE TIME DAILY  
     
   
   
   
  
 levothyroxine 25 mcg tablet Commonly known as:  SYNTHROID Your last dose was: Your next dose is: TAKE ONE TABLET BY MOUTH ONE TIME DAILY BEFORE BREAKFAST  
     
   
   
   
  
 multivitamin tablet Commonly known as:  ONE A DAY Your last dose was: Your next dose is: Take 1 Tab by mouth every other day. 1 Tab NexIUM 20 mg capsule Generic drug:  esomeprazole Your last dose was: Your next dose is: Take 20 mg by mouth every morning. Take / use AM day of surgery  per anesthesia protocols. Indications: GASTROESOPHAGEAL REFLUX  
 20 mg  
    
   
   
   
  
 polyethylene glycol 17 gram/dose powder Commonly known as:  Yanni Justin Your last dose was: Your next dose is: Take 17 g by mouth as needed (constipation). as needed daily 17 g  
    
   
   
   
  
 potassium chloride 10 mEq tablet Commonly known as:  KLOR-CON Your last dose was: Your next dose is: Take 1 Tab by mouth daily. 10 mEq SUMAtriptan 100 mg tablet Commonly known as:  IMITREX Your last dose was: Your next dose is: Take 100 mg by mouth once as needed for Migraine. 100 mg  
    
   
   
   
  
 temazepam 15 mg capsule Commonly known as:  RESTORIL Your last dose was: Your next dose is: Take 1 Cap by mouth nightly. Max Daily Amount: 15 mg.  
 15 mg Discharge Instructions Gastrointestinal Colonoscopy/Flexible Sigmoidoscopy Lower Exam Discharge Instructions 1. Call your doctor for any problems or questions. 2. Contact the doctors office for follow up appointment as directed 3. Medication may cause drowsiness for several hours, therefore, do not drive or operate machinery for remainder of the day. 4. No alcohol today. 5. Ordinarily, you may resume regular diet and activity after exam unless otherwise specified by your physician. 6. Because of air put into your colon during exam, you may experience some abdominal distension, relieved by the passage of gas, for several hours. 7. Contact your physician if you have any of the following: 
a. Excessive amount of bleeding  large amount when having a bowel movement. Occasional specks of blood with bowel movement would not be unusual. 
b. Severe abdominal pain 
c. Fever or Chills Introducing [facility]! [facility] introduces [employer] patient portal. Now you can access parts of your medical record, email your doctor's office, and request medication refills online. 1. In your internet browser, go to [email or web address] 2. Click on the First Time User? Click Here link in the Sign In box. You will see the New Member Sign Up page. 3. Enter your [employer] Access Code exactly as it appears below. You will not need to use this code after youve completed the sign-up process. If you do not sign up before the expiration date, you must request a new code. · [employer] Access Code: [email or web address] Expires: 10/13/2018  5:21 AM 
 
4. Enter the last four digits of your Social Security Number (xxxx) and Date of Birth (mm/dd/yyyy) as indicated and click Submit. You will be taken to the next sign-up page. 5. Create a Animocat ID. This will be your [employer] login ID and cannot be changed, so think of one that is secure and easy to remember. 6. Create a [employer] password. You can change your password at any time. 7. Enter your Password Reset Question and Answer.  This can be used at a later time if you forget your password. 8. Enter your e-mail address. You will receive e-mail notification when new information is available in [address] 9. Click Sign Up. You can now view and download portions of your medical record. 10. Click the Download Summary menu link to download a portable copy of your medical information. If you have questions, please visit the Frequently Asked Questions section of the [facility] [employer] website. Remember, [employer] is NOT to be used for urgent needs. For medical emergencies, dial 911. Now available from your iPhone and Android! Introducing Alex Lewis As a [facility]man patient, I wanted to make you aware of our electronic visit tool called Alex Lewis. [facility] 24/7 allows you to connect within minutes with a medical provider 24 hours a day, seven days a week via a mobile device or tablet or logging into a secure website from your computer. You can access Alex Lewis from anywhere in the [place]. A virtual visit might be right for you when you have a simple condition and feel like you just dont want to get out of bed, or cant get away from work for an appointment, when your regular Lidia Feldman provider is not available (evenings, weekends or holidays), or when youre out of town and need minor care. Electronic visits cost only $49 and if the [facility]idia Feldman 24/7 provider determines a prescription is needed to treat your condition, one can be electronically transmitted to a nearby pharmacy*. Please take a moment to enroll today if you have not already done so. The enrollment process is free and takes just a few minutes. To enroll, please download the [employer]hip[employer] joel to your tablet or phone, or visit [email or web address] to enroll on your computer.    
And, as an [address] [place] [address] patient with a [employer] account, the results of your visits will be scanned into your electronic medical record and your primary care provider will be able to view the scanned results. We urge you to continue to see your regular Michelle Su provider for your ongoing medical care. And while your primary care provider may not be the one available when you seek a Alex Lewis virtual visit, the peace of mind you get from getting a real diagnosis real time can be priceless. For more information on Alex Parksry, view our Frequently Asked Questions (FAQs) at [email or web address]. Sincerely, 
 
Jones Rivera MD 
Chief Medical Officer Inderjit Heather Carter *:  certain medications cannot be prescribed via Alex Lewis Providers Seen During Your Hospitalization Provider Specialty Primary office phone Enrico Rees MD General Surgery [phone] Your Primary Care Physician (PCP) Primary Care Physician Office Phone Office Fax Radha Garcia [phone] You are allergic to the following No active allergies Recent Documentation Height Weight BMI OB Status Smoking Status 1.638 m 78.3 kg 29.16 kg/m2 Postmenopausal Former Smoker Emergency Contacts Name Discharge Info Relation Home Work Mobile RustBhavesh DISCHARGE CAREGIVER [3] Spouse [3] [phone] Patient Belongings The following personal items are in your possession at time of discharge: 
  Dental Appliances: None Please provide this summary of care documentation to your next provider. Signatures-by signing, you are acknowledging that this After Visit Summary has been reviewed with you and you have received a copy. Patient Signature:  ____________________________________________________________ Date:  ____________________________________________________________  
  
Allan Sport Provider Signature:  ____________________________________________________________ Date:  ____________________________________________________________

## 2018-08-29 NOTE — ANESTHESIA POSTPROCEDURE EVALUATION
Post-Anesthesia Evaluation and Assessment Patient: Kate Soliman MRN: [id number]  SSN: [id number] YOB: 1945  Age: 72 y.o. Sex: female Cardiovascular Function/Vital Signs Visit Vitals  /80  Pulse 81  Temp 37 °C (98.6 °F)  Resp 16  
 Ht 5' 4.5\" (1.638 m)  Wt 78.3 kg (172 lb 9 oz)  SpO2 100%  BMI 29.16 kg/m2 Patient is status post total IV anesthesia anesthesia for Procedure(s): 
COLONOSCOPY / BMI=30  
COLON BIOPSY. Nausea/Vomiting: None Postoperative hydration reviewed and adequate. Pain: 
Pain Scale 1: Numeric (0 - 10) (08/29/18 0834) Pain Intensity 1: 0 (08/29/18 1026) Managed Neurological Status: At baseline Mental Status and Level of Consciousness: Arousable Pulmonary Status:  
O2 Device: Nasal cannula (08/29/18 1036) Adequate oxygenation and airway patent Complications related to anesthesia: None Post-anesthesia assessment completed. No concerns Signed By: Joe Tiwari MD   
 August 29, 2018

## 2018-08-29 NOTE — H&P
Colonoscopy History and Physical 
 
 
Patient: [facility] Physician: Georgia Acuña MD 
 
Referring Physician: Carolyn Keith MD 
 
Chief Complaint: For colonoscopy History of Present Illness: Pt presents for colonoscopy. One prior exam, >10 years ago, with no polyps.  thinks she may have also had FFS at a previous PCP. History: 
Past Medical History:  
Diagnosis Date  Anxiety  Arthritis   
 back and knee's  Breast cancer ([facility] [address] 01/18/2016 Left- s/p Lumpectomy  Chronic pain   
 back  Former smoker   
 was a passive smoker for 40 yrs; stopped in 2014  GERD (gastroesophageal reflux disease)   
 nexium  Hard of hearing   
 bilateral hearing aids  Headache   
 hx monthly migraines; rare now  Hypertension   
 controlled with med  Hypothyroidism   
 managed with medication  Insomnia  Melanoma ([facility] [address]   
 left face  Status post total left knee replacement 5/12/2017  Status post total right knee replacement 1/24/2018 Past Surgical History:  
Procedure Laterality Date  BREAST SURGERY PROCEDURE UNLISTED Left 7/2015  
 biopsy  HX BREAST BIOPSY Left 1/18/2016 BREAST NEEDLE LOCALIZED PARTIAL MASTECTOMY/ LEFT //   performed by Georgia Acuña MD at [address] [place] [address] HX BREAST LUMPECTOMY Left 01/2016  HX COLONOSCOPY    
 HX KNEE REPLACEMENT Left 05/12/2017  HX KNEE REPLACEMENT Right 01/24/2018 [address]  HX VASCULAR ACCESS Right   
 port right upper chest- removed 2016  HX WISDOM TEETH EXTRACTION    
 TX MASTECTOMY, PARTIAL Left 01/18/2016 Social History Social History  Marital status:  Spouse name: N/A  
 Number of children: N/A  
 Years of education: N/A Social History Main Topics  Smoking status: Former Smoker Years: 40.00 Types: Cigarettes Quit date: 1/13/2014  Smokeless tobacco: Never Used Comment: was a passive smoker for 40 yrs  Alcohol use 0.0 oz/week  
  0 Standard drinks or equivalent per week Comment: occasionally  Drug use: No  
 Sexual activity: Not Asked Other Topics Concern  None Social History Narrative Family History Problem Relation Age of Onset  Cancer Mother   
  leukemia  Hypertension Mother  Stroke Mother  Heart Disease Father  Hypertension Father  Stroke Sister  Hypertension Sister  Asthma Paternal Grandfather  Breast Cancer Neg Hx Medications:  
Prior to Admission medications Medication Sig Start Date End Date Taking? Authorizing Provider SUMAtriptan (IMITREX) 100 mg tablet Take 100 mg by mouth once as needed for Migraine. Yes Historical Provider  
ascorbic acid/vitamin E/biotin (HAIR, SKIN, NAILS WITH BIOTIN PO) Take  by mouth daily. Yes Historical Provider  
ibuprofen (ADVIL) 200 mg tablet Take  by mouth as needed for Pain. Yes Historical Provider  
temazepam (RESTORIL) 15 mg capsule Take 1 Cap by mouth nightly. Max Daily Amount: 15 mg. 7/26/18  Yes Leilani Miller MD  
celecoxib (CELEBREX) 200 mg capsule TAKE ONE CAPSULE BY MOUTH TWICE A DAY 6/29/18  Yes Leilani Miller MD  
hydroCHLOROthiazide (HYDRODIURIL) 25 mg tablet TAKE ONE TABLET BY MOUTH ONE TIME DAILY 5/2/18  Yes Leilani Miller MD  
levothyroxine (SYNTHROID) 25 mcg tablet TAKE ONE TABLET BY MOUTH ONE TIME DAILY BEFORE BREAKFAST 5/2/18  Yes Leilani Miller MD  
polyethylene glycol (MIRALAX) 17 gram/dose powder Take 17 g by mouth as needed (constipation). as needed daily 2/13/18  Yes Lewis Samuel MD  
acetaminophen (TYLENOL) 500 mg tablet Take 1,000 mg by mouth every six (6) hours as needed for Pain. Take / use AM day of surgery  per anesthesia protocols if needed. 5/15/17  Yes Lewis Samuel MD  
multivitamin (ONE A DAY) tablet Take 1 Tab by mouth every other day. Yes Historical Provider  
esomeprazole (NEXIUM) 20 mg capsule Take 20 mg by mouth every morning. Take / use AM day of surgery  per anesthesia protocols. Indications: GASTROESOPHAGEAL REFLUX   Yes Historical Provider  
potassium chloride (KLOR-CON) 10 mEq tablet Take 1 Tab by mouth daily. Patient not taking: Reported on 8/24/2018 4/26/18   Ge Walters MD  
 
 
Allergies: No Known Allergies Physical Exam:  
 
Vital Signs:  
Visit Vitals  BP (!) 182/101  Pulse 82  Temp 97.8 °F (36.6 °C)  Resp 16  
 Ht 5' 4.5\" (1.638 m)  Wt 172 lb 9 oz (78.3 kg)  SpO2 97%  BMI 29.16 kg/m2 Gayla Rodriguez General: in NAD Heart: regular Lungs: unlabored Abdominal: soft Neurological: grossly normal  
  
 
Findings/Diagnosis: Screening for colorectal cancer. H/o breast cancer 2016. Plan of Care/Planned Procedure: Colonoscopy. Risks of endoscopy include  bleeding, perforation. They understand and agree to proceed. Signed: 
Britni Art MD 
 8/29/2018

## 2018-09-04 NOTE — PROGRESS NOTES
Recall in 10 years-lone polyp completely insignificant.   Repeat in 10 yrs if remains in good health

## 2019-07-09 ENCOUNTER — HOSPITAL ENCOUNTER (OUTPATIENT)
Dept: MAMMOGRAPHY | Age: 74
Discharge: HOME | End: 2019-07-09
Attending: INTERNAL MEDICINE
Payer: MEDICARE

## 2019-07-09 PROCEDURE — 77067 SCR MAMMO BI INCL CAD: CPT

## 2019-07-16 ENCOUNTER — HOSPITAL ENCOUNTER (OUTPATIENT)
Dept: LAB | Age: 74
Discharge: HOME | End: 2019-07-16
Payer: MEDICARE

## 2019-07-16 DIAGNOSIS — C50.912 MALIGNANT NEOPLASM OF LEFT FEMALE BREAST, UNSPECIFIED ESTROGEN RECEPTOR STATUS, UNSPECIFIED SITE OF BREAST (HCC): Chronic | ICD-10-CM

## 2019-07-16 LAB
ALBUMIN SERPL-MCNC: 4 G/DL (ref 3.2–4.6)
ALBUMIN/GLOB SERPL: 1.1 {RATIO} (ref 1.2–3.5)
ALP SERPL-CCNC: 68 U/L (ref 50–136)
ALT SERPL-CCNC: 25 U/L (ref 12–65)
ANION GAP SERPL CALC-SCNC: 8 MMOL/L (ref 7–16)
AST SERPL-CCNC: 18 U/L (ref 15–37)
BASOPHILS # BLD: 0.1 K/UL (ref 0–0.2)
BASOPHILS NFR BLD: 1 % (ref 0–2)
BILIRUB SERPL-MCNC: 0.5 MG/DL (ref 0.2–1.1)
BUN SERPL-MCNC: 26 MG/DL (ref 8–23)
CALCIUM SERPL-MCNC: 9.8 MG/DL (ref 8.3–10.4)
CHLORIDE SERPL-SCNC: 103 MMOL/L (ref 98–107)
CO2 SERPL-SCNC: 27 MMOL/L (ref 21–32)
CREAT SERPL-MCNC: 1 MG/DL (ref 0.6–1)
DIFFERENTIAL METHOD BLD: NORMAL
EOSINOPHIL # BLD: 0.2 K/UL (ref 0–0.8)
EOSINOPHIL NFR BLD: 2 % (ref 0.5–7.8)
ERYTHROCYTE [DISTWIDTH] IN BLOOD BY AUTOMATED COUNT: 13.6 % (ref 11.9–14.6)
GLOBULIN SER CALC-MCNC: 3.8 G/DL (ref 2.3–3.5)
GLUCOSE SERPL-MCNC: 94 MG/DL (ref 65–100)
HCT VFR BLD AUTO: 41.6 % (ref 35.8–46.3)
HGB BLD-MCNC: 13.6 G/DL (ref 11.7–15.4)
IMM GRANULOCYTES # BLD AUTO: 0 K/UL (ref 0–0.5)
IMM GRANULOCYTES NFR BLD AUTO: 0 % (ref 0–5)
LYMPHOCYTES # BLD: 1.6 K/UL (ref 0.5–4.6)
LYMPHOCYTES NFR BLD: 21 % (ref 13–44)
MCH RBC QN AUTO: 28.2 PG (ref 26.1–32.9)
MCHC RBC AUTO-ENTMCNC: 32.7 G/DL (ref 31.4–35)
MCV RBC AUTO: 86.1 FL (ref 79.6–97.8)
MONOCYTES # BLD: 0.9 K/UL (ref 0.1–1.3)
MONOCYTES NFR BLD: 12 % (ref 4–12)
NEUTS SEG # BLD: 5.1 K/UL (ref 1.7–8.2)
NEUTS SEG NFR BLD: 65 % (ref 43–78)
NRBC # BLD: 0 K/UL (ref 0–0.2)
PLATELET # BLD AUTO: 213 K/UL (ref 150–450)
PMV BLD AUTO: 11.8 FL (ref 9.4–12.3)
POTASSIUM SERPL-SCNC: 3.8 MMOL/L (ref 3.5–5.1)
PROT SERPL-MCNC: 7.8 G/DL (ref 6.3–8.2)
RBC # BLD AUTO: 4.83 M/UL (ref 4.05–5.25)
SODIUM SERPL-SCNC: 138 MMOL/L (ref 136–145)
WBC # BLD AUTO: 7.8 K/UL (ref 4.3–11.1)

## 2019-07-16 PROCEDURE — 80053 COMPREHEN METABOLIC PANEL: CPT

## 2019-07-16 PROCEDURE — 36415 COLL VENOUS BLD VENIPUNCTURE: CPT

## 2019-07-16 PROCEDURE — 85025 COMPLETE CBC W/AUTO DIFF WBC: CPT

## 2019-07-23 ENCOUNTER — HOSPITAL ENCOUNTER (OUTPATIENT)
Dept: PHYSICAL THERAPY | Age: 74
Discharge: HOME | End: 2019-07-23
Payer: MEDICARE

## 2019-07-23 PROCEDURE — 97140 MANUAL THERAPY 1/> REGIONS: CPT

## 2019-07-23 PROCEDURE — 97162 PT EVAL MOD COMPLEX 30 MIN: CPT
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
## 2019-07-23 NOTE — PROGRESS NOTES
Laineleoncio Cheryl  : 1945  Primary: Crystal Gant Humanjw Medicare Choice *  Secondary:  [address]  at [place] [address], [address], [address] [facility]  [phone]   [phone]         OUTPATIENT PHYSICAL THERAPY: Daily Treatment Note 2019  Visit Count:  1    ICD-10: Treatment Diagnosis: Cervicalgia (M54.2)  Precautions/Allergies:   Patient has no known allergies. TREATMENT PLAN:  Effective Dates: 2019 TO 10/21/2019 (90 days). Frequency/Duration: 2 times a week for 90 Day(s)    Pre-treatment Symptoms/Complaints:  Neck Pain  Pain: Initial:   7/10 Post Session:  5/10   Medications Last Reviewed:  2019  Updated Objective Findings:  See evaluation note from today  TREATMENT:     THERAPEUTIC EXERCISE: (5 minutes):  Exercises per grid below to improve mobility and strength. Required minimal verbal cues to promote proper body alignment and promote proper body posture. Progressed resistance and repetitions as indicated. MANUAL THERAPY: (15 minutes): Soft tissue mob to bilateral cervical paraspinals, UT and levator scap was utilized and necessary because of the patient's restricted motion of soft tissue. Date:  19 Date:   Date:     Activity/Exercise Parameters Parameters Parameters   Chin Tucks 10 reps  5 sec holds     Cervical Side Bending 3 reps  15 sec holds  Bilaterally                                       MedBridge Portal  Treatment/Session Summary:    · Response to Treatment:  Pt tolerated all treatments well today with no c/o. · Communication/Consultation:  None today  · Equipment provided today:  None today  · Recommendations/Intent for next treatment session: Next visit will focus on manual therapy, progress therex as tolerable, modalities prn for pain.     Total Treatment Billable Duration:  15 minutes  PT Patient Time In/Time Out  Time In: 1015  Time Out: Karmen Salguero [address], PT    Future Appointments   Date Time Provider Department [place]   8/6/2019 11:00 AM Morgan Reed, PT [facility]   8/8/2019  9:30 AM VissageBrook, PT [facility]   8/15/2019  9:30 AM Vissage, Brook Savory, PT [facility]   8/16/2019  9:30 AM Vissage, Brook Savory, PT [facility]   8/19/2019  9:30 AM Burnard Lints, PTA [facility]   8/22/2019  9:30 AM Burnard Lints, PTA [facility]   8/26/2019  9:30 AM Burnard Lints, PTA [facility]   8/29/2019  9:30 AM Brook Encarnacion, PT [facility]   10/23/2019 10:30 AM Argenis Waters MD [facility] PST PST   1/14/2020 11:00 AM [facility] OUTREACH INSURANCE [facility]   1/14/2020 11:30 AM Levi Lazo MD [facility]

## 2019-08-06 ENCOUNTER — HOSPITAL ENCOUNTER (OUTPATIENT)
Dept: PHYSICAL THERAPY | Age: 74
Discharge: HOME | End: 2019-08-06
Payer: MEDICARE

## 2019-08-06 PROCEDURE — 97110 THERAPEUTIC EXERCISES: CPT

## 2019-08-06 PROCEDURE — 97140 MANUAL THERAPY 1/> REGIONS: CPT

## 2019-08-06 NOTE — PROGRESS NOTES
Sherry Saucedo  : 1945  Primary: Miley Lares Medicare Choice *  Secondary:  Therapy Center at [address] [place] [address],[address], [place] [address].  [phone]   [phone]         OUTPATIENT PHYSICAL THERAPY: Daily Treatment Note 2019  Visit Count:  2    ICD-10: Treatment Diagnosis: Cervicalgia (M54.2)  Precautions/Allergies:   Patient has no known allergies. TREATMENT PLAN:  Effective Dates: 2019 TO 10/21/2019 (90 days). Frequency/Duration: 2 times a week for 90 Day(s)    Pre-treatment Symptoms/Complaints:  Neck Pain; Pt states the home exercises have been helping her neck pain. Pain: Initial:   5/10 Post Session:  4/10   Medications Last Reviewed:  2019  Updated Objective Findings:  See evaluation note from today  TREATMENT:     THERAPEUTIC EXERCISE: (15 minutes):  Exercises per grid below to improve mobility and strength. Required minimal verbal cues to promote proper body alignment and promote proper body posture. Progressed resistance and repetitions as indicated. MANUAL THERAPY: (25 minutes): Soft tissue mob to bilateral cervical paraspinals, UT and levator scap was utilized and necessary because of the patient's restricted motion of soft tissue. Date:  19 Date:  19 Date:     Activity/Exercise Parameters Parameters Parameters   Chin Tucks 10 reps  5 sec holds 10 reps  5 sec holds    Cervical Side Bending 3 reps  15 sec holds  Bilaterally 3 reps  15 sec holds  Bilaterally    UBE  Manual L2  4 minutes    T-band Rows and Straight Arm Pulldowns  Green T-band  20 reps each                          MedBridge Portal  Treatment/Session Summary:    · Response to Treatment:  Pt tolerated all treatments well today with no c/o. She reported feeling much better following treatments today.   · Communication/Consultation:  None today  · Equipment provided today:  None today  · Recommendations/Intent for next treatment session: Next visit will focus on manual therapy, progress therex as tolerable, modalities prn for pain.     Total Treatment Billable Duration:  40 minutes  PT Patient Time In/Time Out  Time In: 1100  Time Out: [address], PT    Future Appointments   Date Time Provider [place] Mus[place]   8/8/2019  9:30 AM Ange Hall, ELADIO [facility]   8/15/2019  9:30 AM Aaliyah Encarnacion, PT [facility]   8/16/2019  9:30 AM Aaliyah Encarnacion, PT [facility]   8/19/2019  9:30 AM Korey Schumacher, PTA [facility]   8/22/2019  9:30 AM Korey Schumacher PTA [facility]   8/26/2019  9:30 AM Korey Schumacher, PTA [facility]   8/29/2019  9:30 AM Aaliyah Encarnacion, PT [facility]   10/23/2019 10:30 AM Chandler Lombard, MD [facility] PST PST   1/14/2020 11:00 AM [address] [place] [address] OUTREACH INSURANCE [place] [facility] [address] [place] [address]   1/14/2020 11:30 AM Zulay Khalil MD [facility]

## 2019-08-08 ENCOUNTER — HOSPITAL ENCOUNTER (OUTPATIENT)
Dept: PHYSICAL THERAPY | Age: 74
Discharge: HOME | End: 2019-08-08
Payer: MEDICARE

## 2019-08-08 PROCEDURE — 97110 THERAPEUTIC EXERCISES: CPT

## 2019-08-08 PROCEDURE — 97140 MANUAL THERAPY 1/> REGIONS: CPT

## 2019-08-08 NOTE — PROGRESS NOTES
Viola Hilariomasoud  : 1945  Primary: [place] Lares Medicare Choice *  Secondary:  Therapy Center at [place] [id number] [facility], [address], [place] [address].  [phone]   [phone]         OUTPATIENT PHYSICAL THERAPY: Daily Treatment Note 2019  Visit Count:  3    ICD-10: Treatment Diagnosis: Cervicalgia (M54.2)  Precautions/Allergies:   Patient has no known allergies. TREATMENT PLAN:  Effective Dates: 2019 TO 10/21/2019 (90 days). Frequency/Duration: 2 times a week for 90 Day(s)    Pre-treatment Symptoms/Complaints:  \"I woke up with a stiff neck. I didn't sleep well last night because of the pain\". Pain: Initial: Pain Intensity 1: 5  Post Session:  3/10   Medications Last Reviewed:  2019  Updated Objective Findings:  None Today  TREATMENT:     THERAPEUTIC EXERCISE: (15 minutes):  Exercises per grid below to improve mobility and strength. Required minimal verbal cues to promote proper body alignment and promote proper body posture. Progressed resistance and repetitions as indicated. MANUAL THERAPY: (25 minutes): Soft tissue mob to bilateral cervical paraspinals, UT and levator scap was utilized and necessary because of the patient's restricted motion of soft tissue. Date:  19 Date:  19 Date:  2019   Activity/Exercise Parameters Parameters Parameters   Chin Tucks 10 reps  5 sec holds 10 reps  5 sec holds 10 reps  5 sec holds   Cervical Side Bending 3 reps  15 sec holds  Bilaterally 3 reps  15 sec holds  Bilaterally 3 reps  15 sec holds  Bilaterally   UBE  Manual L2  4 minutes Level 2 x 6 mintues   T-band Rows and Straight Arm Pulldowns  Green T-band  20 reps each Green T-band  20 reps each   Cervical rotation   X 10 reps                   MedBridge Portal  Treatment/Session Summary:    · Response to Treatment:  Patient reports decreased pain and improved cervical range of motion after treatment.     · Communication/Consultation:  None today  · Equipment provided today:  None today  · Recommendations/Intent for next treatment session: Next visit will focus on manual therapy, progress therex as tolerable, modalities prn for pain.     Total Treatment Billable Duration:  40 minutes  PT Patient Time In/Time Out  Time In: 0755  Time Out: [address], PT    Future Appointments   Date Time Provider Wade Musa   8/15/2019  9:30 AM Jeanne Rossi, PT [facility]   8/16/2019  9:30 AM Jcarlos Encarnacion, PT [facility]   8/19/2019  9:30 AM Saul Sheriff, PTA [facility]   8/22/2019  9:30 AM Saul Sheriff, PTA [facility]   8/26/2019  9:30 AM Saul Sheriff, PTA [facility]   8/29/2019  9:30 AM Jcarlos Encarnacion, PT [facility]   10/23/2019 10:30 AM Jazmín Kapadia MD [facility] PST [facility]   1/14/2020 11:00 AM [facility] OUTREACH INSURANCE [facility]   1/14/2020 11:30 AM May Herrmann MD [facility]

## 2019-08-15 ENCOUNTER — HOSPITAL ENCOUNTER (OUTPATIENT)
Dept: PHYSICAL THERAPY | Age: 74
Discharge: HOME | End: 2019-08-15
Payer: MEDICARE

## 2019-08-15 PROCEDURE — 97110 THERAPEUTIC EXERCISES: CPT

## 2019-08-15 PROCEDURE — 97140 MANUAL THERAPY 1/> REGIONS: CPT

## 2019-08-15 NOTE — PROGRESS NOTES
Shauna Luna  : 1945  Primary: Cyrstal Lares Medicare Choice *  Secondary:  Therapy Center at [place] [address] [facility], [address], [place] [address].  [phone]   [phone]         OUTPATIENT PHYSICAL THERAPY: Daily Treatment Note 8/15/2019  Visit Count:  4    ICD-10: Treatment Diagnosis: Cervicalgia (M54.2)  Precautions/Allergies:   Patient has no known allergies. TREATMENT PLAN:  Effective Dates: 2019 TO 10/21/2019 (90 days). Frequency/Duration: 2 times a week for 90 Day(s)    Pre-treatment Symptoms/Complaints: \"Neck wasn't hurting yesterday, but it is hurting today\". Pain: Initial: Pain Intensity 1: 3  Pain Location 1: Neck  Post Session:  1/10   Medications Last Reviewed:  8/15/2019  Updated Objective Findings:  None Today  TREATMENT:     THERAPEUTIC EXERCISE: (15 minutes):  Exercises per grid below to improve mobility and strength. Required minimal verbal cues to promote proper body alignment and promote proper body posture. Progressed resistance and repetitions as indicated. MANUAL THERAPY: (25 minutes): Soft tissue mob to bilateral cervical paraspinals, UT and levator scap was utilized and necessary because of the patient's restricted motion of soft tissue. Date:  8-15-19 Date:  19 Date:  2019   Activity/Exercise Parameters Parameters Parameters   Chin Tucks 10 reps  5 sec holds 10 reps  5 sec holds 10 reps  5 sec holds   Cervical Side Bending 3 reps  15 sec holds  Bilaterally 3 reps  15 sec holds  Bilaterally 3 reps  15 sec holds  Bilaterally   UBE Level 3 x 8 mintues Manual L2  4 minutes Level 2 x 6 mintues   T-band Rows and Straight Arm Pulldowns Green T-band  20 reps each Green T-band  20 reps each Green T-band  20 reps each   Cervical rotation X 10 reps   X 10 reps   Cervical extension X 10 reps                MedBridge Portal  Treatment/Session Summary:    · Response to Treatment:  Patient reports decreased pain after treatment. · Communication/Consultation:  None today  · Equipment provided today:  None today  · Recommendations/Intent for next treatment session: Next visit will focus on manual therapy, progress therex as tolerable, modalities prn for pain.     Total Treatment Billable Duration:  40 minutes  PT Patient Time In/Time Out  Time In: 0930  Time Out: [address], PT    Future Appointments   Date Time Provider Wade Musa   8/16/2019  7:00 PM Nuvia West, PT [facility] SFE   8/19/2019  9:30 AM Jaz Cox, PTA [facility]   8/22/2019  9:30 AM Jaz Cox, PTA [facility]   8/26/2019  9:30 AM Jaz Cox, PTA [facility]   8/29/2019  9:30 AM Desi Encarnacion, PT [facility]   9/5/2019 11:00 AM Monty Ayala, PT [facility]   10/23/2019 10:30 AM Charito Kearney MD [facility] PST PST   1/14/2020 11:00 AM [address] [place] [address] INSURANCE [address]   1/14/2020 11:30 AM Fredi Saez MD [facility]

## 2019-08-16 ENCOUNTER — HOSPITAL ENCOUNTER (OUTPATIENT)
Dept: PHYSICAL THERAPY | Age: 74
Discharge: HOME | End: 2019-08-16
Payer: MEDICARE

## 2019-08-16 NOTE — PROGRESS NOTES
Valeriano Preston  : 1945  Primary: Jeff Lares Medicare Choice *  Secondary:  Therapy Center at [address], [address] [place] [address],[address], [address] [facility]  [phone]   [phone]     OUTPATIENT DAILY NOTE    NAME/AGE/GENDER: Valeriano Preston is a 69 y.o. female. DATE: 2019    Ms. Garciafred Handsome for today's appointment due to unknown reason.     Milda Litten, PT

## 2019-08-22 ENCOUNTER — HOSPITAL ENCOUNTER (OUTPATIENT)
Dept: PHYSICAL THERAPY | Age: 74
Discharge: HOME | End: 2019-08-22
Payer: MEDICARE

## 2019-08-22 PROCEDURE — 97110 THERAPEUTIC EXERCISES: CPT

## 2019-08-22 PROCEDURE — 97140 MANUAL THERAPY 1/> REGIONS: CPT

## 2019-08-22 NOTE — PROGRESS NOTES
Elkin Zachary  : 1945  Primary: Joel Lares Medicare Choice *  Secondary:  [facility] [place] [id number] [facility], [address], [address] [facility]  [phone]   [phone]         OUTPATIENT PHYSICAL THERAPY: Daily Treatment Note 2019  Visit Count:  5    ICD-10: Treatment Diagnosis: Cervicalgia (M54.2)  Precautions/Allergies:   Patient has no known allergies. TREATMENT PLAN:  Effective Dates: 2019 TO 10/21/2019 (90 days). Frequency/Duration: 2 times a week for 90 Day(s)    Pre-treatment Symptoms/Complaints: \"It is a little better\". Pain: Initial:    Post Session:  1/10   Medications Last Reviewed:  2019  Updated Objective Findings:  None Today  TREATMENT:     THERAPEUTIC EXERCISE: (15 minutes):  Exercises per grid below to improve mobility and strength. Required minimal verbal cues to promote proper body alignment and promote proper body posture. Progressed resistance and repetitions as indicated. MANUAL THERAPY: (30 minutes): Soft tissue mob to bilateral cervical paraspinals, UT and levator scap was utilized and necessary because of the patient's restricted motion of soft tissue. Date:  8-15-19 Date:  19 Date:  2019   Activity/Exercise Parameters Parameters Parameters   Chin Tucks 10 reps  5 sec holds 10 reps  5 sec holds 10 reps  5 sec holds   Cervical Side Bending 3 reps  15 sec holds  Bilaterally 3 reps  15 sec holds  Bilaterally 3 reps  15 sec holds  Bilaterally   UBE Level 3 x 8 mintues Manual L2  4 minutes Level 2 x 6 mintues   T-band Rows and Straight Arm Pulldowns Green T-band  20 reps each Green T-band  20 reps each Green T-band  20 reps each   Cervical rotation X 10 reps   X 10 reps   Cervical extension X 10 reps            Moist heat to cervical neck x 10 minutes sitting in chair. Skin clear.       [employer] Portal  Treatment/Session Summary:    · Response to Treatment:  Decreased ROM in neck- Right side with most of the tightness in pain through neck and upper trap and pain into head. Patient had relieve after therapy today. .    · Communication/Consultation:  None today  · Equipment provided today:  None today  · Recommendations/Intent for next treatment session: Next visit will focus on manual therapy, progress therex as tolerable, modalities prn for pain.     Total Treatment Billable Duration:  40 minutes  PT Patient Time In/Time Out  Time In: 0930  Time Out: [address]    Future Appointments   Date Time Provider Wade Musa   8/26/2019  7:00 PM Shawn Phelps PTA [facility] SFE   8/27/2019  1:00 PM Shawn Phelps PTA [facility] SFE   8/29/2019  1:00 PM Shawn Phelps PTA [facility] SFE   9/5/2019 11:00 AM Jaelyn Myers PT [facility] SFE   10/23/2019 10:30 AM Alva Hawk MD [facility] PST PST   1/14/2020 11:00 AM [address] [place] [address]   1/14/2020 11:30 AM Patricia Ruelas MD [facility]

## 2019-08-26 ENCOUNTER — APPOINTMENT (OUTPATIENT)
Dept: PHYSICAL THERAPY | Age: 74
End: 2019-08-26
Payer: MEDICARE

## 2019-08-27 ENCOUNTER — HOSPITAL ENCOUNTER (OUTPATIENT)
Dept: PHYSICAL THERAPY | Age: 74
Discharge: HOME | End: 2019-08-27
Payer: MEDICARE

## 2019-08-27 PROCEDURE — 97140 MANUAL THERAPY 1/> REGIONS: CPT

## 2019-08-27 PROCEDURE — 97110 THERAPEUTIC EXERCISES: CPT

## 2019-08-27 NOTE — PROGRESS NOTES
Rosaline Sparrow  : 1945  Primary: Arvind Lares Medicare Choice *  Secondary:  [facility] [place] [id number] [facility], [address], [place] [address].  [phone]   [phone]         OUTPATIENT PHYSICAL THERAPY: Daily Treatment Note 2019  Visit Count:  6    ICD-10: Treatment Diagnosis: Cervicalgia (M54.2)  Precautions/Allergies:   Patient has no known allergies. TREATMENT PLAN:  Effective Dates: 2019 TO 10/21/2019 (90 days). Frequency/Duration: 2 times a week for 90 Day(s)    Pre-treatment Symptoms/Complaints:  \"I feel like it is better some\". Pain: Initial:   1 Post Session:  1/10   Medications Last Reviewed:  2019  Updated Objective Findings:  None Today  TREATMENT:     THERAPEUTIC EXERCISE: (15 minutes):  Exercises per grid below to improve mobility and strength. Required minimal verbal cues to promote proper body alignment and promote proper body posture. Progressed resistance and repetitions as indicated. MANUAL THERAPY: (30 minutes): Soft tissue mob to bilateral cervical paraspinals, UT and levator scap was utilized and necessary because of the patient's restricted motion of soft tissue. Date:  8-15-19 Date:  19 Date:  2019   Activity/Exercise Parameters Parameters Parameters   Chin Tucks 10 reps  5 sec holds 10 reps  5 sec holds 10 reps  5 sec holds   Cervical Side Bending 3 reps  15 sec holds  Bilaterally 3 reps  15 sec holds  Bilaterally 3 reps  15 sec holds  Bilaterally   UBE Level 3 x 8 mintues Manual L2  4 minutes Level 2 x 6 mintues   T-band Rows and Straight Arm Pulldowns Green T-band  20 reps each Green T-band  20 reps each Green T-band  20 reps each   Cervical rotation X 10 reps   X 10 reps   Cervical extension X 10 reps            Moist heat to cervical neck x 10 minutes sitting in chair. Skin clear.       [employer] Portal  Treatment/Session Summary:    · Response to Treatment:  Decreased ROM in neck- Right side with most of the tightness in pain through neck and upper trap and pain into head. Patient had relieve after therapy today. .  Cervical still shifted to one side upon arrival to therapy. · Communication/Consultation:  None today  · Equipment provided today:  None today  · Recommendations/Intent for next treatment session: Next visit will focus on manual therapy, progress therex as tolerable, modalities prn for pain.     Total Treatment Billable Duration:  45 minutes  PT Patient Time In/Time Out  Time In: 1300  Time Out: [address] [facility]    Future Appointments   Date Time Provider Wade Musa   8/27/2019  1:00 PM [facility]   8/29/2019  1:00 PM Deepa Li PTA [facility] SFE   9/5/2019 11:00 AM Margot Encarnacion, PT [facility]   10/23/2019 10:30 AM Korina Moran MD [facility] PST PST   1/14/2020 11:00 AM [facility] OUTREACH INSURANCE [facility]   1/14/2020 11:30 AM Malgorzata Mendosa MD [facility]

## 2019-08-29 ENCOUNTER — HOSPITAL ENCOUNTER (OUTPATIENT)
Dept: PHYSICAL THERAPY | Age: 74
Discharge: HOME | End: 2019-08-29
Payer: MEDICARE

## 2019-08-29 PROCEDURE — 97140 MANUAL THERAPY 1/> REGIONS: CPT

## 2019-08-29 PROCEDURE — 97110 THERAPEUTIC EXERCISES: CPT

## 2019-08-29 NOTE — PROGRESS NOTES
Mauricio Hernandez  : 1945  Primary: Erlinda Lares Medicare Choice *  Secondary:  [facility] [place] [address] [facility], [address], [address] [facility]  [phone]   [phone]         OUTPATIENT PHYSICAL THERAPY: Daily Treatment Note 2019  Visit Count:  7    ICD-10: Treatment Diagnosis: Cervicalgia (M54.2)  Precautions/Allergies:   Patient has no known allergies. TREATMENT PLAN:  Effective Dates: 2019 TO 10/21/2019 (90 days). Frequency/Duration: 2 times a week for 90 Day(s)    Pre-treatment Symptoms/Complaints:  \"I feel like it is better some\". Pain: Initial:   1 Post Session:  1/10   Medications Last Reviewed:  2019  Updated Objective Findings:  None Today  TREATMENT:     THERAPEUTIC EXERCISE: (15 minutes):  Exercises per grid below to improve mobility and strength. Required minimal verbal cues to promote proper body alignment and promote proper body posture. Progressed resistance and repetitions as indicated. MANUAL THERAPY: (30 minutes): Soft tissue mob to bilateral cervical paraspinals, UT and levator scap was utilized and necessary because of the patient's restricted motion of soft tissue. Date:  8-15-19 Date:  19 Date:  2019   Activity/Exercise Parameters Parameters Parameters   Chin Tucks 10 reps  5 sec holds 10 reps  5 sec holds 10 reps  5 sec holds   Cervical Side Bending 3 reps  15 sec holds  Bilaterally 3 reps  15 sec holds  Bilaterally 3 reps  15 sec holds  Bilaterally   UBE Level 3 x 8 mintues Manual L2  4 minutes Level 2 x 6 mintues   T-band Rows and Straight Arm Pulldowns Green T-band  20 reps each Green T-band  20 reps each Green T-band  20 reps each   Cervical rotation X 10 reps   X 10 reps   Cervical extension X 10 reps   X 10 reps         Moist heat to cervical neck x 10 minutes sitting in chair. Skin clear.       [employer] Portal  Treatment/Session Summary:    · Response to Treatment:  Decreased ROM in neck- Right side with most of the tightness in pain through neck and upper trap and pain into head. Patient had relieve after therapy today. .  Cervical still shifted to one side upon arrival to therapy. · Communication/Consultation:  None today  · Equipment provided today:  None today  · Recommendations/Intent for next treatment session: Next visit will focus on manual therapy, progress therex as tolerable, modalities prn for pain.     Total Treatment Billable Duration:  45 minutes  PT Patient Time In/Time Out  Time In: 1300  Time Out: [address] , [facility]    Future Appointments   Date Time Provider Wade Musa   9/5/2019 11:00 AM Gifty Ward, ELADIO [facility] SFE   10/23/2019 10:30 AM Dwain Otto MD [facility] PST PST   1/14/2020 11:00 AM [facility]REACH INSUR[facility]   1/14/2020 11:30 AM Manolo Guan MD [facility]

## 2019-08-29 NOTE — PROGRESS NOTES
Chano Easley  : 1945  Primary: Seb Lares Medicare Choice *  Secondary:  [facility] [address] [place] [address], [address], [place] [address].  [phone]   [phone]         OUTPATIENT PHYSICAL THERAPY: Daily Treatment Note 2019  Visit Count:  7    ICD-10: Treatment Diagnosis: Cervicalgia (M54.2)  Precautions/Allergies:   Patient has no known allergies. TREATMENT PLAN:  Effective Dates: 2019 TO 10/21/2019 (90 days). Frequency/Duration: 2 times a week for 90 Day(s)    Pre-treatment Symptoms/Complaints:  \"I feel like it is better some\". Pain: Initial:   1 Post Session:  1/10   Medications Last Reviewed:  2019  Updated Objective Findings:  None Today  TREATMENT:     THERAPEUTIC EXERCISE: (15 minutes):  Exercises per grid below to improve mobility and strength. Required minimal verbal cues to promote proper body alignment and promote proper body posture. Progressed resistance and repetitions as indicated. MANUAL THERAPY: (30 minutes): Soft tissue mob to bilateral cervical paraspinals, UT and levator scap was utilized and necessary because of the patient's restricted motion of soft tissue. Date:  8-15-19 Date:  19 Date:  2019   Activity/Exercise Parameters Parameters Parameters   Chin Tucks 10 reps  5 sec holds 10 reps  5 sec holds 10 reps  5 sec holds   Cervical Side Bending 3 reps  15 sec holds  Bilaterally 3 reps  15 sec holds  Bilaterally 3 reps  15 sec holds  Bilaterally   UBE Level 3 x 8 mintues Manual L2  4 minutes Level 2 x 6 mintues   T-band Rows and Straight Arm Pulldowns Green T-band  20 reps each Green T-band  20 reps each Green T-band  20 reps each   Cervical rotation X 10 reps   X 10 reps   Cervical extension X 10 reps   X 10 reps         Moist heat to cervical neck x 10 minutes sitting in chair. Skin clear.       [employer] Portal  Treatment/Session Summary:    · Response to Treatment:  Decreased ROM in neck- Right side with most of the tightness in pain through neck and upper trap and pain into head. Patient had relieve after therapy today. .  Cervical still shifted to one side upon arrival to therapy. · Communication/Consultation:  None today  · Equipment provided today:  None today  · Recommendations/Intent for next treatment session: Next visit will focus on manual therapy, progress therex as tolerable, modalities prn for pain.     Total Treatment Billable Duration:  45 minutes  PT Patient Time In/Time Out  Time In: 1300  Time Out: [address] [facility]    Future Appointments   Date Time Provider Wade Musa   8/29/2019  1:00 PM Jan Orozco PTA [facility] SFE   9/5/2019 11:00 AM Dipika Encarnacion, PT SFEORPT E   10/23/2019 10:30 AM Tashi Silva MD [facility] PST PST   1/14/2020 11:00 AM [facility] OUTREACH INSURANCE [facility]   1/14/2020 11:30 AM Lauren Pedro MD [facility]

## 2019-09-05 ENCOUNTER — HOSPITAL ENCOUNTER (OUTPATIENT)
Dept: PHYSICAL THERAPY | Age: 74
Discharge: HOME | End: 2019-09-05
Payer: MEDICARE

## 2019-09-05 PROCEDURE — 97140 MANUAL THERAPY 1/> REGIONS: CPT

## 2019-09-05 PROCEDURE — 97110 THERAPEUTIC EXERCISES: CPT

## 2019-09-05 NOTE — PROGRESS NOTES
Paul Boyce  : 1945  Primary: Sofi Lares Medicare Choice *  Secondary:  [address] at [place] [address] [facility], [address] [place] [address],[address], [facility] [address]  [phone]   [phone]         OUTPATIENT PHYSICAL THERAPY: Daily Treatment Note 2019  Visit Count:  8    ICD-10: Treatment Diagnosis: Cervicalgia (M54.2)  Precautions/Allergies:   Patient has no known allergies. TREATMENT PLAN:  Effective Dates: 2019 TO 10/21/2019 (90 days). Frequency/Duration: 2 times a week for 90 Day(s)    Pre-treatment Symptoms/Complaints:  \"I am not having headaches anymore. It is still stiff\". Pain: Initial: Pain Intensity 1: 3  Post Session:  1/10   Medications Last Reviewed:  2019  Updated Objective Findings:  See discharge note  TREATMENT:     THERAPEUTIC EXERCISE: (15 minutes):  Exercises per grid below to improve mobility and strength. Required minimal verbal cues to promote proper body alignment and promote proper body posture. Progressed resistance and repetitions as indicated. MANUAL THERAPY: (25 minutes): Soft tissue mob to bilateral cervical paraspinals, UT and levator scap was utilized and necessary because of the patient's restricted motion of soft tissue. Date:  8-15-19 Date:  19 Date:  2019   Activity/Exercise Parameters Parameters Parameters   Chin Tucks 10 reps  5 sec holds 10 reps  5 sec holds 10 reps  5 sec holds   Cervical Side Bending 3 reps  15 sec holds  Bilaterally 3 reps  15 sec holds  Bilaterally 3 reps  15 sec holds  Bilaterally   UBE Level 3 x 8 mintues Level 3 x   6 minutes Level 2 x 6 mintues   T-band Rows and Straight Arm Pulldowns Green T-band  20 reps each Green T-band  20 reps each Green T-band  20 reps each   Cervical rotation X 10 reps  10 reps X 10 reps   Cervical extension X 10 reps  10 reps X 10 reps             [employer] Portal  Treatment/Session Summary:    · Response to Treatment:  Patient tolerated without complaints. · Communication/Consultation:  None today  · Equipment provided today:  None today  · Recommendations/Intent for next treatment session: Patient discharged from physical therapy.     Total Treatment Billable Duration:  40 minutes  PT Patient Time In/Time Out  Time In: 1055  Time Out: Sonny [address], PT    Future Appointments   Date Time Provider Wade Musa   10/23/2019 10:30 AM Ayaka Ren MD [facility] PST PST   1/14/2020 11:00 AM [facility] OUTREACH INSURANCE [facility]   1/14/2020 11:30 AM Jairo Higgins MD [facility]
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
## 2020-07-10 ENCOUNTER — HOSPITAL ENCOUNTER (OUTPATIENT)
Dept: MAMMOGRAPHY | Age: 75
Discharge: HOME | End: 2020-07-10
Attending: INTERNAL MEDICINE
Payer: MEDICARE

## 2020-07-10 DIAGNOSIS — Z12.31 SCREENING MAMMOGRAM, ENCOUNTER FOR: ICD-10-CM

## 2020-07-10 PROCEDURE — 77063 BREAST TOMOSYNTHESIS BI: CPT

## 2021-01-13 ENCOUNTER — HOSPITAL ENCOUNTER (OUTPATIENT)
Dept: LAB | Age: 76
Discharge: HOME | End: 2021-01-13
Payer: MEDICARE

## 2021-01-13 DIAGNOSIS — C50.912 MALIGNANT NEOPLASM OF LEFT FEMALE BREAST, UNSPECIFIED ESTROGEN RECEPTOR STATUS, UNSPECIFIED SITE OF BREAST (HCC): ICD-10-CM

## 2021-01-13 LAB
ALBUMIN SERPL-MCNC: 4.5 G/DL (ref 3.2–4.6)
ALBUMIN/GLOB SERPL: 1.3 {RATIO} (ref 1.2–3.5)
ALP SERPL-CCNC: 59 U/L (ref 50–136)
ALT SERPL-CCNC: 23 U/L (ref 12–65)
ANION GAP SERPL CALC-SCNC: 6 MMOL/L (ref 7–16)
AST SERPL-CCNC: 18 U/L (ref 15–37)
BASOPHILS # BLD: 0 K/UL (ref 0–0.2)
BASOPHILS NFR BLD: 0 % (ref 0–2)
BILIRUB SERPL-MCNC: 0.6 MG/DL (ref 0.2–1.1)
BUN SERPL-MCNC: 19 MG/DL (ref 8–23)
CALCIUM SERPL-MCNC: 9.6 MG/DL (ref 8.3–10.4)
CHLORIDE SERPL-SCNC: 100 MMOL/L (ref 98–107)
CO2 SERPL-SCNC: 30 MMOL/L (ref 21–32)
CREAT SERPL-MCNC: 1.1 MG/DL (ref 0.6–1)
DIFFERENTIAL METHOD BLD: ABNORMAL
EOSINOPHIL # BLD: 0.2 K/UL (ref 0–0.8)
EOSINOPHIL NFR BLD: 3 % (ref 0.5–7.8)
ERYTHROCYTE [DISTWIDTH] IN BLOOD BY AUTOMATED COUNT: 14.1 % (ref 11.9–14.6)
GLOBULIN SER CALC-MCNC: 3.4 G/DL (ref 2.3–3.5)
GLUCOSE SERPL-MCNC: 101 MG/DL (ref 65–100)
HCT VFR BLD AUTO: 43.6 % (ref 35.8–46.3)
HGB BLD-MCNC: 14 G/DL (ref 11.7–15.4)
IMM GRANULOCYTES # BLD AUTO: 0.1 K/UL (ref 0–0.5)
IMM GRANULOCYTES NFR BLD AUTO: 1 % (ref 0–5)
LYMPHOCYTES # BLD: 1.2 K/UL (ref 0.5–4.6)
LYMPHOCYTES NFR BLD: 19 % (ref 13–44)
MCH RBC QN AUTO: 28.7 PG (ref 26.1–32.9)
MCHC RBC AUTO-ENTMCNC: 32.1 G/DL (ref 31.4–35)
MCV RBC AUTO: 89.3 FL (ref 79.6–97.8)
MONOCYTES # BLD: 0.9 K/UL (ref 0.1–1.3)
MONOCYTES NFR BLD: 14 % (ref 4–12)
NEUTS SEG # BLD: 3.9 K/UL (ref 1.7–8.2)
NEUTS SEG NFR BLD: 63 % (ref 43–78)
NRBC # BLD: 0 K/UL (ref 0–0.2)
PLATELET # BLD AUTO: 198 K/UL (ref 150–450)
PMV BLD AUTO: 12.1 FL (ref 9.4–12.3)
POTASSIUM SERPL-SCNC: 3.6 MMOL/L (ref 3.5–5.1)
PROT SERPL-MCNC: 7.9 G/DL (ref 6.3–8.2)
RBC # BLD AUTO: 4.88 M/UL (ref 4.05–5.25)
SODIUM SERPL-SCNC: 136 MMOL/L (ref 136–145)
WBC # BLD AUTO: 6.2 K/UL (ref 4.3–11.1)

## 2021-01-13 PROCEDURE — 80053 COMPREHEN METABOLIC PANEL: CPT

## 2021-01-13 PROCEDURE — 85025 COMPLETE CBC W/AUTO DIFF WBC: CPT

## 2021-01-13 PROCEDURE — 36415 COLL VENOUS BLD VENIPUNCTURE: CPT

## 2021-07-12 ENCOUNTER — HOSPITAL ENCOUNTER (OUTPATIENT)
Dept: MAMMOGRAPHY | Age: 76
Discharge: HOME | End: 2021-07-12
Attending: INTERNAL MEDICINE
Payer: MEDICARE

## 2021-07-12 DIAGNOSIS — Z12.31 SCREENING MAMMOGRAM FOR HIGH-RISK PATIENT: ICD-10-CM

## 2021-07-12 PROCEDURE — 77063 BREAST TOMOSYNTHESIS BI: CPT

## 2021-07-28 ENCOUNTER — HOSPITAL ENCOUNTER (OUTPATIENT)
Dept: CT IMAGING | Age: 76
Discharge: HOME | End: 2021-07-28
Attending: FAMILY MEDICINE
Payer: MEDICARE

## 2021-07-28 DIAGNOSIS — J32.9 RECURRENT SINUSITIS: ICD-10-CM

## 2021-07-28 PROCEDURE — 70486 CT MAXILLOFACIAL W/O DYE: CPT

## 2021-10-04 ENCOUNTER — HOSPITAL ENCOUNTER (OUTPATIENT)
Dept: MRI IMAGING | Age: 76
Discharge: HOME | End: 2021-10-04
Attending: PSYCHIATRY & NEUROLOGY
Payer: MEDICARE

## 2021-10-04 DIAGNOSIS — R25.1 TREMORS OF NERVOUS SYSTEM: ICD-10-CM

## 2021-10-04 DIAGNOSIS — R51.9 WORSENING HEADACHES: ICD-10-CM

## 2021-10-04 PROCEDURE — 74011250636 HC RX REV CODE- 250/636: Performed by: PSYCHIATRY & NEUROLOGY

## 2021-10-04 PROCEDURE — A9576 INJ PROHANCE MULTIPACK: HCPCS | Performed by: PSYCHIATRY & NEUROLOGY

## 2021-10-04 PROCEDURE — 70553 MRI BRAIN STEM W/O & W/DYE: CPT

## 2021-10-04 RX ORDER — SODIUM CHLORIDE 0.9 % (FLUSH) 0.9 %
10 SYRINGE (ML) INJECTION
Status: COMPLETED | OUTPATIENT
Start: 2021-10-04 | End: 2021-10-04

## 2021-10-04 RX ADMIN — Medication 10 ML: at 13:35

## 2021-10-04 RX ADMIN — GADOTERIDOL 16 ML: 279.3 INJECTION, SOLUTION INTRAVENOUS at 13:35

## 2022-02-21 ENCOUNTER — HOSPITAL ENCOUNTER (OUTPATIENT)
Age: 77
Setting detail: OBSERVATION
Discharge: HOME | End: 2022-02-22
Attending: EMERGENCY MEDICINE | Admitting: FAMILY MEDICINE
Payer: MEDICARE

## 2022-02-21 ENCOUNTER — APPOINTMENT (OUTPATIENT)
Dept: CT IMAGING | Age: 77
End: 2022-02-21
Attending: EMERGENCY MEDICINE
Payer: MEDICARE

## 2022-02-21 ENCOUNTER — APPOINTMENT (OUTPATIENT)
Dept: MRI IMAGING | Age: 77
End: 2022-02-21
Attending: FAMILY MEDICINE
Payer: MEDICARE

## 2022-02-21 DIAGNOSIS — G45.9 TIA (TRANSIENT ISCHEMIC ATTACK): ICD-10-CM

## 2022-02-21 DIAGNOSIS — G43.109 MIGRAINE WITH AURA AND WITHOUT STATUS MIGRAINOSUS, NOT INTRACTABLE: Primary | ICD-10-CM

## 2022-02-21 PROBLEM — R47.9 SPEECH ABNORMALITY: Status: ACTIVE | Noted: 2022-02-21

## 2022-02-21 PROBLEM — E03.9 HYPOTHYROID: Status: ACTIVE | Noted: 2022-02-21

## 2022-02-21 PROBLEM — R51.9 HEADACHE: Status: ACTIVE | Noted: 2022-02-21

## 2022-02-21 PROBLEM — G43.909 MIGRAINE: Status: ACTIVE | Noted: 2022-02-21

## 2022-02-21 PROBLEM — E87.6 HYPOKALEMIA: Status: ACTIVE | Noted: 2022-02-21

## 2022-02-21 LAB
ANION GAP SERPL CALC-SCNC: 10 MMOL/L (ref 7–16)
ATRIAL RATE: 82 BPM
BASOPHILS # BLD: 0 K/UL (ref 0–0.2)
BASOPHILS NFR BLD: 0 % (ref 0–2)
BUN SERPL-MCNC: 21 MG/DL (ref 8–23)
CALCIUM SERPL-MCNC: 9.7 MG/DL (ref 8.3–10.4)
CALCULATED P AXIS, ECG09: 71 DEGREES
CALCULATED R AXIS, ECG10: 32 DEGREES
CALCULATED T AXIS, ECG11: 11 DEGREES
CHLORIDE SERPL-SCNC: 104 MMOL/L (ref 98–107)
CO2 SERPL-SCNC: 25 MMOL/L (ref 21–32)
CREAT SERPL-MCNC: 0.94 MG/DL (ref 0.6–1)
DIAGNOSIS, 93000: NORMAL
DIFFERENTIAL METHOD BLD: NORMAL
EOSINOPHIL # BLD: 0.1 K/UL (ref 0–0.8)
EOSINOPHIL NFR BLD: 2 % (ref 0.5–7.8)
ERYTHROCYTE [DISTWIDTH] IN BLOOD BY AUTOMATED COUNT: 13.1 % (ref 11.9–14.6)
GLUCOSE BLD STRIP.AUTO-MCNC: 90 MG/DL (ref 65–100)
GLUCOSE SERPL-MCNC: 91 MG/DL (ref 65–100)
HCT VFR BLD AUTO: 44.8 % (ref 35.8–46.3)
HGB BLD-MCNC: 14.7 G/DL (ref 11.7–15.4)
IMM GRANULOCYTES # BLD AUTO: 0 K/UL (ref 0–0.5)
IMM GRANULOCYTES NFR BLD AUTO: 0 % (ref 0–5)
INR BLD: 1 (ref 0.9–1.2)
INR PPP: 0.9
LYMPHOCYTES # BLD: 1.6 K/UL (ref 0.5–4.6)
LYMPHOCYTES NFR BLD: 23 % (ref 13–44)
MCH RBC QN AUTO: 28.7 PG (ref 26.1–32.9)
MCHC RBC AUTO-ENTMCNC: 32.8 G/DL (ref 31.4–35)
MCV RBC AUTO: 87.5 FL (ref 79.6–97.8)
MONOCYTES # BLD: 0.9 K/UL (ref 0.1–1.3)
MONOCYTES NFR BLD: 12 % (ref 4–12)
NEUTS SEG # BLD: 4.4 K/UL (ref 1.7–8.2)
NEUTS SEG NFR BLD: 63 % (ref 43–78)
NRBC # BLD: 0 K/UL (ref 0–0.2)
P-R INTERVAL, ECG05: 138 MS
PLATELET # BLD AUTO: 206 K/UL (ref 150–450)
PMV BLD AUTO: 11.8 FL (ref 9.4–12.3)
POTASSIUM SERPL-SCNC: 3.4 MMOL/L (ref 3.5–5.1)
PROTHROMBIN TIME: 12.7 SEC (ref 12.6–14.5)
PT BLD: 11.8 SECS (ref 9.6–11.6)
Q-T INTERVAL, ECG07: 412 MS
QRS DURATION, ECG06: 74 MS
QTC CALCULATION (BEZET), ECG08: 481 MS
RBC # BLD AUTO: 5.12 M/UL (ref 4.05–5.2)
SERVICE CMNT-IMP: NORMAL
SODIUM SERPL-SCNC: 139 MMOL/L (ref 136–145)
TROPONIN-HIGH SENSITIVITY: 11.2 PG/ML (ref 0–14)
VENTRICULAR RATE, ECG03: 82 BPM
WBC # BLD AUTO: 7 K/UL (ref 4.3–11.1)

## 2022-02-21 PROCEDURE — 74011250636 HC RX REV CODE- 250/636: Performed by: FAMILY MEDICINE

## 2022-02-21 PROCEDURE — 74011250637 HC RX REV CODE- 250/637: Performed by: EMERGENCY MEDICINE

## 2022-02-21 PROCEDURE — 94762 N-INVAS EAR/PLS OXIMTRY CONT: CPT

## 2022-02-21 PROCEDURE — 93005 ELECTROCARDIOGRAM TRACING: CPT | Performed by: EMERGENCY MEDICINE

## 2022-02-21 PROCEDURE — 74011250636 HC RX REV CODE- 250/636: Performed by: EMERGENCY MEDICINE

## 2022-02-21 PROCEDURE — G0378 HOSPITAL OBSERVATION PER HR: HCPCS

## 2022-02-21 PROCEDURE — 80048 BASIC METABOLIC PNL TOTAL CA: CPT

## 2022-02-21 PROCEDURE — 70496 CT ANGIOGRAPHY HEAD: CPT

## 2022-02-21 PROCEDURE — 74011000258 HC RX REV CODE- 258: Performed by: EMERGENCY MEDICINE

## 2022-02-21 PROCEDURE — 0042T CT PERF W CBF: CPT

## 2022-02-21 PROCEDURE — 82962 GLUCOSE BLOOD TEST: CPT

## 2022-02-21 PROCEDURE — 74011000636 HC RX REV CODE- 636: Performed by: EMERGENCY MEDICINE

## 2022-02-21 PROCEDURE — 74011250637 HC RX REV CODE- 250/637: Performed by: FAMILY MEDICINE

## 2022-02-21 PROCEDURE — 96372 THER/PROPH/DIAG INJ SC/IM: CPT

## 2022-02-21 PROCEDURE — 70551 MRI BRAIN STEM W/O DYE: CPT

## 2022-02-21 PROCEDURE — 84484 ASSAY OF TROPONIN QUANT: CPT

## 2022-02-21 PROCEDURE — 85025 COMPLETE CBC W/AUTO DIFF WBC: CPT

## 2022-02-21 PROCEDURE — 85610 PROTHROMBIN TIME: CPT

## 2022-02-21 PROCEDURE — 96375 TX/PRO/DX INJ NEW DRUG ADDON: CPT

## 2022-02-21 PROCEDURE — 74011000250 HC RX REV CODE- 250: Performed by: FAMILY MEDICINE

## 2022-02-21 PROCEDURE — 99285 EMERGENCY DEPT VISIT HI MDM: CPT

## 2022-02-21 PROCEDURE — 96374 THER/PROPH/DIAG INJ IV PUSH: CPT

## 2022-02-21 PROCEDURE — 74011000250 HC RX REV CODE- 250: Performed by: EMERGENCY MEDICINE

## 2022-02-21 PROCEDURE — 70450 CT HEAD/BRAIN W/O DYE: CPT

## 2022-02-21 RX ORDER — POTASSIUM CHLORIDE 20 MEQ/1
40 TABLET, EXTENDED RELEASE ORAL ONCE
Status: COMPLETED | OUTPATIENT
Start: 2022-02-21 | End: 2022-02-21

## 2022-02-21 RX ORDER — ACETAMINOPHEN 325 MG/1
650 TABLET ORAL
Status: DISCONTINUED | OUTPATIENT
Start: 2022-02-21 | End: 2022-02-22 | Stop reason: HOSPADM

## 2022-02-21 RX ORDER — MAGNESIUM SULFATE 1 G/100ML
1 INJECTION INTRAVENOUS
Status: COMPLETED | OUTPATIENT
Start: 2022-02-21 | End: 2022-02-21

## 2022-02-21 RX ORDER — MORPHINE SULFATE 2 MG/ML
1 INJECTION, SOLUTION INTRAMUSCULAR; INTRAVENOUS
Status: DISCONTINUED | OUTPATIENT
Start: 2022-02-21 | End: 2022-02-21 | Stop reason: SDUPTHER

## 2022-02-21 RX ORDER — SODIUM CHLORIDE 0.9 % (FLUSH) 0.9 %
5-10 SYRINGE (ML) INJECTION AS NEEDED
Status: DISCONTINUED | OUTPATIENT
Start: 2022-02-21 | End: 2022-02-22 | Stop reason: HOSPADM

## 2022-02-21 RX ORDER — HYDROCODONE BITARTRATE AND ACETAMINOPHEN 5; 325 MG/1; MG/1
1 TABLET ORAL
Status: DISCONTINUED | OUTPATIENT
Start: 2022-02-21 | End: 2022-02-21 | Stop reason: SDUPTHER

## 2022-02-21 RX ORDER — SODIUM CHLORIDE 0.9 % (FLUSH) 0.9 %
5-40 SYRINGE (ML) INJECTION AS NEEDED
Status: DISCONTINUED | OUTPATIENT
Start: 2022-02-21 | End: 2022-02-22 | Stop reason: HOSPADM

## 2022-02-21 RX ORDER — LABETALOL HYDROCHLORIDE 5 MG/ML
5 INJECTION, SOLUTION INTRAVENOUS
Status: DISCONTINUED | OUTPATIENT
Start: 2022-02-21 | End: 2022-02-22 | Stop reason: HOSPADM

## 2022-02-21 RX ORDER — DIPHENHYDRAMINE HYDROCHLORIDE 50 MG/ML
25 INJECTION, SOLUTION INTRAMUSCULAR; INTRAVENOUS
Status: COMPLETED | OUTPATIENT
Start: 2022-02-21 | End: 2022-02-21

## 2022-02-21 RX ORDER — ONDANSETRON 2 MG/ML
4 INJECTION INTRAMUSCULAR; INTRAVENOUS
Status: COMPLETED | OUTPATIENT
Start: 2022-02-21 | End: 2022-02-21

## 2022-02-21 RX ORDER — SODIUM CHLORIDE 0.9 % (FLUSH) 0.9 %
10 SYRINGE (ML) INJECTION
Status: COMPLETED | OUTPATIENT
Start: 2022-02-21 | End: 2022-02-21

## 2022-02-21 RX ORDER — SODIUM CHLORIDE 0.9 % (FLUSH) 0.9 %
5-10 SYRINGE (ML) INJECTION EVERY 8 HOURS
Status: DISCONTINUED | OUTPATIENT
Start: 2022-02-21 | End: 2022-02-22 | Stop reason: HOSPADM

## 2022-02-21 RX ORDER — ATORVASTATIN CALCIUM 40 MG/1
80 TABLET, FILM COATED ORAL
Status: DISCONTINUED | OUTPATIENT
Start: 2022-02-21 | End: 2022-02-22 | Stop reason: HOSPADM

## 2022-02-21 RX ORDER — GUAIFENESIN 100 MG/5ML
324 LIQUID (ML) ORAL
Status: COMPLETED | OUTPATIENT
Start: 2022-02-21 | End: 2022-02-21

## 2022-02-21 RX ORDER — SODIUM CHLORIDE 0.9 % (FLUSH) 0.9 %
5-40 SYRINGE (ML) INJECTION EVERY 8 HOURS
Status: DISCONTINUED | OUTPATIENT
Start: 2022-02-21 | End: 2022-02-21

## 2022-02-21 RX ORDER — HEPARIN SODIUM 5000 [USP'U]/ML
5000 INJECTION, SOLUTION INTRAVENOUS; SUBCUTANEOUS EVERY 8 HOURS
Status: DISCONTINUED | OUTPATIENT
Start: 2022-02-21 | End: 2022-02-22 | Stop reason: HOSPADM

## 2022-02-21 RX ORDER — ALPRAZOLAM 0.5 MG/1
0.25 TABLET ORAL
Status: DISCONTINUED | OUTPATIENT
Start: 2022-02-21 | End: 2022-02-22 | Stop reason: HOSPADM

## 2022-02-21 RX ORDER — HYDROCODONE BITARTRATE AND ACETAMINOPHEN 5; 325 MG/1; MG/1
1 TABLET ORAL
Status: DISCONTINUED | OUTPATIENT
Start: 2022-02-21 | End: 2022-02-22 | Stop reason: HOSPADM

## 2022-02-21 RX ORDER — TEMAZEPAM 15 MG/1
30 CAPSULE ORAL
Status: DISCONTINUED | OUTPATIENT
Start: 2022-02-21 | End: 2022-02-22 | Stop reason: HOSPADM

## 2022-02-21 RX ORDER — SODIUM CHLORIDE 9 MG/ML
100 INJECTION, SOLUTION INTRAVENOUS CONTINUOUS
Status: DISPENSED | OUTPATIENT
Start: 2022-02-21 | End: 2022-02-22

## 2022-02-21 RX ORDER — LEVOTHYROXINE SODIUM 50 UG/1
25 TABLET ORAL
Status: DISCONTINUED | OUTPATIENT
Start: 2022-02-22 | End: 2022-02-22 | Stop reason: HOSPADM

## 2022-02-21 RX ORDER — MORPHINE SULFATE 2 MG/ML
1 INJECTION, SOLUTION INTRAMUSCULAR; INTRAVENOUS
Status: DISCONTINUED | OUTPATIENT
Start: 2022-02-21 | End: 2022-02-22 | Stop reason: HOSPADM

## 2022-02-21 RX ORDER — GUAIFENESIN 100 MG/5ML
81 LIQUID (ML) ORAL DAILY
Status: DISCONTINUED | OUTPATIENT
Start: 2022-02-22 | End: 2022-02-22 | Stop reason: HOSPADM

## 2022-02-21 RX ADMIN — SODIUM CHLORIDE, PRESERVATIVE FREE 10 ML: 5 INJECTION INTRAVENOUS at 20:19

## 2022-02-21 RX ADMIN — ATORVASTATIN CALCIUM 80 MG: 40 TABLET, FILM COATED ORAL at 20:18

## 2022-02-21 RX ADMIN — PROCHLORPERAZINE EDISYLATE 5 MG: 5 INJECTION INTRAMUSCULAR; INTRAVENOUS at 13:25

## 2022-02-21 RX ADMIN — ASPIRIN 324 MG: 81 TABLET, CHEWABLE ORAL at 13:25

## 2022-02-21 RX ADMIN — HEPARIN SODIUM 5000 UNITS: 5000 INJECTION INTRAVENOUS; SUBCUTANEOUS at 23:48

## 2022-02-21 RX ADMIN — TEMAZEPAM 30 MG: 15 CAPSULE ORAL at 20:18

## 2022-02-21 RX ADMIN — Medication 10 ML: at 12:41

## 2022-02-21 RX ADMIN — ALPRAZOLAM 0.25 MG: 0.5 TABLET ORAL at 16:18

## 2022-02-21 RX ADMIN — HYDROCODONE BITARTRATE AND ACETAMINOPHEN 1 TABLET: 5; 325 TABLET ORAL at 18:39

## 2022-02-21 RX ADMIN — HEPARIN SODIUM 5000 UNITS: 5000 INJECTION INTRAVENOUS; SUBCUTANEOUS at 15:06

## 2022-02-21 RX ADMIN — IOPAMIDOL 100 ML: 755 INJECTION, SOLUTION INTRAVENOUS at 12:40

## 2022-02-21 RX ADMIN — DIPHENHYDRAMINE HYDROCHLORIDE 25 MG: 50 INJECTION, SOLUTION INTRAMUSCULAR; INTRAVENOUS at 13:25

## 2022-02-21 RX ADMIN — ONDANSETRON 4 MG: 2 INJECTION INTRAMUSCULAR; INTRAVENOUS at 13:13

## 2022-02-21 RX ADMIN — SODIUM CHLORIDE 100 ML: 9 INJECTION, SOLUTION INTRAVENOUS at 12:40

## 2022-02-21 RX ADMIN — MAGNESIUM SULFATE HEPTAHYDRATE 1 G: 1 INJECTION, SOLUTION INTRAVENOUS at 13:25

## 2022-02-21 RX ADMIN — SODIUM CHLORIDE 1000 ML: 900 INJECTION, SOLUTION INTRAVENOUS at 13:24

## 2022-02-21 RX ADMIN — POTASSIUM CHLORIDE 40 MEQ: 20 TABLET, EXTENDED RELEASE ORAL at 13:55

## 2022-02-21 RX ADMIN — SODIUM CHLORIDE 100 ML/HR: 900 INJECTION, SOLUTION INTRAVENOUS at 15:06

## 2022-02-21 NOTE — ED NOTES
Pt states she has had a headache since 0830 this morning with a sudden onset. States she has taken medication for migraine but it has not helped the pain at all.  states speech is a little more slurred and it is harder for her to answer questions this morning. Masked for triage.

## 2022-02-21 NOTE — H&P
Hospitalist History and Physical   Admit Date:  2022 12:16 PM   Name:  Maru Lezama   Age:  78 y.o. Sex:  female  :  1945   MRN:  [id number]   Room:  [facility]/    Presenting Complaint: Headache    Reason(s) for Admission: Headache [R51.9]  Migraine [G43.909]  Speech abnormality [R47.9]  Hypothyroid [E03.9]     History of Present Illness:   Maru Lezama is a 78 y.o. female with medical history of migraines since age of 17, hypertension, acid reflux, hypothyroidism and problems with sleep/insomnia. Patient said this morning she had a migraine attack, the first she had for almost 30 years, as usual with her regular migraine headache-noticed some lines in front of her eyes. Took her migraine medication, could not say what it was, with resolution of symptoms. Headache again reoccurred in 30 minutes, took Excedrin with not much improvement. Now  noted that she was also having speech problems/slurred speech. Decided to bring patient to emergency room for further evaluation. Patient already not complain of any loss of vision or any photophobia or any new one-sided weakness or any sensory problems. Code stroke was called in the emergency room, CT head, CTA head and neck no acute findings. EKG sinus with no acute ST-T wave findings. Potassium of 3.4, not on any potassium supplementation at home. Speech according to  is almost near normal in the emergency room. Patient did receive aspirin 324 mg in the emergency room. Patient will be admitted for observation to rule out CVA versus TIA. Review of Systems:  10 systems reviewed and negative except as noted in HPI. Assessment & Plan:   -Headache with speech problems-CVA versus TIA versus complex migraines  Presently markedly improved headache and almost resolved speech problems. Did get mag sulfate, dose of Benadryl, aspirin 325 mg and Phenergan in the emergency room. Continue aspirin and statin.   CT head, CTA head and neck no acute findings. Ordered MRI brain and echocardiogram.  PT OT and speech consult. -Hypokalemia  Replace with 40 mg of potassium chloride    -Hypertension  Patient is on hydrochlorothiazide 25 mg daily at home. Will hold for permissive blood pressure.     -GERD  We will add Protonix    -Hypothyroidism  Continue 25 mcg daily  Will order TSH level    -Insomnia/sleep problems  Continue Restoril 30 mg nightly    DVT prophylaxis with heparin  Full code            Hospital Problems as of 2/21/2022 Date Reviewed: 1/27/2022          Codes Class Noted - Resolved POA    Migraine ICD-10-CM: A98.589  ICD-9-CM: 346.90  2/21/2022 - Present Unknown        Hypothyroid ICD-10-CM: E03.9  ICD-9-CM: 244.9  2/21/2022 - Present Unknown        Speech abnormality ICD-10-CM: R47.9  ICD-9-CM: 784.59  2/21/2022 - Present Unknown        * (Principal) Headache ICD-10-CM: R51.9  ICD-9-CM: 784.0  2/21/2022 - Present Unknown        Hypokalemia ICD-10-CM: E87.6  ICD-9-CM: 276.8  2/21/2022 - Present Unknown        GERD (gastroesophageal reflux disease) (Chronic) ICD-10-CM: K21.9  ICD-9-CM: 530.81  Unknown - Present Yes        Hypertension (Chronic) ICD-10-CM: I10  ICD-9-CM: 401.9  Unknown - Present Yes              Past History:  Past Medical History:   Diagnosis Date    Anxiety     Arthritis     back and knee's     Breast cancer ([facility] [place] [address] 01/18/2016    Left- s/p Lumpectomy    Chronic pain     back    Former smoker     was a passive smoker for 40 yrs; stopped in 2014    GERD (gastroesophageal reflux disease)     nexium    Hard of hearing     bilateral hearing aids    Headache     hx monthly migraines; rare now    Hypertension     controlled with med    Hypothyroidism     managed with medication    Insomnia     Melanoma ([facility] [place] [address]     left face    Status post total left knee replacement 5/12/2017    Status post total right knee replacement 1/24/2018     Past Surgical History:   Procedure Laterality Date    COLONOSCOPY N/A 2018    COLONOSCOPY / BMI=30  performed by Yuridia Rock MD at [address] [facility] HX BREAST BIOPSY Left 2016    BREAST NEEDLE LOCALIZED PARTIAL MASTECTOMY/ LEFT //   [facility] [address] [facility] Left 2016    HX COLONOSCOPY      HX KNEE REPLACEMENT Left 2017    HX KNEE REPLACEMENT Right 2018    HX TONSILLECTOMY      HX VASCULAR ACCESS Right     port right upper chest- removed     HX WISDOM TEETH EXTRACTION      TN BREAST SURGERY PROCEDURE UNLISTED Left 2015    biopsy    TN COLSC FLX W/REMOVAL LESION BY HOT BX FORCEPS  2018         TN MASTECTOMY, PARTIAL Left 2016      Allergies   Allergen Reactions    Milk Unknown (comments)    Other Plant, Animal, Environmental Unknown (comments)     Pt states she has allergy to pine needles      Social History     Tobacco Use    Smoking status: Former Smoker     Years: 40.00     Types: Cigarettes     Quit date: 2014     Years since quittin.1    Smokeless tobacco: Never Used    Tobacco comment: was a passive smoker for 40 yrs   Substance Use Topics    Alcohol use: Yes     Alcohol/week: 0.0 standard drinks     Comment: occasionally      Family History   Problem Relation Age of Onset   [facility] Mother         leukemia    Hypertension Mother     Stroke Mother     Heart Disease Father     Hypertension Father     Stroke Sister     Hypertension Sister     Asthma Paternal Grandfather     Breast Cancer Neg Hx       Family history reviewed and negative except as noted above.     Immunization History   Administered Date(s) Administered    COVID-19, Pfizer Purple top, DILUTE for use, 12+ yrs, 30mcg/0.3mL dose 2021, 2021    Hib (PRP-T) 10/06/2020    Influenza Vaccine 2014, 10/17/2016, 2018    Influenza Vaccine (Quad) PF (>6 Mo Flulaval, Fluarix, and >3 Yrs Afluria, Fluzone [id number]) 10/23/2019    Pneumococcal Conjugate (PCV-13) 2019    Pneumococcal Polysaccharide (PPSV-23) 08/28/2017    TB Skin Test (PPD) Intradermal 05/12/2017, 01/24/2018    Zoster Vaccine, Live 09/06/2017     Prior to Admit Medications:  Current Outpatient Medications   Medication Instructions    acetaminophen (TYLENOL) 1,000 mg, Oral, EVERY 6 HOURS AS NEEDED, Take / use AM day of surgery  per anesthesia protocols if needed.  APPLE CIDER VINEGAR PO Oral    ascorbic acid/vitamin E/biotin (HAIR, SKIN, NAILS WITH BIOTIN PO) Oral, DAILY    aspirin-acetaminophen-caffeine (Excedrin Migraine) 250-250-65 mg per tablet 1 Tablet, Oral    diclofenac (VOLTAREN) 2 g, Topical, 4 TIMES DAILY    esomeprazole (NEXIUM) 20 mg, Oral, 7AM, Take / use AM day of surgery  per anesthesia protocols.  famotidine (PEPCID) 40 mg, Oral, EVERY EVENING    fluticasone propionate (FLONASE) 50 mcg/actuation nasal spray No dose, route, or frequency recorded.  GRAPE SEED EXTRACT PO Oral    hydroCHLOROthiazide (HYDRODIURIL) 25 mg tablet TAKE ONE TABLET BY MOUTH ONE TIME DAILY    ibuprofen (ADVIL) 200 mg tablet Oral, AS NEEDED    Lactobacillus acidophilus (PROBIOTIC PO) Oral    levothyroxine (SYNTHROID) 25 mcg tablet TAKE ONE TABLET BY MOUTH EVERY MORNING BEFORE BREAKFAST    montelukast (SINGULAIR) 10 mg, Oral, DAILY    multivitamin (ONE A DAY) tablet 1 Tablet, Oral, EVERY OTHER DAY    olopatadine (PATANASE) 0.6 % spry INSTILL 2 SPRAYS INTO EACH NOSTRIL TWO TIMES A DAY    potassium chloride (KLOR-CON) 10 mEq tablet 10 mEq, Oral, DAILY    Prolensa 0.07 % ophthalmic solution INSTILL ONE DROP INTO THE SURGICAL EYE ONCE A DAY . BEGIN 3 DAYS BEFORE SURGERY AND CONTINUE UNTIL BOTTLE IS EMPTY.  temazepam (RESTORIL) 30 mg capsule TAKE ONE CAPSULE BY MOUTH EVERY EVENING AS NEEDED FOR SLEEP    TURMERIC PO Oral    ubrogepant (Ubrelvy) 50 mg tablet Take 1 tablet at the onset of a headache. May repeat dose in 2 hours if needed. No more than 2 tablets in 24 hours.        Objective:     Patient Vitals for the past 24 hrs:   Temp Pulse Resp BP SpO2   02/21/22 1334  89 26  100 %   02/21/22 1327    (!) 179/85    02/21/22 1325  84  (!) 179/85    02/21/22 1222 98.4 °F (36.9 °C) 84 22 (!) 168/80 100 %     Oxygen Therapy  O2 Sat (%): 100 % (02/21/22 1334)  Pulse via Oximetry: 89 beats per minute (02/21/22 1334)  O2 Device: None (Room air) (02/21/22 1222)    Estimated body mass index is 30.04 kg/m² as calculated from the following:    Height as of this encounter: 5' 4\" (1.626 m). Weight as of this encounter: 79.4 kg (175 lb). No intake or output data in the 24 hours ending 02/21/22 1427      Physical Exam:    Blood pressure (!) 179/85, pulse 89, temperature 98.4 °F (36.9 °C), resp. rate 26, height 5' 4\" (1.626 m), weight 79.4 kg (175 lb), SpO2 100 %. General:    Well nourished. No overt distress  Head:  Normocephalic, atraumatic  Eyes:  Sclerae appear normal.  Pupils equally round. ENT:  Nares appear normal, no drainage. Moist oral mucosa  Neck:  No restricted ROM. Trachea midline   CV:   RRR. No m/r/g. No jugular venous distension. Lungs:   CTAB. No wheezing, rhonchi, or rales. Respirations even, unlabored  Abdomen: Bowel sounds present. Soft, nontender, nondistended. Extremities: No cyanosis or clubbing. No edema  Skin:     No rashes and normal coloration. Warm and dry. Neuro:  CN II-XII grossly intact. Sensation intact. A&Ox3  Psych:  Normal mood and affect.       I have reviewed ordered lab tests and independently visualized imaging below:    Last 24hr Labs:  Recent Results (from the past 24 hour(s))   GLUCOSE, POC    Collection Time: 02/21/22 12:25 PM   Result Value Ref Range    Glucose (POC) 90 65 - 100 mg/dL    Performed by Xi    POC PT/INR    Collection Time: 02/21/22 12:27 PM   Result Value Ref Range    Prothrombin time (POC) 11.8 (H) 9.6 - 11.6 SECS    INR (POC) 1.0 0.9 - 1.2     CBC WITH AUTOMATED DIFF    Collection Time: 02/21/22 12:32 PM   Result Value Ref Range    WBC 7.0 4.3 - 11.1 K/uL    RBC 5.12 4.05 - 5.2 M/uL    HGB 14.7 11.7 - 15.4 g/dL    HCT 44.8 35.8 - 46.3 %    MCV 87.5 79.6 - 97.8 FL    MCH 28.7 26.1 - 32.9 PG    MCHC 32.8 31.4 - 35.0 g/dL    RDW 13.1 11.9 - 14.6 %    PLATELET 987 583 - 852 K/uL    MPV 11.8 9.4 - 12.3 FL    ABSOLUTE NRBC 0.00 0.0 - 0.2 K/uL    DF AUTOMATED      NEUTROPHILS 63 43 - 78 %    LYMPHOCYTES 23 13 - 44 %    MONOCYTES 12 4.0 - 12.0 %    EOSINOPHILS 2 0.5 - 7.8 %    BASOPHILS 0 0.0 - 2.0 %    IMMATURE GRANULOCYTES 0 0.0 - 5.0 %    ABS. NEUTROPHILS 4.4 1.7 - 8.2 K/UL    ABS. LYMPHOCYTES 1.6 0.5 - 4.6 K/UL    ABS. MONOCYTES 0.9 0.1 - 1.3 K/UL    ABS. EOSINOPHILS 0.1 0.0 - 0.8 K/UL    ABS. BASOPHILS 0.0 0.0 - 0.2 K/UL    ABS. IMM.  GRANS. 0.0 0.0 - 0.5 K/UL   METABOLIC PANEL, BASIC    Collection Time: 02/21/22 12:32 PM   Result Value Ref Range    Sodium 139 136 - 145 mmol/L    Potassium 3.4 (L) 3.5 - 5.1 mmol/L    Chloride 104 98 - 107 mmol/L    CO2 25 21 - 32 mmol/L    Anion gap 10 7 - 16 mmol/L    Glucose 91 65 - 100 mg/dL    BUN 21 8 - 23 MG/DL    Creatinine 0.94 0.6 - 1.0 MG/DL    GFR est AA >60 >60 ml/min/1.73m2    GFR est non-AA >60 >60 ml/min/1.73m2    Calcium 9.7 8.3 - 10.4 MG/DL   TROPONIN-HIGH SENSITIVITY    Collection Time: 02/21/22 12:32 PM   Result Value Ref Range    Troponin-High Sensitivity 11.2 0 - 14 pg/mL   PROTHROMBIN TIME + INR    Collection Time: 02/21/22 12:32 PM   Result Value Ref Range    Prothrombin time 12.7 12.6 - 14.5 sec    INR 0.9     EKG, 12 LEAD, INITIAL    Collection Time: 02/21/22  1:21 PM   Result Value Ref Range    Ventricular Rate 82 BPM    Atrial Rate 82 BPM    P-R Interval 138 ms    QRS Duration 74 ms    Q-T Interval 412 ms    QTC Calculation (Bezet) 481 ms    Calculated P Axis 71 degrees    Calculated R Axis 32 degrees    Calculated T Axis 11 degrees    Diagnosis       Normal sinus rhythm  ST abnormality, possible digitalis effect  Prolonged QT  Abnormal ECG  When compared with ECG of 18-APR-2017 12:52,  No significant change was found         All Micro Results     None          Other Studies:  CT PERF W CBF    Result Date: 2/21/2022  Exam: CT PERF W CBF on 2/21/2022 1:27 PM Clinical History: The Female patient is 78years old  presenting for acute neuro changes, possible LVAO. COMPARISON: Head CT 2/21/2022 TECHNIQUE: CT perfusion of the brain was obtained after the administration of intravenous contrast.  Perfusion maps and perfusion analysis output were generated using the VIZ ContaCt perfusion processing software algorithm for LVO detection. Radiation dose reduction techniques were used for this study: All CT scans performed at this facility use one or all of the following: Automated exposure control, adjustment of the mA and/or kVp according to patient's size, iterative reconstruction. Total radiation dose: 2799 mGy-cm FINDINGS: Study is technically adequate. Adequate vascular enhancement is demonstrated. RAPID Output Values: CBF < 30% volume (best correlation with core infarct volume without overcalls): 0 ml (core infarction volume greater than 50 cc associated with poor outcomes) Tmax > 6 seconds: 0 ml Tmax/CBF Mismatch Volume: 0 ml Tmax/CBF Mismatch Ratio: None Hypoperfusion Intensity Ratio (Tmax > 10 seconds / Tmax > 6 seconds): 0 (values greater than 0.5 associated with poor outcome) Tmax > 10 seconds Volume: 0 ml (volume greater than 100 mL is associated with poor outcome)     1. No evidence of core CT cerebral perfusion defect. Please note that the determination of patient treatment is not based solely upon imaging factors or calculation values. Management of ischemia is at the discretion of the primary physician and is based upon a combination of clinical and imaging data, along other factors. CTA CODE NEURO HEAD AND NECK W CONT    Result Date: 2/21/2022  History: Headache code stroke.  FINDINGS: CT angiography was performed of the neck and head with contrast and three-dimensional CT angiography reconstruction and reformat was performed. NASCET criteria as needed. CT dose reduction was achieved through use of a standardized protocol tailored for this examination and automatic exposure control for dose modulation. Study analyzed by the DriveK software package per the hospital protocol if presented as such by the facility technologists in the pacs system. However, the results of the analysis are neither reviewed nor provided in this exam interpretation and report. This statement is provided at the request of the hospital for hospital billing purposes only. Chronic appearing reticular pleural-based parenchymal disease in the lungs bilaterally. Aortic arch and proximal great vessel segments are patent. The left vertebral artery is patent. The right vertebral artery is patent. The left common carotid artery is patent. The left internal carotid artery is patent. The right common and internal carotid arteries are patent. The basilar artery is patent. The right posterior communicating artery and posterior cerebral artery is patent. Hypoplastic P1 segment on the right. The left posterior cerebral artery is patent. No intracranial aneurysms are identified. The anterior cerebral arteries are patent. The middle cerebral arteries are patent. Dural venous sinuses are patent. No acute arterial occlusive disease. No hemodynamically significant carotid artery stenosis. CT CODE NEURO HEAD WO CONTRAST    Result Date: 2/21/2022  Exam: CT CODE NEURO HEAD WO CONTRAST on 2/21/2022 12:41 PM Clinical History: The Female patient is 78years old  presenting for stroke. Technique: Thin slice axial CT images through the brain were obtained. All CT scans at this facility are performed using dose reduction/dose modulation techniques, as appropriate the performed exam, including the following: Automated Exposure Control;  Adjustment of the mA and/or kV according to patient size (this includes techniques or standardized protocols for targeted exams where dose is matched to indication/reason for exam); and Use of Iterative Reconstruction Technique. Radiation Exposure Indices: Reference Air Kerma (Ines Logan) = 1006 mGy-cm Comparison:  MRI 10/4/2021. Findings:  Cerebrum: Age-related senescent changes are seen with sulcal and ventricular prominence. . There is minimal chronic periventricular white matter disease most pronounced throughout the occipital lobes. No evidence of intracranial hemorrhage, mass, or other space-occupying lesion is seen. There are no abnormal extra-axial fluid collections. Cerebellum: Mild cerebellar involutional changes. CSF spaces: The ventricular system is within normal limits. The basilar cisterns are unremarkable. Brainstem: No evidence of ischemia, hemorrhage, or mass. Extracranial tissues: Visualized orbits and extracranial soft tissues are unremarkable. Paranasal sinuses/Mastoids: Well-pneumatized and aerated. . Calvarium: No acute osseous abnormality. 1.  No acute intracranial abnormality.  CPT code 20557       Medications Administered     aspirin chewable tablet 324 mg     Admin Date  02/21/2022 Action  Given Dose  324 mg Route  Oral Administered By  Keven Goss RN          diphenhydrAMINE (BENADRYL) injection 25 mg     Admin Date  02/21/2022 Action  Given Dose  25 mg Route  IntraVENous Administered By  Keven Goss RN          iopamidoL (ISOVUE-370) 76 % injection 100 mL     Admin Date  02/21/2022 Action  Given Dose  100 mL Route  IntraVENous Administered By  Alicia Pollard RT, R, CT          magnesium sulfate 1 g/100 ml IVPB (premix or compounded)     Admin Date  02/21/2022 Action  New Bag Dose  1 g Rate  100 mL/hr Route  IntraVENous Administered By  Keven Goss RN          ondansetron [facility]) injection 4 mg     Admin Date  02/21/2022 Action  Given Dose  4 mg Route  IntraVENous Administered By  Keven Goss RN          potassium chloride (K-DUR, KLOR-CON M20) SR tablet 40 mEq     Admin Date  02/21/2022 Action  Given Dose  40 mEq Route  Oral Administered By  Anna Murrieta RN          prochlorperazine (COMPAZINE) with saline injection 5 mg     Admin Date  02/21/2022 Action  Given Dose  5 mg Route  IntraVENous Administered By  Anna Murrieta RN          saline peripheral flush soln 10 mL     Admin Date  02/21/2022 Action  Given Dose  10 mL Route  InterCATHeter Administered By  Luiz Mendoza, RT, R, CT          sodium chloride 0.9 % bolus infusion 1,000 mL     Admin Date  02/21/2022 Action  New Bag Dose  1,000 mL Rate  1,000 mL/hr Route  IntraVENous Administered By  Anna Murrieta RN          sodium chloride 0.9 % bolus infusion 100 mL     Admin Date  02/21/2022 Action  New Bag Dose  100 mL Rate   Route  IntraVENous Administered By  Luiz Mendoza, RT, R, CT                Signed:  Sandra Rowan MD

## 2022-02-21 NOTE — CONSULTS
[facility]/Physiatry has rec'd and acknowledges consult under stroke protocol. Per documentation    \"Le Figueroa is a 78 y.o. female with medical history of migraines since age of 17, hypertension, acid reflux, hypothyroidism and problems with sleep/insomnia.     Patient said this morning she had a migraine attack, the first she had for almost 30 years, as usual with her regular migraine headache-noticed some lines in front of her eyes. Took her migraine medication, could not say what it was, with resolution of symptoms. Headache again reoccurred in 30 minutes, took Excedrin with not much improvement. Now  noted that she was also having speech problems/slurred speech. Decided to bring patient to emergency room for further evaluation.     Patient already not complain of any loss of vision or any photophobia or any new one-sided weakness or any sensory problems.     Code stroke was called in the emergency room, CT head, CTA head and neck no acute findings. EKG sinus with no acute ST-T wave findings. Potassium of 3.4, not on any potassium supplementation at home. Speech according to  is almost near normal in the emergency room. Patient did receive aspirin 324 mg in the emergency room. Pt refused MRI this evening due to headache  PT/OT pending    Unlikely that [facility] is indicated. Will f/u MRI and therapy results for further recommendations. Thank you for including [facility] in pt care  No charge to pt    Michelle Tomas MD, Medical Director  [address] [place] [address]

## 2022-02-21 NOTE — ED NOTES
TRANSFER - OUT REPORT:    Verbal report given to RN on Litzy Guardado  being transferred to  for routine progression of care       Report consisted of patients Situation, Background, Assessment and   Recommendations(SBAR). Information from the following report(s) SBAR, ED Summary, MAR, Recent Results and Quality Measures was reviewed with the receiving nurse. Lines:   Peripheral IV 02/21/22 Left Antecubital (Active)   Site Assessment Clean, dry, & intact 02/21/22 1324   Phlebitis Assessment 0 02/21/22 1324   Infiltration Assessment 0 02/21/22 1324        Opportunity for questions and clarification was provided.       Patient transported with:   Registered Nurse

## 2022-02-21 NOTE — ED PROVIDER NOTES
Patient is a 71-year-old female presenting to the emergency department today complaining of a migraine headache which started at 8:30 AM.  The patient said she is already up and around everything was normal this morning and then all of a sudden her symptoms started. She says she gets headaches often and took her migraine medicine but it did not seem to help. The patient is accompanied to the ER by her  and he is concerned that her speech is delayed and slurred.            Past Medical History:   Diagnosis Date    Anxiety     Arthritis     back and knee's     Breast cancer ([facility] [address] 01/18/2016    Left- s/p Lumpectomy    Chronic pain     back    Former smoker     was a passive smoker for 40 yrs; stopped in 2014    GERD (gastroesophageal reflux disease)     nexium    Hard of hearing     bilateral hearing aids    Headache     hx monthly migraines; rare now    Hypertension     controlled with med    Hypothyroidism     managed with medication    Insomnia     Melanoma ([facility] [address]     left face    Status post total left knee replacement 5/12/2017    Status post total right knee replacement 1/24/2018       Past Surgical History:   Procedure Laterality Date    COLONOSCOPY N/A 8/29/2018    COLONOSCOPY / BMI=30  performed by Chrystal Morrell MD at [address] [facility] HX BREAST BIOPSY Left 1/18/2016    BREAST NEEDLE LOCALIZED PARTIAL MASTECTOMY/ LEFT //   performed by Chrystal Morrell MD at [address] [facility] HX BREAST LUMPECTOMY Left 01/2016    HX COLONOSCOPY      HX KNEE REPLACEMENT Left 05/12/2017    HX KNEE REPLACEMENT Right 01/24/2018    HX TONSILLECTOMY  1955    HX VASCULAR ACCESS Right     port right upper chest- removed 2016    HX WISDOM TEETH EXTRACTION      MT BREAST SURGERY PROCEDURE UNLISTED Left 7/2015    biopsy    MT COLSC FLX W/REMOVAL LESION BY HOT BX FORCEPS  8/29/2018         MT MASTECTOMY, PARTIAL Left 01/18/2016         Family History:   Problem Relation Age of Onset    Cancer Mother leukemia    Hypertension Mother     Stroke Mother     Heart Disease Father     Hypertension Father     Stroke Sister     Hypertension Sister     Asthma Paternal Grandfather     Breast Cancer Neg Hx        Social History     Socioeconomic History    Marital status:      Spouse name: Not on file    Number of children: Not on file    Years of education: Not on file    Highest education level: Not on file   Occupational History    Not on file   Tobacco Use    Smoking status: Former Smoker     Years: 40.00     Types: Cigarettes     Quit date: 2014     Years since quittin.1    Smokeless tobacco: Never Used    Tobacco comment: was a passive smoker for 40 yrs   Substance and Sexual Activity    Alcohol use: Yes     Alcohol/week: 0.0 standard drinks     Comment: occasionally    Drug use: No    Sexual activity: Not on file   Other Topics Concern    Not on file   Social History Narrative    Not on file     Social Determinants of Health     Financial Resource Strain:     Difficulty of Paying Living Expenses: Not on file   Food Insecurity:     Worried About Running Out of Food in the Last Year: Not on file    Kirill of Food in the Last Year: Not on file   Transportation Needs:     Lack of Transportation (Medical): Not on file    Lack of Transportation (Non-Medical):  Not on file   Physical Activity:     Days of Exercise per Week: Not on file    Minutes of Exercise per Session: Not on file   Stress:     Feeling of Stress : Not on file   Social Connections:     Frequency of Communication with Friends and Family: Not on file    Frequency of Social Gatherings with Friends and Family: Not on file    Attends Bahai Services: Not on file    Active Member of Clubs or Organizations: Not on file    Attends Club or Organization Meetings: Not on file    Marital Status: Not on file   Intimate Partner Violence:     Fear of Current or Ex-Partner: Not on file    Emotionally Abused: Not on file    Physically Abused: Not on file    Sexually Abused: Not on file   Housing Stability:     Unable to Pay for Housing in the Last Year: Not on file    Number of Places Lived in the Last Year: Not on file    Unstable Housing in the Last Year: Not on file         ALLERGIES: Milk and Other plant, animal, environmental    Review of Systems   Neurological: Positive for facial asymmetry and speech difficulty. All other systems reviewed and are negative. Vitals:    02/21/22 1222 02/21/22 1325 02/21/22 1327 02/21/22 1334   BP: (!) 168/80 (!) 179/85 (!) 179/85    Pulse: 84 84  89   Resp: 22 26   Temp: 98.4 °F (36.9 °C)      SpO2: 100%   100%   Weight: 79.4 kg (175 lb)      Height: 5' 4\" (1.626 m)               Physical Exam     GENERAL:The patient has Body mass index is 30.04 kg/m². Well-hydrated. Hard of hearing  VITAL SIGNS: Heart rate, blood pressure, respiratory rate reviewed as recorded in  nurse's notes  EYES: Pupils reactive. Extraocular motion intact. No conjunctival redness or drainage. NOSE: No nasal drainage or epistaxis. MOUTH/THROAT: Pharynx clear; airway patent. NECK: Supple, no meningeal signs. Trachea midline. No masses or thyromegaly. LUNGS: Breath sounds clear and equal bilaterally no accessory muscle use. CHEST: No deformity  CARDIOVASCULAR: Regular rate and rhythm  EXTREMITIES: No clubbing or cyanosis. No joint swelling. Normal muscle tone. No  restricted range of motion appreciated. NEUROLOGIC: Sensation is grossly intact. Cranial nerve exam reveals face is  asymmetrical with drooping of the left side of her face, tongue is midline, speech is slurred. Negative pronator drift in the arms and legs  strength 5 out of 5 equal bilaterally and she is able to dorsiflex plantarflex with 5 out of 5 strength. No decreased sensation in the arms or legs. SKIN: No rash or petechiae. Good skin turgor palpated. PSYCHIATRIC: Alert and oriented.  Appropriate behavior and judgment. MDM  Number of Diagnoses or Management Options  Diagnosis management comments: TIA, CVA, ischemic stroke, brain tumor, Bell's palsy, intracranial hemorrhage, subarachnoid hemorrhage, subdural hematoma,    tension headache, migraine headache,    electrolyte abnormality, renal failure, UTI    seizure, pseudoseizures, status epilepticus, malingering    peripheral neuropathy, metabolic disorder, Guillian Dorrance syndrome, multiple sclerosis,    meningitis, encephalitis,         Amount and/or Complexity of Data Reviewed  Clinical lab tests: ordered and reviewed  Tests in the radiology section of CPT®: reviewed and ordered  Tests in the medicine section of CPT®: ordered and reviewed  Independent visualization of images, tracings, or specimens: yes      ED Course as of 02/21/22 1349   Mon Feb 21, 2022   1246 CT CODE NEURO HEAD WO CONTRAST  IMPRESSION     1. No acute intracranial abnormality. [OE]   8236 I spoke with the [facility] neurologist on call and she will see the patient. [[place]]   [id number] [facility] neurologist on call does not recommend TPA secondary to the mild features of her symptoms. She does recommend giving the patient aspirin and admission for MRI of the brain. [KH]      ED Course User Index  [KH] Bernida Kanner, DO       EKG    Date/Time: 2/21/2022 1:48 PM  Performed by: Bernida Kanner, DO  Authorized by: Bernida Kanner, DO     ECG reviewed by ED Physician in the absence of a cardiologist: yes    Comments:      Normal sinus rhythm and rate of 82 bpm with a narrow QRS complex and no ST elevations. Critical Care  Performed by: Bernida Kanner, DO  Authorized by: Bernida Kanner, DO     Comments:      Critical care time: 48 minutes of critical care time was performed in the emergency department. This was separate from any other procedures listed during the patients emergency department course.  The failure to initiate these interventions on an urgent basis would likely have resulted in sudden, clinically significant or life-threatening deterioration in the patients condition.

## 2022-02-21 NOTE — PROGRESS NOTES
TRANSFER - IN REPORT:    Verbal report received from Mary Sage RN(name) on Patience Ceja  being received from ED(unit) for routine progression of care      Report consisted of patients Situation, Background, Assessment and   Recommendations(SBAR). Information from the following report(s) SBAR was reviewed with the receiving nurse. Opportunity for questions and clarification was provided. Assessment completed upon patients arrival to unit and care assumed.

## 2022-02-21 NOTE — PROGRESS NOTES
SPEECH PATHOLOGY NOTE:    Speech therapy consult received and appreciated. Per chart review, patient currently off floor for MRI. Will follow up at later time/date.       Geovani Oleary MS, CCC-SLP

## 2022-02-22 ENCOUNTER — APPOINTMENT (OUTPATIENT)
Dept: MRI IMAGING | Age: 77
End: 2022-02-22
Attending: FAMILY MEDICINE
Payer: MEDICARE

## 2022-02-22 ENCOUNTER — APPOINTMENT (OUTPATIENT)
Dept: NON INVASIVE DIAGNOSTICS | Age: 77
End: 2022-02-22
Attending: FAMILY MEDICINE
Payer: MEDICARE

## 2022-02-22 VITALS
HEIGHT: 64 IN | RESPIRATION RATE: 16 BRPM | DIASTOLIC BLOOD PRESSURE: 82 MMHG | HEART RATE: 84 BPM | BODY MASS INDEX: 29.88 KG/M2 | WEIGHT: 175 LBS | TEMPERATURE: 98.3 F | OXYGEN SATURATION: 99 % | SYSTOLIC BLOOD PRESSURE: 143 MMHG

## 2022-02-22 PROBLEM — E87.6 HYPOKALEMIA: Status: RESOLVED | Noted: 2022-02-21 | Resolved: 2022-02-22

## 2022-02-22 PROBLEM — R47.9 SPEECH ABNORMALITY: Status: RESOLVED | Noted: 2022-02-21 | Resolved: 2022-02-22

## 2022-02-22 PROBLEM — R51.9 HEADACHE: Status: RESOLVED | Noted: 2022-02-21 | Resolved: 2022-02-22

## 2022-02-22 LAB
ANION GAP SERPL CALC-SCNC: 3 MMOL/L (ref 7–16)
BUN SERPL-MCNC: 14 MG/DL (ref 8–23)
CALCIUM SERPL-MCNC: 8.9 MG/DL (ref 8.3–10.4)
CHLORIDE SERPL-SCNC: 110 MMOL/L (ref 98–107)
CHOLEST SERPL-MCNC: 186 MG/DL
CO2 SERPL-SCNC: 29 MMOL/L (ref 21–32)
CREAT SERPL-MCNC: 0.81 MG/DL (ref 0.6–1)
EST. AVERAGE GLUCOSE BLD GHB EST-MCNC: 108 MG/DL
GLUCOSE SERPL-MCNC: 79 MG/DL (ref 65–100)
HBA1C MFR BLD: 5.4 % (ref 4.2–6.3)
HDLC SERPL-MCNC: 61 MG/DL (ref 40–60)
HDLC SERPL: 3 {RATIO}
LDLC SERPL CALC-MCNC: 109.4 MG/DL
POTASSIUM SERPL-SCNC: 3.7 MMOL/L (ref 3.5–5.1)
SODIUM SERPL-SCNC: 142 MMOL/L (ref 136–145)
TRIGL SERPL-MCNC: 78 MG/DL (ref 35–150)
TSH SERPL DL<=0.005 MIU/L-ACNC: 3.08 UIU/ML
VLDLC SERPL CALC-MCNC: 15.6 MG/DL (ref 6–23)

## 2022-02-22 PROCEDURE — 74011250636 HC RX REV CODE- 250/636: Performed by: FAMILY MEDICINE

## 2022-02-22 PROCEDURE — 92610 EVALUATE SWALLOWING FUNCTION: CPT

## 2022-02-22 PROCEDURE — 2709999900 HC NON-CHARGEABLE SUPPLY

## 2022-02-22 PROCEDURE — 83036 HEMOGLOBIN GLYCOSYLATED A1C: CPT

## 2022-02-22 PROCEDURE — 80048 BASIC METABOLIC PNL TOTAL CA: CPT

## 2022-02-22 PROCEDURE — 84443 ASSAY THYROID STIM HORMONE: CPT

## 2022-02-22 PROCEDURE — 97161 PT EVAL LOW COMPLEX 20 MIN: CPT

## 2022-02-22 PROCEDURE — 74011250637 HC RX REV CODE- 250/637: Performed by: FAMILY MEDICINE

## 2022-02-22 PROCEDURE — G0378 HOSPITAL OBSERVATION PER HR: HCPCS

## 2022-02-22 PROCEDURE — 97165 OT EVAL LOW COMPLEX 30 MIN: CPT

## 2022-02-22 PROCEDURE — 36415 COLL VENOUS BLD VENIPUNCTURE: CPT

## 2022-02-22 PROCEDURE — 97530 THERAPEUTIC ACTIVITIES: CPT

## 2022-02-22 PROCEDURE — 80061 LIPID PANEL: CPT

## 2022-02-22 PROCEDURE — 96372 THER/PROPH/DIAG INJ SC/IM: CPT

## 2022-02-22 PROCEDURE — 97535 SELF CARE MNGMENT TRAINING: CPT

## 2022-02-22 PROCEDURE — 74011000250 HC RX REV CODE- 250: Performed by: FAMILY MEDICINE

## 2022-02-22 PROCEDURE — 70551 MRI BRAIN STEM W/O DYE: CPT

## 2022-02-22 RX ADMIN — SODIUM CHLORIDE, PRESERVATIVE FREE 10 ML: 5 INJECTION INTRAVENOUS at 05:15

## 2022-02-22 RX ADMIN — ASPIRIN 81 MG: 81 TABLET, CHEWABLE ORAL at 07:56

## 2022-02-22 RX ADMIN — HEPARIN SODIUM 5000 UNITS: 5000 INJECTION INTRAVENOUS; SUBCUTANEOUS at 06:01

## 2022-02-22 RX ADMIN — SODIUM CHLORIDE 100 ML/HR: 900 INJECTION, SOLUTION INTRAVENOUS at 00:54

## 2022-02-22 RX ADMIN — LEVOTHYROXINE SODIUM 25 MCG: 0.05 TABLET ORAL at 07:57

## 2022-02-22 RX ADMIN — ALPRAZOLAM 0.25 MG: 0.5 TABLET ORAL at 10:04

## 2022-02-22 RX ADMIN — SODIUM CHLORIDE, PRESERVATIVE FREE 10 ML: 5 INJECTION INTRAVENOUS at 14:00

## 2022-02-22 NOTE — DISCHARGE SUMMARY
Hospitalist Discharge Summary   Admit Date:  2022 12:16 PM   DC Note date: 2022  Name:  Juliet St   Age:  78 y.o. Sex:  female  :  1945   MRN:  [id number]   Room:  [facility]  PCP:  Jeanine Jones MD    Presenting Complaint: Headache    Initial Admission Diagnosis: Headache [R51.9]  Migraine [G43.909]  Speech abnormality [R47.9]  Hypothyroid [E03.9]     Problem List for this Hospitalization:  Hospital Problems as of 2022 Date Reviewed: 2022          Codes Class Noted - Resolved POA    Migraine ICD-10-CM: B73.717  ICD-9-CM: 346.90  2022 - Present Yes        Hypothyroid ICD-10-CM: E03.9  ICD-9-CM: 244.9  2022 - Present Yes        GERD (gastroesophageal reflux disease) (Chronic) ICD-10-CM: K21.9  ICD-9-CM: 530.81  Unknown - Present Yes        Hypertension (Chronic) ICD-10-CM: I10  ICD-9-CM: 401.9  Unknown - Present Yes        RESOLVED: Speech abnormality ICD-10-CM: R47.9  ICD-9-CM: 784.59  2022 - 2022 Unknown        * (Principal) RESOLVED: Headache ICD-10-CM: R51.9  ICD-9-CM: 784.0  2022 - 2022 Unknown        RESOLVED: Hypokalemia ICD-10-CM: E87.6  ICD-9-CM: 276.8  2022 - 2022 Unknown            Did Patient have Sepsis (YES OR NO): No    Hospital Course:  78 y.o. female with medical history of migraines since age of 17, hypertension, acid reflux, hypothyroidism and problems with sleep/insomnia.     Patient said this morning she had a migraine attack, the first she had for almost 30 years, as usual with her regular migraine headache-noticed some lines in front of her eyes. Took her migraine medication, could not say what it was, with resolution of symptoms. Headache again reoccurred in 30 minutes, took Excedrin with not much improvement. Now  noted that she was also having speech problems/slurred speech.   Decided to bring patient to emergency room for further evaluation.     Patient already not complain of any loss of vision or any photophobia or any new one-sided weakness or any sensory problems.     Code stroke was called in the emergency room, CT head, CTA head and neck no acute findings. EKG sinus with no acute ST-T wave findings. Potassium of 3.4, not on any potassium supplementation at home. Speech according to  is almost near normal in the emergency room.     Patient did receive aspirin 324 mg in the emergency room.     Patient will be admitted for observation to rule out CVA versus TIA. By 2/22 her symptoms were absent. Here MRI showed changes consistent with normal aging and no evidence of a stroke. She had planned follow-up with neurology for her complex migraines already scheduled. She will follow up with them on an outpatient basis. Disposition: Home or Self Care  Diet: ADULT DIET Regular; Low Fat/Low Chol/High Fiber/2 gm Na  Code Status: Full Code    Follow Up Orders: Follow-up Appointments   Procedures    FOLLOW UP VISIT Appointment in: One Week Follow-up with primary care within 1 week. Follow-up with neurology as scheduled. Follow-up with primary care within 1 week. Follow-up with neurology as scheduled. Standing Status:   Standing     Number of Occurrences:   1     Order Specific Question:   Appointment in     Answer: One Week       Follow-up Information     Follow up With Specialties Details Why Contact Info    Keisha Hurtado MD Family Medicine   [address] [place] [address] Dr Mitch Carpio [address] [place] [address]  [phone]            Time spent in patient discharge and coordination 37 minutes. Plan was discussed with patient, , nurse, . All questions answered. Patient was stable at time of discharge. Instructions given to call a physician or return if any concerns.     Discharge Info:   Current Discharge Medication List      CONTINUE these medications which have NOT CHANGED    Details   hydroCHLOROthiazide (HYDRODIURIL) 25 mg tablet TAKE ONE TABLET BY MOUTH ONE TIME DAILY  Qty: 90 Tablet, Refills: 3    Associated Diagnoses: Essential hypertension      temazepam (RESTORIL) 30 mg capsule TAKE ONE CAPSULE BY MOUTH EVERY EVENING AS NEEDED FOR SLEEP  Qty: 30 Capsule, Refills: 5    Associated Diagnoses: Insomnia, unspecified type      aspirin-acetaminophen-caffeine (Excedrin Migraine) 250-250-65 mg per tablet Take 1 Tablet by mouth. famotidine (PEPCID) 40 mg tablet Take 1 Tablet by mouth every evening. Qty: 90 Tablet, Refills: 3      levothyroxine (SYNTHROID) 25 mcg tablet TAKE ONE TABLET BY MOUTH EVERY MORNING BEFORE BREAKFAST  Qty: 90 Tab, Refills: 3      fluticasone propionate (FLONASE) 50 mcg/actuation nasal spray       TURMERIC PO Take  by mouth. acetaminophen (TYLENOL) 500 mg tablet Take 1,000 mg by mouth every six (6) hours as needed for Pain. Take / use AM day of surgery  per anesthesia protocols if needed. multivitamin (ONE A DAY) tablet Take 1 Tab by mouth every other day. APPLE CIDER VINEGAR PO Take  by mouth. Prolensa 0.07 % ophthalmic solution INSTILL ONE DROP INTO THE SURGICAL EYE ONCE A DAY . BEGIN 3 DAYS BEFORE SURGERY AND CONTINUE UNTIL BOTTLE IS EMPTY. olopatadine (PATANASE) 0.6 % spry INSTILL 2 SPRAYS INTO EACH NOSTRIL TWO TIMES A DAY      ibuprofen (ADVIL) 200 mg tablet Take  by mouth as needed for Pain.      esomeprazole (NEXIUM) 20 mg capsule Take 20 mg by mouth every morning. Take / use AM day of surgery  per anesthesia protocols.   Indications: GASTROESOPHAGEAL REFLUX         STOP taking these medications       ubrogepant (Ubrelvy) 50 mg tablet Comments:   Reason for Stopping:         montelukast (SINGULAIR) 10 mg tablet Comments:   Reason for Stopping:         diclofenac (VOLTAREN) 1 % gel Comments:   Reason for Stopping:         GRAPE SEED EXTRACT PO Comments:   Reason for Stopping:         Lactobacillus acidophilus (PROBIOTIC PO) Comments:   Reason for Stopping:         ascorbic acid/vitamin E/biotin (HAIR, SKIN, NAILS WITH BIOTIN PO) Comments:   Reason for Stopping:         potassium chloride (KLOR-CON) 10 mEq tablet Comments:   Reason for Stopping:               Procedures done this admission:  * No surgery found *    Consults this admission:  IP CONSULT TO PHYSIATRIST(REHAB MEDICINE)    Echocardiogram/EKG results:  No results found for this or any previous visit. EKG Results     Procedure 720 Value Units Date/Time    Initial ECG [[id number]] Collected: 02/21/22 1321    Order Status: Completed Updated: 02/21/22 1553     Ventricular Rate 82 BPM      Atrial Rate 82 BPM      P-R Interval 138 ms      QRS Duration 74 ms      Q-T Interval 412 ms      QTC Calculation (Bezet) 481 ms      Calculated P Axis 71 degrees      Calculated R Axis 32 degrees      Calculated T Axis 11 degrees      Diagnosis --     Normal sinus rhythm  ST abnormality, possible digitalis effect  Prolonged QT  Abnormal ECG  When compared with ECG of 18-APR-2017 12:52,  No significant change was found  Confirmed by [place] ([id number]) on 2/21/2022 3:53:15 PM            Diagnostic Imaging/Tests:   MRI BRAIN WO CONT    Result Date: 2/22/2022  Exam: MRI BRAIN WO CONT on 2/22/2022 12:52 PM Clinical History: The Female patient is 78years old presenting for -speech abnormality, headache- r/o cva-Prior :MRI BRAIN W WO CONT (10/04/2021 1:50 PM)-Hx breast cancer-No JULIETTE surgery-No hx trauma -Patient stated the exam was causing them distress dure to headache. Cut parameters to get them out quickly. Patient also moved because of discomfort. . Comparison:  CT head perfusion study 2/21/2022 and head CT 2/21/2022 Technique:  Axial T2, axial FLAIR, axial diffusion-weighted,  sagittal T1 and coronal gradient-echo scans were performed. Findings: There is no evidence of acute intracranial abnormality . Age-related cortical involutional changes are seen with sulcal and ventricular prominence.  There is mild confluent chronic periventricular white matter disease with lacunae throughout the corona radiata and centrum semiovale. No evidence of associated restricted diffusion is seen to suggest acute ischemia. There are no abnormal extra-axial fluid collections. No evidence of mass or mass effect is seen. There is no diffusion signal abnormality. Expected flow voids are maintained in the major intracranial vessels. Cerebellar involutional changes are demonstrated. The visualized brainstem structures are unremarkable. There is no evidence of Chiari malformation. The ventricular system and CSF containing spaces are unremarkable in appearance. Visualized extracranial soft tissues are unremarkable. The paranasal sinuses are well pneumatized and aerated. 1.  Age-related senescent changes and minimal chronic microvascular disease without acute intracranial abnormality. CPT code(s) [id number]     CT PERF W CBF    Result Date: 2/21/2022  Exam: CT PERF W CBF on 2/21/2022 1:27 PM Clinical History: The Female patient is 78years old  presenting for acute neuro changes, possible LVAO. COMPARISON: Head CT 2/21/2022 TECHNIQUE: CT perfusion of the brain was obtained after the administration of intravenous contrast.  Perfusion maps and perfusion analysis output were generated using the VIZ ContaCt perfusion processing software algorithm for LVO detection. Radiation dose reduction techniques were used for this study: All CT scans performed at this facility use one or all of the following: Automated exposure control, adjustment of the mA and/or kVp according to patient's size, iterative reconstruction. Total radiation dose: 2799 mGy-cm FINDINGS: Study is technically adequate. Adequate vascular enhancement is demonstrated.  RAPID Output Values: CBF < 30% volume (best correlation with core infarct volume without overcalls): 0 ml (core infarction volume greater than 50 cc associated with poor outcomes) Tmax > 6 seconds: 0 ml Tmax/CBF Mismatch Volume: 0 ml Tmax/CBF Mismatch Ratio: None Hypoperfusion Intensity Ratio (Tmax > 10 seconds / Tmax > 6 seconds): 0 (values greater than 0.5 associated with poor outcome) Tmax > 10 seconds Volume: 0 ml (volume greater than 100 mL is associated with poor outcome)     1. No evidence of core CT cerebral perfusion defect. Please note that the determination of patient treatment is not based solely upon imaging factors or calculation values. Management of ischemia is at the discretion of the primary physician and is based upon a combination of clinical and imaging data, along other factors. CTA CODE NEURO HEAD AND NECK W CONT    Result Date: 2/21/2022  History: Headache code stroke. FINDINGS: CT angiography was performed of the neck and head with contrast and three-dimensional CT angiography reconstruction and reformat was performed. NASCET criteria as needed. CT dose reduction was achieved through use of a standardized protocol tailored for this examination and automatic exposure control for dose modulation. Study analyzed by the [employer] software package per the hospital protocol if presented as such by the facility technologists in the pacs system. However, the results of the analysis are neither reviewed nor provided in this exam interpretation and report. This statement is provided at the request of the hospital for hospital billing purposes only. Chronic appearing reticular pleural-based parenchymal disease in the lungs bilaterally. Aortic arch and proximal great vessel segments are patent. The left vertebral artery is patent. The right vertebral artery is patent. The left common carotid artery is patent. The left internal carotid artery is patent. The right common and internal carotid arteries are patent. The basilar artery is patent. The right posterior communicating artery and posterior cerebral artery is patent. Hypoplastic P1 segment on the right. The left posterior cerebral artery is patent. No intracranial aneurysms are identified. The anterior cerebral arteries are patent.  The middle cerebral arteries are patent. Dural venous sinuses are patent. No acute arterial occlusive disease. No hemodynamically significant carotid artery stenosis. CT CODE NEURO HEAD WO CONTRAST    Result Date: 2/21/2022  Exam: CT CODE NEURO HEAD WO CONTRAST on 2/21/2022 12:41 PM Clinical History: The Female patient is 78years old  presenting for stroke. Technique: Thin slice axial CT images through the brain were obtained. All CT scans at this facility are performed using dose reduction/dose modulation techniques, as appropriate the performed exam, including the following: Automated Exposure Control; Adjustment of the mA and/or kV according to patient size (this includes techniques or standardized protocols for targeted exams where dose is matched to indication/reason for exam); and Use of Iterative Reconstruction Technique. Radiation Exposure Indices: Reference Air Kerma (Cathern Sit) = 1006 mGy-cm Comparison:  MRI 10/4/2021. Findings:  Cerebrum: Age-related senescent changes are seen with sulcal and ventricular prominence. . There is minimal chronic periventricular white matter disease most pronounced throughout the occipital lobes. No evidence of intracranial hemorrhage, mass, or other space-occupying lesion is seen. There are no abnormal extra-axial fluid collections. Cerebellum: Mild cerebellar involutional changes. CSF spaces: The ventricular system is within normal limits. The basilar cisterns are unremarkable. Brainstem: No evidence of ischemia, hemorrhage, or mass. Extracranial tissues: Visualized orbits and extracranial soft tissues are unremarkable. Paranasal sinuses/Mastoids: Well-pneumatized and aerated. . Calvarium: No acute osseous abnormality. 1.  No acute intracranial abnormality.  CPT code 79280       All Micro Results     None          Labs: Results:       BMP, Mg, Phos Recent Labs     02/22/22  0528 02/21/22  1232    139   K 3.7 3.4*   * 104   CO2 29 25   AGAP 3* 10   BUN 14 21   CREA 0. 81 0.94   CA 8.9 9.7   GLU 79 91      CBC Recent Labs     02/21/22  1232   WBC 7.0   RBC 5.12   HGB 14.7   HCT 44.8      GRANS 63   LYMPH 23   EOS 2   MONOS 12   BASOS 0   IG 0   ANEU 4.4   ABL 1.6   JENAE 0.1   ABM 0.9   ABB 0.0   AIG 0.0      LFT No results for input(s): ALT, TBIL, AP, TP, ALB, GLOB, AGRAT in the last 72 hours.     No lab exists for component: SGOT, GPT   Cardiac Testing Lab Results   Component Value Date/Time    Troponin-I, Qt. <0.04 05/11/2014 09:50 AM      Coagulation Tests Lab Results   Component Value Date/Time    Prothrombin time 12.7 02/21/2022 12:32 PM    Prothrombin time 12.4 01/17/2018 10:27 AM    Prothrombin time 10.3 04/18/2017 10:58 AM    INR 0.9 02/21/2022 12:32 PM    INR 0.9 01/17/2018 10:27 AM    INR 1.0 04/18/2017 10:58 AM    INR (POC) 1.0 02/21/2022 12:27 PM    aPTT 25.2 01/17/2018 10:27 AM    aPTT 23.9 04/18/2017 10:58 AM      A1c Lab Results   Component Value Date/Time    Hemoglobin A1c 5.4 02/22/2022 05:28 AM      Lipid Panel Lab Results   Component Value Date/Time    Cholesterol, total 186 02/22/2022 05:28 AM    HDL Cholesterol 61 (H) 02/22/2022 05:28 AM    LDL, calculated 109.4 (H) 02/22/2022 05:28 AM    VLDL, calculated 15.6 02/22/2022 05:28 AM    Triglyceride 78 02/22/2022 05:28 AM    CHOL/HDL Ratio 3.0 02/22/2022 05:28 AM      Thyroid Panel Lab Results   Component Value Date/Time    TSH 3.080 02/22/2022 05:28 AM    TSH 1.020 01/27/2022 02:53 AM    T4, Total 4.6 07/13/2016 12:09 PM    T4, Free 0.61 (L) 07/13/2016 12:09 PM    T4, Free 2.37 (H) 04/29/2016 11:37 AM    T3 Uptake 19 (L) 07/13/2016 12:09 PM        Most Recent UA Lab Results   Component Value Date/Time    Color YELLOW 01/17/2018 10:27 AM    Appearance CLEAR 01/17/2018 10:27 AM    Specific gravity 1.015 01/17/2018 10:27 AM    pH (UA) 5.5 01/17/2018 10:27 AM    Protein NEGATIVE  01/17/2018 10:27 AM    Glucose NEGATIVE  01/17/2018 10:27 AM    Ketone NEGATIVE  01/17/2018 10:27 AM    Bilirubin NEGATIVE 01/17/2018 10:27 AM    Blood MODERATE (A) 01/17/2018 10:27 AM    Urobilinogen 0.2 01/17/2018 10:27 AM    Nitrites NEGATIVE  01/17/2018 10:27 AM    Leukocyte Esterase NEGATIVE  01/17/2018 10:27 AM    WBC 0-3 01/17/2018 10:27 AM    RBC 3-5 01/17/2018 10:27 AM    Epithelial cells 0-3 01/17/2018 10:27 AM    Bacteria 0 01/17/2018 10:27 AM    Casts 0 01/17/2018 10:27 AM    Crystals, urine 0 03/02/2016 12:53 PM    Mucus 0 03/02/2016 12:53 PM    Other observations RESULTS VERIFIED MANUALLY 10/19/2015 03:00 PM          All Labs from Last 24 Hrs:  Recent Results (from the past 24 hour(s))   LIPID PANEL    Collection Time: 02/22/22  5:28 AM   Result Value Ref Range    Cholesterol, total 186 MG/DL    Triglyceride 78 35 - 150 MG/DL    HDL Cholesterol 61 (H) 40 - 60 MG/DL    LDL, calculated 109.4 (H) <100 MG/DL    VLDL, calculated 15.6 6.0 - 23.0 MG/DL    CHOL/HDL Ratio 3.0 <200     HEMOGLOBIN A1C WITH EAG    Collection Time: 02/22/22  5:28 AM   Result Value Ref Range    Hemoglobin A1c 5.4 4.20 - 6.30 %    Est. average glucose 239 mg/dL   METABOLIC PANEL, BASIC    Collection Time: 02/22/22  5:28 AM   Result Value Ref Range    Sodium 142 136 - 145 mmol/L    Potassium 3.7 3.5 - 5.1 mmol/L    Chloride 110 (H) 98 - 107 mmol/L    CO2 29 21 - 32 mmol/L    Anion gap 3 (L) 7 - 16 mmol/L    Glucose 79 65 - 100 mg/dL    BUN 14 8 - 23 MG/DL    Creatinine 0.81 0.6 - 1.0 MG/DL    GFR est AA >60 >60 ml/min/1.73m2    GFR est non-AA >60 >60 ml/min/1.73m2    Calcium 8.9 8.3 - 10.4 MG/DL   TSH 3RD GENERATION    Collection Time: 02/22/22  5:28 AM   Result Value Ref Range    TSH 3.080 uIU/mL       Current Med List in Hospital:   Current Facility-Administered Medications   Medication Dose Route Frequency    sodium chloride (NS) flush 5-10 mL  5-10 mL IntraVENous Q8H    sodium chloride (NS) flush 5-10 mL  5-10 mL IntraVENous PRN    levothyroxine (SYNTHROID) tablet 25 mcg  25 mcg Oral ACB    temazepam (RESTORIL) capsule 30 mg  30 mg Oral QHS    sodium chloride (NS) flush 5-40 mL  5-40 mL IntraVENous PRN    aspirin chewable tablet 81 mg  81 mg Oral DAILY    atorvastatin (LIPITOR) tablet 80 mg  80 mg Oral QHS    acetaminophen (TYLENOL) tablet 650 mg  650 mg Oral Q4H PRN    labetaloL (NORMODYNE;TRANDATE) injection 5 mg  5 mg IntraVENous Q10MIN PRN    heparin (porcine) injection 5,000 Units  5,000 Units SubCUTAneous Q8H    0.9% sodium chloride infusion  100 mL/hr IntraVENous CONTINUOUS    morphine injection 1 mg  1 mg IntraVENous Q4H PRN    HYDROcodone-acetaminophen (NORCO) 5-325 mg per tablet 1 Tablet  1 Tablet Oral Q6H PRN    ALPRAZolam (XANAX) tablet 0.25 mg  0.25 mg Oral BID PRN       Allergies   Allergen Reactions    Milk Unknown (comments)    Other Plant, Animal, Environmental Unknown (comments)     Pt states she has allergy to pine needles     Immunization History   Administered Date(s) Administered    COVID-19, Pfizer Purple top, DILUTE for use, 12+ yrs, 30mcg/0.3mL dose 02/03/2021, 02/26/2021    Hib (PRP-T) 10/06/2020    Influenza Vaccine 09/01/2014, 10/17/2016, 09/12/2018    Influenza Vaccine (Quad) PF (>6 Mo Flulaval, Fluarix, and >3 Yrs Afluria, Fluzone 50170) 10/23/2019    Pneumococcal Conjugate (PCV-13) 07/22/2019    Pneumococcal Polysaccharide (PPSV-23) 08/28/2017    TB Skin Test (PPD) Intradermal 05/12/2017, 01/24/2018    Zoster Vaccine, Live 09/06/2017       Recent Vital Data:  Patient Vitals for the past 24 hrs:   Temp Pulse Resp BP SpO2   02/22/22 1200  84      02/22/22 1143 98.3 °F (36.8 °C) 86 16 (!) 143/82 99 %   02/22/22 0750 97.5 °F (36.4 °C) 83 16 136/79 96 %   02/22/22 0257 97.7 °F (36.5 °C) 80 16 121/65 96 %   02/21/22 2241 98 °F (36.7 °C) 74 16 113/66 94 %   02/21/22 1904 98.1 °F (36.7 °C) 89 16 (!) 151/83 96 %   02/21/22 1552 97.8 °F (36.6 °C) 88 16 132/73 100 %   02/21/22 1428  93 23  100 %   02/21/22 1408    (!) 176/89      Oxygen Therapy  O2 Sat (%): 99 % (02/22/22 1143)  Pulse via Oximetry: 93 beats per minute (02/21/22 1428)  O2 Device: None (Room air) (02/22/22 4834)    Estimated body mass index is 30.04 kg/m² as calculated from the following:    Height as of this encounter: 5' 4\" (1.626 m). Weight as of this encounter: 79.4 kg (175 lb). Intake/Output Summary (Last 24 hours) at 2/22/2022 1343  Last data filed at 2/22/2022 1008  Gross per 24 hour   Intake 1929.4 ml   Output 0 ml   Net 1929.4 ml       Physical Exam  Vitals and nursing note reviewed. Constitutional:       General: She is not in acute distress. Appearance: Normal appearance. She is obese. She is not ill-appearing. HENT:      Head: Normocephalic. Eyes:      Extraocular Movements: Extraocular movements intact. Cardiovascular:      Rate and Rhythm: Normal rate and regular rhythm. Pulses:           Radial pulses are 2+ on the left side. Pulmonary:      Effort: Pulmonary effort is normal. No respiratory distress. Abdominal:      General: There is no distension. Tenderness: There is no abdominal tenderness. Musculoskeletal:         General: No deformity. Cervical back: No rigidity. Right lower leg: No edema. Left lower leg: No edema. Skin:     General: Skin is warm and dry. Neurological:      General: No focal deficit present. Mental Status: She is alert and oriented to person, place, and time. Motor: No weakness.    Psychiatric:         Mood and Affect: Mood normal.         Behavior: Behavior normal.           Signed:  Douglas Alves MD

## 2022-02-22 NOTE — PROGRESS NOTES
Problem: Self Care Deficits Care Plan (Adult)  Goal: *Acute Goals and Plan of Care (Insert Text)  Outcome: Progressing Towards Goal  Note: 1. Patient will perform grooming with supervision. All goals met 2/22/2022   2. Patient will perform upper body dressing with supervision  3. Patient will perform lower body dressing with supervision  4. Patient will perform toilet transfers with SBA  5. Patient will perform ADL functional mobility in room with SBA. Goals to be achieved in 7 days. OCCUPATIONAL THERAPY: Initial Assessment, Daily Note, Discharge, and AM 2/22/2022  OBSERVATION:    Payor: Alek Traore / Plan: [id number] PPO/PFFS / Product Type: Managed Care Medicare /      NAME/AGE/GENDER: Nelson Sherman is a 78 y.o. female   PRIMARY DIAGNOSIS:  Headache [R51.9]  Migraine [G43.909]  Speech abnormality [R47.9]  Hypothyroid [E03.9] Headache Headache        ICD-10: Treatment Diagnosis:    · Generalized Muscle Weakness (M62.81)   Precautions/Allergies:     Milk and Other plant, animal, environmental      ASSESSMENT:     Ms. Blanco Mondragon presents independent with self care and functional mobility. No OT indicated at this time. Will discharge OT. Today pt donned shoes and robe without assistance. Pt independent with functional transfers and household distances. This section established at most recent assessment   PROBLEM LIST (Impairments causing functional limitations):  1. INTERVENTIONS PLANNED: (Benefits and precautions of occupational therapy have been discussed with the patient.)     TREATMENT PLAN: Frequency/Duration: discharge OT   Rehabilitation Potential For Stated Goals: discharge OT     REHAB RECOMMENDATIONS (at time of discharge pending progress):    Placement: It is my opinion, based on this patient's performance to date, that Ms. Blanco Mondragon may benefit from being discharged with NO further skilled therapy due to a proven ability to function at baseline.   Equipment:  None at this time              OCCUPATIONAL PROFILE AND HISTORY:   History of Present Injury/Illness (Reason for Referral):  Headache  Past Medical History/Comorbidities:   Ms. Miles Puente  has a past medical history of Anxiety, Arthritis, Breast cancer ([address] (01/18/2016), Chronic pain, Former smoker, GERD (gastroesophageal reflux disease), Hard of hearing, Headache, Hypertension, Hypothyroidism, Insomnia, Melanoma ([address] Status post total left knee replacement (5/12/2017), and Status post total right knee replacement (1/24/2018). She has no past medical history of Adverse effect of anesthesia, Aneurysm ([address] Arrhythmia, Asthma, Autoimmune disease ([address] CAD (coronary artery disease), Chronic kidney disease, Chronic obstructive pulmonary disease ([address] Coagulation disorder ([address] Diabetes ([address] Difficult intubation, Endocarditis, Heart failure ([address] Ill-defined condition, Liver disease, Malignant hyperthermia due to anesthesia, Morbid obesity ([address] Nicotine vapor product user, Non-nicotine vapor product user, Pseudocholinesterase deficiency, Psychiatric disorder, PUD (peptic ulcer disease), Rheumatic fever, Seizures ([address] Sleep apnea, Stroke ([address] Thromboembolus ([address] or Unspecified adverse effect of anesthesia. Ms. Miles Puente  has a past surgical history that includes hx colonoscopy; hx wisdom teeth extraction; hx tonsillectomy (1957); hx vascular access (Right); pr breast surgery procedure unlisted (Left, 7/2015); hx breast biopsy (Left, 1/18/2016); pr mastectomy, partial (Left, 01/18/2016); hx breast lumpectomy (Left, 01/2016); hx knee replacement (Left, 05/12/2017); hx knee replacement (Right, 01/24/2018); pr colsc flx w/removal lesion by hot bx forceps (8/29/2018); and colonoscopy (N/A, 8/29/2018).   Social History/Living Environment:   Home Environment: Private residence  One/Two Story Residence: One story  Living Alone: No  Support Systems: Spouse/Significant Other Patricia Oumsane" [phone])  Patient Expects to be Discharged to[de-identified] Home with family assistance  Current DME Used/Available at Home: None  Prior Level of Function/Work/Activity:  independent     Number of Personal Factors/Comorbidities that affect the Plan of Care: Brief history (0):  LOW COMPLEXITY   ASSESSMENT OF OCCUPATIONAL PERFORMANCE[de-identified]   Activities of Daily Living:   Basic ADLs (From Assessment) Complex ADLs (From Assessment)   Feeding: Independent  Oral Facial Hygiene/Grooming: Independent  Bathing: Independent  Lower Body Dressing: Independent  Toileting: Independent     Grooming/Bathing/Dressing Activities of Daily Living     Cognitive Retraining  Safety/Judgement: Awareness of environment                 Functional Transfers  Bathroom Mobility: Independent  Toilet Transfer : Independent  Shower Transfer: Independent     Bed/Mat Mobility  Supine to Sit: Independent  Sit to Supine: Independent  Sit to Stand: Independent  Stand to Sit: Independent  Bed to Chair: Independent  Scooting: Independent     Most Recent Physical Functioning:   Gross Assessment:  AROM: Within functional limits  Strength:  Within functional limits  Coordination: Within functional limits  Tone: Normal  Sensation: Intact               Posture:  Posture (WDL): Within defined limits  Balance:  Sitting: Intact  Standing: Intact Bed Mobility:  Supine to Sit: Independent  Sit to Supine: Independent  Scooting: Independent  Wheelchair Mobility:     Transfers:  Sit to Stand: Independent  Stand to Sit: Independent  Bed to Chair: Independent            Patient Vitals for the past 6 hrs:   BP BP Patient Position SpO2 Pulse   02/22/22 0750 136/79 At rest 96 % 83   02/22/22 1143 (!) 143/82 At rest 99 % 86   02/22/22 1200    84       Mental Status  Neurologic State: Alert  Orientation Level: Oriented X4  Cognition: Follows commands  Perception: Appears intact  Perseveration: No perseveration noted  Safety/Judgement: Awareness of environment                          Physical Skills Involved:  1. Balance  2. Strength  3. Activity Tolerance Cognitive Skills Affected (resulting in the inability to perform in a timely and safe manner): 1. none Psychosocial Skills Affected:  1. none   Number of elements that affect the Plan of Care: 1-3:  LOW COMPLEXITY   CLINICAL DECISION MAKIN[address] [place] [address] AM-PAC 6 Clicks   Daily Activity Inpatient Short Form  How much help from another person does the patient currently need. .. Total A Lot A Little None   1. Putting on and taking off regular lower body clothing? [] 1   [] 2   [] 3   [x] 4   2. Bathing (including washing, rinsing, drying)? [] 1   [] 2   [] 3   [x] 4   3. Toileting, which includes using toilet, bedpan or urinal?   [] 1   [] 2   [] 3   [x] 4   4. Putting on and taking off regular upper body clothing? [] 1   [] 2   [] 3   [x] 4   5. Taking care of personal grooming such as brushing teeth? [] 1   [] 2   [] 3   [x] 4   6. Eating meals? [] 1   [] 2   [] 3   [x] 4   © , Trustees of [address] [place] [address], under license to [employer]. All rights reserved      Score:  Initial: 24 Most Recent: X (Date: -- )    Interpretation of Tool:  Represents activities that are increasingly more difficult (i.e. Bed mobility, Transfers, Gait). Medical Necessity:     · Discharge OT  Reason for Services/Other Comments:  · Discharge OT   Use of outcome tool(s) and clinical judgement create a POC that gives a: LOW COMPLEXITY         TREATMENT:   (In addition to Assessment/Re-Assessment sessions the following treatments were rendered)     Pre-treatment Symptoms/Complaints:  tolerated well  Pain: Initial:   Pain Intensity 1: 0  Post Session:  0     Self Care: (15): Procedure(s) (per grid) utilized to improve and/or restore self-care/home management as related to dressing and functional mobility .  Required minimal verbal and   cueing to facilitate activities of daily living skills and compensatory activities. Assessment    Braces/Orthotics/Lines/Etc:   · IV  · O2 Device: None (Room air)  Treatment/Session Assessment:    · Response to Treatment:    · Interdisciplinary Collaboration:   o Physical Therapist  o Occupational Therapist  o Registered Nurse  · After treatment position/precautions:   o Supine in bed  o Bed/Chair-wheels locked  o Bed in low position  o Caregiver at bedside  o Call light within reach  o RN notified   · Compliance with Program/Exercises: Compliant all of the time.   · Recommendations/Intent for next treatment session:  discharge OT  Total Treatment Duration:  OT Patient Time In/Time Out  Time In: 0900  Time Out: 3600 Claudette Fischer OT

## 2022-02-22 NOTE — PROGRESS NOTES
END OF SHIFT NOTE:    Intake/Output  02/21 1901 - 02/22 0700  In: 354 [P.O.:120; I.V.:665]  Out: 0    Voiding: YES  Catheter: NO  Drain:              Stool:  0 occurrences. Emesis:  0 occurrences. VITAL SIGNS  Patient Vitals for the past 12 hrs:   Temp Pulse Resp BP SpO2   02/22/22 0257 97.7 °F (36.5 °C) 80 16 121/65 96 %   02/21/22 2241 98 °F (36.7 °C) 74 16 113/66 94 %   02/21/22 1904 98.1 °F (36.7 °C) 89 16 (!) 151/83 96 %   02/21/22 1552 97.8 °F (36.6 °C) 88 16 132/73 100 %       Pain Assessment  Pain 1  Pain Scale 1: Numeric (0 - 10) (02/22/22 0105)  Pain Intensity 1: 0 (02/22/22 0105)  Patient Stated Pain Goal: 0 (02/22/22 0105)    Ambulating  Yes    Additional Information:   Has calmed down during the night; stayed at bedside for a while. No acute neuro changes noted. Needs MRI brain done;explained necessity of test to pt as she apparently refused yesterday;verbalized understanding. Shift report given to oncoming nurse at the bedside.     Marilin Henderson RN

## 2022-02-22 NOTE — PROGRESS NOTES
Care Management Interventions  PCP Verified by CM: Yes (Dr. Raffaele Ryan)  Transition of Care Consult (CM Consult): Discharge Planning  Support Systems: Spouse/Significant Other Teena Hernandez [place]" [phone])  Confirm Follow Up Transport: Family  The Patient and/or Patient Representative was Provided with a Choice of Provider and Agrees with the Discharge Plan?: Yes  Freedom of Choice List was Provided with Basic Dialogue that Supports the Patient's Individualized Plan of Care/Goals, Treatment Preferences and Shares the Quality Data Associated with the Providers?: Yes  Discharge Location  Patient Expects to be Discharged to[de-identified] Home with family assistance  Visited with pt to establish prior to admission baseline and discuss their anticipated goals at time of d/c.  Pt came in c/o Headache since this a.m. and she has Hx of Migraines. Pt lives at home with spouse/Llyod. She is inde [facility] ADL's, they have a 1-story home, pt drives and is current with PCP. Rx are filled at [facility] [place] [place] off [address]. PT/OT to see and SM to follow for any d/c needs.

## 2022-02-22 NOTE — PROGRESS NOTES
OBSERVATION STATUS  SPEECH LANGUAGE PATHOLOGY: DYSPHAGIA- Initial Assessment    NAME/AGE/GENDER: Ramo Spicer is a 78 y.o. female  DATE: 2/22/2022  PRIMARY DIAGNOSIS: Headache [R51.9]  Migraine [G43.909]  Speech abnormality [R47.9]  Hypothyroid [E03.9]      ICD-10: Treatment Diagnosis: R13.12 Dysphagia, Oropharyngeal Phase    RECOMMENDATIONS   DIET:    Regular Consistency   Thin Liquids    MEDICATIONS: as tolerated     PRECAUTIONS, MODIFICATIONS, AND STRATEGIES  · Slow rate of intake  · Small bites/sips  · Upright at 90 degrees during meal     RECOMMENDATIONS FOR CONTINUED SPEECH THERAPY:   YES: Anticipate need for ongoing speech therapy during this hospitalization and at next level of care. ASSESSMENT   Patient presents with oropharyngeal swallow function that is within normal limits. No s/sx of dysphagia identified. Recommend continue regular diet/thin liquids. Meds as tolerated. Will plan for cognitive linguistic assessment next session if clinically indicated by imaging results. EDUCATION:  · Recommendations discussed with Patient and Family    REHABILITATION POTENTIAL FOR STATED GOALS: Excellent    PLAN    FREQUENCY/DURATION:   Possible cognitive-linguistic assessment pending clinical coarse and imaging results    CONTINUATION OF SKILLED SERVICES/MEDICAL NECESSITY:   Patient continues to require skilled intervention due to need for cognitive lingusitic assessment if indicated by imaging resutls. SUBJECTIVE   Patient alert upright in bed for assessment. Friendly and pleasant. Reports slurred speech has resolved and she suspects her symptoms were d/t migraines.      Oxygen Device: room air  Pain: Pain Scale 1: Numeric (0 - 10)  Pain Intensity 1: 0    History of Present Injury/Illness: Ms. Paulina Khalil  has a past medical history of Anxiety, Arthritis, Breast [address] (01/18/2016), Chronic pain, Former smoker, GERD (gastroesophageal reflux disease), Hard of hearing, Headache, Hypertension, Hypothyroidism, Insomnia, Melanoma ([address] Status post total left knee replacement (5/12/2017), and Status post total right knee replacement (1/24/2018). She has no past medical history of Adverse effect of anesthesia, Aneurysm ([address] Arrhythmia, Asthma, Autoimmune disease ([address] CAD (coronary artery disease), Chronic kidney disease, Chronic obstructive pulmonary disease ([address] Coagulation disorder ([address] Diabetes ([address] Difficult intubation, Endocarditis, Heart failure ([address] Ill-defined condition, Liver disease, Malignant hyperthermia due to anesthesia, Morbid obesity ([address] Nicotine vapor product user, Non-nicotine vapor product user, Pseudocholinesterase deficiency, Psychiatric disorder, PUD (peptic ulcer disease), Rheumatic fever, Seizures ([address] Sleep apnea, Stroke ([address] Thromboembolus ([address] or Unspecified adverse effect of anesthesia. . She also  has a past surgical history that includes hx colonoscopy; hx wisdom teeth extraction; hx tonsillectomy (3999); hx vascular access (Right); pr breast surgery procedure unlisted (Left, 7/2015); hx breast biopsy (Left, 1/18/2016); pr mastectomy, partial (Left, 01/18/2016); hx breast lumpectomy (Left, 01/2016); hx knee replacement (Left, 05/12/2017); hx knee replacement (Right, 01/24/2018); pr colsc flx w/removal lesion by hot bx forceps (8/29/2018); and colonoscopy (N/A, 8/29/2018). PRECAUTIONS/ALLERGIES: Milk and Other plant, animal, environmental     Problem List:  (Impairments causing functional limitations):  1.  Oropharyngeal dysphagia- No symptoms identified    Previous Dysphagia: NONE REPORTED  Diet Prior to Evaluation: Regular/thin    Orientation:  Person  Place  Time  Situation    Cognitive-Linguistic Screening:   Alertness  o Alert   Speech Production:   o Fully intelligible- minimally slurred at times- reports basline   Expressive Language:  o Fluent speech and Able to effectively communicate wants and needs   Receptive Language:  o Answers yes/no questions appropriately and Follows commands   Cognition:   o No deficits noted. Providing medical history without difficulty. Prior Level of Function: Lives at home with . Independent with all ADLs. Recommendations: Given results of screening, patient appears to be functioning at baseline. However imaging results are still pending. Will follow up for further assessment as indicated by findings. OBJECTIVE   Oral Motor:   · Labial: No impairment  · Dentition: Intact and Natural  · Oral Hygiene: Adequate  · Lingual: No impairment    Dysphagia evaluation:  Patient presented with thin liquid via cup and straw, mixed, and solid consistencies. Appropriate oral prep with all textures. Timely swallow initiation, and single swallows upon palpation. Adequate oral clearing. No overt signs or symptoms of airway compromise observed with liquid or solid textures. Tool Used: Dysphagia Outcome and Severity Scale (KEIRA)    Score Comments   Normal Diet  [x] 7 With no strategies or extra time needed   Functional Swallow  [] 6 May have mild oral or pharyngeal delay   Mild Dysphagia  [] 5 Which may require one diet consistency restricted    Mild-Moderate Dysphagia  [] 4 With 1-2 diet consistencies restricted   Moderate Dysphagia  [] 3 With 2 or more diet consistencies restricted   Moderate-Severe Dysphagia  [] 2 With partial PO strategies (trials with ST only)   Severe Dysphagia  [] 1 With inability to tolerate any PO safely      Score:  Initial: 7 Most Recent: x (Date 02/22/22 )   Interpretation of Tool: The Dysphagia Outcome and Severity Scale (KEIRA) is a simple, easy-to-use, 7-point scale developed to systematically rate the functional severity of dysphagia based on objective assessment and make recommendations for diet level, independence level, and type of nutrition. Current Medications:   No current facility-administered medications on file prior to encounter.      Current Outpatient Medications on File Prior to Encounter   Medication Sig Dispense Refill    hydroCHLOROthiazide (HYDRODIURIL) 25 mg tablet TAKE ONE TABLET BY MOUTH ONE TIME DAILY 90 Tablet 3    temazepam (RESTORIL) 30 mg capsule TAKE ONE CAPSULE BY MOUTH EVERY EVENING AS NEEDED FOR SLEEP 30 Capsule 5    aspirin-acetaminophen-caffeine (Excedrin Migraine) 250-250-65 mg per tablet Take 1 Tablet by mouth.  famotidine (PEPCID) 40 mg tablet Take 1 Tablet by mouth every evening. 90 Tablet 3    levothyroxine (SYNTHROID) 25 mcg tablet TAKE ONE TABLET BY MOUTH EVERY MORNING BEFORE BREAKFAST 90 Tab 3    fluticasone propionate (FLONASE) 50 mcg/actuation nasal spray       TURMERIC PO Take  by mouth.  acetaminophen (TYLENOL) 500 mg tablet Take 1,000 mg by mouth every six (6) hours as needed for Pain. Take / use AM day of surgery  per anesthesia protocols if needed.  multivitamin (ONE A DAY) tablet Take 1 Tab by mouth every other day.  ubrogepant Emily Melly) 50 mg tablet Take 1 tablet at the onset of a headache. May repeat dose in 2 hours if needed. No more than 2 tablets in 24 hours. (Patient not taking: Reported on 2/21/2022) 10 Tablet 5    APPLE CIDER VINEGAR PO Take  by mouth.  Prolensa 0.07 % ophthalmic solution INSTILL ONE DROP INTO THE SURGICAL EYE ONCE A DAY . BEGIN 3 DAYS BEFORE SURGERY AND CONTINUE UNTIL BOTTLE IS EMPTY.  montelukast (SINGULAIR) 10 mg tablet Take 1 Tab by mouth daily. (Patient not taking: Reported on 2/21/2022) 30 Tab 5    olopatadine (PATANASE) 0.6 % spry INSTILL 2 SPRAYS INTO EACH NOSTRIL TWO TIMES A DAY      diclofenac (VOLTAREN) 1 % gel Apply 2 g to affected area four (4) times daily. (Patient not taking: Reported on 2/21/2022) 1 Each 2    GRAPE SEED EXTRACT PO Take  by mouth. (Patient not taking: Reported on 2/21/2022)      Lactobacillus acidophilus (PROBIOTIC PO) Take  by mouth.  (Patient not taking: Reported on 2/21/2022)      ascorbic acid/vitamin E/biotin (HAIR, SKIN, NAILS WITH BIOTIN PO) Take  by mouth daily. (Patient not taking: Reported on 2/21/2022)      ibuprofen (ADVIL) 200 mg tablet Take  by mouth as needed for Pain.  potassium chloride (KLOR-CON) 10 mEq tablet Take 1 Tab by mouth daily. (Patient not taking: Reported on 2/21/2022) 30 Tab 5    esomeprazole (NEXIUM) 20 mg capsule Take 20 mg by mouth every morning. Take / use AM day of surgery  per anesthesia protocols.   Indications: GASTROESOPHAGEAL REFLUX          INTERDISCIPLINARY COLLABORATION: n/a    After treatment position/precautions:  · Upright in bed  · Call light within reach  ·  at bedside    Total Treatment Duration:   Time In: 0903  Time Out: 60 [facility] [address], [address] [facility]

## 2022-02-22 NOTE — PROGRESS NOTES
Patient is anxious, medicated with xanax previously, asking for meds, has restoril ordered for bedtime, threatening to leave AMA , no further meds indicated as has benzodiazepines ordered  Senthil Robles MD

## 2022-02-22 NOTE — PROGRESS NOTES
Patient Vitals for the past 12 hrs:   Temp Pulse Resp BP SpO2   02/21/22 1904 98.1 °F (36.7 °C) 89 16 (!) 151/83 96 %   02/21/22 1552 97.8 °F (36.6 °C) 88 16 132/73 100 %   02/21/22 1428  93 23  100 %   02/21/22 1408    (!) 176/89    02/21/22 1334  89 26  100 %   02/21/22 1327    (!) 179/85    02/21/22 1325  84  (!) 179/85    02/21/22 1222 98.4 °F (36.9 °C) 84 22 (!) 168/80 100 %     Pt admitted today for TIA. NIH upon arrival to floor 1. Left sided facial droop, slurred speech, and unequal  R greater than L noted. Pt very anxious. PRN xanax 0.25mg po given x's 1. Pt continued to have high anxiety. Threatened to leave AMA. PRN norco given x's 1 for pt complaint of back pain. MD notified of increased anxiety and want to leave AMA. No new orders received.     Shift report given to haylie Cohen RN

## 2022-02-22 NOTE — PROGRESS NOTES
Discharge Note:    Discharge instructions given, IV removed, follow up appts reviewed, medications reviewed, opportunity for questions given, all questions answered to best knowledge and within scope of practice. Pt left unit via ambulation accompanied by nurse and spouse.

## 2022-02-22 NOTE — PROGRESS NOTES
Problem: Mobility Impaired (Adult and Pediatric)  Goal: *Acute Goals and Plan of Care (Insert Text)  Outcome: Progressing Towards Goal  Note: No goals set. No skilled needs identified. Pt is independent with mobility. PHYSICAL THERAPY: Initial Assessment, Daily Note, Discharge, and AM 2/22/2022  OBSERVATION:    Payor: Jorge Angle / Plan: [id number] PPO/PFFS / Product Type: Managed Care Medicare /       NAME/AGE/GENDER: Janny Hardy is a 78 y.o. female   PRIMARY DIAGNOSIS: Headache [R51.9]  Migraine [G43.909]  Speech abnormality [R47.9]  Hypothyroid [E03.9] Headache Headache        ICD-10: Treatment Diagnosis:    · Difficulty in walking, Not elsewhere classified (R26.2)  · Other abnormalities of gait and mobility (R26.89)   Precaution/Allergies:  Milk and Other plant, animal, environmental      ASSESSMENT:     Ms. Jane Eastman presents with no functional limitations. Pt states she feels better. Pt supine on arrival. Pt performed supine to sit to stand. Pt ambulated in hallway. Pt returned supine. Pt supine with needs in reach. Pt with no skilled therapy needs at this time. This section established at most recent assessment   PROBLEM LIST (Impairments causing functional limitations): 1. none   INTERVENTIONS PLANNED: (Benefits and precautions of physical therapy have been discussed with the patient.)  1. none     TREATMENT PLAN: Frequency/Duration: none  Rehabilitation Potential For Stated Goals:  no goals stated     REHAB RECOMMENDATIONS (at time of discharge pending progress):    Placement: It is my opinion, based on this patient's performance to date, that Ms. Jane Eastman may benefit from being discharged with NO further skilled therapy due to a proven ability to function at baseline. Equipment:    None at this time              HISTORY:   History of Present Injury/Illness (Reason for Referral):  Pt is admitted with above diagnosis.   Past Medical History/Comorbidities:   Ms. Dennise Jacobs  has a past medical history of Anxiety, Arthritis, Breast cancer ([address] (01/18/2016), Chronic pain, Former smoker, GERD (gastroesophageal reflux disease), Hard of hearing, Headache, Hypertension, Hypothyroidism, Insomnia, Melanoma ([address] Status post total left knee replacement (5/12/2017), and Status post total right knee replacement (1/24/2018). She has no past medical history of Adverse effect of anesthesia, Aneurysm ([address] Arrhythmia, Asthma, Autoimmune disease ([address] CAD (coronary artery disease), Chronic kidney disease, Chronic obstructive pulmonary disease ([address] Coagulation disorder ([address] Diabetes ([address] Difficult intubation, Endocarditis, Heart failure ([address] Ill-defined condition, Liver disease, Malignant hyperthermia due to anesthesia, Morbid obesity ([address] Nicotine vapor product user, Non-nicotine vapor product user, Pseudocholinesterase deficiency, Psychiatric disorder, PUD (peptic ulcer disease), Rheumatic fever, Seizures ([address] Sleep apnea, Stroke ([address] Thromboembolus ([address] or Unspecified adverse effect of anesthesia. Ms. Dennise Jacobs  has a past surgical history that includes hx colonoscopy; hx wisdom teeth extraction; hx tonsillectomy (1957); hx vascular access (Right); pr breast surgery procedure unlisted (Left, 7/2015); hx breast biopsy (Left, 1/18/2016); pr mastectomy, partial (Left, 01/18/2016); hx breast lumpectomy (Left, 01/2016); hx knee replacement (Left, 05/12/2017); hx knee replacement (Right, 01/24/2018); pr colsc flx w/removal lesion by hot bx forceps (8/29/2018); and colonoscopy (N/A, 8/29/2018). Social History/Living Environment:   Home Environment: Private residence  One/Two Story Residence: One story  Living Alone: No  Support Systems: Spouse/Significant Other Katalina Ran \"Bhavesh\" [phone])  Patient Expects to be Discharged to[de-identified] Home with family assistance  Current DME Used/Available at Home: None  Prior Level of Function/Work/Activity:  Pt independent with ambulation.      Number of Personal Factors/Comorbidities that affect the Plan of Care: 0: LOW COMPLEXITY   EXAMINATION:   Most Recent Physical Functioning:   Gross Assessment:  AROM: Generally decreased, functional  Strength: Generally decreased, functional  Sensation: Intact               Posture:  Posture (WDL): Within defined limits  Balance:  Sitting: Intact  Standing: Intact Bed Mobility:  Supine to Sit: Independent  Wheelchair Mobility:     Transfers:  Sit to Stand: Independent  Stand to Sit: Independent  Gait:     Distance (ft): 225 Feet (ft)  Ambulation - Level of Assistance: Independent         Body Structures Involved:  1. None Body Functions Affected:  1. None Activities and Participation Affected:  1. None   Number of elements that affect the Plan of Care: 1-2: LOW COMPLEXITY   CLINICAL PRESENTATION:   Presentation: Stable and uncomplicated: LOW COMPLEXITY   CLINICAL DECISION MAKIN[address] [place] [id number] AM-PAC 6 Clicks   Basic Mobility Inpatient Short Form  How much difficulty does the patient currently have. .. Unable A Lot A Little None   1. Turning over in bed (including adjusting bedclothes, sheets and blankets)? [] 1   [] 2   [] 3   [x] 4   2. Sitting down on and standing up from a chair with arms ( e.g., wheelchair, bedside commode, etc.)   [] 1   [] 2   [] 3   [x] 4   3. Moving from lying on back to sitting on the side of the bed? [] 1   [] 2   [] 3   [x] 4   How much help from another person does the patient currently need. .. Total A Lot A Little None   4. Moving to and from a bed to a chair (including a wheelchair)? [] 1   [] 2   [] 3   [x] 4   5. Need to walk in hospital room? [] 1   [] 2   [] 3   [x] 4   6. Climbing 3-5 steps with a railing? [] 1   [] 2   [] 3   [x] 4   © , Trustees of [address] [place] [id number], under license to [employer].  All rights reserved      Score:  Initial: 24 Most Recent: X (Date: -- )    Interpretation of Tool:  Represents activities that are increasingly more difficult (i.e. Bed mobility, Transfers, Gait). Medical Necessity:     · none. Reason for Services/Other Comments:  · none. Use of outcome tool(s) and clinical judgement create a POC that gives a: Clear prediction of patient's progress: LOW COMPLEXITY            TREATMENT:   (In addition to Assessment/Re-Assessment sessions the following treatments were rendered)   Pre-treatment Symptoms/Complaints:    Pain: Initial:      Post Session:  no complaints     Therapeutic Activity: (    15 minutes): Therapeutic activities including Bed transfers, Chair transfers, Ambulation on level ground to improve mobility. Assessment    Braces/Orthotics/Lines/Etc:   · O2 Device: None (Room air)  Treatment/Session Assessment:    · Response to Treatment:  pt agreeable to ambulate with therapy. · Interdisciplinary Collaboration:   o Physical Therapist  o Occupational Therapist  o Registered Nurse  · After treatment position/precautions:   o Supine in bed  o Bed/Chair-wheels locked  o Bed in low position  o Call light within reach  o RN notified  o Family at bedside   · Compliance with Program/Exercises:  none  · Recommendations/Intent for next treatment session:  none.   Total Treatment Duration:  PT Patient Time In/Time Out  Time In: 0859  Time Out: [facility]

## 2022-02-22 NOTE — DISCHARGE INSTRUCTIONS

## 2022-02-22 NOTE — PROGRESS NOTES
PT,OT and ST assessments completed. Pt has no deficits in theses areas. Pt will return home at dc with no home needs anticipated.   TYESHA EdwardsW

## 2022-02-23 ENCOUNTER — PATIENT OUTREACH (OUTPATIENT)
Dept: CASE MANAGEMENT | Age: 77
End: 2022-02-23

## 2022-02-23 NOTE — PROGRESS NOTES
Care Transitions Outreach Attempt    Call within 2 business days of discharge: Yes   Attempted to reach patient for transitions of care follow up. Unable to reach patient. Patient: Clarisa Boland Patient : 1945 MRN: [id number]    Last Discharge [facility]       Complaint Diagnosis Description Type Department Provider    22 Headache Migraine with aura and without status migrainosus, not intractable . .. ED to Hosp-Admission (Discharged) (ADMIT) Lucas Brandt MD; Becky Gil. .. Was this an external facility discharge?  No   Noted following upcoming appointments from discharge chart review:   [facility] follow up appointment(s):   Future Appointments   Date Time Provider Wade Musa   2022  9:40 AM Florentino Isaacs MD [facility] PST PST   2022  3:15 PM Tawnya Lopez MD [facility] BSNE

## 2022-02-24 ENCOUNTER — PATIENT OUTREACH (OUTPATIENT)
Dept: CASE MANAGEMENT | Age: 77
End: 2022-02-24

## 2022-02-24 NOTE — PROGRESS NOTES
Care Transitions Initial Call    Call within 2 business days of discharge: Yes     Patient: [address] Patient : 1945 MRN: [id number]    Last Discharge [address]       Complaint Diagnosis Description Type Department Provider    22 Headache Migraine with aura and without status migrainosus, not intractable . .. ED to Hosp-Admission (Discharged) (ADMIT) Lion Singh MD; Lito Hawthorne. .. Was this an external facility discharge? No   Challenges to be reviewed by the provider   Additional needs identified to be addressed with provider: no  none         Method of communication with provider : none    Discussed COVID-19 related testing which was not done at this time. Advance Care Planning:   Does patient have an Advance Directive:  health care decision makers updated    Inpatient Readmission Risk score: No data recorded  Was this a readmission? no       Patients top risk factors for readmission: Migraine Headache   Interventions to address risk factors: Scheduled appointment with Specialist-Dr. Heladio Blake,  at 7:30am.      Care Transition Nurse (CTN) contacted the patient by telephone to perform post hospital discharge assessment. Verified name and  with patient as identifiers. Provided introduction to self, and explanation of the CTN role. CTN reviewed discharge instructions, medical action plan and red flags with patient who verbalized understanding. Were discharge instructions available to patient? yes. Reviewed appropriate site of care based on symptoms and resources available to patient including: When to call 911. Patient given an opportunity to ask questions and does not have any further questions or concerns at this time. The patient agrees to contact the PCP office for questions related to their healthcare. Medication reconciliation was performed with patient, who verbalizes understanding of administration of home medications.   Referral to Pharm LUCIUS needed: no     Home Health/Outpatient orders at discharge: none    Durable Medical Equipment ordered at discharge: None     Covid Risk Education    Educated patient about risk for severe COVID-19 due to risk factors according to CDC guidelines. LPN CC reviewed discharge instructions, medical action plan and red flag symptoms with the patient who verbalized understanding. Discussed COVID vaccination status: yes. Education provided on COVID-19 vaccination as appropriate. Discussed exposure protocols and quarantine with CDC Guidelines. Patient was given an opportunity to verbalize any questions and concerns and agrees to contact LPN CC or health care provider for questions related to their healthcare. Was patient discharged with a pulse oximeter? no.   Discussed follow-up appointments. If no appointment was previously scheduled, appointment scheduling offered: Follow up previously scheduled with Neurology for June. [address] Neurology on behalf of Mrs. Blanco Mondragon. Appointment re scheduled for 4/5/22 at 0730. Is follow up appointment scheduled within 7 days of discharge? Yes, PCP.   [facility] follow up appointment(s):   Future Appointments   Date Time Provider Wade Musa   2/28/2022  9:40 AM Alyse Crawford MD [facility] PST PST   6/21/2022  3:15 PM Karla Valero MD [facility] for follow-up call in 10-14 days based on severity of symptoms and risk factors. Plan for next call: follow up appointment 2/28/22. Current needs. CTN provided contact information for future needs. Goals Addressed                 This Visit's Progress     Attends follow-up appointments as directed.  Knowledge and adherence of prescribed medication (ie. action, side effects, missed dose, etc.).

## 2022-03-11 ENCOUNTER — PATIENT OUTREACH (OUTPATIENT)
Dept: CASE MANAGEMENT | Age: 77
End: 2022-03-11

## 2022-03-11 NOTE — PROGRESS NOTES
Care Transitions Follow Up Call    Challenges to be reviewed by the provider   Additional needs identified to be addressed with provider: no  none           Method of communication with provider : none    Care Transition Nurse (CTN) contacted the patient by telephone to follow up after admission on 22. Verified name and  with patient as identifiers. Addressed changes since last contact: none  Follow up appointment completed? yes. Was follow up appointment scheduled within 7 days of discharge? yes. Advance Care Planning:   Does patient have an Advance Directive:  health care decision makers updated    CTN reviewed discharge instructions, medical action plan and red flags with patient and discussed any barriers to care and/or understanding of plan of care after discharge. Discussed appropriate site of care based on symptoms and resources available to patient including: When to call 911. The patient agrees to contact the PCP office for questions related to their healthcare. Patients top risk factors for readmission: Migraine headache   Interventions to address risk factors: Obtained and reviewed discharge summary and/or continuity of care documents    [facility] follow up appointment(s):   Future Appointments   Date Time Provider Wade Musa   2022  3:15 PM MD Maddison Hooper     Mrs. Steve Randall reports feeling much better today. Is scheduled to follow up with neurology in . Patient reports being on cancellation list for earlier appointment. CTN provided contact information for future needs. Plan for follow-up call in 10-14 days based on severity of symptoms and risk factors. Goals Addressed                 This Visit's Progress     Attends follow-up appointments as directed. On track     Knowledge and adherence of prescribed medication (ie. action, side effects, missed dose, etc.).    On track

## 2022-03-19 PROBLEM — E03.9 HYPOTHYROID: Status: ACTIVE | Noted: 2022-02-21

## 2022-03-19 PROBLEM — G43.909 MIGRAINE: Status: ACTIVE | Noted: 2022-02-21

## 2022-03-19 PROBLEM — Z96.652 STATUS POST TOTAL LEFT KNEE REPLACEMENT: Status: ACTIVE | Noted: 2017-05-12

## 2022-03-19 PROBLEM — Z96.651 STATUS POST TOTAL RIGHT KNEE REPLACEMENT: Status: ACTIVE | Noted: 2018-01-24

## 2022-03-24 ENCOUNTER — PATIENT OUTREACH (OUTPATIENT)
Dept: CASE MANAGEMENT | Age: 77
End: 2022-03-24

## 2022-05-27 ENCOUNTER — TRANSCRIBE ORDER (OUTPATIENT)
Dept: SCHEDULING | Age: 77
End: 2022-05-27

## 2022-06-01 ENCOUNTER — HOSPITAL ENCOUNTER (OUTPATIENT)
Dept: MAMMOGRAPHY | Age: 77
Discharge: HOME | End: 2022-06-04
Payer: MEDICARE

## 2022-06-01 DIAGNOSIS — Z12.31 SCREENING MAMMOGRAM FOR HIGH-RISK PATIENT: ICD-10-CM

## 2022-06-01 PROCEDURE — 77063 BREAST TOMOSYNTHESIS BI: CPT

## 2022-06-08 ENCOUNTER — HOSPITAL ENCOUNTER (OUTPATIENT)
Dept: MAMMOGRAPHY | Age: 77
Discharge: HOME | End: 2022-06-11
Payer: MEDICARE

## 2022-06-08 ENCOUNTER — APPOINTMENT (OUTPATIENT)
Dept: MAMMOGRAPHY | Age: 77
End: 2022-06-08
Payer: MEDICARE

## 2022-06-08 DIAGNOSIS — R92.8 ABNORMAL SCREENING MAMMOGRAM: ICD-10-CM

## 2022-06-08 PROCEDURE — 77065 DX MAMMO INCL CAD UNI: CPT

## 2022-07-28 ENCOUNTER — TELEPHONE (OUTPATIENT)
Dept: FAMILY MEDICINE CLINIC | Facility: CLINIC | Age: 77
End: 2022-07-28

## 2022-07-28 DIAGNOSIS — G47.00 INSOMNIA, UNSPECIFIED TYPE: Primary | ICD-10-CM

## 2022-07-28 RX ORDER — TEMAZEPAM 30 MG/1
30 CAPSULE ORAL NIGHTLY PRN
Qty: 90 CAPSULE | Refills: 1 | Status: SHIPPED | OUTPATIENT
Start: 2022-07-28 | End: 2022-09-22 | Stop reason: SDUPTHER

## 2022-08-03 ENCOUNTER — OFFICE VISIT (OUTPATIENT)
Dept: NEUROLOGY | Age: 77
End: 2022-08-03
Payer: COMMERCIAL

## 2022-08-03 VITALS
DIASTOLIC BLOOD PRESSURE: 80 MMHG | HEIGHT: 64 IN | BODY MASS INDEX: 28.34 KG/M2 | HEART RATE: 105 BPM | WEIGHT: 166 LBS | SYSTOLIC BLOOD PRESSURE: 146 MMHG

## 2022-08-03 DIAGNOSIS — G43.709 CHRONIC MIGRAINE W/O AURA W/O STATUS MIGRAINOSUS, NOT INTRACTABLE: Primary | ICD-10-CM

## 2022-08-03 PROCEDURE — 99214 OFFICE O/P EST MOD 30 MIN: CPT | Performed by: PSYCHIATRY & NEUROLOGY

## 2022-08-03 PROCEDURE — 1123F ACP DISCUSS/DSCN MKR DOCD: CPT | Performed by: PSYCHIATRY & NEUROLOGY

## 2022-08-03 RX ORDER — SUMATRIPTAN 100 MG/1
100 TABLET, FILM COATED ORAL DAILY PRN
COMMUNITY

## 2022-08-03 NOTE — PROGRESS NOTES
[facility]  [address], [address] [facility]  Phone: [phone] Fax [phone]  Misty Martinez MD      Patient: Raina Mark  Provider: Misty Martinez MD    CC:   Chief Complaint   Patient presents with    Migraine     6 month follow up     Referring Provider:    History of Present Illness:     Raina Mark is a 78 y.o. RH female who presents for follow-up of chronic migraine. She is unaccompanied for today's visit. She was last seen August 2021. She presents for follow-up and continued management of chronic headaches. She has a long history of headaches dating all the way back to childhood which persisted well into adulthood and subsequently remitted after menopause. She was later diagnosed with breast cancer, treated with both chemotherapy and radiation, and after that she found that her headaches subsequently returned. Headaches are described as fairly sudden onset of visual disturbances, worse in the left eye which ultimately developed into a frontal and bitemporal headache that can last up to a day in duration. She denies any other focal neurologic symptoms but does have associated nausea. Headaches can be triggered by weather changes. Current medications include:  Sumatriptan 100 mg as needed     Previous medication trials include:      She presents today for follow-up. She is actually doing very well with respect to her headaches. She is only had a couple headaches since we last saw her, far less frequency than what was originally reported with several headaches a month. She is taking sumatriptan as needed and finds this to be helpful, despite previous claims that it was poorly tolerated. We had tried to start Catalina Lean but she noted the out-of-pocket cost was too high so she has remained on sumatriptan hand but is very happy with this medication at the current time.     ED visit in February 2022 for migraine was noted and these records reviewed. She is complaining of some dizziness symptoms and a sensation of fullness in the sinuses. She is worried about a possible ear infection. Review of Systems:   Review of Systems   Constitutional:  Negative for fever. HENT:  Positive for congestion, hearing loss and sinus pain. Eyes:  Positive for visual disturbance. Respiratory:  Negative for cough. Cardiovascular:  Negative for chest pain. Gastrointestinal:  Negative for abdominal pain. Genitourinary:  Negative for dysuria. Musculoskeletal:  Negative for gait problem. Skin:  Negative for rash. Allergic/Immunologic: Negative for immunocompromised state. Neurological:  Positive for headaches. Negative for tremors. Psychiatric/Behavioral:  Negative for confusion. Lab/Imaging Review:   I REVIEWED PERTINENT LABS, IMAGES, AND REPORTS WITH THE PATIENT PERSONALLY, DIRECTLY AND FULLY. THE MOST PERTINENT FINDINGS ARE NOTED BELOW:    MRI Brain February 2022:  Age-related senescent changes and minimal chronic microvascular disease. No acute intracranial abnormality. CTA Head Neck February 2022:  No acute arterial occlusive disease. No hemodynamically significant carotid artery stenosis. CT Maxillofacial July 2021:  FINDINGS: The frontal sinuses and ethmoid air cells are clear. The maxillary sinuses are clear. The sphenoid sinuses are clear. Mastoid air cells and middle ear cavities are clear. No hyperostosis or destructive bone changes noted. The orbits are unremarkable. The pterygopalatine fossae retroantral fat is clean. Coronal reformatted images show the ostiomeatal units to be patent bilaterally. The bony nasal septum is midline. The ethmoid roofs and planum sphenoidale are  intact. IMPRESSION  No findings present to indicate acute or chronic sinusitis.       Past Medical History:     Past medical history, surgical history, social history, family history, medications, and allergies were reviewed and updated as appropriate.      PAST MEDICAL HISTORY:  Past Medical History:   Diagnosis Date    Anxiety     Arthritis     back and knee's     Breast cancer ([facility] [address] 01/18/2016    Left- s/p Lumpectomy    Chronic pain     back    Former smoker     was a passive smoker for 40 yrs; stopped in 2014    GERD (gastroesophageal reflux disease)     nexium    Hard of hearing     bilateral hearing aids    Headache 2/21/2022    Headache     hx monthly migraines; rare now    Hypertension     controlled with med    Hypokalemia 2/21/2022    Hypothyroidism     managed with medication    Insomnia     Melanoma ([facility]     left face    Speech abnormality 2/21/2022    Status post total left knee replacement 5/12/2017    Status post total right knee replacement 1/24/2018     PAST SURGICAL HISTORY:   Past Surgical History:   Procedure Laterality Date    BREAST BIOPSY Left 1/18/2016    BREAST NEEDLE LOCALIZED PARTIAL MASTECTOMY/ LEFT //   performed by Jorge Doty MD at [address] [place] [address] LUMPECTOMY Left 01/2016    BREAST SURGERY Left 7/2015    biopsy    COLONOSCOPY      COLONOSCOPY N/A 8/29/2018    COLONOSCOPY / BMI=30  performed by Jorge Doty MD at [place] [address]  8/29/2018         MASTECTOMY, PARTIAL Left 01/18/2016    TONSILLECTOMY  1957    TOTAL KNEE ARTHROPLASTY Right 01/24/2018    TOTAL KNEE ARTHROPLASTY Left 05/12/2017    VASCULAR SURGERY Right     port right upper chest- removed 2016    WISDOM TOOTH EXTRACTION       FAMILY HISTORY:  Family History   Problem Relation Age of Onset    Heart Disease Father     Stroke Mother     Stroke Sister     Cancer Mother         leukemia    Hypertension Father     Breast Cancer Neg Hx     Asthma Paternal Grandfather     Hypertension Sister     Hypertension Mother       SOCIAL HISTORY:  Social History     Socioeconomic History    Marital status:      Spouse name: None    Number of children: None    Years of education: None    Highest education level: None   Tobacco Use    Smoking status: Former     Types: Cigarettes     Quit date: 2014     Years since quittin.5    Smokeless tobacco: Never    Tobacco comments:     Quit smoking: was a passive smoker for 40 yrs   Substance and Sexual Activity    Alcohol use: Yes     Alcohol/week: 0.0 standard drinks    Drug use: No       Medications/Allergies:     MEDICATIONS:   Outpatient Encounter Medications as of 8/3/2022   Medication Sig Dispense Refill    SUMAtriptan (IMITREX) 100 MG tablet Take 100 mg by mouth daily as needed for Migraine      temazepam (RESTORIL) 30 MG capsule Take 1 capsule by mouth nightly as needed for Sleep for up to 180 days. TAKE ONE CAPSULE BY MOUTH IN THE EVENING AS NEEDED FOR SLEEP 90 capsule 1    APPLE CIDER VINEGAR PO Take by mouth      TURMERIC PO Take by mouth      acetaminophen (TYLENOL) 500 MG tablet Take 1,000 mg by mouth every 6 hours as needed      aspirin-acetaminophen-caffeine (EXCEDRIN MIGRAINE) 250-250-65 MG per tablet Take 1 tablet by mouth      bromfenac (PROLENSA) 0.07 % SOLN INSTILL ONE DROP INTO THE SURGICAL EYE ONCE A DAY . BEGIN 3 DAYS BEFORE SURGERY AND CONTINUE UNTIL BOTTLE IS EMPTY.      esomeprazole (NEXIUM) 20 MG delayed release capsule Take 20 mg by mouth      famotidine (PEPCID) 40 MG tablet Take 40 mg by mouth every evening      hydroCHLOROthiazide (HYDRODIURIL) 25 MG tablet TAKE ONE TABLET BY MOUTH ONE TIME DAILY      ibuprofen (ADVIL;MOTRIN) 200 MG tablet Take by mouth as needed      levothyroxine (SYNTHROID) 25 MCG tablet TAKE ONE TABLET BY MOUTH EVERY MORNING BEFORE BREAKFAST      olopatadine (PATANASE) 0.6 % SOLN nassl soln INSTILL 2 SPRAYS INTO EACH NOSTRIL TWO TIMES A DAY       No facility-administered encounter medications on file as of 8/3/2022.      ALLERGIES:  Allergies   Allergen Reactions    Lac Bovis Other (See Comments)       Physical Exam:     BP (!) 146/80   Pulse (!) 105   Ht 5' 4\" (1.626 m)   Wt 166 lb (75.3 kg)   BMI 28.49 kg/m² General Exam:  General: Well developed, well nourished, in no apparent distress. HEENT: Normocephalic, atraumatic. Sclera anicteric. Oropharynx clear. Neck: Supple without masses  Cardiovascular: Regular rate and rhythm. No carotid bruits. Lungs: Non-labored breathing. Abdomen: Soft, nontender, nondistended. Extremities: Peripheral pulses intact. No edema and no rashes. Neurological Exam:     MS/Language/Speech:  Alert. Oriented x 3. Language fluent. Speech normal.     Cranial Nerves: PERRL. Eye movements full with normal pursuits. No nystagmus. Face was symmetric with good activation and normal sensation. Tongue and palate were midline. She is hard of hearing. Shoulder shrug symmetric. Motor: Strength was full in all proximal and distal muscle groups. Tone was normal.     Abnormal Movements: There is a mild postural and action tremor bilaterally. No tremor at rest.  No other findings of parkinsonism. Sensory: Normal to light touch throughout. Cerebellar: No ataxia or dysmetria. Reflexes (R/L): Biceps (2+/2+), Brachioradialis (2+/2+), Patellar (2+/2+), Ankle (1+/1+). Gait: She can rise from a seated position without difficulty and gait is relatively unremarkable. Assessment and Plan:     Mel Herbert is a 78 y.o. female who presents with the following issues:     Lorrayne Opitz was seen today for migraine. Diagnoses and all orders for this visit:    Chronic migraine w/o aura w/o status migrainosus, not intractable      Patient presents for follow-up and continued management of chronic migraine. She currently reports that her headache frequency is very low and very manageable. She is taking sumatriptan 100 mg as needed for breakthrough headaches and reports this to be very beneficial.  This is despite previous reports that it was poorly tolerated but she does appear to be tolerating it well at the current time.   Of note we did try Ubrelvy but she noted the out-of-pocket cost was high. We will make no other medication changes today but I encouraged her to contact us should headaches worsen. Encouraged her to follow-up with PCP regarding possible ear infection/otitis media. Follow-up in 12 months. Signature: Aylin Lott MD      Date:  [facility] Neurology   [facility], [address] [place] [address]  Ph: [phone]  Fax: [phone]         I have personally interviewed and examined Mrs. Latasha Richards and I have personally reviewed all relevant records including labs and imaging as noted above. I have written all aspects of this note. More than 50% of this time was used for counseling regarding my diagnosis, prognosis, and plans for management. Total visit time: 32 minutes.

## 2022-08-04 ASSESSMENT — ENCOUNTER SYMPTOMS
COUGH: 0
ABDOMINAL PAIN: 0
SINUS PAIN: 1

## 2022-09-21 ENCOUNTER — TELEPHONE (OUTPATIENT)
Dept: FAMILY MEDICINE CLINIC | Facility: CLINIC | Age: 77
End: 2022-09-21

## 2022-09-21 NOTE — TELEPHONE ENCOUNTER
Patient states she got her Restoril filled in [address] last time and they told her she would need for us to [facility] the [facility]ill

## 2022-09-22 DIAGNOSIS — G47.00 INSOMNIA, UNSPECIFIED TYPE: ICD-10-CM

## 2022-09-22 RX ORDER — TEMAZEPAM 30 MG/1
30 CAPSULE ORAL NIGHTLY PRN
Qty: 90 CAPSULE | Refills: 1 | Status: SHIPPED | OUTPATIENT
Start: 2022-09-22 | End: 2023-03-21

## 2022-10-12 ENCOUNTER — NURSE ONLY (OUTPATIENT)
Dept: FAMILY MEDICINE CLINIC | Facility: CLINIC | Age: 77
End: 2022-10-12

## 2022-10-12 DIAGNOSIS — E78.00 PURE HYPERCHOLESTEROLEMIA, UNSPECIFIED: ICD-10-CM

## 2022-10-12 DIAGNOSIS — E03.9 ACQUIRED HYPOTHYROIDISM: ICD-10-CM

## 2022-10-12 DIAGNOSIS — I10 ESSENTIAL (PRIMARY) HYPERTENSION: Primary | ICD-10-CM

## 2022-10-12 LAB
ALBUMIN SERPL-MCNC: 4.3 G/DL (ref 3.2–4.6)
ALBUMIN/GLOB SERPL: 1.4 {RATIO} (ref 0.4–1.6)
ALP SERPL-CCNC: 61 U/L (ref 50–136)
ALT SERPL-CCNC: 28 U/L (ref 12–65)
ANION GAP SERPL CALC-SCNC: 7 MMOL/L (ref 2–11)
AST SERPL-CCNC: 24 U/L (ref 15–37)
BILIRUB SERPL-MCNC: 0.5 MG/DL (ref 0.2–1.1)
BUN SERPL-MCNC: 26 MG/DL (ref 8–23)
CALCIUM SERPL-MCNC: 9.8 MG/DL (ref 8.3–10.4)
CHLORIDE SERPL-SCNC: 103 MMOL/L (ref 101–110)
CHOLEST SERPL-MCNC: 250 MG/DL
CO2 SERPL-SCNC: 29 MMOL/L (ref 21–32)
CREAT SERPL-MCNC: 0.9 MG/DL (ref 0.6–1)
GLOBULIN SER CALC-MCNC: 3.1 G/DL (ref 2.8–4.5)
GLUCOSE SERPL-MCNC: 78 MG/DL (ref 65–100)
HDLC SERPL-MCNC: 67 MG/DL (ref 40–60)
HDLC SERPL: 3.7 {RATIO}
LDLC SERPL CALC-MCNC: 163 MG/DL
POTASSIUM SERPL-SCNC: 4 MMOL/L (ref 3.5–5.1)
PROT SERPL-MCNC: 7.4 G/DL (ref 6.3–8.2)
SODIUM SERPL-SCNC: 139 MMOL/L (ref 133–143)
TRIGL SERPL-MCNC: 100 MG/DL (ref 35–150)
TSH, 3RD GENERATION: 1.42 UIU/ML (ref 0.36–3.74)
VLDLC SERPL CALC-MCNC: 20 MG/DL (ref 6–23)

## 2022-10-18 ENCOUNTER — OFFICE VISIT (OUTPATIENT)
Dept: FAMILY MEDICINE CLINIC | Facility: CLINIC | Age: 77
End: 2022-10-18
Payer: COMMERCIAL

## 2022-10-18 VITALS
SYSTOLIC BLOOD PRESSURE: 118 MMHG | WEIGHT: 169.2 LBS | BODY MASS INDEX: 28.89 KG/M2 | DIASTOLIC BLOOD PRESSURE: 74 MMHG | HEIGHT: 64 IN

## 2022-10-18 DIAGNOSIS — R92.8 ABNORMALITY OF LEFT BREAST ON SCREENING MAMMOGRAM: Primary | ICD-10-CM

## 2022-10-18 DIAGNOSIS — E03.4 HYPOTHYROIDISM DUE TO ACQUIRED ATROPHY OF THYROID: Primary | ICD-10-CM

## 2022-10-18 DIAGNOSIS — I10 PRIMARY HYPERTENSION: ICD-10-CM

## 2022-10-18 DIAGNOSIS — E78.00 PURE HYPERCHOLESTEROLEMIA: ICD-10-CM

## 2022-10-18 PROCEDURE — 1123F ACP DISCUSS/DSCN MKR DOCD: CPT | Performed by: FAMILY MEDICINE

## 2022-10-18 PROCEDURE — 99213 OFFICE O/P EST LOW 20 MIN: CPT | Performed by: FAMILY MEDICINE

## 2022-10-18 ASSESSMENT — PATIENT HEALTH QUESTIONNAIRE - PHQ9
1. LITTLE INTEREST OR PLEASURE IN DOING THINGS: 0
SUM OF ALL RESPONSES TO PHQ9 QUESTIONS 1 & 2: 0
SUM OF ALL RESPONSES TO PHQ QUESTIONS 1-9: 0
2. FEELING DOWN, DEPRESSED OR HOPELESS: 0
SUM OF ALL RESPONSES TO PHQ QUESTIONS 1-9: 0

## 2022-10-18 NOTE — PROGRESS NOTES
SUBJECTIVE:   Percy Kennedy is a 78 y.o. female who has past medical history significant for hypertension, high cholesterol, migraines followed by neurology, hypothyroidism, breast cancer, insomnia and DJD. Review of systems reveals no complaints of chest pain, shortness of breath, orthopnea or PND. GI and  review of systems is unremarkable. Current medications are listed in the EMR and reviewed today. HPI  See above    Past Medical History, Past Surgical History, Family history, Social History, and Medications were all reviewed with the patient today and updated as necessary. Current Outpatient Medications   Medication Sig Dispense Refill    temazepam (RESTORIL) 30 MG capsule Take 1 capsule by mouth nightly as needed for Sleep for up to 180 days. TAKE ONE CAPSULE BY MOUTH IN THE EVENING AS NEEDED FOR SLEEP 90 capsule 1    SUMAtriptan (IMITREX) 100 MG tablet Take 100 mg by mouth daily as needed for Migraine      APPLE CIDER VINEGAR PO Take by mouth      TURMERIC PO Take by mouth      acetaminophen (TYLENOL) 500 MG tablet Take 1,000 mg by mouth every 6 hours as needed      aspirin-acetaminophen-caffeine (EXCEDRIN MIGRAINE) 250-250-65 MG per tablet Take 1 tablet by mouth      bromfenac (PROLENSA) 0.07 % SOLN INSTILL ONE DROP INTO THE SURGICAL EYE ONCE A DAY . BEGIN 3 DAYS BEFORE SURGERY AND CONTINUE UNTIL BOTTLE IS EMPTY.      esomeprazole (NEXIUM) 20 MG delayed release capsule Take 20 mg by mouth      famotidine (PEPCID) 40 MG tablet Take 40 mg by mouth every evening      hydroCHLOROthiazide (HYDRODIURIL) 25 MG tablet TAKE ONE TABLET BY MOUTH ONE TIME DAILY      ibuprofen (ADVIL;MOTRIN) 200 MG tablet Take by mouth as needed      levothyroxine (SYNTHROID) 25 MCG tablet TAKE ONE TABLET BY MOUTH EVERY MORNING BEFORE BREAKFAST      olopatadine (PATANASE) 0.6 % SOLN nassl soln INSTILL 2 SPRAYS INTO EACH NOSTRIL TWO TIMES A DAY       No current facility-administered medications for this visit. Allergies   Allergen Reactions    Lac Bovis Other (See Comments)     Patient Active Problem List   Diagnosis    GERD (gastroesophageal reflux disease)    Status post total right knee replacement    Dyspnea    Migraine    Hypothyroid    Status post total left knee replacement    Anemia    Hypomagnesemia    Malignant neoplasm of left breast (HCC)    Hypertension    Pulmonary infiltrates     Past Medical History:   Diagnosis Date    Anxiety     Arthritis     back and knee's     Breast cancer ([facility] [address] 01/18/2016    Left- s/p Lumpectomy    Chronic pain     back    Former smoker     was a passive smoker for 40 yrs; stopped in 2014    GERD (gastroesophageal reflux disease)     nexium    Hard of hearing     bilateral hearing aids    Headache 2/21/2022    Headache     hx monthly migraines; rare now    Hypertension     controlled with med    Hypokalemia 2/21/2022    Hypothyroidism     managed with medication    Insomnia     Melanoma ([facility]     left face    Speech abnormality 2/21/2022    Status post total left knee replacement 5/12/2017    Status post total right knee replacement 1/24/2018     Past Surgical History:   Procedure Laterality Date    BREAST BIOPSY Left 1/18/2016    BREAST NEEDLE LOCALIZED PARTIAL MASTECTOMY/ LEFT //   performed by Elvia Lainez MD at [address] [place] [address] LUMPECTOMY Left 01/2016    BREAST SURGERY Left 7/2015    biopsy    COLONOSCOPY      COLONOSCOPY N/A 8/29/2018    COLONOSCOPY / BMI=30  performed by Elvia Lainez MD at [place] [address]  8/29/2018         MASTECTOMY, PARTIAL Left 01/18/2016    TONSILLECTOMY  1957    TOTAL KNEE ARTHROPLASTY Right 01/24/2018    TOTAL KNEE ARTHROPLASTY Left 05/12/2017    VASCULAR SURGERY Right     port right upper chest- removed 2016    WISDOM TOOTH EXTRACTION       Family History   Problem Relation Age of Onset    Heart Disease Father     Stroke Mother     Stroke Sister     Cancer Mother         leukemia Hypertension Father     Breast Cancer Neg Hx     Asthma Paternal Grandfather     Hypertension Sister     Hypertension Mother      Social History     Tobacco Use    Smoking status: Former     Types: Cigarettes     Quit date: 2014     Years since quittin.7    Smokeless tobacco: Never    Tobacco comments:     Quit smoking: was a passive smoker for 40 yrs   Substance Use Topics    Alcohol use: Yes     Alcohol/week: 0.0 standard drinks         Review of Systems  See above    OBJECTIVE:  /74   Ht 5' 4\" (1.626 m)   Wt 169 lb 3.2 oz (76.7 kg)   BMI 29.04 kg/m²      Physical Exam  Constitutional:       General: She is not in acute distress. Appearance: Normal appearance. She is not ill-appearing. HENT:      Head: Normocephalic and atraumatic. Cardiovascular:      Rate and Rhythm: Normal rate and regular rhythm. Heart sounds: Normal heart sounds. No murmur heard. Pulmonary:      Effort: Pulmonary effort is normal.      Breath sounds: Normal breath sounds. No wheezing or rhonchi. Musculoskeletal:         General: Normal range of motion. Cervical back: Normal range of motion and neck supple. Right lower leg: No edema. Left lower leg: No edema. Skin:     General: Skin is warm. Findings: No rash. Neurological:      Mental Status: She is alert and oriented to person, place, and time. Psychiatric:         Mood and Affect: Mood normal.         Behavior: Behavior normal.         Thought Content: Thought content normal.         Judgment: Judgment normal.       Medical problems and test results were reviewed with the patient today. ASSESSMENT and PLAN    1. Hypothyroidism. TSH is 1.4. Continue current dose of Synthroid. 2.  High cholesterol. . Previously 126. Refocus on dietary management which the patient has acknowledges poor. 3.  Hypertension. /74. Renal function and electrolytes are normal.  Continue current therapy.     Elements of this note have been dictated using speech recognition software. As a result, errors of speech recognition may have occurred.

## 2023-01-03 ENCOUNTER — TELEPHONE (OUTPATIENT)
Dept: FAMILY MEDICINE CLINIC | Facility: CLINIC | Age: 78
End: 2023-01-03

## 2023-01-04 RX ORDER — HYDROCHLOROTHIAZIDE 25 MG/1
25 TABLET ORAL DAILY
Qty: 90 TABLET | Refills: 3 | Status: SHIPPED | OUTPATIENT
Start: 2023-01-04

## 2023-01-25 ENCOUNTER — HOSPITAL ENCOUNTER (OUTPATIENT)
Dept: MAMMOGRAPHY | Age: 78
Discharge: HOME | End: 2023-01-28
Payer: MEDICARE

## 2023-01-25 DIAGNOSIS — R92.8 ABNORMALITY OF LEFT BREAST ON SCREENING MAMMOGRAM: ICD-10-CM

## 2023-01-25 PROCEDURE — 77066 DX MAMMO INCL CAD BI: CPT

## 2023-02-03 DIAGNOSIS — G47.00 INSOMNIA, UNSPECIFIED TYPE: ICD-10-CM

## 2023-02-03 RX ORDER — TEMAZEPAM 30 MG/1
30 CAPSULE ORAL NIGHTLY PRN
Qty: 90 CAPSULE | Refills: 1 | Status: SHIPPED | OUTPATIENT
Start: 2023-02-03 | End: 2023-08-02

## 2023-02-03 NOTE — TELEPHONE ENCOUNTER
Patient is going out of town soon but the pharmacist says she cant get a refill on her temazapam because it will be too early but her doctor could send a new prescription in. This didn't make sense to me. Can you look into this and see how we can help. She also states that she can only get 30 supply.

## 2023-02-03 NOTE — TELEPHONE ENCOUNTER
Spoke with pharmacy, pt is leaving to go out of town on the 3rd of March and rx is not due to be filled until the 6th. Pharmacy will need a new rx and approval for early fill for medication. Pt is going to [place] and there is not a [facility].

## 2023-02-08 ENCOUNTER — OFFICE VISIT (OUTPATIENT)
Dept: ORTHOPEDIC SURGERY | Age: 78
End: 2023-02-08
Payer: MEDICARE

## 2023-02-08 VITALS — WEIGHT: 169 LBS | HEIGHT: 64 IN | BODY MASS INDEX: 28.85 KG/M2

## 2023-02-08 DIAGNOSIS — I83.813 VARICOSE VEINS OF BILATERAL LOWER EXTREMITIES WITH PAIN: ICD-10-CM

## 2023-02-08 DIAGNOSIS — Z96.652 STATUS POST TOTAL LEFT KNEE REPLACEMENT: Primary | ICD-10-CM

## 2023-02-08 PROCEDURE — 1090F PRES/ABSN URINE INCON ASSESS: CPT | Performed by: PHYSICIAN ASSISTANT

## 2023-02-08 PROCEDURE — G8427 DOCREV CUR MEDS BY ELIG CLIN: HCPCS | Performed by: PHYSICIAN ASSISTANT

## 2023-02-08 PROCEDURE — G8484 FLU IMMUNIZE NO ADMIN: HCPCS | Performed by: PHYSICIAN ASSISTANT

## 2023-02-08 PROCEDURE — G8417 CALC BMI ABV UP PARAM F/U: HCPCS | Performed by: PHYSICIAN ASSISTANT

## 2023-02-08 PROCEDURE — 1123F ACP DISCUSS/DSCN MKR DOCD: CPT | Performed by: PHYSICIAN ASSISTANT

## 2023-02-08 PROCEDURE — G8400 PT W/DXA NO RESULTS DOC: HCPCS | Performed by: PHYSICIAN ASSISTANT

## 2023-02-08 PROCEDURE — 1036F TOBACCO NON-USER: CPT | Performed by: PHYSICIAN ASSISTANT

## 2023-02-08 PROCEDURE — 99213 OFFICE O/P EST LOW 20 MIN: CPT | Performed by: PHYSICIAN ASSISTANT

## 2023-02-08 RX ORDER — RIZATRIPTAN BENZOATE 10 MG/1
TABLET ORAL
COMMUNITY
Start: 2022-12-12

## 2023-02-08 NOTE — PROGRESS NOTES
Name: Dorys Son  YOB: 1945  Gender: female  MRN: [id number]    CC: No chief complaint on file. HPI: Dorys Son is a 70 y.o. female with a PMHx of hypothyroidism and migraines  here for evaluation left knee pain status post left TKA which was performed by Dr. Jackie Mckeon in 2017. she has done well in the postoperative period thus far.  she complains of lateral knee pain over the proximal tibia that is intermittent in nature depending on her activity level over the last 2 months. she describes the pain as dull, aching, and throbbing as well as intermittent swelling  The pain is worse with inactivity and walking  The pain does not radiate down the leg. she denies numbness and tingling down the leg. She denies any instability, redness, warmth, drainage over the knee. She denies any recent fever, chills, nausea, vomiting. Treatment so far has been activity modification and Aleve  with little relief. Allergies   Allergen Reactions    Lac Bovis Other (See Comments)       Review of Systems:  As per HPI. Pertinent positives and negatives are addressed with the patient, particularly those related to musculoskeletal concerns. Non-orthopaedic concerns were referred back to the primary care physician. PE:  left Knee  Patient is comfortable and in no distress. The lower extremities are as described below. Circulation is normal with palpable pedal pulses bilaterally and no edema. There is no lymph adenopathy in the popliteal or malleolar region. The skin is without stasis disease distally bilaterally. Sensation is intact to light touch bilaterally.   There is mild tenderness to palpation over the anterolateral proximal tibia over varicose veins  There is no tenderness over the joint line of the left knee(s)  The gait is noted to be normal  ROM: 0 to 120 degrees  AP translation: 4 mm  M-L laxity: 2 degrees  Alignment: 4 degrees of valgus  Quadriceps strength: excellent  Gait: no limp  Incision: well healed    Radiographs: AP/Lateral and sunrise of the left knee taken in the office today reveal a good bone, prosthetic appearance. The patella is balanced. No fractures or lucencies noted. Radiographic Diagnosis:  Stable total knee arthroplasty. Recommendations:  Reviewed x-ray findings with the patient. I see no acute pathology on exam or imaging today regarding the knee. Her implant appears stable. Her symptoms are dependent on activity and her tenderness on exam is consistent with varicose veins. I recommend a referral to the vein clinic for further evaluation and treatment as well as compression socks/stockings in the meantime. Continue activity as tolerated. Normal lifetime restrictions as discussed. Appropriate activities for strengthening and conditioning were reviewed. Return appointment in 2 to 3 years for follow up and x-rays.           WARREN Alba

## 2023-03-13 RX ORDER — LEVOTHYROXINE SODIUM 0.03 MG/1
25 TABLET ORAL DAILY
Qty: 100 TABLET | Refills: 2 | Status: SHIPPED | OUTPATIENT
Start: 2023-03-13

## 2023-03-15 DIAGNOSIS — G43.709 CHRONIC MIGRAINE W/O AURA W/O STATUS MIGRAINOSUS, NOT INTRACTABLE: Primary | ICD-10-CM

## 2023-03-16 RX ORDER — RIZATRIPTAN BENZOATE 10 MG/1
10 TABLET ORAL
Qty: 9 TABLET | Refills: 5 | Status: SHIPPED | OUTPATIENT
Start: 2023-03-16 | End: 2023-03-16

## 2023-03-28 ENCOUNTER — TELEPHONE (OUTPATIENT)
Dept: NEUROLOGY | Age: 78
End: 2023-03-28

## 2023-03-28 DIAGNOSIS — G43.709 CHRONIC MIGRAINE W/O AURA W/O STATUS MIGRAINOSUS, NOT INTRACTABLE: Primary | ICD-10-CM

## 2023-03-28 NOTE — TELEPHONE ENCOUNTER
Patient called saying that she is having a migraine every other day. Wants to know what she should do.

## 2023-03-29 RX ORDER — AMITRIPTYLINE HYDROCHLORIDE 25 MG/1
25 TABLET, FILM COATED ORAL NIGHTLY
Qty: 30 TABLET | Refills: 5 | Status: SHIPPED | OUTPATIENT
Start: 2023-03-29

## 2023-03-29 NOTE — TELEPHONE ENCOUNTER
Spoke to patient. Worsening headaches over the last several weeks, possibly attributed to allergies but not clear. Starting amitriptyline 25 mg at night for prevention. She is currently taking Maxalt as an abortive and will continue this.

## 2023-06-20 ENCOUNTER — OFFICE VISIT (OUTPATIENT)
Dept: FAMILY MEDICINE CLINIC | Facility: CLINIC | Age: 78
End: 2023-06-20
Payer: MEDICARE

## 2023-06-20 VITALS
SYSTOLIC BLOOD PRESSURE: 128 MMHG | HEIGHT: 64 IN | DIASTOLIC BLOOD PRESSURE: 70 MMHG | WEIGHT: 169.8 LBS | BODY MASS INDEX: 28.99 KG/M2

## 2023-06-20 DIAGNOSIS — Z00.00 MEDICARE ANNUAL WELLNESS VISIT, SUBSEQUENT: Primary | ICD-10-CM

## 2023-06-20 DIAGNOSIS — Z78.0 MENOPAUSE: ICD-10-CM

## 2023-06-20 DIAGNOSIS — H69.83 CHRONIC EUSTACHIAN TUBE DYSFUNCTION, BILATERAL: ICD-10-CM

## 2023-06-20 DIAGNOSIS — C50.912 MALIGNANT NEOPLASM OF LEFT FEMALE BREAST, UNSPECIFIED ESTROGEN RECEPTOR STATUS, UNSPECIFIED SITE OF BREAST (HCC): ICD-10-CM

## 2023-06-20 DIAGNOSIS — R42 DIZZY: ICD-10-CM

## 2023-06-20 DIAGNOSIS — M19.90 OSTEOARTHRITIS, UNSPECIFIED OSTEOARTHRITIS TYPE, UNSPECIFIED SITE: ICD-10-CM

## 2023-06-20 DIAGNOSIS — J30.9 ALLERGIC RHINITIS, UNSPECIFIED SEASONALITY, UNSPECIFIED TRIGGER: ICD-10-CM

## 2023-06-20 DIAGNOSIS — G47.00 INSOMNIA, UNSPECIFIED TYPE: ICD-10-CM

## 2023-06-20 DIAGNOSIS — G43.709 CHRONIC MIGRAINE WITHOUT AURA WITHOUT STATUS MIGRAINOSUS, NOT INTRACTABLE: ICD-10-CM

## 2023-06-20 DIAGNOSIS — I10 PRIMARY HYPERTENSION: ICD-10-CM

## 2023-06-20 DIAGNOSIS — R09.81 HEAD CONGESTION: ICD-10-CM

## 2023-06-20 DIAGNOSIS — E78.00 PURE HYPERCHOLESTEROLEMIA: ICD-10-CM

## 2023-06-20 DIAGNOSIS — E03.4 HYPOTHYROIDISM DUE TO ACQUIRED ATROPHY OF THYROID: ICD-10-CM

## 2023-06-20 PROCEDURE — 1036F TOBACCO NON-USER: CPT | Performed by: FAMILY MEDICINE

## 2023-06-20 PROCEDURE — G8417 CALC BMI ABV UP PARAM F/U: HCPCS | Performed by: FAMILY MEDICINE

## 2023-06-20 PROCEDURE — G8399 PT W/DXA RESULTS DOCUMENT: HCPCS | Performed by: FAMILY MEDICINE

## 2023-06-20 PROCEDURE — 3078F DIAST BP <80 MM HG: CPT | Performed by: FAMILY MEDICINE

## 2023-06-20 PROCEDURE — 99213 OFFICE O/P EST LOW 20 MIN: CPT | Performed by: FAMILY MEDICINE

## 2023-06-20 PROCEDURE — 36415 COLL VENOUS BLD VENIPUNCTURE: CPT | Performed by: FAMILY MEDICINE

## 2023-06-20 PROCEDURE — 3074F SYST BP LT 130 MM HG: CPT | Performed by: FAMILY MEDICINE

## 2023-06-20 PROCEDURE — 1090F PRES/ABSN URINE INCON ASSESS: CPT | Performed by: FAMILY MEDICINE

## 2023-06-20 PROCEDURE — G8427 DOCREV CUR MEDS BY ELIG CLIN: HCPCS | Performed by: FAMILY MEDICINE

## 2023-06-20 PROCEDURE — 1123F ACP DISCUSS/DSCN MKR DOCD: CPT | Performed by: FAMILY MEDICINE

## 2023-06-20 PROCEDURE — G0439 PPPS, SUBSEQ VISIT: HCPCS | Performed by: FAMILY MEDICINE

## 2023-06-20 SDOH — ECONOMIC STABILITY: FOOD INSECURITY: WITHIN THE PAST 12 MONTHS, YOU WORRIED THAT YOUR FOOD WOULD RUN OUT BEFORE YOU GOT MONEY TO BUY MORE.: NEVER TRUE

## 2023-06-20 SDOH — ECONOMIC STABILITY: FOOD INSECURITY: WITHIN THE PAST 12 MONTHS, THE FOOD YOU BOUGHT JUST DIDN'T LAST AND YOU DIDN'T HAVE MONEY TO GET MORE.: NEVER TRUE

## 2023-06-20 SDOH — ECONOMIC STABILITY: INCOME INSECURITY: HOW HARD IS IT FOR YOU TO PAY FOR THE VERY BASICS LIKE FOOD, HOUSING, MEDICAL CARE, AND HEATING?: NOT HARD AT ALL

## 2023-06-20 SDOH — ECONOMIC STABILITY: HOUSING INSECURITY
IN THE LAST 12 MONTHS, WAS THERE A TIME WHEN YOU DID NOT HAVE A STEADY PLACE TO SLEEP OR SLEPT IN A SHELTER (INCLUDING NOW)?: NO

## 2023-06-20 ASSESSMENT — PATIENT HEALTH QUESTIONNAIRE - PHQ9
SUM OF ALL RESPONSES TO PHQ QUESTIONS 1-9: 0
SUM OF ALL RESPONSES TO PHQ9 QUESTIONS 1 & 2: 0
SUM OF ALL RESPONSES TO PHQ QUESTIONS 1-9: 0
1. LITTLE INTEREST OR PLEASURE IN DOING THINGS: 0
2. FEELING DOWN, DEPRESSED OR HOPELESS: 0

## 2023-06-20 ASSESSMENT — LIFESTYLE VARIABLES
HOW MANY STANDARD DRINKS CONTAINING ALCOHOL DO YOU HAVE ON A TYPICAL DAY: 1 OR 2
HOW OFTEN DO YOU HAVE A DRINK CONTAINING ALCOHOL: 2-4 TIMES A MONTH

## 2023-06-20 NOTE — PROGRESS NOTES
Medicare Annual Wellness Visit    Ricardo Eduardo is here for Medicare AWV (subs)    Assessment & Plan   Medicare annual wellness visit, subsequent  Recommendations for Preventive Services Due: see orders and patient instructions/AVS.  Recommended screening schedule for the next 5-10 years is provided to the patient in written form: see Patient Instructions/AVS.     No follow-ups on file. Subjective   who has past medical history significant for hypertension, high cholesterol, migraines followed by neurology, hypothyroidism, breast cancer, insomnia and DJD. Patient's complete Health Risk Assessment and screening values have been reviewed and are found in Flowsheets. The following problems were reviewed today and where indicated follow up appointments were made and/or referrals ordered. Positive Risk Factor Screenings with Interventions:       Cognitive: Words recalled: 2 Words Recalled           Total Score Interpretation: Abnormal Mini-Cog      Interventions:  Patient declines any further evaluation or treatment                 Safety:  Do you have either shower bars, grab bars, non-slip mats or non-slip surfaces in your shower or bathtub?: (!) No  Interventions:  Patient declined any further interventions or treatment                     Objective   Vitals:    06/20/23 1521   BP: 128/70   Weight: 169 lb 12.8 oz (77 kg)   Height: 5' 4\" (1.626 m)      Body mass index is 29.15 kg/m².         General Appearance: alert and oriented to person, place and time, well developed and well- nourished, in no acute distress  Skin: warm and dry, no rash or erythema  Head: normocephalic and atraumatic  Eyes: pupils equal, round, and reactive to light, extraocular eye movements intact, conjunctivae normal  ENT: tympanic membrane, external ear and ear canal normal bilaterally, nose without deformity, nasal mucosa and turbinates normal without polyps  Neck: supple and non-tender without mass, no thyromegaly or thyroid

## 2023-06-20 NOTE — PROGRESS NOTES
SUBJECTIVE:   Court Beth is a 70 y.o. female who has past medical history significant for hypertension, high cholesterol, migraines followed by neurology, hypothyroidism, breast cancer, insomnia and DJD. Patient reports that for 1 week she has been experiencing head congestion, vertigo with head movement and fullness in her ears. She went to an urgent care center last week and was given some prednisone and antibiotics. She reports no improvement in symptoms. She has been noncompliant with her allergy medicines which are supposed to be Nasacort and Mucinex daily. Patient reports no active chest pain, shortness of breath, orthopnea or PND. GI and  review of systems is unremarkable. HPI  See above    Past Medical History, Past Surgical History, Family history, Social History, and Medications were all reviewed with the patient today and updated as necessary. Current Outpatient Medications   Medication Sig Dispense Refill    amitriptyline (ELAVIL) 25 MG tablet Take 1 tablet by mouth nightly 30 tablet 5    rizatriptan (MAXALT) 10 MG tablet Take 1 tablet by mouth once as needed for Migraine 9 tablet 5    levothyroxine (SYNTHROID) 25 MCG tablet Take 1 tablet by mouth Daily 100 tablet 2    temazepam (RESTORIL) 30 MG capsule Take 1 capsule by mouth nightly as needed for Sleep for up to 180 days. TAKE ONE CAPSULE BY MOUTH IN THE EVENING AS NEEDED FOR SLEEP 90 capsule 1    hydroCHLOROthiazide (HYDRODIURIL) 25 MG tablet Take 1 tablet by mouth daily 90 tablet 3    APPLE CIDER VINEGAR PO Take by mouth      TURMERIC PO Take by mouth      acetaminophen (TYLENOL) 500 MG tablet Take 2 tablets by mouth every 6 hours as needed      aspirin-acetaminophen-caffeine (EXCEDRIN MIGRAINE) 250-250-65 MG per tablet Take 1 tablet by mouth      bromfenac (PROLENSA) 0.07 % SOLN INSTILL ONE DROP INTO THE SURGICAL EYE ONCE A DAY .  BEGIN 3 DAYS BEFORE SURGERY AND CONTINUE UNTIL BOTTLE IS EMPTY.      esomeprazole ([employer]) 20

## 2023-06-21 LAB
ALBUMIN SERPL-MCNC: 4 G/DL (ref 3.2–4.6)
ALBUMIN/GLOB SERPL: 1.2 (ref 0.4–1.6)
ALP SERPL-CCNC: 71 U/L (ref 50–136)
ALT SERPL-CCNC: 30 U/L (ref 12–65)
ANION GAP SERPL CALC-SCNC: 6 MMOL/L (ref 2–11)
AST SERPL-CCNC: 22 U/L (ref 15–37)
BASOPHILS # BLD: 0.1 K/UL (ref 0–0.2)
BASOPHILS NFR BLD: 1 % (ref 0–2)
BILIRUB SERPL-MCNC: 0.3 MG/DL (ref 0.2–1.1)
BUN SERPL-MCNC: 31 MG/DL (ref 8–23)
CALCIUM SERPL-MCNC: 9.9 MG/DL (ref 8.3–10.4)
CHLORIDE SERPL-SCNC: 101 MMOL/L (ref 101–110)
CHOLEST SERPL-MCNC: 191 MG/DL
CO2 SERPL-SCNC: 30 MMOL/L (ref 21–32)
CREAT SERPL-MCNC: 1.1 MG/DL (ref 0.6–1)
DIFFERENTIAL METHOD BLD: ABNORMAL
EOSINOPHIL # BLD: 0.2 K/UL (ref 0–0.8)
EOSINOPHIL NFR BLD: 3 % (ref 0.5–7.8)
ERYTHROCYTE [DISTWIDTH] IN BLOOD BY AUTOMATED COUNT: 13.4 % (ref 11.9–14.6)
GLOBULIN SER CALC-MCNC: 3.3 G/DL (ref 2.8–4.5)
GLUCOSE SERPL-MCNC: 95 MG/DL (ref 65–100)
HCT VFR BLD AUTO: 44.6 % (ref 35.8–46.3)
HDLC SERPL-MCNC: 72 MG/DL (ref 40–60)
HDLC SERPL: 2.7
HGB BLD-MCNC: 14.1 G/DL (ref 11.7–15.4)
IMM GRANULOCYTES # BLD AUTO: 0 K/UL (ref 0–0.5)
IMM GRANULOCYTES NFR BLD AUTO: 0 % (ref 0–5)
LDLC SERPL CALC-MCNC: 85.4 MG/DL
LYMPHOCYTES # BLD: 2 K/UL (ref 0.5–4.6)
LYMPHOCYTES NFR BLD: 21 % (ref 13–44)
MCH RBC QN AUTO: 29 PG (ref 26.1–32.9)
MCHC RBC AUTO-ENTMCNC: 31.6 G/DL (ref 31.4–35)
MCV RBC AUTO: 91.8 FL (ref 82–102)
MONOCYTES # BLD: 1.2 K/UL (ref 0.1–1.3)
MONOCYTES NFR BLD: 13 % (ref 4–12)
NEUTS SEG # BLD: 6 K/UL (ref 1.7–8.2)
NEUTS SEG NFR BLD: 62 % (ref 43–78)
NRBC # BLD: 0 K/UL (ref 0–0.2)
PLATELET # BLD AUTO: 219 K/UL (ref 150–450)
PMV BLD AUTO: 12.7 FL (ref 9.4–12.3)
POTASSIUM SERPL-SCNC: 4 MMOL/L (ref 3.5–5.1)
PROT SERPL-MCNC: 7.3 G/DL (ref 6.3–8.2)
RBC # BLD AUTO: 4.86 M/UL (ref 4.05–5.2)
SODIUM SERPL-SCNC: 137 MMOL/L (ref 133–143)
TRIGL SERPL-MCNC: 168 MG/DL (ref 35–150)
TSH, 3RD GENERATION: 2.21 UIU/ML (ref 0.36–3.74)
VLDLC SERPL CALC-MCNC: 33.6 MG/DL (ref 6–23)
WBC # BLD AUTO: 9.5 K/UL (ref 4.3–11.1)

## 2023-07-11 ENCOUNTER — HOSPITAL ENCOUNTER (OUTPATIENT)
Dept: MAMMOGRAPHY | Age: 78
Discharge: HOME | End: 2023-07-14
Attending: FAMILY MEDICINE
Payer: MEDICARE

## 2023-07-11 DIAGNOSIS — Z78.0 MENOPAUSE: ICD-10-CM

## 2023-07-11 PROCEDURE — 77080 DXA BONE DENSITY AXIAL: CPT

## 2023-08-01 ENCOUNTER — OFFICE VISIT (OUTPATIENT)
Dept: FAMILY MEDICINE CLINIC | Facility: CLINIC | Age: 78
End: 2023-08-01
Payer: MEDICARE

## 2023-08-01 VITALS
DIASTOLIC BLOOD PRESSURE: 80 MMHG | WEIGHT: 169.2 LBS | HEIGHT: 64 IN | BODY MASS INDEX: 28.89 KG/M2 | SYSTOLIC BLOOD PRESSURE: 118 MMHG

## 2023-08-01 DIAGNOSIS — E78.1 PURE HYPERGLYCERIDEMIA: ICD-10-CM

## 2023-08-01 DIAGNOSIS — E03.4 HYPOTHYROIDISM DUE TO ACQUIRED ATROPHY OF THYROID: ICD-10-CM

## 2023-08-01 DIAGNOSIS — G47.00 INSOMNIA, UNSPECIFIED TYPE: ICD-10-CM

## 2023-08-01 DIAGNOSIS — I10 PRIMARY HYPERTENSION: ICD-10-CM

## 2023-08-01 DIAGNOSIS — E78.00 PURE HYPERCHOLESTEROLEMIA: Primary | ICD-10-CM

## 2023-08-01 DIAGNOSIS — G43.709 CHRONIC MIGRAINE WITHOUT AURA WITHOUT STATUS MIGRAINOSUS, NOT INTRACTABLE: ICD-10-CM

## 2023-08-01 PROCEDURE — 1036F TOBACCO NON-USER: CPT | Performed by: FAMILY MEDICINE

## 2023-08-01 PROCEDURE — 3079F DIAST BP 80-89 MM HG: CPT | Performed by: FAMILY MEDICINE

## 2023-08-01 PROCEDURE — G8427 DOCREV CUR MEDS BY ELIG CLIN: HCPCS | Performed by: FAMILY MEDICINE

## 2023-08-01 PROCEDURE — 99213 OFFICE O/P EST LOW 20 MIN: CPT | Performed by: FAMILY MEDICINE

## 2023-08-01 PROCEDURE — 1090F PRES/ABSN URINE INCON ASSESS: CPT | Performed by: FAMILY MEDICINE

## 2023-08-01 PROCEDURE — 1123F ACP DISCUSS/DSCN MKR DOCD: CPT | Performed by: FAMILY MEDICINE

## 2023-08-01 PROCEDURE — G8399 PT W/DXA RESULTS DOCUMENT: HCPCS | Performed by: FAMILY MEDICINE

## 2023-08-01 PROCEDURE — G8417 CALC BMI ABV UP PARAM F/U: HCPCS | Performed by: FAMILY MEDICINE

## 2023-08-01 PROCEDURE — 3074F SYST BP LT 130 MM HG: CPT | Performed by: FAMILY MEDICINE

## 2023-08-01 RX ORDER — TEMAZEPAM 30 MG/1
30 CAPSULE ORAL NIGHTLY PRN
Qty: 90 CAPSULE | Refills: 1 | Status: SHIPPED | OUTPATIENT
Start: 2023-08-01 | End: 2024-01-28

## 2023-08-01 ASSESSMENT — PATIENT HEALTH QUESTIONNAIRE - PHQ9
SUM OF ALL RESPONSES TO PHQ QUESTIONS 1-9: 0
2. FEELING DOWN, DEPRESSED OR HOPELESS: 0
SUM OF ALL RESPONSES TO PHQ9 QUESTIONS 1 & 2: 0
1. LITTLE INTEREST OR PLEASURE IN DOING THINGS: 0

## 2023-08-01 NOTE — PROGRESS NOTES
SUBJECTIVE:   Mia Lopez is a 70 y.o. female who has past medical history significant for hypertension, high cholesterol, migraines followed by neurology, hypothyroidism, breast cancer, insomnia and DJD. Review of systems reveals no complaints of chest pain, shortness of breath, orthopnea or PND. GI and  review of systems is unremarkable. Current medicines listed in the EMR and reviewed today. HPI  See above    Past Medical History, Past Surgical History, Family history, Social History, and Medications were all reviewed with the patient today and updated as necessary. Current Outpatient Medications   Medication Sig Dispense Refill    amitriptyline (ELAVIL) 25 MG tablet Take 1 tablet by mouth nightly 30 tablet 5    rizatriptan (MAXALT) 10 MG tablet Take 1 tablet by mouth once as needed for Migraine 9 tablet 5    levothyroxine (SYNTHROID) 25 MCG tablet Take 1 tablet by mouth Daily 100 tablet 2    temazepam (RESTORIL) 30 MG capsule Take 1 capsule by mouth nightly as needed for Sleep for up to 180 days. TAKE ONE CAPSULE BY MOUTH IN THE EVENING AS NEEDED FOR SLEEP 90 capsule 1    hydroCHLOROthiazide (HYDRODIURIL) 25 MG tablet Take 1 tablet by mouth daily 90 tablet 3    APPLE CIDER VINEGAR PO Take by mouth      TURMERIC PO Take by mouth      acetaminophen (TYLENOL) 500 MG tablet Take 2 tablets by mouth every 6 hours as needed      aspirin-acetaminophen-caffeine (EXCEDRIN MIGRAINE) 250-250-65 MG per tablet Take 1 tablet by mouth      esomeprazole (NEXIUM) 20 MG delayed release capsule Take 1 capsule by mouth      famotidine (PEPCID) 40 MG tablet Take 1 tablet by mouth every evening      ibuprofen (ADVIL;MOTRIN) 200 MG tablet Take by mouth as needed      olopatadine (PATANASE) 0.6 % SOLN nassl soln INSTILL 2 SPRAYS INTO EACH NOSTRIL TWO TIMES A DAY      bromfenac (PROLENSA) 0.07 % SOLN INSTILL ONE DROP INTO THE SURGICAL EYE ONCE A DAY . BEGIN 3 DAYS BEFORE SURGERY AND CONTINUE UNTIL BOTTLE IS EMPTY.

## 2023-10-03 ENCOUNTER — TELEPHONE (OUTPATIENT)
Dept: NEUROLOGY | Age: 78
End: 2023-10-03

## 2023-10-03 NOTE — TELEPHONE ENCOUNTER
Pt called and stated that she has been having horrible migraines and is hoping to be prescribed a medication for relief.

## 2023-10-19 NOTE — PROGRESS NOTES
temporalis  Respiratory: non-labored respirations, without cough/audible wheeze    Neurological:  Mental Status: Alert and oriented x 3  Cranial Nerves:   I- deferred  II, III, IV, VI-Pupils equal, round and reactive to light. Extraocular movements intact. Visual fields full. V-Facial sensation intact to light touch V1-V3  VII-Face symmetric  VIII-Hearing intact to finger rub  IX-deferred  X, XII-Speech clear and fluent. Tongue midline  XI-Shoulder shrug strong and symmetric    Sensation: intact to light touch in bilateral upper and lower extremities  Strength: normal tone. 5/5 strength in bilateral deltoids, biceps, triceps, interossei, hip flexors, knee extensors, knee flexors. Without tremor. Cerebellar function: Finger-to-nose bilateral upper extremities intact. Negative Romberg  Reflexes: deep tendon reflexes- 2+ bilateral biceps, brachioradialis, triceps, patellar   Gait: casual and tandem steady      Assessment/ Plan:    1. Chronic migraine with aura without status migrainosus, not intractable  -     CBC with Auto Differential; Future  -     Comprehensive Metabolic Panel; Future  -     Hemoglobin A1C; Future  -     Vitamin B12; Future  -     Magnesium; Future  -     TSH; Future  -     C-Reactive Protein; Future  -     Sedimentation Rate; Future  -     MRI BRAIN W WO CONTRAST; Future  Today, the patient presents to the office for ongoing follow-up and management of headaches. Neurologic exam as noted above. Will obtain MRI of the brain in addition to lab work to further investigate potential underlying causes that may be contributing to her symptoms. Given the patient's age, medical history, and previous failed trials of multiple medications to manage headache symptoms would like to move forward with a trial of Botox injections. Patient is in agreement.     Counseled patient on the importance of consistency pertaining to the following: daily routine, sleep hygiene, adequate hydration, minimal

## 2023-10-20 ENCOUNTER — OFFICE VISIT (OUTPATIENT)
Dept: NEUROLOGY | Age: 78
End: 2023-10-20

## 2023-10-20 VITALS
HEART RATE: 84 BPM | DIASTOLIC BLOOD PRESSURE: 81 MMHG | WEIGHT: 170.4 LBS | SYSTOLIC BLOOD PRESSURE: 137 MMHG | HEIGHT: 64 IN | BODY MASS INDEX: 29.09 KG/M2 | OXYGEN SATURATION: 92 %

## 2023-10-20 DIAGNOSIS — G43.E09 CHRONIC MIGRAINE WITH AURA WITHOUT STATUS MIGRAINOSUS, NOT INTRACTABLE: ICD-10-CM

## 2023-10-20 DIAGNOSIS — G43.E09 CHRONIC MIGRAINE WITH AURA WITHOUT STATUS MIGRAINOSUS, NOT INTRACTABLE: Primary | ICD-10-CM

## 2023-10-20 LAB
BASOPHILS # BLD: 0 K/UL (ref 0–0.2)
BASOPHILS NFR BLD: 1 % (ref 0–2)
DIFFERENTIAL METHOD BLD: ABNORMAL
EOSINOPHIL # BLD: 0.1 K/UL (ref 0–0.8)
EOSINOPHIL NFR BLD: 1 % (ref 0.5–7.8)
ERYTHROCYTE [DISTWIDTH] IN BLOOD BY AUTOMATED COUNT: 13.4 % (ref 11.9–14.6)
ERYTHROCYTE [SEDIMENTATION RATE] IN BLOOD: 11 MM/HR (ref 0–30)
HCT VFR BLD AUTO: 45.5 % (ref 35.8–46.3)
HGB BLD-MCNC: 14.5 G/DL (ref 11.7–15.4)
IMM GRANULOCYTES # BLD AUTO: 0 K/UL (ref 0–0.5)
IMM GRANULOCYTES NFR BLD AUTO: 0 % (ref 0–5)
LYMPHOCYTES # BLD: 1.8 K/UL (ref 0.5–4.6)
LYMPHOCYTES NFR BLD: 23 % (ref 13–44)
MCH RBC QN AUTO: 28.8 PG (ref 26.1–32.9)
MCHC RBC AUTO-ENTMCNC: 31.9 G/DL (ref 31.4–35)
MCV RBC AUTO: 90.3 FL (ref 82–102)
MONOCYTES # BLD: 0.9 K/UL (ref 0.1–1.3)
MONOCYTES NFR BLD: 11 % (ref 4–12)
NEUTS SEG # BLD: 5.2 K/UL (ref 1.7–8.2)
NEUTS SEG NFR BLD: 64 % (ref 43–78)
NRBC # BLD: 0 K/UL (ref 0–0.2)
PLATELET # BLD AUTO: 219 K/UL (ref 150–450)
PMV BLD AUTO: 12.8 FL (ref 9.4–12.3)
RBC # BLD AUTO: 5.04 M/UL (ref 4.05–5.2)
WBC # BLD AUTO: 8.1 K/UL (ref 4.3–11.1)

## 2023-10-20 ASSESSMENT — PATIENT HEALTH QUESTIONNAIRE - PHQ9
SUM OF ALL RESPONSES TO PHQ9 QUESTIONS 1 & 2: 0
1. LITTLE INTEREST OR PLEASURE IN DOING THINGS: 0
2. FEELING DOWN, DEPRESSED OR HOPELESS: 0
SUM OF ALL RESPONSES TO PHQ QUESTIONS 1-9: 0

## 2023-10-20 ASSESSMENT — ENCOUNTER SYMPTOMS
SORE THROAT: 0
CHEST TIGHTNESS: 0
EYE PAIN: 0
ABDOMINAL PAIN: 0
COUGH: 0

## 2023-10-21 LAB
ALBUMIN SERPL-MCNC: 4.2 G/DL (ref 3.2–4.6)
ALBUMIN/GLOB SERPL: 1.1 (ref 0.4–1.6)
ALP SERPL-CCNC: 71 U/L (ref 50–136)
ALT SERPL-CCNC: 32 U/L (ref 12–65)
ANION GAP SERPL CALC-SCNC: 8 MMOL/L (ref 2–11)
AST SERPL-CCNC: 26 U/L (ref 15–37)
BILIRUB SERPL-MCNC: 0.3 MG/DL (ref 0.2–1.1)
BUN SERPL-MCNC: 36 MG/DL (ref 8–23)
CALCIUM SERPL-MCNC: 9.8 MG/DL (ref 8.3–10.4)
CHLORIDE SERPL-SCNC: 104 MMOL/L (ref 101–110)
CO2 SERPL-SCNC: 26 MMOL/L (ref 21–32)
CREAT SERPL-MCNC: 1.4 MG/DL (ref 0.6–1)
CRP SERPL-MCNC: <0.3 MG/DL (ref 0–0.9)
EST. AVERAGE GLUCOSE BLD GHB EST-MCNC: 117 MG/DL
GLOBULIN SER CALC-MCNC: 3.8 G/DL (ref 2.8–4.5)
GLUCOSE SERPL-MCNC: 93 MG/DL (ref 65–100)
HBA1C MFR BLD: 5.7 % (ref 4.8–5.6)
MAGNESIUM SERPL-MCNC: 2 MG/DL (ref 1.8–2.4)
POTASSIUM SERPL-SCNC: 3.5 MMOL/L (ref 3.5–5.1)
PROT SERPL-MCNC: 8 G/DL (ref 6.3–8.2)
SODIUM SERPL-SCNC: 138 MMOL/L (ref 133–143)
TSH, 3RD GENERATION: 1.37 UIU/ML (ref 0.36–3.74)
VIT B12 SERPL-MCNC: 1983 PG/ML (ref 193–986)

## 2023-10-26 ENCOUNTER — CLINICAL DOCUMENTATION (OUTPATIENT)
Dept: NEUROLOGY | Age: 78
End: 2023-10-26

## 2023-10-26 NOTE — PROGRESS NOTES
Called and spoke with the patient regarding lab results following her most recent office visit. Reviewed and discussed the results. No questions or concerns at this time. Upcoming office visit in early November with Dr. Pierre Duane.

## 2023-10-26 NOTE — PROGRESS NOTES
Called and spoke with the patient regarding her labs that were collected following her last office visit. No questions or concerns at this time. Upcoming appointment with Dr. Kristen Mccloud in November.

## 2023-11-09 ENCOUNTER — HOSPITAL ENCOUNTER (OUTPATIENT)
Dept: MRI IMAGING | Age: 78
Discharge: HOME | End: 2023-11-09
Payer: MEDICARE

## 2023-11-09 DIAGNOSIS — G43.E09 CHRONIC MIGRAINE WITH AURA WITHOUT STATUS MIGRAINOSUS, NOT INTRACTABLE: ICD-10-CM

## 2023-11-09 PROCEDURE — 6360000004 HC RX CONTRAST MEDICATION

## 2023-11-09 PROCEDURE — 2580000003 HC RX 258

## 2023-11-09 PROCEDURE — 70553 MRI BRAIN STEM W/O & W/DYE: CPT

## 2023-11-09 PROCEDURE — A9579 GAD-BASE MR CONTRAST NOS,1ML: HCPCS

## 2023-11-09 RX ORDER — SODIUM CHLORIDE 0.9 % (FLUSH) 0.9 %
10 SYRINGE (ML) INJECTION AS NEEDED
Status: ACTIVE | OUTPATIENT
Start: 2023-11-09

## 2023-11-09 RX ADMIN — SODIUM CHLORIDE, PRESERVATIVE FREE 10 ML: 5 INJECTION INTRAVENOUS at 14:53

## 2023-11-09 RX ADMIN — GADOTERIDOL 15 ML: 279.3 INJECTION, SOLUTION INTRAVENOUS at 14:53

## 2023-11-28 NOTE — PROGRESS NOTES
Dorene Smalls , [address] [facility] [address], [address]  Phone: [phone] Fax [phone]  COLIN Mckenzie        Patient: Harsh Quan  Provider: COLIN Mckenzie      CC:   Chief Complaint   Patient presents with    Follow-up     Chronic migraine with aura without status migrainosus, not intractable       Referring Provider:   PCP: Kayleigh Morales MD    History of Present Illness:     Harsh Quan is a 70 y.o. female with PMH as below, who presents for follow-up of chronic migraines. She was last seen in office October 20, 2023. These notes have been reviewed. The patient arrived ambulating independently and is unaccompanied for her visit today. Since her last office visit, she reports having 2 headaches. Relieved with ibuprofen. Reviewed and discussed recent imaging and lab results    Continues to make regular physical activity/walking and yoga. Also, patient is followed by a chiropractor and receives regular medical massage therapy for headache relief. Original HPI/ Previous Notes:    Headaches are located bifrontal and occipital regions. They began when she was 17years old. The current frequency of headaches: 5 headache days in the past month, but ultimately varies. Duration: 15-30 minutes to several days. Severity is mild to severe. Quality of headaches are described as aching pain. Associated symptoms: nausea, dizziness, visual disturbances, light sensitivity, sound sensitivity, and sensitivity to smells. The headaches are exacerbated by chocolate and biometric pressure. Factors that relieve the headaches are yoga. Most bothersome migraine symptoms nausea, \"makes me feel bad\". Previous Imaging as noted below. According to review the patient's medical record she has previously tried sumatriptan, amitriptyline, Maxalt, and Ubrelvy without notable improvement in migraine management.   On average she reports sleeping between 6 and

## 2023-11-29 ENCOUNTER — OFFICE VISIT (OUTPATIENT)
Dept: NEUROLOGY | Age: 78
End: 2023-11-29
Payer: MEDICARE

## 2023-11-29 VITALS
WEIGHT: 173 LBS | HEIGHT: 64 IN | BODY MASS INDEX: 29.53 KG/M2 | HEART RATE: 84 BPM | DIASTOLIC BLOOD PRESSURE: 82 MMHG | OXYGEN SATURATION: 97 % | SYSTOLIC BLOOD PRESSURE: 142 MMHG

## 2023-11-29 DIAGNOSIS — G43.E09 CHRONIC MIGRAINE WITH AURA WITHOUT STATUS MIGRAINOSUS, NOT INTRACTABLE: Primary | ICD-10-CM

## 2023-11-29 PROCEDURE — G8427 DOCREV CUR MEDS BY ELIG CLIN: HCPCS

## 2023-11-29 PROCEDURE — 1090F PRES/ABSN URINE INCON ASSESS: CPT

## 2023-11-29 PROCEDURE — 3074F SYST BP LT 130 MM HG: CPT

## 2023-11-29 PROCEDURE — 3078F DIAST BP <80 MM HG: CPT

## 2023-11-29 PROCEDURE — 1036F TOBACCO NON-USER: CPT

## 2023-11-29 PROCEDURE — 99215 OFFICE O/P EST HI 40 MIN: CPT

## 2023-11-29 PROCEDURE — 1123F ACP DISCUSS/DSCN MKR DOCD: CPT

## 2023-11-29 PROCEDURE — G8399 PT W/DXA RESULTS DOCUMENT: HCPCS

## 2023-11-29 PROCEDURE — G8484 FLU IMMUNIZE NO ADMIN: HCPCS

## 2023-11-29 PROCEDURE — G8417 CALC BMI ABV UP PARAM F/U: HCPCS

## 2023-11-29 ASSESSMENT — PATIENT HEALTH QUESTIONNAIRE - PHQ9
SUM OF ALL RESPONSES TO PHQ9 QUESTIONS 1 & 2: 0
1. LITTLE INTEREST OR PLEASURE IN DOING THINGS: 0
SUM OF ALL RESPONSES TO PHQ QUESTIONS 1-9: 0
2. FEELING DOWN, DEPRESSED OR HOPELESS: 0
SUM OF ALL RESPONSES TO PHQ QUESTIONS 1-9: 0

## 2023-11-29 ASSESSMENT — ENCOUNTER SYMPTOMS
EYE PAIN: 0
COUGH: 0
CHEST TIGHTNESS: 0
SORE THROAT: 0
ABDOMINAL PAIN: 0

## 2023-12-04 RX ORDER — LEVOTHYROXINE SODIUM 0.03 MG/1
25 TABLET ORAL DAILY
Qty: 90 TABLET | Refills: 1 | Status: SHIPPED | OUTPATIENT
Start: 2023-12-04

## 2023-12-04 RX ORDER — HYDROCHLOROTHIAZIDE 25 MG/1
25 TABLET ORAL DAILY
Qty: 90 TABLET | Refills: 3 | OUTPATIENT
Start: 2023-12-04

## 2023-12-04 RX ORDER — HYDROCHLOROTHIAZIDE 25 MG/1
25 TABLET ORAL DAILY
Qty: 90 TABLET | Refills: 1 | Status: SHIPPED | OUTPATIENT
Start: 2023-12-04

## 2023-12-06 ENCOUNTER — TELEPHONE (OUTPATIENT)
Dept: NEUROLOGY | Age: 78
End: 2023-12-06

## 2023-12-06 DIAGNOSIS — G43.E09 CHRONIC MIGRAINE WITH AURA WITHOUT STATUS MIGRAINOSUS, NOT INTRACTABLE: Primary | ICD-10-CM

## 2023-12-06 NOTE — TELEPHONE ENCOUNTER
Botox 200 units  Intramuscularly  [facility] - on file  G43. E09 Chronic Migraines

## 2024-01-08 ENCOUNTER — APPOINTMENT (OUTPATIENT)
Dept: CT IMAGING | Age: 79
End: 2024-01-08
Payer: MEDICARE

## 2024-01-08 ENCOUNTER — APPOINTMENT (OUTPATIENT)
Dept: GENERAL RADIOLOGY | Age: 79
End: 2024-01-08
Payer: MEDICARE

## 2024-01-08 ENCOUNTER — HOSPITAL ENCOUNTER (EMERGENCY)
Age: 79
Discharge: HOME | End: 2024-01-08
Attending: EMERGENCY MEDICINE
Payer: MEDICARE

## 2024-01-08 VITALS
WEIGHT: 167 LBS | SYSTOLIC BLOOD PRESSURE: 164 MMHG | OXYGEN SATURATION: 99 % | DIASTOLIC BLOOD PRESSURE: 83 MMHG | RESPIRATION RATE: 23 BRPM | HEIGHT: 64 IN | BODY MASS INDEX: 28.51 KG/M2 | HEART RATE: 86 BPM | TEMPERATURE: 98 F

## 2024-01-08 DIAGNOSIS — I48.0 PAROXYSMAL A-FIB (HCC): Primary | ICD-10-CM

## 2024-01-08 LAB
ANION GAP SERPL CALC-SCNC: 3 MMOL/L (ref 2–11)
BASOPHILS # BLD: 0 K/UL (ref 0–0.2)
BASOPHILS NFR BLD: 1 % (ref 0–2)
BUN SERPL-MCNC: 26 MG/DL (ref 8–23)
CALCIUM SERPL-MCNC: 10.2 MG/DL (ref 8.3–10.4)
CHLORIDE SERPL-SCNC: 106 MMOL/L (ref 103–113)
CO2 SERPL-SCNC: 29 MMOL/L (ref 21–32)
CREAT SERPL-MCNC: 0.9 MG/DL (ref 0.6–1)
D DIMER PPP FEU-MCNC: 0.99 UG/ML(FEU)
DIFFERENTIAL METHOD BLD: NORMAL
EKG ATRIAL RATE: 97 BPM
EKG DIAGNOSIS: NORMAL
EKG P AXIS: 64 DEGREES
EKG P-R INTERVAL: 120 MS
EKG Q-T INTERVAL: 336 MS
EKG QRS DURATION: 60 MS
EKG QTC CALCULATION (BAZETT): 426 MS
EKG R AXIS: 49 DEGREES
EKG T AXIS: 40 DEGREES
EKG VENTRICULAR RATE: 97 BPM
EOSINOPHIL # BLD: 0 K/UL (ref 0–0.8)
EOSINOPHIL NFR BLD: 1 % (ref 0.5–7.8)
ERYTHROCYTE [DISTWIDTH] IN BLOOD BY AUTOMATED COUNT: 13.7 % (ref 11.9–14.6)
GLUCOSE SERPL-MCNC: 101 MG/DL (ref 65–100)
HCT VFR BLD AUTO: 44.7 % (ref 35.8–46.3)
HGB BLD-MCNC: 14.6 G/DL (ref 11.7–15.4)
IMM GRANULOCYTES # BLD AUTO: 0 K/UL (ref 0–0.5)
IMM GRANULOCYTES NFR BLD AUTO: 0 % (ref 0–5)
LYMPHOCYTES # BLD: 2.4 K/UL (ref 0.5–4.6)
LYMPHOCYTES NFR BLD: 29 % (ref 13–44)
MAGNESIUM SERPL-MCNC: 1.7 MG/DL (ref 1.8–2.4)
MCH RBC QN AUTO: 28.6 PG (ref 26.1–32.9)
MCHC RBC AUTO-ENTMCNC: 32.7 G/DL (ref 31.4–35)
MCV RBC AUTO: 87.5 FL (ref 82–102)
MONOCYTES # BLD: 0.9 K/UL (ref 0.1–1.3)
MONOCYTES NFR BLD: 11 % (ref 4–12)
NEUTS SEG # BLD: 5 K/UL (ref 1.7–8.2)
NEUTS SEG NFR BLD: 59 % (ref 43–78)
NRBC # BLD: 0 K/UL (ref 0–0.2)
PLATELET # BLD AUTO: 220 K/UL (ref 150–450)
PMV BLD AUTO: 12 FL (ref 9.4–12.3)
POTASSIUM SERPL-SCNC: 3.7 MMOL/L (ref 3.5–5.1)
RBC # BLD AUTO: 5.11 M/UL (ref 4.05–5.2)
SODIUM SERPL-SCNC: 138 MMOL/L (ref 136–146)
TROPONIN I SERPL HS-MCNC: 14.7 PG/ML (ref 0–14)
TROPONIN I SERPL HS-MCNC: 15.5 PG/ML (ref 0–14)
TSH W FREE THYROID IF ABNORMAL: 1.5 UIU/ML (ref 0.36–3.74)
WBC # BLD AUTO: 8.4 K/UL (ref 4.3–11.1)

## 2024-01-08 PROCEDURE — 94762 N-INVAS EAR/PLS OXIMTRY CONT: CPT

## 2024-01-08 PROCEDURE — 80048 BASIC METABOLIC PNL TOTAL CA: CPT

## 2024-01-08 PROCEDURE — 93010 ELECTROCARDIOGRAM REPORT: CPT | Performed by: INTERNAL MEDICINE

## 2024-01-08 PROCEDURE — 71260 CT THORAX DX C+: CPT

## 2024-01-08 PROCEDURE — 6360000002 HC RX W HCPCS: Performed by: EMERGENCY MEDICINE

## 2024-01-08 PROCEDURE — 83735 ASSAY OF MAGNESIUM: CPT

## 2024-01-08 PROCEDURE — 84443 ASSAY THYROID STIM HORMONE: CPT

## 2024-01-08 PROCEDURE — 6360000004 HC RX CONTRAST MEDICATION: Performed by: EMERGENCY MEDICINE

## 2024-01-08 PROCEDURE — 96365 THER/PROPH/DIAG IV INF INIT: CPT

## 2024-01-08 PROCEDURE — 84484 ASSAY OF TROPONIN QUANT: CPT

## 2024-01-08 PROCEDURE — 99285 EMERGENCY DEPT VISIT HI MDM: CPT

## 2024-01-08 PROCEDURE — 85025 COMPLETE CBC W/AUTO DIFF WBC: CPT

## 2024-01-08 PROCEDURE — 85379 FIBRIN DEGRADATION QUANT: CPT

## 2024-01-08 PROCEDURE — 93005 ELECTROCARDIOGRAM TRACING: CPT | Performed by: EMERGENCY MEDICINE

## 2024-01-08 PROCEDURE — 71046 X-RAY EXAM CHEST 2 VIEWS: CPT

## 2024-01-08 RX ORDER — METOPROLOL SUCCINATE 50 MG/1
50 TABLET, EXTENDED RELEASE ORAL DAILY
Qty: 30 TABLET | Refills: 0 | Status: SHIPPED | OUTPATIENT
Start: 2024-01-08 | End: 2024-02-07

## 2024-01-08 RX ORDER — MAGNESIUM SULFATE 1 G/100ML
1000 INJECTION INTRAVENOUS
Status: COMPLETED | OUTPATIENT
Start: 2024-01-08 | End: 2024-01-08

## 2024-01-08 RX ADMIN — MAGNESIUM SULFATE HEPTAHYDRATE 1000 MG: 1 INJECTION, SOLUTION INTRAVENOUS at 14:32

## 2024-01-08 RX ADMIN — IOPAMIDOL 100 ML: 755 INJECTION, SOLUTION INTRAVENOUS at 16:27

## 2024-01-08 ASSESSMENT — ENCOUNTER SYMPTOMS
SORE THROAT: 0
SHORTNESS OF BREATH: 1
NAUSEA: 0
VOMITING: 0
CHEST TIGHTNESS: 0
ABDOMINAL DISTENTION: 0
RHINORRHEA: 0
EYE REDNESS: 0
DIARRHEA: 0
COUGH: 0
BACK PAIN: 0

## 2024-01-08 NOTE — DISCHARGE INSTRUCTIONS
You should hear from the cardiology office 1 to 2 days to set up a follow-up appointment.  If you do not please call them to schedule your appointment.    Hold your hydrochlorothiazide    Start taking metoprolol 50 mg once daily and follow the instructions on the starter pack for the Eliquis    If your heart rate increases into the 170s and does not come back down shortly you will need to come to the emergency department for further evaluation at that time.

## 2024-01-08 NOTE — ED PROVIDER NOTES
VW)    INDICATION:Pain.    COMPARISON: None     FINDINGS: Cardiac size and mediastinal configuration are normal.  The lungs are  well expanded and clear.  No acute bony abnormalities are noted.       Impression    No acute pulmonary findings.     CT CHEST PULMONARY EMBOLISM W CONTRAST    Narrative    CHEST CT    INDICATION: Atrial fibrillation, elevated D-dimer, palpitations    Multiple axial images were obtained through the chest during intravenous  infusion of 100mL of Isovue 370.  Coronal reformats were also evaluated.   Radiation dose reduction techniques were used for this study:  All CT scans  performed at this facility use one or all of the following: Automated exposure  control, adjustment of the mA and/or kVp according to patient's size, iterative  reconstruction.    COMPARISON: Chest radiograph from earlier the same day.    FINDINGS:  - LUNGS: Minimal hypoventilatory change and atelectasis. No focal consolidation,  pleural effusion or pneumothorax. No discrete pulmonary nodule or mass.  - HEART/VESSELS: No evidence for pulmonary embolism. No cardiomegaly or  pericardial effusion. No thoracic aortic aneurysm or dissection. Minimal  atherosclerotic change along the aortic arch. No coronary artery atherosclerotic  calcifications.    - MEDIASTINUM/AXILLA: No enlarged mediastinal adenopathy.  - CHEST WALL/BONES: Mild multilevel thoracic spondylosis. Levoscoliosis of the  lumbar spine at the films edge. No evidence for discrete fracture or aggressive  osseous lesion.  - UPPER ABDOMEN: Calcifications in the spleen likely sequela of prior  granulomatous disease. Subcentimeter hypoattenuating foci in the left hepatic  lobe which is too small to characterize accurately, however is unchanged since  2016 and likely benign. Partially imaged 1.9 cm cyst midpole left kidney.      Impression    1. No evidence of pulmonary embolism.  2. Minimal hypoventilatory change without evidence for acute pulmonary process.  3.

## 2024-01-08 NOTE — ED TRIAGE NOTES
Pt states that she went to urgent care due to some urinary pain and frequency.  She also started feeling some palpitations last night.  At urgent care they found her to be in afib and was sent to the ED.  Pt states that she feels better and pt not currently in afib in the ED.

## 2024-01-08 NOTE — ED NOTES
I have reviewed discharge instructions with the patient.  The patient verbalized understanding.    Patient left ED via Discharge Method: ambulatory to Home with spouse  Opportunity for questions and clarification provided.       Patient given 2 scripts.         To continue your aftercare when you leave the hospital, you may receive an automated call from our care team to check in on how you are doing.  This is a free service and part of our promise to provide the best care and service to meet your aftercare needs.” If you have questions, or wish to unsubscribe from this service please call [phone].  Thank you for Choosing our [facility] Emergency Department.

## 2024-01-12 ENCOUNTER — INITIAL CONSULT (OUTPATIENT)
Age: 79
End: 2024-01-12
Payer: MEDICARE

## 2024-01-12 VITALS
BODY MASS INDEX: 29.69 KG/M2 | DIASTOLIC BLOOD PRESSURE: 60 MMHG | WEIGHT: 173.9 LBS | SYSTOLIC BLOOD PRESSURE: 122 MMHG | HEART RATE: 76 BPM | HEIGHT: 64 IN

## 2024-01-12 DIAGNOSIS — I48.0 PAF (PAROXYSMAL ATRIAL FIBRILLATION) (HCC): Primary | ICD-10-CM

## 2024-01-12 PROCEDURE — G8399 PT W/DXA RESULTS DOCUMENT: HCPCS | Performed by: INTERNAL MEDICINE

## 2024-01-12 PROCEDURE — 3074F SYST BP LT 130 MM HG: CPT | Performed by: INTERNAL MEDICINE

## 2024-01-12 PROCEDURE — 1036F TOBACCO NON-USER: CPT | Performed by: INTERNAL MEDICINE

## 2024-01-12 PROCEDURE — G8428 CUR MEDS NOT DOCUMENT: HCPCS | Performed by: INTERNAL MEDICINE

## 2024-01-12 PROCEDURE — 99203 OFFICE O/P NEW LOW 30 MIN: CPT | Performed by: INTERNAL MEDICINE

## 2024-01-12 PROCEDURE — G8417 CALC BMI ABV UP PARAM F/U: HCPCS | Performed by: INTERNAL MEDICINE

## 2024-01-12 PROCEDURE — 1090F PRES/ABSN URINE INCON ASSESS: CPT | Performed by: INTERNAL MEDICINE

## 2024-01-12 PROCEDURE — 1123F ACP DISCUSS/DSCN MKR DOCD: CPT | Performed by: INTERNAL MEDICINE

## 2024-01-12 PROCEDURE — 3078F DIAST BP <80 MM HG: CPT | Performed by: INTERNAL MEDICINE

## 2024-01-12 PROCEDURE — G8484 FLU IMMUNIZE NO ADMIN: HCPCS | Performed by: INTERNAL MEDICINE

## 2024-01-12 RX ORDER — ZINC GLUCONATE 50 MG
50 TABLET ORAL DAILY
COMMUNITY

## 2024-01-12 NOTE — PROGRESS NOTES
[facility] CARDIOLOGY  [address] [place] [address], [address]  [place] [address]  PHONE: [phone]        24        NAME:  Miranda Juares  : 1945  MRN: [id number]     CHIEF COMPLAINT:    Atrial Fibrillation      SUBJECTIVE:     77 yo female went to urgent care for ? UTI. Pt had rapid a fib  and was sent to ER. She was back in a regular rhythm on presentation to the ER. Meds adjusted. Eliquis too expensive.No prior hx a fib.    Phx:  Breast cancer 8yrs ago ctx and sx and xrt no recurrence.  Migraine HA's, sees neurology, Botox pending.  S/p r and l tki    Fhx:  NC    Shx:  . Non smoker    Ros:  NC       Medications were all reviewed with the patient today and updated as necessary.   Current Outpatient Medications   Medication Sig    CALCIUM & MAGNESIUM CARBONATES PO Take by mouth    zinc 50 MG TABS tablet Take 1 tablet by mouth daily    metoprolol succinate (TOPROL XL) 50 MG extended release tablet Take 1 tablet by mouth daily    levothyroxine (SYNTHROID) 25 MCG tablet Take 1 tablet by mouth Daily    temazepam (RESTORIL) 30 MG capsule Take 1 capsule by mouth nightly as needed for Sleep for up to 180 days. TAKE ONE CAPSULE BY MOUTH IN THE EVENING AS NEEDED FOR SLEEP    APPLE CIDER VINEGAR PO Take by mouth    TURMERIC PO Take by mouth    acetaminophen (TYLENOL) 500 MG tablet Take 2 tablets by mouth every 6 hours as needed    esomeprazole (NEXIUM) 20 MG delayed release capsule Take 1 capsule by mouth    ibuprofen (ADVIL;MOTRIN) 200 MG tablet Take by mouth as needed    olopatadine (PATANASE) 0.6 % SOLN nassl soln INSTILL 2 SPRAYS INTO EACH NOSTRIL TWO TIMES A DAY     No current facility-administered medications for this visit.        Allergies   Allergen Reactions    Milk (Cow) Other (See Comments)           PHYSICAL EXAM:     Wt Readings from Last 3 Encounters:   24 78.9 kg (173 lb 14.4 oz)   24 75.8 kg (167 lb)   23 78.5 kg (173 lb)     BP Readings from Last 3 Encounters:

## 2024-01-16 ENCOUNTER — OFFICE VISIT (OUTPATIENT)
Dept: FAMILY MEDICINE CLINIC | Facility: CLINIC | Age: 79
End: 2024-01-16
Payer: MEDICARE

## 2024-01-16 VITALS
SYSTOLIC BLOOD PRESSURE: 124 MMHG | BODY MASS INDEX: 29.78 KG/M2 | WEIGHT: 174.4 LBS | DIASTOLIC BLOOD PRESSURE: 68 MMHG | HEIGHT: 64 IN

## 2024-01-16 DIAGNOSIS — E83.42 HYPOMAGNESEMIA: ICD-10-CM

## 2024-01-16 DIAGNOSIS — G43.E09 CHRONIC MIGRAINE WITH AURA WITHOUT STATUS MIGRAINOSUS, NOT INTRACTABLE: ICD-10-CM

## 2024-01-16 DIAGNOSIS — E03.4 HYPOTHYROIDISM DUE TO ACQUIRED ATROPHY OF THYROID: ICD-10-CM

## 2024-01-16 DIAGNOSIS — I10 PRIMARY HYPERTENSION: ICD-10-CM

## 2024-01-16 DIAGNOSIS — I48.0 PAROXYSMAL ATRIAL FIBRILLATION (HCC): Primary | ICD-10-CM

## 2024-01-16 PROCEDURE — 1123F ACP DISCUSS/DSCN MKR DOCD: CPT | Performed by: FAMILY MEDICINE

## 2024-01-16 PROCEDURE — 1036F TOBACCO NON-USER: CPT | Performed by: FAMILY MEDICINE

## 2024-01-16 PROCEDURE — 3074F SYST BP LT 130 MM HG: CPT | Performed by: FAMILY MEDICINE

## 2024-01-16 PROCEDURE — G8427 DOCREV CUR MEDS BY ELIG CLIN: HCPCS | Performed by: FAMILY MEDICINE

## 2024-01-16 PROCEDURE — G8399 PT W/DXA RESULTS DOCUMENT: HCPCS | Performed by: FAMILY MEDICINE

## 2024-01-16 PROCEDURE — 3078F DIAST BP <80 MM HG: CPT | Performed by: FAMILY MEDICINE

## 2024-01-16 PROCEDURE — G8417 CALC BMI ABV UP PARAM F/U: HCPCS | Performed by: FAMILY MEDICINE

## 2024-01-16 PROCEDURE — G8484 FLU IMMUNIZE NO ADMIN: HCPCS | Performed by: FAMILY MEDICINE

## 2024-01-16 PROCEDURE — 99214 OFFICE O/P EST MOD 30 MIN: CPT | Performed by: FAMILY MEDICINE

## 2024-01-16 PROCEDURE — 1090F PRES/ABSN URINE INCON ASSESS: CPT | Performed by: FAMILY MEDICINE

## 2024-01-16 ASSESSMENT — PATIENT HEALTH QUESTIONNAIRE - PHQ9
SUM OF ALL RESPONSES TO PHQ9 QUESTIONS 1 & 2: 0
SUM OF ALL RESPONSES TO PHQ QUESTIONS 1-9: 0
SUM OF ALL RESPONSES TO PHQ QUESTIONS 1-9: 0
1. LITTLE INTEREST OR PLEASURE IN DOING THINGS: 0
SUM OF ALL RESPONSES TO PHQ QUESTIONS 1-9: 0
SUM OF ALL RESPONSES TO PHQ QUESTIONS 1-9: 0
2. FEELING DOWN, DEPRESSED OR HOPELESS: 0

## 2024-01-26 ENCOUNTER — TELEPHONE (OUTPATIENT)
Age: 79
End: 2024-01-26

## 2024-01-26 ENCOUNTER — OFFICE VISIT (OUTPATIENT)
Dept: FAMILY MEDICINE CLINIC | Facility: CLINIC | Age: 79
End: 2024-01-26
Payer: MEDICARE

## 2024-01-26 VITALS
BODY MASS INDEX: 30.05 KG/M2 | HEIGHT: 64 IN | HEART RATE: 74 BPM | DIASTOLIC BLOOD PRESSURE: 70 MMHG | SYSTOLIC BLOOD PRESSURE: 130 MMHG | OXYGEN SATURATION: 98 % | WEIGHT: 176 LBS

## 2024-01-26 DIAGNOSIS — R31.9 URINARY TRACT INFECTION WITH HEMATURIA, SITE UNSPECIFIED: ICD-10-CM

## 2024-01-26 DIAGNOSIS — N39.0 URINARY TRACT INFECTION WITH HEMATURIA, SITE UNSPECIFIED: ICD-10-CM

## 2024-01-26 DIAGNOSIS — R31.9 HEMATURIA, UNSPECIFIED TYPE: Primary | ICD-10-CM

## 2024-01-26 DIAGNOSIS — I48.0 PAROXYSMAL ATRIAL FIBRILLATION (HCC): ICD-10-CM

## 2024-01-26 DIAGNOSIS — R30.0 DYSURIA: ICD-10-CM

## 2024-01-26 DIAGNOSIS — I10 PRIMARY HYPERTENSION: ICD-10-CM

## 2024-01-26 LAB
BILIRUBIN, URINE, POC: NEGATIVE
BLOOD URINE, POC: ABNORMAL
GLUCOSE URINE, POC: NEGATIVE
GRANS ABSOLUTE, POC: 4.8 K/UL
GRANULOCYTES %, POC: 58.6 %
HEMATOCRIT, POC: 42.3 %
HEMOGLOBIN, POC: 13.4 G/DL
KETONES, URINE, POC: NEGATIVE
LEUKOCYTE ESTERASE, URINE, POC: NEGATIVE
LYMPHOCYTE %, POC: 30.2 %
LYMPHS ABSOLUTE, POC: 2.5 K/UL
MCH, POC: NORMAL PG (ref 40–?)
MCHC, POC: 31.7
MCV, POC: 90
MONOCYTE %, POC: 11.2 %
MONOCYTE, ABSOLUTE POC: 0.9 K/UL
MPV, POC: 9.2 FL
NITRITE, URINE, POC: NEGATIVE
PH, URINE, POC: 7.5 (ref 4.6–8)
PLATELET COUNT, POC: 175 K/UL
PROTEIN,URINE, POC: NEGATIVE
RBC, POC: 4.7 M/UL
RDW, POC: 13.5 %
SPECIFIC GRAVITY, URINE, POC: 1.01 (ref 1–1.03)
URINALYSIS CLARITY, POC: ABNORMAL
URINALYSIS COLOR, POC: YELLOW
UROBILINOGEN, POC: NORMAL
WBC, POC: 8.2 K/UL

## 2024-01-26 PROCEDURE — G8484 FLU IMMUNIZE NO ADMIN: HCPCS | Performed by: FAMILY MEDICINE

## 2024-01-26 PROCEDURE — G8417 CALC BMI ABV UP PARAM F/U: HCPCS | Performed by: FAMILY MEDICINE

## 2024-01-26 PROCEDURE — 36415 COLL VENOUS BLD VENIPUNCTURE: CPT | Performed by: FAMILY MEDICINE

## 2024-01-26 PROCEDURE — 3075F SYST BP GE 130 - 139MM HG: CPT | Performed by: FAMILY MEDICINE

## 2024-01-26 PROCEDURE — G8427 DOCREV CUR MEDS BY ELIG CLIN: HCPCS | Performed by: FAMILY MEDICINE

## 2024-01-26 PROCEDURE — 85025 COMPLETE CBC W/AUTO DIFF WBC: CPT | Performed by: FAMILY MEDICINE

## 2024-01-26 PROCEDURE — 1090F PRES/ABSN URINE INCON ASSESS: CPT | Performed by: FAMILY MEDICINE

## 2024-01-26 PROCEDURE — 81003 URINALYSIS AUTO W/O SCOPE: CPT | Performed by: FAMILY MEDICINE

## 2024-01-26 PROCEDURE — 1123F ACP DISCUSS/DSCN MKR DOCD: CPT | Performed by: FAMILY MEDICINE

## 2024-01-26 PROCEDURE — G8399 PT W/DXA RESULTS DOCUMENT: HCPCS | Performed by: FAMILY MEDICINE

## 2024-01-26 PROCEDURE — 3078F DIAST BP <80 MM HG: CPT | Performed by: FAMILY MEDICINE

## 2024-01-26 PROCEDURE — 1036F TOBACCO NON-USER: CPT | Performed by: FAMILY MEDICINE

## 2024-01-26 PROCEDURE — 99213 OFFICE O/P EST LOW 20 MIN: CPT | Performed by: FAMILY MEDICINE

## 2024-01-26 RX ORDER — APIXABAN 5 MG (74)
KIT ORAL
COMMUNITY
Start: 2024-01-17

## 2024-01-26 RX ORDER — SULFAMETHOXAZOLE AND TRIMETHOPRIM 800; 160 MG/1; MG/1
1 TABLET ORAL 2 TIMES DAILY
Qty: 14 TABLET | Refills: 0 | Status: SHIPPED | OUTPATIENT
Start: 2024-01-26 | End: 2024-02-02

## 2024-01-26 ASSESSMENT — PATIENT HEALTH QUESTIONNAIRE - PHQ9
1. LITTLE INTEREST OR PLEASURE IN DOING THINGS: 0
SUM OF ALL RESPONSES TO PHQ QUESTIONS 1-9: 0
2. FEELING DOWN, DEPRESSED OR HOPELESS: 0
SUM OF ALL RESPONSES TO PHQ9 QUESTIONS 1 & 2: 0

## 2024-01-26 NOTE — TELEPHONE ENCOUNTER
Pt started Eliquis on Tuesday.  Started Toprol XL about  2 weeks ago.    Pt sent to see Dr Nigel MD due to blood in urine and has a UTI.   Antibiotics ordered today.    Pt is going out of town on Monday, looking for help as she doesn't know which med may also be the source of bleeding.    States she took Toprol XL yesterday and there was more blood then after a while it will stop.    Pt wants to stop the Eliquis to see if that is impacting the bleeding.

## 2024-01-26 NOTE — TELEPHONE ENCOUNTER
If having some bleeding in urine, can hold for 2-3 days, then get back on.  See PCP or needs to see Urology for more bleeding.  Call us back if getting worse.   Thanks

## 2024-01-26 NOTE — PROGRESS NOTES
with 20-40 RBCs, moderate bacteria and 0-3 WBCs.  CBC shows a normal hemoglobin and platelet count.  I believe her gross hematuria is secondary to her UTI.  We will culture her urine.  Will place her on Septra DS twice a day for a week.  She will monitor her hematuria and if not improving and certainly if worsening she is to let me know.  If she develops any other bleeding tendencies she is to let me know this as well.  Will repeat a UA on her in 2 to 3 weeks.  Of interest is that she had hematuria noted back in March 2020 also at that time associated with a UTI.    2.  Dysuria.  As above.    3.  UTI.  As above.    4.  Atrial fibrillation.  Continue follow-up with cardiology.    5.  Hypertension.  /70.  Continue current therapy.    Elements of this note have been dictated using speech recognition software. As a result, errors of speech recognition may have occurred.

## 2024-01-26 NOTE — TELEPHONE ENCOUNTER
I spoke w/pt.told her Eliquis can aggravate an existing condition.She says blood in urine.Saw  and he started her on antibiotic.He told her to call here concerning whether she should hole her Eliquis.

## 2024-01-29 LAB
BACTERIA SPEC CULT: NORMAL
BACTERIA SPEC CULT: NORMAL
SERVICE CMNT-IMP: NORMAL

## 2024-02-06 ENCOUNTER — NURSE ONLY (OUTPATIENT)
Dept: FAMILY MEDICINE CLINIC | Facility: CLINIC | Age: 79
End: 2024-02-06

## 2024-02-06 DIAGNOSIS — I10 PRIMARY HYPERTENSION: ICD-10-CM

## 2024-02-06 DIAGNOSIS — E78.00 PURE HYPERCHOLESTEROLEMIA: ICD-10-CM

## 2024-02-06 DIAGNOSIS — E78.1 PURE HYPERGLYCERIDEMIA: ICD-10-CM

## 2024-02-06 LAB
ALBUMIN SERPL-MCNC: 4.3 G/DL (ref 3.2–4.6)
ALBUMIN/GLOB SERPL: 1.3 (ref 0.4–1.6)
ALP SERPL-CCNC: 66 U/L (ref 50–136)
ALT SERPL-CCNC: 27 U/L (ref 12–65)
ANION GAP SERPL CALC-SCNC: 3 MMOL/L (ref 2–11)
AST SERPL-CCNC: 23 U/L (ref 15–37)
BILIRUB SERPL-MCNC: 0.6 MG/DL (ref 0.2–1.1)
BUN SERPL-MCNC: 25 MG/DL (ref 8–23)
CALCIUM SERPL-MCNC: 9.9 MG/DL (ref 8.3–10.4)
CHLORIDE SERPL-SCNC: 106 MMOL/L (ref 103–113)
CHOLEST SERPL-MCNC: 225 MG/DL
CO2 SERPL-SCNC: 30 MMOL/L (ref 21–32)
CREAT SERPL-MCNC: 0.9 MG/DL (ref 0.6–1)
GLOBULIN SER CALC-MCNC: 3.4 G/DL (ref 2.8–4.5)
GLUCOSE SERPL-MCNC: 101 MG/DL (ref 65–100)
HDLC SERPL-MCNC: 71 MG/DL (ref 40–60)
HDLC SERPL: 3.2
LDLC SERPL CALC-MCNC: 131.2 MG/DL
POTASSIUM SERPL-SCNC: 4.3 MMOL/L (ref 3.5–5.1)
PROT SERPL-MCNC: 7.7 G/DL (ref 6.3–8.2)
SODIUM SERPL-SCNC: 139 MMOL/L (ref 136–146)
TRIGL SERPL-MCNC: 114 MG/DL (ref 35–150)
TSH, 3RD GENERATION: 1.53 UIU/ML (ref 0.36–3.74)
VLDLC SERPL CALC-MCNC: 22.8 MG/DL (ref 6–23)

## 2024-02-06 RX ORDER — METOPROLOL SUCCINATE 50 MG/1
50 TABLET, EXTENDED RELEASE ORAL DAILY
Qty: 30 TABLET | Refills: 5 | Status: SHIPPED | OUTPATIENT
Start: 2024-02-06

## 2024-02-06 NOTE — TELEPHONE ENCOUNTER
Patient needs metoprolol [facility] to [facility] [address]. This was given by the ER. She has an appt next week to see Dr Manning

## 2024-02-12 ENCOUNTER — TELEPHONE (OUTPATIENT)
Dept: NEUROLOGY | Age: 79
End: 2024-02-12

## 2024-02-12 NOTE — TELEPHONE ENCOUNTER
Patient's insurance denied Botox injections stating it's not covered unless she tries Depakote or Topamax. Please call patient to discuss options and if one of these drugs would be beneficial for her migraines. She is aware [employer] denied Botox unless she tries one of these medication. If one of these is good she would like it sent to [facility] [address] [facility]. Please let her know.      Per [place]:  \"The drug you asked for is not listed in your preferred drug list (formulary). The preferred drug(s), you may not have tried, are: divalproex capsule delayed release sprinkle topiramate tablet Your provider needs to give us medical reasons why the preferred drug(s) would not work for you and/or would have bad side effects.\"

## 2024-02-13 ENCOUNTER — TELEPHONE (OUTPATIENT)
Age: 79
End: 2024-02-13

## 2024-02-13 ENCOUNTER — OFFICE VISIT (OUTPATIENT)
Dept: FAMILY MEDICINE CLINIC | Facility: CLINIC | Age: 79
End: 2024-02-13
Payer: MEDICARE

## 2024-02-13 VITALS
SYSTOLIC BLOOD PRESSURE: 138 MMHG | DIASTOLIC BLOOD PRESSURE: 76 MMHG | WEIGHT: 171 LBS | HEIGHT: 64 IN | OXYGEN SATURATION: 98 % | BODY MASS INDEX: 29.19 KG/M2 | HEART RATE: 66 BPM

## 2024-02-13 DIAGNOSIS — E78.00 PURE HYPERCHOLESTEROLEMIA: ICD-10-CM

## 2024-02-13 DIAGNOSIS — N39.0 URINARY TRACT INFECTION WITH HEMATURIA, SITE UNSPECIFIED: ICD-10-CM

## 2024-02-13 DIAGNOSIS — I48.0 PAROXYSMAL ATRIAL FIBRILLATION (HCC): ICD-10-CM

## 2024-02-13 DIAGNOSIS — R31.0 GROSS HEMATURIA: ICD-10-CM

## 2024-02-13 DIAGNOSIS — E03.4 HYPOTHYROIDISM DUE TO ACQUIRED ATROPHY OF THYROID: ICD-10-CM

## 2024-02-13 DIAGNOSIS — R31.9 URINARY TRACT INFECTION WITH HEMATURIA, SITE UNSPECIFIED: ICD-10-CM

## 2024-02-13 DIAGNOSIS — I10 PRIMARY HYPERTENSION: Primary | ICD-10-CM

## 2024-02-13 LAB
BILIRUBIN, URINE, POC: NEGATIVE
BLOOD URINE, POC: ABNORMAL
GLUCOSE URINE, POC: NEGATIVE
KETONES, URINE, POC: NEGATIVE
LEUKOCYTE ESTERASE, URINE, POC: ABNORMAL
NITRITE, URINE, POC: NEGATIVE
PH, URINE, POC: 5.5 (ref 4.6–8)
PROTEIN,URINE, POC: NEGATIVE
SPECIFIC GRAVITY, URINE, POC: 1.02 (ref 1–1.03)
URINALYSIS CLARITY, POC: ABNORMAL
URINALYSIS COLOR, POC: YELLOW
UROBILINOGEN, POC: NORMAL

## 2024-02-13 PROCEDURE — 1123F ACP DISCUSS/DSCN MKR DOCD: CPT | Performed by: FAMILY MEDICINE

## 2024-02-13 PROCEDURE — G8399 PT W/DXA RESULTS DOCUMENT: HCPCS | Performed by: FAMILY MEDICINE

## 2024-02-13 PROCEDURE — G8427 DOCREV CUR MEDS BY ELIG CLIN: HCPCS | Performed by: FAMILY MEDICINE

## 2024-02-13 PROCEDURE — 1090F PRES/ABSN URINE INCON ASSESS: CPT | Performed by: FAMILY MEDICINE

## 2024-02-13 PROCEDURE — G8417 CALC BMI ABV UP PARAM F/U: HCPCS | Performed by: FAMILY MEDICINE

## 2024-02-13 PROCEDURE — 3075F SYST BP GE 130 - 139MM HG: CPT | Performed by: FAMILY MEDICINE

## 2024-02-13 PROCEDURE — G8484 FLU IMMUNIZE NO ADMIN: HCPCS | Performed by: FAMILY MEDICINE

## 2024-02-13 PROCEDURE — 3078F DIAST BP <80 MM HG: CPT | Performed by: FAMILY MEDICINE

## 2024-02-13 PROCEDURE — 1036F TOBACCO NON-USER: CPT | Performed by: FAMILY MEDICINE

## 2024-02-13 PROCEDURE — 99214 OFFICE O/P EST MOD 30 MIN: CPT | Performed by: FAMILY MEDICINE

## 2024-02-13 PROCEDURE — 81003 URINALYSIS AUTO W/O SCOPE: CPT | Performed by: FAMILY MEDICINE

## 2024-02-13 NOTE — PROGRESS NOTES
on a combination of Toprol and Eliquis.  Patient reports that she does not have a follow-up with cardiology until July.  However she would like to be seen sooner because she would like to consider an alternative therapy for her A-fib other than metoprolol.  She states that she will call and make an appointment with her cardiologist.    3.  Gross hematuria.  Will repeat UA today with further recommendations pending    4.  UTI.  Culture negative.  Symptomatic relief with the Septra.    5.  Hypothyroidism.  TSH 1.5.  Continue current dose of Synthroid.    6.  High cholesterol.  LDL is 131.  Dietarily managed.  Previously 85.  Refocus on eating healthy.    Elements of this note have been dictated using speech recognition software. As a result, errors of speech recognition may have occurred.

## 2024-02-13 NOTE — TELEPHONE ENCOUNTER
Started Metoprolol 1/8/24 in ER  continued at initial Consult 1/12/24.Eliquis is new as well.She feels very sluggish and has a rash on her stomach and back which started 4 weeks ago./76 HR=66.She is wondering if she should stop the meds given fatigue and rash?

## 2024-02-13 NOTE — TELEPHONE ENCOUNTER
Pt comes in to see Dr. Eller sooner than July, as pt has has not been feeling well since he started her on new medicine last month.  Pt states that she is extremely tired, and that she has a rash on her stomach and on her back.    Pt took first available apt in April, but please contact to discuss further and/or adjust medication, or to be seen.     Thank you.  Pt can be contacted on [phone].

## 2024-02-14 ENCOUNTER — TELEPHONE (OUTPATIENT)
Dept: FAMILY MEDICINE CLINIC | Facility: CLINIC | Age: 79
End: 2024-02-14

## 2024-02-14 ENCOUNTER — HOSPITAL ENCOUNTER (OUTPATIENT)
Dept: MAMMOGRAPHY | Age: 79
Discharge: HOME | End: 2024-02-17
Attending: FAMILY MEDICINE
Payer: MEDICARE

## 2024-02-14 DIAGNOSIS — Z12.31 VISIT FOR SCREENING MAMMOGRAM: ICD-10-CM

## 2024-02-14 PROCEDURE — 77063 BREAST TOMOSYNTHESIS BI: CPT

## 2024-02-14 NOTE — TELEPHONE ENCOUNTER
----- Message from Chandana Manning MD sent at 2/13/2024  6:36 PM EST -----  Please inform patient that she continues to have some blood in the urine although it is less prominent.  There may be some wisdom and her being evaluated by urology if she is interested.  Please let me know if she would be willing to do this.  ----- Message -----  From: Day Blanca  Sent: 2/13/2024  11:43 AM EST  To: Chandana Manning MD

## 2024-02-14 NOTE — TELEPHONE ENCOUNTER
Chandana Manning MD sent to [facility] [place]  Please inform patient that she continues to have some blood in the urine although it is less prominent.  There may be some wisdom and her being evaluated by urology if she is interested.  Please let me know if she would be willing to do this.     Pt was called and informed of the results of UA which revealed hematuria. Pt stated that she has had blood in her urine for years.

## 2024-02-19 ENCOUNTER — TELEPHONE (OUTPATIENT)
Age: 79
End: 2024-02-19

## 2024-02-19 DIAGNOSIS — R31.9 HEMATURIA, UNSPECIFIED TYPE: Primary | ICD-10-CM

## 2024-02-19 NOTE — TELEPHONE ENCOUNTER
Tres Eller MD  You12 minutes ago (4:29 PM)       Sto eliuos  Start xarelto 20  Continue metoprolol   Pt.notified of MD response.Med escribed as below  Requested Prescriptions     Signed Prescriptions Disp Refills    rivaroxaban (XARELTO) 20 MG TABS tablet 30 tablet 3     Sig: Take 1 tablet by mouth daily (with breakfast)     Authorizing Provider: TRES ELLER     Ordering User: PAVEL LÓPEZ

## 2024-02-19 NOTE — TELEPHONE ENCOUNTER
Called 2/13 feeling sluggish and had a rash on trunk of body. stopped her Metoprolol that was recently added.    She calls back today reporting that she went out of town Wed.and Friday after a walk had Afib until later Saturday night.She took the Metoprolol again and it helped w./her Afib.Her rash has never resolved.Rash did not decrease off Metoprolol but Afib worsened.    Could Eliquis be causing her rash?    Is there an alternative to Metoprolol?

## 2024-02-19 NOTE — TELEPHONE ENCOUNTER
Patient said Dr. Eller took her off one of her medications on Wednesday of last week. She said it was due to being in AFIB but she really could not explain and said for the nurse to call her. Please call to assist. She wants a medication change.

## 2024-02-22 DIAGNOSIS — G47.00 INSOMNIA, UNSPECIFIED TYPE: Primary | ICD-10-CM

## 2024-02-22 RX ORDER — LEVOTHYROXINE SODIUM 0.03 MG/1
25 TABLET ORAL DAILY
Qty: 90 TABLET | Refills: 1 | Status: SHIPPED | OUTPATIENT
Start: 2024-02-22

## 2024-02-22 RX ORDER — TEMAZEPAM 30 MG/1
30 CAPSULE ORAL NIGHTLY PRN
Qty: 90 CAPSULE | Refills: 1 | Status: SHIPPED | OUTPATIENT
Start: 2024-02-22 | End: 2024-08-20

## 2024-02-26 DIAGNOSIS — G47.00 INSOMNIA, UNSPECIFIED TYPE: ICD-10-CM

## 2024-02-26 RX ORDER — TEMAZEPAM 30 MG/1
30 CAPSULE ORAL NIGHTLY PRN
Qty: 90 CAPSULE | Refills: 1 | OUTPATIENT
Start: 2024-02-26 | End: 2024-08-24

## 2024-02-26 NOTE — TELEPHONE ENCOUNTER
Pt is leaving to go out of town on Saturday. Pharmacy is requesting an authorization for early refill on Friday.

## 2024-02-28 ENCOUNTER — TELEPHONE (OUTPATIENT)
Dept: FAMILY MEDICINE CLINIC | Facility: CLINIC | Age: 79
End: 2024-02-28

## 2024-02-28 DIAGNOSIS — G47.00 INSOMNIA, UNSPECIFIED TYPE: ICD-10-CM

## 2024-02-28 RX ORDER — TEMAZEPAM 30 MG/1
30 CAPSULE ORAL NIGHTLY PRN
Qty: 90 CAPSULE | Refills: 1 | Status: SHIPPED | OUTPATIENT
Start: 2024-02-28 | End: 2024-08-26

## 2024-02-28 NOTE — TELEPHONE ENCOUNTER
Pt is going out of town on Friday for 10 days and is requesting an early refill for Temazepam to  Friday morning before leaving.

## 2024-03-20 ENCOUNTER — OFFICE VISIT (OUTPATIENT)
Dept: UROLOGY | Age: 79
End: 2024-03-20
Payer: MEDICARE

## 2024-03-20 ENCOUNTER — TELEPHONE (OUTPATIENT)
Dept: UROLOGY | Age: 79
End: 2024-03-20

## 2024-03-20 DIAGNOSIS — R31.9 HEMATURIA, UNSPECIFIED TYPE: Primary | ICD-10-CM

## 2024-03-20 LAB
BILIRUBIN, URINE, POC: NEGATIVE
BLOOD URINE, POC: NORMAL
GLUCOSE URINE, POC: NEGATIVE
KETONES, URINE, POC: NEGATIVE
LEUKOCYTE ESTERASE, URINE, POC: NORMAL
NITRITE, URINE, POC: NEGATIVE
PH, URINE, POC: 6.5 (ref 4.6–8)
PROTEIN,URINE, POC: NEGATIVE
SPECIFIC GRAVITY, URINE, POC: 1.01 (ref 1–1.03)
URINALYSIS CLARITY, POC: NORMAL
URINALYSIS COLOR, POC: NORMAL
UROBILINOGEN, POC: NORMAL

## 2024-03-20 PROCEDURE — 81003 URINALYSIS AUTO W/O SCOPE: CPT | Performed by: UROLOGY

## 2024-03-20 PROCEDURE — G8427 DOCREV CUR MEDS BY ELIG CLIN: HCPCS | Performed by: UROLOGY

## 2024-03-20 PROCEDURE — G8484 FLU IMMUNIZE NO ADMIN: HCPCS | Performed by: UROLOGY

## 2024-03-20 PROCEDURE — G8417 CALC BMI ABV UP PARAM F/U: HCPCS | Performed by: UROLOGY

## 2024-03-20 PROCEDURE — 1036F TOBACCO NON-USER: CPT | Performed by: UROLOGY

## 2024-03-20 PROCEDURE — 1123F ACP DISCUSS/DSCN MKR DOCD: CPT | Performed by: UROLOGY

## 2024-03-20 PROCEDURE — 99204 OFFICE O/P NEW MOD 45 MIN: CPT | Performed by: UROLOGY

## 2024-03-20 PROCEDURE — G8399 PT W/DXA RESULTS DOCUMENT: HCPCS | Performed by: UROLOGY

## 2024-03-20 PROCEDURE — 1090F PRES/ABSN URINE INCON ASSESS: CPT | Performed by: UROLOGY

## 2024-03-20 RX ORDER — METOPROLOL SUCCINATE 50 MG/1
TABLET, EXTENDED RELEASE ORAL
COMMUNITY

## 2024-03-20 RX ORDER — HYDROCHLOROTHIAZIDE 50 MG/1
TABLET ORAL
COMMUNITY

## 2024-03-20 ASSESSMENT — ENCOUNTER SYMPTOMS
EYE DISCHARGE: 0
COUGH: 0
ABDOMINAL PAIN: 0
HEARTBURN: 1
WHEEZING: 0
INDIGESTION: 1
NAUSEA: 0
SKIN LESIONS: 0
CONSTIPATION: 0
VOMITING: 0
BACK PAIN: 1
DIARRHEA: 0
BLOOD IN STOOL: 0

## 2024-03-20 NOTE — PROGRESS NOTES
[facility]  [address] [address]  [phone]          Miranda Juares  : 1945    Chief Complaint   Patient presents with    Follow-up     HPI     Miranda Juares is a 79 y.o.  female with a long PMH of microscopic hematuria who is referred to clinic for evaluation.     She reports microscopic hematuria dating back at least to .  She saw Dr. Farooq at that time and hematuria work up was recommended but she chose not to have this done.  She has been lost to follow up until this time.  She continues to have microscopic hematuria.  She does report a PMH of tobacco use.  No personal or FH of  cancer.  Reports 40 year smoking history.  No occupational exposures.  Denies known cause for hematuria.      On review, she does have  PMH of breast cancer treated with lumpectomy.     Past Medical History:   Diagnosis Date    Anxiety     Arthritis     back and knee's     Breast cancer (HCC) 2016    Left- s/p Lumpectomy    Chronic pain     back    Former smoker     was a passive smoker for 40 yrs; stopped in     GERD (gastroesophageal reflux disease)     nexium    Hard of hearing     bilateral hearing aids    Headache 2022    Headache     hx monthly migraines; rare now    Hypertension     controlled with med    Hypokalemia 2022    Hypothyroidism     managed with medication    Insomnia     Melanoma (HCC)     left face    Speech abnormality 2022    Status post total left knee replacement 2017    Status post total right knee replacement 2018     Past Surgical History:   Procedure Laterality Date    BREAST BIOPSY Left 2016    BREAST NEEDLE LOCALIZED PARTIAL MASTECTOMY/ LEFT //   performed by Rhett Gallegos [facility]    BREAST LUMPECTOMY Left 2016    BREAST SURGERY Left 2015    biopsy    COLONOSCOPY      COLONOSCOPY N/A 2018    COLONOSCOPY / BMI=30  performed by [place]tt Gallegos [place] [facility]

## 2024-03-27 ENCOUNTER — HOSPITAL ENCOUNTER (OUTPATIENT)
Dept: CT IMAGING | Age: 79
Discharge: HOME | End: 2024-03-30
Attending: UROLOGY
Payer: MEDICARE

## 2024-03-27 DIAGNOSIS — R31.9 HEMATURIA, UNSPECIFIED TYPE: ICD-10-CM

## 2024-03-27 LAB — CREAT BLD-MCNC: 0.67 MG/DL (ref 0.8–1.5)

## 2024-03-27 PROCEDURE — 6360000004 HC RX CONTRAST MEDICATION: Performed by: UROLOGY

## 2024-03-27 PROCEDURE — 82565 ASSAY OF CREATININE: CPT

## 2024-03-27 PROCEDURE — 74178 CT ABD&PLV WO CNTR FLWD CNTR: CPT

## 2024-03-27 RX ADMIN — IOPAMIDOL 100 ML: 755 INJECTION, SOLUTION INTRAVENOUS at 13:03

## 2024-04-05 ENCOUNTER — TELEPHONE (OUTPATIENT)
Dept: NEUROLOGY | Age: 79
End: 2024-04-05

## 2024-04-05 ENCOUNTER — PROCEDURE VISIT (OUTPATIENT)
Dept: UROLOGY | Age: 79
End: 2024-04-05

## 2024-04-05 DIAGNOSIS — N28.1 ACQUIRED RENAL CYST OF LEFT KIDNEY: ICD-10-CM

## 2024-04-05 DIAGNOSIS — R31.9 HEMATURIA, UNSPECIFIED TYPE: Primary | ICD-10-CM

## 2024-04-05 LAB
BILIRUBIN, URINE, POC: NEGATIVE
BLOOD URINE, POC: NORMAL
GLUCOSE URINE, POC: NEGATIVE
KETONES, URINE, POC: NEGATIVE
LEUKOCYTE ESTERASE, URINE, POC: NORMAL
NITRITE, URINE, POC: NEGATIVE
PH, URINE, POC: 5.5 (ref 4.6–8)
PROTEIN,URINE, POC: NEGATIVE
SPECIFIC GRAVITY, URINE, POC: 1.01 (ref 1–1.03)
URINALYSIS CLARITY, POC: NORMAL
URINALYSIS COLOR, POC: NORMAL
UROBILINOGEN, POC: NORMAL

## 2024-04-05 NOTE — PROGRESS NOTES
[facility]  [address]   [address]  [place] [address]  [phone]    Miranda Juares  : 1945         HPI   79 y.o., female who presents for cystoscopy for microscopic hematuria work up.     She has a long PMH of microscopic hematuria who is referred to clinic for evaluation.      She reports microscopic hematuria dating back at least to 2017.  She saw Dr. Farooq at that time and hematuria work up was recommended but she chose not to have this done.  She has been lost to follow up until this time.  She continues to have microscopic hematuria.  She does report a PMH of tobacco use.  No personal or FH of  cancer.  Reports 40 year smoking history.  No occupational exposures.  Denies known cause for hematuria.      CT Hematuria: 3/27/24  IMPRESSION:  Left renal cyst. Otherwise normal kidneys ureters and bladder.     On review, she does have  PMH of breast cancer treated with lumpectomy.       Past Medical History:   Diagnosis Date    Anxiety     Arthritis     back and knee's     Breast cancer (HCC) 2016    Left- s/p Lumpectomy    Chronic pain     back    Former smoker     was a passive smoker for 40 yrs; stopped in     GERD (gastroesophageal reflux disease)     nexium    Hard of hearing     bilateral hearing aids    Headache 2022    Headache     hx monthly migraines; rare now    Hypertension     controlled with med    Hypokalemia 2022    Hypothyroidism     managed with medication    Insomnia     Melanoma (HCC)     left face    Speech abnormality 2022    Status post total left knee replacement 2017    Status post total right knee replacement 2018     Past Surgical History:   Procedure Laterality Date    BREAST BIOPSY Left 2016    BREAST NEEDLE LOCALIZED PARTIAL MASTECTOMY/ LEFT //   performed by Rhett Gallegos MD at [facility] MAIN OR    BREAST LUMPECTOMY Left 2016    BREAST SURGERY Left 2015    biopsy    COLONOSCOPY      COLONOSCOPY N/A

## 2024-04-05 NOTE — TELEPHONE ENCOUNTER
Patient would like to try for another PA with [employer] for Botox. She said [place] told her we never sent in the paperwork.

## 2024-04-09 ENCOUNTER — OFFICE VISIT (OUTPATIENT)
Age: 79
End: 2024-04-09
Payer: MEDICARE

## 2024-04-09 VITALS
SYSTOLIC BLOOD PRESSURE: 156 MMHG | WEIGHT: 172 LBS | DIASTOLIC BLOOD PRESSURE: 74 MMHG | BODY MASS INDEX: 29.37 KG/M2 | HEIGHT: 64 IN

## 2024-04-09 DIAGNOSIS — I48.0 PAROXYSMAL ATRIAL FIBRILLATION (HCC): ICD-10-CM

## 2024-04-09 DIAGNOSIS — D68.69 SECONDARY HYPERCOAGULABLE STATE (HCC): ICD-10-CM

## 2024-04-09 DIAGNOSIS — I10 PRIMARY HYPERTENSION: Primary | ICD-10-CM

## 2024-04-09 PROCEDURE — 99214 OFFICE O/P EST MOD 30 MIN: CPT | Performed by: INTERNAL MEDICINE

## 2024-04-09 PROCEDURE — G8427 DOCREV CUR MEDS BY ELIG CLIN: HCPCS | Performed by: INTERNAL MEDICINE

## 2024-04-09 PROCEDURE — G8417 CALC BMI ABV UP PARAM F/U: HCPCS | Performed by: INTERNAL MEDICINE

## 2024-04-09 PROCEDURE — G8399 PT W/DXA RESULTS DOCUMENT: HCPCS | Performed by: INTERNAL MEDICINE

## 2024-04-09 PROCEDURE — 3078F DIAST BP <80 MM HG: CPT | Performed by: INTERNAL MEDICINE

## 2024-04-09 PROCEDURE — 1090F PRES/ABSN URINE INCON ASSESS: CPT | Performed by: INTERNAL MEDICINE

## 2024-04-09 PROCEDURE — 1036F TOBACCO NON-USER: CPT | Performed by: INTERNAL MEDICINE

## 2024-04-09 PROCEDURE — 3077F SYST BP >= 140 MM HG: CPT | Performed by: INTERNAL MEDICINE

## 2024-04-09 PROCEDURE — 1123F ACP DISCUSS/DSCN MKR DOCD: CPT | Performed by: INTERNAL MEDICINE

## 2024-04-09 RX ORDER — DILTIAZEM HYDROCHLORIDE 120 MG/1
120 CAPSULE, COATED, EXTENDED RELEASE ORAL DAILY
Qty: 90 CAPSULE | Refills: 3 | Status: SHIPPED | OUTPATIENT
Start: 2024-04-09

## 2024-04-09 RX ORDER — HYDROCHLOROTHIAZIDE 25 MG/1
25 TABLET ORAL DAILY
Qty: 90 TABLET | Refills: 3 | Status: SHIPPED | OUTPATIENT
Start: 2024-04-09

## 2024-04-09 NOTE — PROGRESS NOTES
[facility]  [address] [place] [address], [address]  [place] [address]  PHONE: [phone]        24        NAME:  Miranda Juares  : 1945  MRN: [id number]     CHIEF COMPLAINT:    Hypertension, Atrial Fibrillation, and Medication Adjustment      SUBJECTIVE:     Since last visit, pt had gross hematuria on Eliquis. Pt was seen and evaluated cleared by urology. Pt now on Xarelto and there is no bleeding.  Pt. feels xs fatigue w/ current med regimen.         Medications were all reviewed with the patient today and updated as necessary.   Current Outpatient Medications   Medication Sig    dilTIAZem (CARDIZEM CD) 120 MG extended release capsule Take 1 capsule by mouth daily    hydroCHLOROthiazide (HYDRODIURIL) 25 MG tablet Take 1 tablet by mouth daily    temazepam (RESTORIL) 30 MG capsule Take 1 capsule by mouth nightly as needed for Sleep for up to 180 days. Max Daily Amount: 30 mg    levothyroxine (SYNTHROID) 25 MCG tablet Take 1 tablet by mouth Daily    rivaroxaban (XARELTO) 20 MG TABS tablet Take 1 tablet by mouth daily (with breakfast)    CALCIUM & MAGNESIUM CARBONATES PO Take by mouth    zinc 50 MG TABS tablet Take 1 tablet by mouth three times a week    APPLE CIDER VINEGAR PO Take by mouth    TURMERIC PO Take by mouth    acetaminophen (TYLENOL) 500 MG tablet Take 2 tablets by mouth every 6 hours as needed    esomeprazole (NEXIUM) 20 MG delayed release capsule Take 1 capsule by mouth    ibuprofen (ADVIL;MOTRIN) 200 MG tablet Take by mouth as needed    olopatadine (PATANASE) 0.6 % SOLN nassl soln INSTILL 2 SPRAYS INTO EACH NOSTRIL TWO TIMES A DAY     No current facility-administered medications for this visit.        Allergies   Allergen Reactions    Milk (Cow) Other (See Comments)    Eliquis [Apixaban] Rash           PHYSICAL EXAM:     Wt Readings from Last 3 Encounters:   24 78 kg (172 lb)   24 77.6 kg (171 lb)   24 79.8 kg (176 lb)     BP Readings from Last 3

## 2024-05-09 DIAGNOSIS — G47.00 INSOMNIA, UNSPECIFIED TYPE: ICD-10-CM

## 2024-05-09 RX ORDER — TEMAZEPAM 30 MG/1
30 CAPSULE ORAL NIGHTLY PRN
Qty: 90 CAPSULE | Refills: 0 | Status: SHIPPED | OUTPATIENT
Start: 2024-05-09 | End: 2024-11-05

## 2024-05-13 ENCOUNTER — TELEPHONE (OUTPATIENT)
Age: 79
End: 2024-05-13

## 2024-05-13 DIAGNOSIS — R49.0 HOARSENESS: Primary | ICD-10-CM

## 2024-05-13 RX ORDER — LOSARTAN POTASSIUM 50 MG/1
50 TABLET ORAL DAILY
Qty: 90 TABLET | Refills: 3 | Status: SHIPPED | OUTPATIENT
Start: 2024-05-13

## 2024-05-13 NOTE — TELEPHONE ENCOUNTER
Pt states that since she started taking diltiazem she has been having problems. States that she had a nose bleed this morning.

## 2024-05-13 NOTE — TELEPHONE ENCOUNTER
I spoke w/pt.she reports since going on Diltiazem 120mg qday  and HCTZ 25mg qday 4/9  has been feeling really tired.Today she had a nose bleed for the first time ever./86 HR=74.She is also on Xarelto.She just has been very fatigued since starting these meds and just does not feel herself.Having some dizziness off/on.She just feels something is not right.Please advise..

## 2024-05-13 NOTE — TELEPHONE ENCOUNTER
Tres Eller MD  You10 minutes ago (12:59 PM)       Stop diltiazem  Stop hctz  Start losartan 50  Refer to ENT   I called and informed pt.of MD response and she v/u.Referral placed as below  Orders Placed This Encounter   Procedures    [facility]     Referral Priority:   Routine     Referral Type:   Eval and Treat     Referral Reason:   Specialty Services Required     Requested Specialty:   Otolaryngology     Number of Visits Requested:   1     Med escribed as below  Requested Prescriptions     Signed Prescriptions Disp Refills    losartan (COZAAR) 50 MG tablet 90 tablet 3     Sig: Take 1 tablet by mouth daily     Authorizing Provider: TRES ELLER     Ordering User: PAVEL LÓPEZ

## 2024-05-30 ENCOUNTER — TELEPHONE (OUTPATIENT)
Age: 79
End: 2024-05-30

## 2024-05-30 NOTE — TELEPHONE ENCOUNTER
Patient called stating she has the following issues :    losartan (COZAAR) 50 MG tablet   Dizziness  No energy  Tired  Feel better at night than in the morning  Takes Rx early morning

## 2024-05-30 NOTE — TELEPHONE ENCOUNTER
Called today c/o dizziness.BP today was high 156/71 usually runs around 120/60's.Was wanting to now if she should adjut her med.I told her BP has been controlled would not change med based on one day.BP in 120/60's should not cause dizziness.I advised she f/u w/ENT and PCP about ongoing dizziness,continue current meds,keep BP log for 7 days and call back in 7 days w/report.Pt.v/u.

## (undated) DEVICE — FAN SPRAY KIT: Brand: PULSAVAC®

## (undated) DEVICE — REM POLYHESIVE ADULT PATIENT RETURN ELECTRODE: Brand: VALLEYLAB

## (undated) DEVICE — SKIN STAPLER: Brand: SIGNET

## (undated) DEVICE — SYR LR LCK 1ML GRAD NSAF 30ML --

## (undated) DEVICE — Device

## (undated) DEVICE — BANDAGE COMPR SELF ADH 5 YDX4 IN TAN STRL PREMIERPRO LF

## (undated) DEVICE — T4 HOOD

## (undated) DEVICE — X-LARGE COTTON GLOVE: Brand: DEROYAL

## (undated) DEVICE — DRAPE,TOP,102X53,STERILE: Brand: MEDLINE

## (undated) DEVICE — 2000CC GUARDIAN II: Brand: GUARDIAN

## (undated) DEVICE — SYR 50ML LR LCK 1ML GRAD NSAF --

## (undated) DEVICE — SYR 50ML SLIP TIP NSAF LF STRL --

## (undated) DEVICE — SYR 10ML LUER LOK 1/5ML GRAD --

## (undated) DEVICE — SOLUTION IV 1000ML 0.9% SOD CHL

## (undated) DEVICE — SUTURE PDS II SZ 1 L96IN ABSRB VLT TP-1 L65MM 1/2 CIR Z880G

## (undated) DEVICE — PACK PROCEDURE SURG TOT KNEE

## (undated) DEVICE — SUT ETHBND 2 30IN LR DA GRN --

## (undated) DEVICE — CURETTE BNE CEM 10IN DISP --

## (undated) DEVICE — 3000CC GUARDIAN II: Brand: GUARDIAN

## (undated) DEVICE — CANNULA NSL ORAL AD FOR CAPNOFLEX CO2 O2 AIRLFE

## (undated) DEVICE — SYR 3ML LL TIP 1/10ML GRAD --

## (undated) DEVICE — SOLUTION IRRIG 3000ML 0.9% SOD CHL FLX CONT 0797208] ICU MEDICAL INC]

## (undated) DEVICE — BUTTON SWITCH PENCIL BLADE ELECTRODE, HOLSTER: Brand: EDGE

## (undated) DEVICE — SOLUTION IV DEXTROSE/SALINE 5%-0.9% 500ML - 500ML

## (undated) DEVICE — KENDALL RADIOLUCENT FOAM MONITORING ELECTRODE RECTANGULAR SHAPE: Brand: KENDALL

## (undated) DEVICE — PRECISION FALCON OSCILLATING TIP SAW CARTRIDGE: Brand: PRECISION FALCON

## (undated) DEVICE — FORCEPS BX L240CM JAW DIA2.8MM L CAP W/ NDL MIC MESH TOOTH

## (undated) DEVICE — CONNECTOR TBNG OD5-7MM O2 END DISP

## (undated) DEVICE — CONTAINER PREFIL FRMLN 40ML --

## (undated) DEVICE — Z DISCONTINUED USE 2744636  DRESSING AQUACEL 14 IN ALG W3.5XL14IN POLYUR FLM CVR W/ HYDRCOLL

## (undated) DEVICE — BLADE SAW PAT RMR PILT H 41MM --

## (undated) DEVICE — SUTURE STRATAFIX SYMMETRIC PDS + SZ 2-0 L18IN ABSRB VLT SXPP1A403

## (undated) DEVICE — TRAY PREP DRY W/ PREM GLV 2 APPL 6 SPNG 2 UNDPD 1 OVERWRAP

## (undated) DEVICE — MEDI-VAC YANKAUER SUCTION HANDLE W/BULBOUS TIP: Brand: CARDINAL HEALTH

## (undated) DEVICE — 3M™ STERI-DRAPE™ INSTRUMENT POUCH 1018: Brand: STERI-DRAPE™

## (undated) DEVICE — (D)SYR 10ML 1/5ML GRAD NSAF -- PKGING CHANGE USE ITEM 338027

## (undated) DEVICE — SIZER SURG TIB KT DISP TRIATHLON PRECIS

## (undated) DEVICE — TRAY CATH 16F DRN BG LTX -- CONVERT TO ITEM 363158

## (undated) DEVICE — SUTURE ABSRB X-1 REV CUT 1/2 CIR 22MM UD BRAID 27IN SZ 3-0 J458H

## (undated) DEVICE — SYRINGE CATH TIP 50ML

## (undated) DEVICE — SUTURE VCRL SZ 1 L27IN ABSRB UD L36MM CP-1 1/2 CIR REV CUT J268H

## (undated) DEVICE — NDL PRT INJ NSAF BLNT 18GX1.5 --

## (undated) DEVICE — BIPOLAR SEALER 23-112-1 AQM 6.0: Brand: AQUAMANTYS ®

## (undated) DEVICE — FCPS BX HOT RJ4 2.2MMX240CM -- RADIAL JAW 4 BX/40

## (undated) DEVICE — SYR 5ML 1/5 GRAD LL NSAF LF --

## (undated) DEVICE — (D)PREP SKN CHLRAPRP APPL 26ML -- CONVERT TO ITEM 371833

## (undated) DEVICE — 3M™ COBAN™ NL STERILE NON-LATEX SELF-ADHERENT WRAP, 2084S, 4 IN X 5 YD (10 CM X 4,5 M), 18 ROLLS/CASE: Brand: 3M™ COBAN™